# Patient Record
Sex: FEMALE | Race: WHITE | Employment: UNEMPLOYED
[De-identification: names, ages, dates, MRNs, and addresses within clinical notes are randomized per-mention and may not be internally consistent; named-entity substitution may affect disease eponyms.]

---

## 2017-01-12 ENCOUNTER — OFFICE VISIT (OUTPATIENT)
Dept: BARIATRICS/WEIGHT MGMT | Facility: CLINIC | Age: 57
End: 2017-01-12

## 2017-01-12 VITALS
HEART RATE: 74 BPM | SYSTOLIC BLOOD PRESSURE: 122 MMHG | RESPIRATION RATE: 20 BRPM | HEIGHT: 66 IN | DIASTOLIC BLOOD PRESSURE: 74 MMHG | BODY MASS INDEX: 29.89 KG/M2 | WEIGHT: 186 LBS

## 2017-01-12 DIAGNOSIS — Z98.84 STATUS POST BARIATRIC SURGERY: ICD-10-CM

## 2017-01-12 DIAGNOSIS — G47.33 OBSTRUCTIVE SLEEP APNEA: Primary | ICD-10-CM

## 2017-01-12 PROCEDURE — 99212 OFFICE O/P EST SF 10 MIN: CPT | Performed by: SURGERY

## 2017-02-20 ENCOUNTER — OFFICE VISIT (OUTPATIENT)
Dept: PULMONOLOGY | Facility: CLINIC | Age: 57
End: 2017-02-20

## 2017-02-20 VITALS
WEIGHT: 188.6 LBS | HEART RATE: 66 BPM | DIASTOLIC BLOOD PRESSURE: 67 MMHG | RESPIRATION RATE: 18 BRPM | OXYGEN SATURATION: 99 % | BODY MASS INDEX: 29.6 KG/M2 | SYSTOLIC BLOOD PRESSURE: 102 MMHG | HEIGHT: 67 IN

## 2017-02-20 DIAGNOSIS — Z86.718 HISTORY OF DVT (DEEP VEIN THROMBOSIS): ICD-10-CM

## 2017-02-20 DIAGNOSIS — I10 ESSENTIAL HYPERTENSION: ICD-10-CM

## 2017-02-20 DIAGNOSIS — Z99.89 OSA ON CPAP: ICD-10-CM

## 2017-02-20 DIAGNOSIS — J45.20 MILD INTERMITTENT ASTHMA WITHOUT COMPLICATION: Primary | ICD-10-CM

## 2017-02-20 DIAGNOSIS — G47.33 OSA ON CPAP: ICD-10-CM

## 2017-02-20 DIAGNOSIS — Z98.84 STATUS POST GASTRIC BYPASS FOR OBESITY: ICD-10-CM

## 2017-02-20 DIAGNOSIS — R63.4 WEIGHT LOSS: ICD-10-CM

## 2017-02-20 PROCEDURE — 99213 OFFICE O/P EST LOW 20 MIN: CPT | Performed by: INTERNAL MEDICINE

## 2017-02-20 RX ORDER — SENNOSIDES 8.6 MG
650 CAPSULE ORAL EVERY 8 HOURS PRN
Status: ON HOLD | COMMUNITY
End: 2018-11-22

## 2017-02-21 ENCOUNTER — OFFICE VISIT (OUTPATIENT)
Dept: FAMILY MEDICINE CLINIC | Facility: CLINIC | Age: 57
End: 2017-02-21

## 2017-02-21 VITALS
WEIGHT: 188.8 LBS | OXYGEN SATURATION: 98 % | HEART RATE: 64 BPM | HEIGHT: 66 IN | SYSTOLIC BLOOD PRESSURE: 112 MMHG | TEMPERATURE: 97.8 F | DIASTOLIC BLOOD PRESSURE: 76 MMHG | BODY MASS INDEX: 30.34 KG/M2

## 2017-02-21 DIAGNOSIS — H72.91 PERFORATION OF EAR DRUM, RIGHT: Primary | ICD-10-CM

## 2017-02-21 PROCEDURE — 99213 OFFICE O/P EST LOW 20 MIN: CPT | Performed by: PHYSICIAN ASSISTANT

## 2017-02-21 RX ORDER — TRIAMCINOLONE ACETONIDE 40 MG/ML
40 INJECTION, SUSPENSION INTRA-ARTICULAR; INTRAMUSCULAR ONCE
Status: COMPLETED | OUTPATIENT
Start: 2017-02-21 | End: 2017-02-21

## 2017-02-21 RX ORDER — LEVOFLOXACIN 500 MG/1
500 TABLET, FILM COATED ORAL DAILY
Qty: 5 TABLET | Refills: 0 | Status: SHIPPED | OUTPATIENT
Start: 2017-02-21 | End: 2017-02-26

## 2017-02-21 RX ORDER — CEFTRIAXONE 1 G/1
1 INJECTION, POWDER, FOR SOLUTION INTRAMUSCULAR; INTRAVENOUS ONCE
Status: COMPLETED | OUTPATIENT
Start: 2017-02-21 | End: 2017-02-21

## 2017-02-21 RX ADMIN — TRIAMCINOLONE ACETONIDE 40 MG: 40 INJECTION, SUSPENSION INTRA-ARTICULAR; INTRAMUSCULAR at 09:36

## 2017-02-21 RX ADMIN — CEFTRIAXONE 1 G: 1 INJECTION, POWDER, FOR SOLUTION INTRAMUSCULAR; INTRAVENOUS at 09:35

## 2017-02-21 ASSESSMENT — ENCOUNTER SYMPTOMS
COUGH: 0
EYE ITCHING: 0
NAUSEA: 0
CHEST TIGHTNESS: 0
SINUS PRESSURE: 0
EYE DISCHARGE: 0
RHINORRHEA: 0
DIARRHEA: 0
VOMITING: 0
ABDOMINAL PAIN: 0
SORE THROAT: 0
SHORTNESS OF BREATH: 0

## 2017-03-09 ENCOUNTER — OFFICE VISIT (OUTPATIENT)
Dept: FAMILY MEDICINE CLINIC | Facility: CLINIC | Age: 57
End: 2017-03-09

## 2017-03-09 VITALS
SYSTOLIC BLOOD PRESSURE: 101 MMHG | WEIGHT: 185 LBS | OXYGEN SATURATION: 99 % | HEART RATE: 71 BPM | HEIGHT: 66 IN | TEMPERATURE: 97.8 F | BODY MASS INDEX: 29.73 KG/M2 | DIASTOLIC BLOOD PRESSURE: 60 MMHG

## 2017-03-09 DIAGNOSIS — L72.9 INFECTED CYST OF SKIN: Primary | ICD-10-CM

## 2017-03-09 DIAGNOSIS — L08.9 INFECTED CYST OF SKIN: Primary | ICD-10-CM

## 2017-03-09 PROCEDURE — 99213 OFFICE O/P EST LOW 20 MIN: CPT | Performed by: NURSE PRACTITIONER

## 2017-03-09 RX ORDER — CYCLOSPORINE 0.5 MG/ML
1 EMULSION OPHTHALMIC 2 TIMES DAILY
COMMUNITY
Start: 2017-03-01 | End: 2020-09-24

## 2017-03-09 RX ORDER — CEPHALEXIN 500 MG/1
500 CAPSULE ORAL 3 TIMES DAILY
Qty: 30 CAPSULE | Refills: 0 | Status: SHIPPED | OUTPATIENT
Start: 2017-03-09 | End: 2017-03-19

## 2017-03-20 ENCOUNTER — OFFICE VISIT (OUTPATIENT)
Dept: FAMILY MEDICINE CLINIC | Age: 57
End: 2017-03-20
Payer: MEDICARE

## 2017-03-20 VITALS
HEART RATE: 98 BPM | DIASTOLIC BLOOD PRESSURE: 76 MMHG | HEIGHT: 66 IN | OXYGEN SATURATION: 98 % | BODY MASS INDEX: 29.88 KG/M2 | TEMPERATURE: 97.7 F | WEIGHT: 185.9 LBS | SYSTOLIC BLOOD PRESSURE: 118 MMHG

## 2017-03-20 DIAGNOSIS — L02.91 ABSCESS: Primary | ICD-10-CM

## 2017-03-20 PROCEDURE — 99213 OFFICE O/P EST LOW 20 MIN: CPT | Performed by: PHYSICIAN ASSISTANT

## 2017-03-20 RX ORDER — DOXYCYCLINE HYCLATE 100 MG
100 TABLET ORAL 2 TIMES DAILY
Qty: 20 TABLET | Refills: 0 | Status: SHIPPED | OUTPATIENT
Start: 2017-03-20 | End: 2017-03-30

## 2017-03-20 ASSESSMENT — ENCOUNTER SYMPTOMS
EYE ITCHING: 0
COUGH: 0
VOMITING: 0
NAUSEA: 0
EYE DISCHARGE: 0
RHINORRHEA: 0
DIARRHEA: 0
SINUS PRESSURE: 0
CHEST TIGHTNESS: 0
ABDOMINAL PAIN: 0
SHORTNESS OF BREATH: 0
SORE THROAT: 0

## 2017-03-31 ENCOUNTER — ANESTHESIA EVENT (OUTPATIENT)
Dept: OPERATING ROOM | Age: 57
End: 2017-03-31
Payer: MEDICARE

## 2017-04-03 ENCOUNTER — HOSPITAL ENCOUNTER (OUTPATIENT)
Age: 57
Setting detail: OUTPATIENT SURGERY
Discharge: HOME | End: 2017-04-03
Attending: SURGERY | Admitting: SURGERY
Payer: MEDICARE

## 2017-04-03 ENCOUNTER — ANESTHESIA (OUTPATIENT)
Dept: OPERATING ROOM | Age: 57
End: 2017-04-03
Payer: MEDICARE

## 2017-04-03 VITALS — SYSTOLIC BLOOD PRESSURE: 105 MMHG | DIASTOLIC BLOOD PRESSURE: 55 MMHG | OXYGEN SATURATION: 100 %

## 2017-04-03 VITALS
HEART RATE: 67 BPM | BODY MASS INDEX: 29.73 KG/M2 | WEIGHT: 185 LBS | OXYGEN SATURATION: 99 % | TEMPERATURE: 97.2 F | HEIGHT: 66 IN | SYSTOLIC BLOOD PRESSURE: 110 MMHG | RESPIRATION RATE: 16 BRPM | DIASTOLIC BLOOD PRESSURE: 68 MMHG

## 2017-04-03 LAB
ANION GAP: 12 MMOL/L (ref 8–16)
GLUCOSE BLD-MCNC: 84 MG/DL (ref 74–106)
HCT VFR BLD CALC: 40.4 % (ref 36–46)
HEMOGLOBIN: 13.6 G/DL (ref 12–16)
MCH RBC QN AUTO: 29.8 PG (ref 26–34)
MCHC RBC AUTO-ENTMCNC: 33.6 G/DL (ref 31–37)
MCV RBC AUTO: 88.9 FL (ref 80–100)
PDW BLD-RTO: 14.9 % (ref 12.5–15.4)
PLATELET # BLD: 287 K/UL (ref 140–450)
PMV BLD AUTO: 6.9 FL (ref 6–12)
POC BUN: 17 MG/DL (ref 6–20)
POC CHLORIDE: 102 MMOL/L (ref 98–110)
POC CREATININE: 0.7 MG/DL (ref 0.6–1.4)
POC HEMATOCRIT: 43 % (ref 36–46)
POC HEMOGLOBIN: 14.6 GM/DL (ref 12–16)
POC IONIZED CALCIUM: 1.19 MMOL/L (ref 1.13–1.33)
POC POTASSIUM: 5.3 MMOL/L (ref 3.5–5.1)
POC SODIUM: 140 MMOL/L (ref 136–145)
POC TCO2: 32 MMOL/L (ref 20–31)
RBC # BLD: 4.54 M/UL (ref 4–5.2)
WBC # BLD: 9.4 K/UL (ref 3.5–11)

## 2017-04-03 PROCEDURE — 2580000003 HC RX 258: Performed by: ANESTHESIOLOGY

## 2017-04-03 PROCEDURE — 3600000002 HC SURGERY LEVEL 2 BASE: Performed by: SURGERY

## 2017-04-03 PROCEDURE — 80047 BASIC METABLC PNL IONIZED CA: CPT

## 2017-04-03 PROCEDURE — 3700000000 HC ANESTHESIA ATTENDED CARE: Performed by: SURGERY

## 2017-04-03 PROCEDURE — 7100000010 HC PHASE II RECOVERY - FIRST 15 MIN: Performed by: SURGERY

## 2017-04-03 PROCEDURE — 3700000001 HC ADD 15 MINUTES (ANESTHESIA): Performed by: SURGERY

## 2017-04-03 PROCEDURE — 3600000012 HC SURGERY LEVEL 2 ADDTL 15MIN: Performed by: SURGERY

## 2017-04-03 PROCEDURE — 2580000003 HC RX 258: Performed by: SURGERY

## 2017-04-03 PROCEDURE — 85027 COMPLETE CBC AUTOMATED: CPT

## 2017-04-03 PROCEDURE — 2500000003 HC RX 250 WO HCPCS: Performed by: SURGERY

## 2017-04-03 PROCEDURE — 2500000003 HC RX 250 WO HCPCS: Performed by: ANESTHESIOLOGY

## 2017-04-03 PROCEDURE — 93005 ELECTROCARDIOGRAM TRACING: CPT

## 2017-04-03 PROCEDURE — 6360000002 HC RX W HCPCS: Performed by: NURSE ANESTHETIST, CERTIFIED REGISTERED

## 2017-04-03 PROCEDURE — 88304 TISSUE EXAM BY PATHOLOGIST: CPT

## 2017-04-03 PROCEDURE — 85014 HEMATOCRIT: CPT

## 2017-04-03 PROCEDURE — 2580000003 HC RX 258: Performed by: NURSE ANESTHETIST, CERTIFIED REGISTERED

## 2017-04-03 PROCEDURE — 7100000011 HC PHASE II RECOVERY - ADDTL 15 MIN: Performed by: SURGERY

## 2017-04-03 PROCEDURE — 2500000003 HC RX 250 WO HCPCS: Performed by: NURSE ANESTHETIST, CERTIFIED REGISTERED

## 2017-04-03 RX ORDER — FENTANYL CITRATE 50 UG/ML
INJECTION, SOLUTION INTRAMUSCULAR; INTRAVENOUS PRN
Status: DISCONTINUED | OUTPATIENT
Start: 2017-04-03 | End: 2017-04-03 | Stop reason: SDUPTHER

## 2017-04-03 RX ORDER — FENTANYL CITRATE 50 UG/ML
25 INJECTION, SOLUTION INTRAMUSCULAR; INTRAVENOUS EVERY 5 MIN PRN
Status: DISCONTINUED | OUTPATIENT
Start: 2017-04-03 | End: 2017-04-03 | Stop reason: HOSPADM

## 2017-04-03 RX ORDER — CLINDAMYCIN PHOSPHATE 150 MG/ML
INJECTION, SOLUTION INTRAVENOUS PRN
Status: DISCONTINUED | OUTPATIENT
Start: 2017-04-03 | End: 2017-04-03 | Stop reason: SDUPTHER

## 2017-04-03 RX ORDER — SODIUM CHLORIDE, SODIUM LACTATE, POTASSIUM CHLORIDE, CALCIUM CHLORIDE 600; 310; 30; 20 MG/100ML; MG/100ML; MG/100ML; MG/100ML
INJECTION, SOLUTION INTRAVENOUS CONTINUOUS
Status: DISCONTINUED | OUTPATIENT
Start: 2017-04-03 | End: 2017-04-03 | Stop reason: SDUPTHER

## 2017-04-03 RX ORDER — HYDROCODONE BITARTRATE AND ACETAMINOPHEN 5; 325 MG/1; MG/1
1 TABLET ORAL EVERY 6 HOURS PRN
Qty: 10 TABLET | Refills: 0 | Status: SHIPPED | OUTPATIENT
Start: 2017-04-03 | End: 2017-04-10

## 2017-04-03 RX ORDER — DIPHENHYDRAMINE HYDROCHLORIDE 50 MG/ML
12.5 INJECTION INTRAMUSCULAR; INTRAVENOUS
Status: DISCONTINUED | OUTPATIENT
Start: 2017-04-03 | End: 2017-04-03 | Stop reason: HOSPADM

## 2017-04-03 RX ORDER — ONDANSETRON 2 MG/ML
4 INJECTION INTRAMUSCULAR; INTRAVENOUS
Status: DISCONTINUED | OUTPATIENT
Start: 2017-04-03 | End: 2017-04-03 | Stop reason: HOSPADM

## 2017-04-03 RX ORDER — SODIUM CHLORIDE, SODIUM LACTATE, POTASSIUM CHLORIDE, CALCIUM CHLORIDE 600; 310; 30; 20 MG/100ML; MG/100ML; MG/100ML; MG/100ML
INJECTION, SOLUTION INTRAVENOUS CONTINUOUS PRN
Status: DISCONTINUED | OUTPATIENT
Start: 2017-04-03 | End: 2017-04-03 | Stop reason: SDUPTHER

## 2017-04-03 RX ORDER — MIDAZOLAM HYDROCHLORIDE 1 MG/ML
INJECTION INTRAMUSCULAR; INTRAVENOUS PRN
Status: DISCONTINUED | OUTPATIENT
Start: 2017-04-03 | End: 2017-04-03 | Stop reason: SDUPTHER

## 2017-04-03 RX ORDER — BUPIVACAINE HYDROCHLORIDE AND EPINEPHRINE 5; 5 MG/ML; UG/ML
INJECTION, SOLUTION EPIDURAL; INTRACAUDAL; PERINEURAL PRN
Status: DISCONTINUED | OUTPATIENT
Start: 2017-04-03 | End: 2017-04-03 | Stop reason: HOSPADM

## 2017-04-03 RX ORDER — MAGNESIUM HYDROXIDE 1200 MG/15ML
LIQUID ORAL CONTINUOUS PRN
Status: DISCONTINUED | OUTPATIENT
Start: 2017-04-03 | End: 2017-04-03 | Stop reason: HOSPADM

## 2017-04-03 RX ORDER — LIDOCAINE HYDROCHLORIDE 10 MG/ML
1 INJECTION, SOLUTION EPIDURAL; INFILTRATION; INTRACAUDAL; PERINEURAL
Status: COMPLETED | OUTPATIENT
Start: 2017-04-03 | End: 2017-04-03

## 2017-04-03 RX ORDER — FENTANYL CITRATE 50 UG/ML
50 INJECTION, SOLUTION INTRAMUSCULAR; INTRAVENOUS EVERY 5 MIN PRN
Status: DISCONTINUED | OUTPATIENT
Start: 2017-04-03 | End: 2017-04-03 | Stop reason: HOSPADM

## 2017-04-03 RX ORDER — PROPOFOL 10 MG/ML
INJECTION, EMULSION INTRAVENOUS CONTINUOUS PRN
Status: DISCONTINUED | OUTPATIENT
Start: 2017-04-03 | End: 2017-04-03 | Stop reason: SDUPTHER

## 2017-04-03 RX ORDER — HYDROCODONE BITARTRATE AND ACETAMINOPHEN 5; 325 MG/1; MG/1
1 TABLET ORAL EVERY 6 HOURS PRN
Qty: 10 TABLET | Refills: 0 | Status: SHIPPED | OUTPATIENT
Start: 2017-04-03 | End: 2017-04-03

## 2017-04-03 RX ADMIN — MIDAZOLAM HYDROCHLORIDE 2 MG: 1 INJECTION, SOLUTION INTRAMUSCULAR; INTRAVENOUS at 15:29

## 2017-04-03 RX ADMIN — FENTANYL CITRATE 50 MCG: 50 INJECTION INTRAMUSCULAR; INTRAVENOUS at 15:30

## 2017-04-03 RX ADMIN — CLINDAMYCIN PHOSPHATE 900 MG: 150 INJECTION, SOLUTION INTRAMUSCULAR; INTRAVENOUS at 15:41

## 2017-04-03 RX ADMIN — SODIUM CHLORIDE, POTASSIUM CHLORIDE, SODIUM LACTATE AND CALCIUM CHLORIDE: 600; 310; 30; 20 INJECTION, SOLUTION INTRAVENOUS at 15:29

## 2017-04-03 RX ADMIN — FENTANYL CITRATE 25 MCG: 50 INJECTION INTRAMUSCULAR; INTRAVENOUS at 16:13

## 2017-04-03 RX ADMIN — FENTANYL CITRATE 25 MCG: 50 INJECTION INTRAMUSCULAR; INTRAVENOUS at 16:04

## 2017-04-03 RX ADMIN — SODIUM CHLORIDE, POTASSIUM CHLORIDE, SODIUM LACTATE AND CALCIUM CHLORIDE: 600; 310; 30; 20 INJECTION, SOLUTION INTRAVENOUS at 13:53

## 2017-04-03 RX ADMIN — LIDOCAINE HYDROCHLORIDE 0.4 ML: 10 INJECTION, SOLUTION INFILTRATION; PERINEURAL at 13:41

## 2017-04-03 RX ADMIN — PROPOFOL 100 MCG/KG/MIN: 10 INJECTION, EMULSION INTRAVENOUS at 15:32

## 2017-04-03 ASSESSMENT — PAIN - FUNCTIONAL ASSESSMENT: PAIN_FUNCTIONAL_ASSESSMENT: 0-10

## 2017-04-03 ASSESSMENT — PAIN SCALES - GENERAL
PAINLEVEL_OUTOF10: 2
PAINLEVEL_OUTOF10: 0
PAINLEVEL_OUTOF10: 2

## 2017-04-05 LAB — SURGICAL PATHOLOGY REPORT: NORMAL

## 2017-04-06 LAB
EKG ATRIAL RATE: 66 BPM
EKG P AXIS: 12 DEGREES
EKG P-R INTERVAL: 138 MS
EKG Q-T INTERVAL: 388 MS
EKG QRS DURATION: 72 MS
EKG QTC CALCULATION (BAZETT): 406 MS
EKG R AXIS: 33 DEGREES
EKG T AXIS: 38 DEGREES
EKG VENTRICULAR RATE: 66 BPM

## 2017-05-12 RX ORDER — LORAZEPAM 0.5 MG/1
0.5 TABLET ORAL EVERY 8 HOURS PRN
Qty: 15 TABLET | Refills: 0 | Status: SHIPPED | OUTPATIENT
Start: 2017-05-12 | End: 2017-06-11

## 2017-06-06 RX ORDER — MOMETASONE FUROATE AND FORMOTEROL FUMARATE DIHYDRATE 200; 5 UG/1; UG/1
AEROSOL RESPIRATORY (INHALATION)
Qty: 1 INHALER | Refills: 2 | Status: SHIPPED | OUTPATIENT
Start: 2017-06-06 | End: 2017-08-21 | Stop reason: SDUPTHER

## 2017-07-12 ENCOUNTER — OFFICE VISIT (OUTPATIENT)
Dept: BARIATRICS/WEIGHT MGMT | Age: 57
End: 2017-07-12
Payer: MEDICARE

## 2017-07-12 VITALS
WEIGHT: 186 LBS | RESPIRATION RATE: 20 BRPM | SYSTOLIC BLOOD PRESSURE: 104 MMHG | DIASTOLIC BLOOD PRESSURE: 64 MMHG | HEART RATE: 68 BPM | BODY MASS INDEX: 29.89 KG/M2 | HEIGHT: 66 IN

## 2017-07-12 DIAGNOSIS — M25.562 CHRONIC PAIN OF LEFT KNEE: ICD-10-CM

## 2017-07-12 DIAGNOSIS — E66.9 OBESITY (BMI 30-39.9): ICD-10-CM

## 2017-07-12 DIAGNOSIS — Z98.84 STATUS POST GASTRIC BYPASS FOR OBESITY: ICD-10-CM

## 2017-07-12 DIAGNOSIS — Z99.89 OSA ON CPAP: ICD-10-CM

## 2017-07-12 DIAGNOSIS — G89.29 CHRONIC PAIN OF LEFT KNEE: ICD-10-CM

## 2017-07-12 DIAGNOSIS — M85.80 OSTEOPENIA: ICD-10-CM

## 2017-07-12 DIAGNOSIS — J45.909 UNCOMPLICATED ASTHMA, UNSPECIFIED ASTHMA SEVERITY: Primary | ICD-10-CM

## 2017-07-12 DIAGNOSIS — G47.33 OSA ON CPAP: ICD-10-CM

## 2017-07-12 DIAGNOSIS — M15.9 PRIMARY OSTEOARTHRITIS INVOLVING MULTIPLE JOINTS: ICD-10-CM

## 2017-07-12 PROCEDURE — 99213 OFFICE O/P EST LOW 20 MIN: CPT | Performed by: NURSE PRACTITIONER

## 2017-08-21 ENCOUNTER — OFFICE VISIT (OUTPATIENT)
Dept: PULMONOLOGY | Age: 57
End: 2017-08-21
Payer: MEDICARE

## 2017-08-21 VITALS
HEART RATE: 73 BPM | DIASTOLIC BLOOD PRESSURE: 72 MMHG | TEMPERATURE: 98.1 F | HEIGHT: 67 IN | RESPIRATION RATE: 15 BRPM | SYSTOLIC BLOOD PRESSURE: 114 MMHG | WEIGHT: 182 LBS | OXYGEN SATURATION: 98 % | BODY MASS INDEX: 28.56 KG/M2

## 2017-08-21 DIAGNOSIS — I10 ESSENTIAL HYPERTENSION: ICD-10-CM

## 2017-08-21 DIAGNOSIS — Z98.84 STATUS POST GASTRIC BYPASS FOR OBESITY: ICD-10-CM

## 2017-08-21 DIAGNOSIS — R63.4 WEIGHT LOSS: ICD-10-CM

## 2017-08-21 DIAGNOSIS — G47.33 OSA ON CPAP: ICD-10-CM

## 2017-08-21 DIAGNOSIS — Z86.718 HISTORY OF DVT (DEEP VEIN THROMBOSIS): ICD-10-CM

## 2017-08-21 DIAGNOSIS — Z99.89 OSA ON CPAP: ICD-10-CM

## 2017-08-21 DIAGNOSIS — J45.20 MILD INTERMITTENT ASTHMA WITHOUT COMPLICATION: Primary | ICD-10-CM

## 2017-08-21 PROCEDURE — 99214 OFFICE O/P EST MOD 30 MIN: CPT | Performed by: INTERNAL MEDICINE

## 2017-08-21 RX ORDER — ALBUTEROL SULFATE 2.5 MG/3ML
2.5 SOLUTION RESPIRATORY (INHALATION) EVERY 6 HOURS PRN
Qty: 120 EACH | Refills: 11 | Status: SHIPPED | OUTPATIENT
Start: 2017-08-21 | End: 2020-10-12 | Stop reason: SDUPTHER

## 2017-08-21 RX ORDER — ALBUTEROL SULFATE 90 UG/1
2 AEROSOL, METERED RESPIRATORY (INHALATION) EVERY 6 HOURS PRN
Qty: 1 INHALER | Refills: 11 | Status: ON HOLD | OUTPATIENT
Start: 2017-08-21 | End: 2018-12-06 | Stop reason: HOSPADM

## 2017-09-05 ENCOUNTER — OFFICE VISIT (OUTPATIENT)
Dept: FAMILY MEDICINE CLINIC | Age: 57
End: 2017-09-05
Payer: MEDICARE

## 2017-09-05 VITALS
WEIGHT: 189.6 LBS | HEART RATE: 75 BPM | BODY MASS INDEX: 30.47 KG/M2 | HEIGHT: 66 IN | SYSTOLIC BLOOD PRESSURE: 118 MMHG | TEMPERATURE: 98.2 F | DIASTOLIC BLOOD PRESSURE: 76 MMHG | OXYGEN SATURATION: 98 %

## 2017-09-05 DIAGNOSIS — R51.9 INTRACTABLE HEADACHE, UNSPECIFIED CHRONICITY PATTERN, UNSPECIFIED HEADACHE TYPE: Primary | ICD-10-CM

## 2017-09-05 DIAGNOSIS — J45.909 UNCOMPLICATED ASTHMA, UNSPECIFIED ASTHMA SEVERITY: ICD-10-CM

## 2017-09-05 DIAGNOSIS — M15.9 PRIMARY OSTEOARTHRITIS INVOLVING MULTIPLE JOINTS: ICD-10-CM

## 2017-09-05 DIAGNOSIS — E66.9 OBESITY (BMI 30-39.9): ICD-10-CM

## 2017-09-05 DIAGNOSIS — R63.4 WEIGHT LOSS: ICD-10-CM

## 2017-09-05 DIAGNOSIS — E55.9 VITAMIN D DEFICIENCY: ICD-10-CM

## 2017-09-05 DIAGNOSIS — Z23 NEED FOR INFLUENZA VACCINATION: ICD-10-CM

## 2017-09-05 DIAGNOSIS — I10 ESSENTIAL HYPERTENSION: ICD-10-CM

## 2017-09-05 PROCEDURE — 90471 IMMUNIZATION ADMIN: CPT | Performed by: PHYSICIAN ASSISTANT

## 2017-09-05 PROCEDURE — 90686 IIV4 VACC NO PRSV 0.5 ML IM: CPT | Performed by: PHYSICIAN ASSISTANT

## 2017-09-05 PROCEDURE — 99214 OFFICE O/P EST MOD 30 MIN: CPT | Performed by: PHYSICIAN ASSISTANT

## 2017-09-05 RX ORDER — KETOROLAC TROMETHAMINE 30 MG/ML
60 INJECTION, SOLUTION INTRAMUSCULAR; INTRAVENOUS ONCE
Status: COMPLETED | OUTPATIENT
Start: 2017-09-05 | End: 2017-09-05

## 2017-09-05 RX ORDER — TRIAMCINOLONE ACETONIDE 40 MG/ML
40 INJECTION, SUSPENSION INTRA-ARTICULAR; INTRAMUSCULAR ONCE
Status: COMPLETED | OUTPATIENT
Start: 2017-09-05 | End: 2017-09-05

## 2017-09-05 RX ADMIN — KETOROLAC TROMETHAMINE 60 MG: 30 INJECTION, SOLUTION INTRAMUSCULAR; INTRAVENOUS at 14:41

## 2017-09-05 RX ADMIN — TRIAMCINOLONE ACETONIDE 40 MG: 40 INJECTION, SUSPENSION INTRA-ARTICULAR; INTRAMUSCULAR at 14:38

## 2017-09-05 ASSESSMENT — PATIENT HEALTH QUESTIONNAIRE - PHQ9
1. LITTLE INTEREST OR PLEASURE IN DOING THINGS: 0
SUM OF ALL RESPONSES TO PHQ QUESTIONS 1-9: 0
SUM OF ALL RESPONSES TO PHQ9 QUESTIONS 1 & 2: 0
2. FEELING DOWN, DEPRESSED OR HOPELESS: 0

## 2017-09-06 ENCOUNTER — HOSPITAL ENCOUNTER (OUTPATIENT)
Age: 57
Discharge: HOME | End: 2017-09-06
Payer: MEDICARE

## 2017-09-06 DIAGNOSIS — E66.9 OBESITY (BMI 30-39.9): ICD-10-CM

## 2017-09-06 DIAGNOSIS — E55.9 VITAMIN D DEFICIENCY: ICD-10-CM

## 2017-09-06 DIAGNOSIS — I10 ESSENTIAL HYPERTENSION: ICD-10-CM

## 2017-09-06 DIAGNOSIS — M15.9 PRIMARY OSTEOARTHRITIS INVOLVING MULTIPLE JOINTS: ICD-10-CM

## 2017-09-06 DIAGNOSIS — R63.4 WEIGHT LOSS: ICD-10-CM

## 2017-09-06 DIAGNOSIS — R51.9 INTRACTABLE HEADACHE, UNSPECIFIED CHRONICITY PATTERN, UNSPECIFIED HEADACHE TYPE: ICD-10-CM

## 2017-09-06 DIAGNOSIS — J45.909 UNCOMPLICATED ASTHMA, UNSPECIFIED ASTHMA SEVERITY: ICD-10-CM

## 2017-09-06 LAB
ABSOLUTE EOS #: 0.2 K/UL (ref 0–0.4)
ABSOLUTE LYMPH #: 1.9 K/UL (ref 1–4.8)
ABSOLUTE MONO #: 0.5 K/UL (ref 0.1–1.2)
ALBUMIN SERPL-MCNC: 4 G/DL (ref 3.5–5.2)
ALBUMIN/GLOBULIN RATIO: 1.4 (ref 1–2.5)
ALP BLD-CCNC: 80 U/L (ref 35–104)
ALT SERPL-CCNC: 13 U/L (ref 5–33)
ANION GAP SERPL CALCULATED.3IONS-SCNC: 10 MMOL/L (ref 9–17)
AST SERPL-CCNC: 13 U/L
BASOPHILS # BLD: 0 %
BASOPHILS ABSOLUTE: 0 K/UL (ref 0–0.2)
BILIRUB SERPL-MCNC: 0.39 MG/DL (ref 0.3–1.2)
BUN BLDV-MCNC: 18 MG/DL (ref 6–20)
BUN/CREAT BLD: NORMAL (ref 9–20)
CALCIUM SERPL-MCNC: 8.9 MG/DL (ref 8.6–10.4)
CHLORIDE BLD-SCNC: 106 MMOL/L (ref 98–107)
CHOLESTEROL/HDL RATIO: 4.3
CHOLESTEROL: 166 MG/DL
CO2: 26 MMOL/L (ref 20–31)
CREAT SERPL-MCNC: 0.73 MG/DL (ref 0.5–0.9)
DIFFERENTIAL TYPE: NORMAL
EOSINOPHILS RELATIVE PERCENT: 2 %
ESTIMATED AVERAGE GLUCOSE: 105 MG/DL
FOLATE: >20 NG/ML
GFR AFRICAN AMERICAN: >60 ML/MIN
GFR NON-AFRICAN AMERICAN: >60 ML/MIN
GFR SERPL CREATININE-BSD FRML MDRD: NORMAL ML/MIN/{1.73_M2}
GFR SERPL CREATININE-BSD FRML MDRD: NORMAL ML/MIN/{1.73_M2}
GLUCOSE BLD-MCNC: 94 MG/DL (ref 70–99)
HBA1C MFR BLD: 5.3 % (ref 4–6)
HCT VFR BLD CALC: 40.6 % (ref 36–46)
HDLC SERPL-MCNC: 39 MG/DL
HEMOGLOBIN: 13.6 G/DL (ref 12–16)
IRON SATURATION: 26 % (ref 20–55)
IRON: 61 UG/DL (ref 37–145)
LDL CHOLESTEROL: 95 MG/DL (ref 0–130)
LYMPHOCYTES # BLD: 24 %
MCH RBC QN AUTO: 30.9 PG (ref 26–34)
MCHC RBC AUTO-ENTMCNC: 33.5 G/DL (ref 31–37)
MCV RBC AUTO: 92.2 FL (ref 80–100)
MONOCYTES # BLD: 6 %
PDW BLD-RTO: 13.3 % (ref 12.5–15.4)
PLATELET # BLD: 299 K/UL (ref 140–450)
PLATELET ESTIMATE: NORMAL
PMV BLD AUTO: 6.4 FL (ref 6–12)
POTASSIUM SERPL-SCNC: 4.9 MMOL/L (ref 3.7–5.3)
RBC # BLD: 4.4 M/UL (ref 4–5.2)
RBC # BLD: NORMAL 10*6/UL
SEG NEUTROPHILS: 68 %
SEGMENTED NEUTROPHILS ABSOLUTE COUNT: 5.2 K/UL (ref 1.8–7.7)
SODIUM BLD-SCNC: 142 MMOL/L (ref 135–144)
TOTAL IRON BINDING CAPACITY: 232 UG/DL (ref 250–450)
TOTAL PROTEIN: 6.9 G/DL (ref 6.4–8.3)
TRIGL SERPL-MCNC: 159 MG/DL
TSH SERPL DL<=0.05 MIU/L-ACNC: 1.15 MIU/L (ref 0.3–5)
UNSATURATED IRON BINDING CAPACITY: 171 UG/DL (ref 112–347)
VITAMIN B-12: 647 PG/ML (ref 211–946)
VITAMIN D 25-HYDROXY: 46.5 NG/ML (ref 30–100)
VLDLC SERPL CALC-MCNC: ABNORMAL MG/DL (ref 1–30)
WBC # BLD: 7.8 K/UL (ref 3.5–11)
WBC # BLD: NORMAL 10*3/UL

## 2017-09-06 PROCEDURE — 80053 COMPREHEN METABOLIC PANEL: CPT

## 2017-09-06 PROCEDURE — 83036 HEMOGLOBIN GLYCOSYLATED A1C: CPT

## 2017-09-06 PROCEDURE — 83540 ASSAY OF IRON: CPT

## 2017-09-06 PROCEDURE — 85025 COMPLETE CBC W/AUTO DIFF WBC: CPT

## 2017-09-06 PROCEDURE — 84425 ASSAY OF VITAMIN B-1: CPT

## 2017-09-06 PROCEDURE — 83550 IRON BINDING TEST: CPT

## 2017-09-06 PROCEDURE — 82607 VITAMIN B-12: CPT

## 2017-09-06 PROCEDURE — 84443 ASSAY THYROID STIM HORMONE: CPT

## 2017-09-06 PROCEDURE — 82306 VITAMIN D 25 HYDROXY: CPT

## 2017-09-06 PROCEDURE — 82746 ASSAY OF FOLIC ACID SERUM: CPT

## 2017-09-06 PROCEDURE — 80061 LIPID PANEL: CPT

## 2017-09-06 PROCEDURE — 36415 COLL VENOUS BLD VENIPUNCTURE: CPT

## 2017-09-06 RX ORDER — AMITRIPTYLINE HYDROCHLORIDE 25 MG/1
25 TABLET, FILM COATED ORAL NIGHTLY
Qty: 30 TABLET | Refills: 0 | Status: SHIPPED | OUTPATIENT
Start: 2017-09-06 | End: 2017-09-22

## 2017-09-06 RX ORDER — AMITRIPTYLINE HYDROCHLORIDE 10 MG/1
10 TABLET, FILM COATED ORAL NIGHTLY PRN
Qty: 30 TABLET | Refills: 3 | Status: SHIPPED | OUTPATIENT
Start: 2017-09-06 | End: 2017-09-06

## 2017-09-06 ASSESSMENT — ENCOUNTER SYMPTOMS
COUGH: 0
DIARRHEA: 0
BLURRED VISION: 0
NAUSEA: 0
DIFFICULTY BREATHING: 0
EYE PAIN: 0
EYE DISCHARGE: 0
ABDOMINAL PAIN: 0
PHOTOPHOBIA: 0
BACK PAIN: 0
EYE WATERING: 0
SINUS PRESSURE: 0
SHORTNESS OF BREATH: 0
EYE REDNESS: 0
ORTHOPNEA: 0
RHINORRHEA: 0
CHEST TIGHTNESS: 0
VOMITING: 0
SORE THROAT: 0
EYE ITCHING: 0

## 2017-09-09 ENCOUNTER — HOSPITAL ENCOUNTER (OUTPATIENT)
Dept: MRI IMAGING | Age: 57
Discharge: HOME | End: 2017-09-09
Payer: MEDICARE

## 2017-09-09 DIAGNOSIS — R51.9 INTRACTABLE HEADACHE, UNSPECIFIED CHRONICITY PATTERN, UNSPECIFIED HEADACHE TYPE: ICD-10-CM

## 2017-09-09 LAB — VITAMIN B1 WHOLE BLOOD: 97 NMOL/L (ref 70–180)

## 2017-09-09 PROCEDURE — 70551 MRI BRAIN STEM W/O DYE: CPT

## 2017-09-22 ENCOUNTER — OFFICE VISIT (OUTPATIENT)
Dept: FAMILY MEDICINE CLINIC | Age: 57
End: 2017-09-22
Payer: MEDICARE

## 2017-09-22 VITALS
HEIGHT: 66 IN | OXYGEN SATURATION: 98 % | DIASTOLIC BLOOD PRESSURE: 76 MMHG | TEMPERATURE: 98.1 F | WEIGHT: 186 LBS | SYSTOLIC BLOOD PRESSURE: 120 MMHG | BODY MASS INDEX: 29.89 KG/M2 | HEART RATE: 87 BPM

## 2017-09-22 DIAGNOSIS — R51.9 HEADACHE, UNSPECIFIED HEADACHE TYPE: Primary | ICD-10-CM

## 2017-09-22 PROCEDURE — 99213 OFFICE O/P EST LOW 20 MIN: CPT | Performed by: PHYSICIAN ASSISTANT

## 2017-09-22 RX ORDER — DOXYCYCLINE HYCLATE 100 MG
100 TABLET ORAL 2 TIMES DAILY
Qty: 20 TABLET | Refills: 0 | Status: SHIPPED | OUTPATIENT
Start: 2017-09-22 | End: 2017-10-02

## 2017-09-22 RX ORDER — AMITRIPTYLINE HYDROCHLORIDE 50 MG/1
50 TABLET, FILM COATED ORAL NIGHTLY
Qty: 30 TABLET | Refills: 3 | Status: SHIPPED | OUTPATIENT
Start: 2017-09-22 | End: 2017-10-10

## 2017-09-22 ASSESSMENT — ENCOUNTER SYMPTOMS
ABDOMINAL PAIN: 0
SHORTNESS OF BREATH: 0
NAUSEA: 0
DIFFICULTY BREATHING: 0
BACK PAIN: 0
EYE DISCHARGE: 0
PHOTOPHOBIA: 0
DIARRHEA: 0
COUGH: 0
SINUS PRESSURE: 0
EYE ITCHING: 0
EYE PAIN: 0
SORE THROAT: 0
BLURRED VISION: 0
ORTHOPNEA: 0
EYE REDNESS: 0
VOMITING: 0
RHINORRHEA: 0
EYE WATERING: 0
CHEST TIGHTNESS: 0
FACIAL SWEATING: 0

## 2017-09-26 ENCOUNTER — PATIENT MESSAGE (OUTPATIENT)
Dept: FAMILY MEDICINE CLINIC | Age: 57
End: 2017-09-26

## 2017-09-26 ENCOUNTER — HOSPITAL ENCOUNTER (EMERGENCY)
Age: 57
Discharge: HOME | End: 2017-09-26
Attending: EMERGENCY MEDICINE
Payer: MEDICARE

## 2017-09-26 VITALS
BODY MASS INDEX: 29.73 KG/M2 | DIASTOLIC BLOOD PRESSURE: 88 MMHG | OXYGEN SATURATION: 99 % | TEMPERATURE: 98.4 F | RESPIRATION RATE: 18 BRPM | WEIGHT: 185 LBS | HEART RATE: 78 BPM | SYSTOLIC BLOOD PRESSURE: 126 MMHG | HEIGHT: 66 IN

## 2017-09-26 DIAGNOSIS — R51.9 NONINTRACTABLE HEADACHE, UNSPECIFIED CHRONICITY PATTERN, UNSPECIFIED HEADACHE TYPE: Primary | ICD-10-CM

## 2017-09-26 PROCEDURE — 6360000002 HC RX W HCPCS: Performed by: NURSE PRACTITIONER

## 2017-09-26 PROCEDURE — S0028 INJECTION, FAMOTIDINE, 20 MG: HCPCS | Performed by: NURSE PRACTITIONER

## 2017-09-26 PROCEDURE — 99283 EMERGENCY DEPT VISIT LOW MDM: CPT

## 2017-09-26 PROCEDURE — 96374 THER/PROPH/DIAG INJ IV PUSH: CPT

## 2017-09-26 PROCEDURE — 96375 TX/PRO/DX INJ NEW DRUG ADDON: CPT

## 2017-09-26 PROCEDURE — 2500000003 HC RX 250 WO HCPCS: Performed by: NURSE PRACTITIONER

## 2017-09-26 PROCEDURE — 2580000003 HC RX 258: Performed by: NURSE PRACTITIONER

## 2017-09-26 RX ORDER — DEXAMETHASONE SODIUM PHOSPHATE 10 MG/ML
10 INJECTION INTRAMUSCULAR; INTRAVENOUS ONCE
Status: COMPLETED | OUTPATIENT
Start: 2017-09-26 | End: 2017-09-26

## 2017-09-26 RX ORDER — 0.9 % SODIUM CHLORIDE 0.9 %
1000 INTRAVENOUS SOLUTION INTRAVENOUS ONCE
Status: COMPLETED | OUTPATIENT
Start: 2017-09-26 | End: 2017-09-26

## 2017-09-26 RX ORDER — KETOROLAC TROMETHAMINE 15 MG/ML
15 INJECTION, SOLUTION INTRAMUSCULAR; INTRAVENOUS ONCE
Status: COMPLETED | OUTPATIENT
Start: 2017-09-26 | End: 2017-09-26

## 2017-09-26 RX ORDER — DIPHENHYDRAMINE HYDROCHLORIDE 50 MG/ML
25 INJECTION INTRAMUSCULAR; INTRAVENOUS ONCE
Status: COMPLETED | OUTPATIENT
Start: 2017-09-26 | End: 2017-09-26

## 2017-09-26 RX ADMIN — FAMOTIDINE 20 MG: 10 INJECTION INTRAVENOUS at 18:49

## 2017-09-26 RX ADMIN — DIPHENHYDRAMINE HYDROCHLORIDE 25 MG: 50 INJECTION, SOLUTION INTRAMUSCULAR; INTRAVENOUS at 18:02

## 2017-09-26 RX ADMIN — DEXAMETHASONE SODIUM PHOSPHATE 10 MG: 10 INJECTION INTRAMUSCULAR; INTRAVENOUS at 18:49

## 2017-09-26 RX ADMIN — SODIUM CHLORIDE 1000 ML: 9 INJECTION, SOLUTION INTRAVENOUS at 18:02

## 2017-09-26 RX ADMIN — PROCHLORPERAZINE EDISYLATE 10 MG: 5 INJECTION INTRAMUSCULAR; INTRAVENOUS at 18:02

## 2017-09-26 RX ADMIN — KETOROLAC TROMETHAMINE 15 MG: 15 INJECTION, SOLUTION INTRAMUSCULAR; INTRAVENOUS at 18:02

## 2017-09-26 ASSESSMENT — PAIN SCALES - GENERAL
PAINLEVEL_OUTOF10: 7
PAINLEVEL_OUTOF10: 8
PAINLEVEL_OUTOF10: 8

## 2017-09-26 ASSESSMENT — PAIN DESCRIPTION - LOCATION: LOCATION: HEAD

## 2017-09-26 ASSESSMENT — PAIN DESCRIPTION - DESCRIPTORS: DESCRIPTORS: DULL

## 2017-09-26 ASSESSMENT — PAIN DESCRIPTION - PAIN TYPE: TYPE: CHRONIC PAIN

## 2017-09-26 ASSESSMENT — PAIN DESCRIPTION - PROGRESSION: CLINICAL_PROGRESSION: GRADUALLY IMPROVING

## 2017-10-10 RX ORDER — AMITRIPTYLINE HYDROCHLORIDE 75 MG/1
150 TABLET, FILM COATED ORAL NIGHTLY
Qty: 60 TABLET | Refills: 3 | Status: SHIPPED | OUTPATIENT
Start: 2017-10-10 | End: 2018-02-05 | Stop reason: SDUPTHER

## 2017-10-11 ENCOUNTER — APPOINTMENT (OUTPATIENT)
Dept: CT IMAGING | Age: 57
End: 2017-10-11
Payer: MEDICARE

## 2017-10-11 ENCOUNTER — HOSPITAL ENCOUNTER (OUTPATIENT)
Age: 57
Setting detail: OBSERVATION
Discharge: HOME | End: 2017-10-12
Attending: EMERGENCY MEDICINE | Admitting: INTERNAL MEDICINE
Payer: MEDICARE

## 2017-10-11 DIAGNOSIS — R11.2 NAUSEA AND VOMITING, INTRACTABILITY OF VOMITING NOT SPECIFIED, UNSPECIFIED VOMITING TYPE: Primary | ICD-10-CM

## 2017-10-11 DIAGNOSIS — R10.13 ABDOMINAL PAIN, EPIGASTRIC: ICD-10-CM

## 2017-10-11 PROBLEM — K52.9 ACUTE GASTROENTERITIS: Status: ACTIVE | Noted: 2017-10-11

## 2017-10-11 LAB
-: ABNORMAL
ABSOLUTE EOS #: 0.1 K/UL (ref 0–0.4)
ABSOLUTE LYMPH #: 2.7 K/UL (ref 1–4.8)
ABSOLUTE MONO #: 1.1 K/UL (ref 0.1–1.2)
ALBUMIN SERPL-MCNC: 4.5 G/DL (ref 3.5–5.2)
ALBUMIN/GLOBULIN RATIO: 1.1 (ref 1–2.5)
ALP BLD-CCNC: 105 U/L (ref 35–104)
ALT SERPL-CCNC: 7 U/L (ref 5–33)
AMORPHOUS: ABNORMAL
AMYLASE: 61 U/L (ref 28–100)
ANION GAP SERPL CALCULATED.3IONS-SCNC: 14 MMOL/L (ref 9–17)
AST SERPL-CCNC: 15 U/L
BACTERIA: ABNORMAL
BASOPHILS # BLD: 0 %
BASOPHILS ABSOLUTE: 0.1 K/UL (ref 0–0.2)
BILIRUB SERPL-MCNC: 0.38 MG/DL (ref 0.3–1.2)
BILIRUBIN DIRECT: 0.11 MG/DL
BILIRUBIN URINE: NEGATIVE
BILIRUBIN, INDIRECT: 0.27 MG/DL (ref 0–1)
BUN BLDV-MCNC: 10 MG/DL (ref 6–20)
BUN/CREAT BLD: ABNORMAL (ref 9–20)
CALCIUM SERPL-MCNC: 10 MG/DL (ref 8.6–10.4)
CASTS UA: ABNORMAL /LPF
CHLORIDE BLD-SCNC: 99 MMOL/L (ref 98–107)
CO2: 27 MMOL/L (ref 20–31)
COLOR: YELLOW
COMMENT UA: ABNORMAL
CREAT SERPL-MCNC: 0.73 MG/DL (ref 0.5–0.9)
CRYSTALS, UA: ABNORMAL /HPF
DIFFERENTIAL TYPE: ABNORMAL
EKG ATRIAL RATE: 98 BPM
EKG P AXIS: 58 DEGREES
EKG P-R INTERVAL: 186 MS
EKG Q-T INTERVAL: 346 MS
EKG QRS DURATION: 84 MS
EKG QTC CALCULATION (BAZETT): 441 MS
EKG R AXIS: 55 DEGREES
EKG T AXIS: 47 DEGREES
EKG VENTRICULAR RATE: 98 BPM
EOSINOPHILS RELATIVE PERCENT: 1 %
EPITHELIAL CELLS UA: ABNORMAL /HPF (ref 0–5)
GFR AFRICAN AMERICAN: >60 ML/MIN
GFR NON-AFRICAN AMERICAN: >60 ML/MIN
GFR SERPL CREATININE-BSD FRML MDRD: ABNORMAL ML/MIN/{1.73_M2}
GFR SERPL CREATININE-BSD FRML MDRD: ABNORMAL ML/MIN/{1.73_M2}
GLOBULIN: ABNORMAL G/DL (ref 1.5–3.8)
GLUCOSE BLD-MCNC: 107 MG/DL (ref 70–99)
GLUCOSE URINE: NEGATIVE
HCT VFR BLD CALC: 42.4 % (ref 36–46)
HEMOGLOBIN: 14 G/DL (ref 12–16)
KETONES, URINE: ABNORMAL
LACTIC ACID: 1.5 MMOL/L (ref 0.5–2.2)
LEUKOCYTE ESTERASE, URINE: NEGATIVE
LIPASE: 25 U/L (ref 13–60)
LYMPHOCYTES # BLD: 16 %
MCH RBC QN AUTO: 30.3 PG (ref 26–34)
MCHC RBC AUTO-ENTMCNC: 33 G/DL (ref 31–37)
MCV RBC AUTO: 91.6 FL (ref 80–100)
MONOCYTES # BLD: 7 %
MUCUS: ABNORMAL
NITRITE, URINE: NEGATIVE
OTHER OBSERVATIONS UA: ABNORMAL
PDW BLD-RTO: 12.9 % (ref 12.5–15.4)
PH UA: 7 (ref 5–8)
PLATELET # BLD: 395 K/UL (ref 140–450)
PLATELET ESTIMATE: ABNORMAL
PMV BLD AUTO: 6.1 FL (ref 6–12)
POTASSIUM SERPL-SCNC: 4.4 MMOL/L (ref 3.7–5.3)
PROTEIN UA: NEGATIVE
RBC # BLD: 4.63 M/UL (ref 4–5.2)
RBC # BLD: ABNORMAL 10*6/UL
RBC UA: ABNORMAL /HPF (ref 0–2)
RENAL EPITHELIAL, UA: ABNORMAL /HPF
SEG NEUTROPHILS: 76 %
SEGMENTED NEUTROPHILS ABSOLUTE COUNT: 12.7 K/UL (ref 1.8–7.7)
SODIUM BLD-SCNC: 140 MMOL/L (ref 135–144)
SPECIFIC GRAVITY UA: 1.02 (ref 1–1.03)
TOTAL PROTEIN: 8.5 G/DL (ref 6.4–8.3)
TRICHOMONAS: ABNORMAL
TROPONIN INTERP: NORMAL
TROPONIN T: <0.03 NG/ML
TURBIDITY: ABNORMAL
URINE HGB: ABNORMAL
UROBILINOGEN, URINE: NORMAL
WBC # BLD: 16.6 K/UL (ref 3.5–11)
WBC # BLD: ABNORMAL 10*3/UL
WBC UA: ABNORMAL /HPF (ref 0–5)
YEAST: ABNORMAL

## 2017-10-11 PROCEDURE — G0378 HOSPITAL OBSERVATION PER HR: HCPCS

## 2017-10-11 PROCEDURE — 6370000000 HC RX 637 (ALT 250 FOR IP): Performed by: INTERNAL MEDICINE

## 2017-10-11 PROCEDURE — 94640 AIRWAY INHALATION TREATMENT: CPT

## 2017-10-11 PROCEDURE — 80048 BASIC METABOLIC PNL TOTAL CA: CPT

## 2017-10-11 PROCEDURE — 6360000002 HC RX W HCPCS: Performed by: INTERNAL MEDICINE

## 2017-10-11 PROCEDURE — 83690 ASSAY OF LIPASE: CPT

## 2017-10-11 PROCEDURE — 85025 COMPLETE CBC W/AUTO DIFF WBC: CPT

## 2017-10-11 PROCEDURE — 96375 TX/PRO/DX INJ NEW DRUG ADDON: CPT

## 2017-10-11 PROCEDURE — 96376 TX/PRO/DX INJ SAME DRUG ADON: CPT

## 2017-10-11 PROCEDURE — 81001 URINALYSIS AUTO W/SCOPE: CPT

## 2017-10-11 PROCEDURE — 2580000003 HC RX 258: Performed by: EMERGENCY MEDICINE

## 2017-10-11 PROCEDURE — 6360000002 HC RX W HCPCS: Performed by: EMERGENCY MEDICINE

## 2017-10-11 PROCEDURE — 80076 HEPATIC FUNCTION PANEL: CPT

## 2017-10-11 PROCEDURE — 36415 COLL VENOUS BLD VENIPUNCTURE: CPT

## 2017-10-11 PROCEDURE — 2580000003 HC RX 258: Performed by: INTERNAL MEDICINE

## 2017-10-11 PROCEDURE — 6360000004 HC RX CONTRAST MEDICATION: Performed by: EMERGENCY MEDICINE

## 2017-10-11 PROCEDURE — 99285 EMERGENCY DEPT VISIT HI MDM: CPT

## 2017-10-11 PROCEDURE — 96374 THER/PROPH/DIAG INJ IV PUSH: CPT

## 2017-10-11 PROCEDURE — 84484 ASSAY OF TROPONIN QUANT: CPT

## 2017-10-11 PROCEDURE — 83605 ASSAY OF LACTIC ACID: CPT

## 2017-10-11 PROCEDURE — 93005 ELECTROCARDIOGRAM TRACING: CPT

## 2017-10-11 PROCEDURE — 96372 THER/PROPH/DIAG INJ SC/IM: CPT

## 2017-10-11 PROCEDURE — 94664 DEMO&/EVAL PT USE INHALER: CPT

## 2017-10-11 PROCEDURE — 99219 PR INITIAL OBSERVATION CARE/DAY 50 MINUTES: CPT | Performed by: INTERNAL MEDICINE

## 2017-10-11 PROCEDURE — 82150 ASSAY OF AMYLASE: CPT

## 2017-10-11 PROCEDURE — 74177 CT ABD & PELVIS W/CONTRAST: CPT

## 2017-10-11 RX ORDER — SODIUM CHLORIDE 0.9 % (FLUSH) 0.9 %
10 SYRINGE (ML) INJECTION PRN
Status: DISCONTINUED | OUTPATIENT
Start: 2017-10-11 | End: 2017-10-11 | Stop reason: SDUPTHER

## 2017-10-11 RX ORDER — SODIUM CHLORIDE 0.9 % (FLUSH) 0.9 %
10 SYRINGE (ML) INJECTION EVERY 12 HOURS SCHEDULED
Status: DISCONTINUED | OUTPATIENT
Start: 2017-10-11 | End: 2017-10-12 | Stop reason: HOSPADM

## 2017-10-11 RX ORDER — MORPHINE SULFATE 4 MG/ML
4 INJECTION, SOLUTION INTRAMUSCULAR; INTRAVENOUS
Status: DISCONTINUED | OUTPATIENT
Start: 2017-10-11 | End: 2017-10-11

## 2017-10-11 RX ORDER — BISACODYL 10 MG
10 SUPPOSITORY, RECTAL RECTAL DAILY PRN
Status: DISCONTINUED | OUTPATIENT
Start: 2017-10-11 | End: 2017-10-12 | Stop reason: HOSPADM

## 2017-10-11 RX ORDER — SODIUM CHLORIDE 9 MG/ML
INJECTION, SOLUTION INTRAVENOUS CONTINUOUS
Status: DISCONTINUED | OUTPATIENT
Start: 2017-10-11 | End: 2017-10-12 | Stop reason: HOSPADM

## 2017-10-11 RX ORDER — MORPHINE SULFATE 2 MG/ML
2 INJECTION, SOLUTION INTRAMUSCULAR; INTRAVENOUS ONCE
Status: COMPLETED | OUTPATIENT
Start: 2017-10-11 | End: 2017-10-11

## 2017-10-11 RX ORDER — AMITRIPTYLINE HYDROCHLORIDE 50 MG/1
150 TABLET, FILM COATED ORAL NIGHTLY
Status: DISCONTINUED | OUTPATIENT
Start: 2017-10-11 | End: 2017-10-12 | Stop reason: HOSPADM

## 2017-10-11 RX ORDER — OMEGA-3 FATTY ACIDS/FISH OIL 300-1000MG
1000 CAPSULE ORAL DAILY
Status: DISCONTINUED | OUTPATIENT
Start: 2017-10-11 | End: 2017-10-11

## 2017-10-11 RX ORDER — ALBUTEROL SULFATE 90 UG/1
2 AEROSOL, METERED RESPIRATORY (INHALATION) EVERY 6 HOURS PRN
Status: DISCONTINUED | OUTPATIENT
Start: 2017-10-11 | End: 2017-10-11

## 2017-10-11 RX ORDER — MORPHINE SULFATE 2 MG/ML
2 INJECTION, SOLUTION INTRAMUSCULAR; INTRAVENOUS
Status: DISCONTINUED | OUTPATIENT
Start: 2017-10-11 | End: 2017-10-11

## 2017-10-11 RX ORDER — 0.9 % SODIUM CHLORIDE 0.9 %
80 INTRAVENOUS SOLUTION INTRAVENOUS ONCE
Status: COMPLETED | OUTPATIENT
Start: 2017-10-11 | End: 2017-10-11

## 2017-10-11 RX ORDER — ALBUTEROL SULFATE 2.5 MG/3ML
2.5 SOLUTION RESPIRATORY (INHALATION)
Status: DISCONTINUED | OUTPATIENT
Start: 2017-10-11 | End: 2017-10-12 | Stop reason: HOSPADM

## 2017-10-11 RX ORDER — MULTIVITAMIN WITH FOLIC ACID 400 MCG
1 TABLET ORAL DAILY
Status: DISCONTINUED | OUTPATIENT
Start: 2017-10-11 | End: 2017-10-12 | Stop reason: HOSPADM

## 2017-10-11 RX ORDER — ONDANSETRON 2 MG/ML
4 INJECTION INTRAMUSCULAR; INTRAVENOUS EVERY 6 HOURS PRN
Status: DISCONTINUED | OUTPATIENT
Start: 2017-10-11 | End: 2017-10-12 | Stop reason: HOSPADM

## 2017-10-11 RX ORDER — NICOTINE 21 MG/24HR
1 PATCH, TRANSDERMAL 24 HOURS TRANSDERMAL DAILY PRN
Status: DISCONTINUED | OUTPATIENT
Start: 2017-10-11 | End: 2017-10-12 | Stop reason: HOSPADM

## 2017-10-11 RX ORDER — SODIUM CHLORIDE 0.9 % (FLUSH) 0.9 %
10 SYRINGE (ML) INJECTION PRN
Status: DISCONTINUED | OUTPATIENT
Start: 2017-10-11 | End: 2017-10-12 | Stop reason: HOSPADM

## 2017-10-11 RX ORDER — CALCIUM CARBONATE/VITAMIN D3 250-3.125
2 TABLET ORAL DAILY
Status: DISCONTINUED | OUTPATIENT
Start: 2017-10-11 | End: 2017-10-12 | Stop reason: HOSPADM

## 2017-10-11 RX ORDER — POTASSIUM CHLORIDE 7.45 MG/ML
10 INJECTION INTRAVENOUS PRN
Status: DISCONTINUED | OUTPATIENT
Start: 2017-10-11 | End: 2017-10-12 | Stop reason: HOSPADM

## 2017-10-11 RX ORDER — POTASSIUM CHLORIDE 20MEQ/15ML
40 LIQUID (ML) ORAL PRN
Status: DISCONTINUED | OUTPATIENT
Start: 2017-10-11 | End: 2017-10-12 | Stop reason: HOSPADM

## 2017-10-11 RX ORDER — HYDROCODONE BITARTRATE AND ACETAMINOPHEN 10; 325 MG/1; MG/1
1 TABLET ORAL EVERY 6 HOURS PRN
Status: ON HOLD | COMMUNITY
End: 2017-10-12 | Stop reason: HOSPADM

## 2017-10-11 RX ORDER — ONDANSETRON 2 MG/ML
4 INJECTION INTRAMUSCULAR; INTRAVENOUS ONCE
Status: COMPLETED | OUTPATIENT
Start: 2017-10-11 | End: 2017-10-11

## 2017-10-11 RX ORDER — HYDROCODONE BITARTRATE AND ACETAMINOPHEN 5; 325 MG/1; MG/1
1 TABLET ORAL EVERY 4 HOURS PRN
Status: DISCONTINUED | OUTPATIENT
Start: 2017-10-11 | End: 2017-10-11

## 2017-10-11 RX ORDER — ACETAMINOPHEN 325 MG/1
650 TABLET ORAL EVERY 4 HOURS PRN
Status: DISCONTINUED | OUTPATIENT
Start: 2017-10-11 | End: 2017-10-12 | Stop reason: HOSPADM

## 2017-10-11 RX ORDER — SODIUM CHLORIDE 0.9 % (FLUSH) 0.9 %
10 SYRINGE (ML) INJECTION EVERY 12 HOURS SCHEDULED
Status: DISCONTINUED | OUTPATIENT
Start: 2017-10-11 | End: 2017-10-11 | Stop reason: SDUPTHER

## 2017-10-11 RX ORDER — ALBUTEROL SULFATE 2.5 MG/3ML
2.5 SOLUTION RESPIRATORY (INHALATION) EVERY 6 HOURS PRN
Status: DISCONTINUED | OUTPATIENT
Start: 2017-10-11 | End: 2017-10-11

## 2017-10-11 RX ORDER — POTASSIUM CHLORIDE 20 MEQ/1
40 TABLET, EXTENDED RELEASE ORAL PRN
Status: DISCONTINUED | OUTPATIENT
Start: 2017-10-11 | End: 2017-10-12 | Stop reason: HOSPADM

## 2017-10-11 RX ADMIN — ENOXAPARIN SODIUM 40 MG: 40 INJECTION SUBCUTANEOUS at 21:06

## 2017-10-11 RX ADMIN — IOPAMIDOL 75 ML: 755 INJECTION, SOLUTION INTRAVENOUS at 12:44

## 2017-10-11 RX ADMIN — SODIUM CHLORIDE: 9 INJECTION, SOLUTION INTRAVENOUS at 12:02

## 2017-10-11 RX ADMIN — MORPHINE SULFATE 2 MG: 2 INJECTION, SOLUTION INTRAMUSCULAR; INTRAVENOUS at 15:13

## 2017-10-11 RX ADMIN — ONDANSETRON 4 MG: 2 INJECTION, SOLUTION INTRAMUSCULAR; INTRAVENOUS at 12:02

## 2017-10-11 RX ADMIN — SODIUM CHLORIDE: 9 INJECTION, SOLUTION INTRAVENOUS at 16:45

## 2017-10-11 RX ADMIN — ONDANSETRON 4 MG: 2 INJECTION, SOLUTION INTRAMUSCULAR; INTRAVENOUS at 19:28

## 2017-10-11 RX ADMIN — HYDROCODONE BITARTRATE AND ACETAMINOPHEN 1 TABLET: 5; 325 TABLET ORAL at 17:18

## 2017-10-11 RX ADMIN — SODIUM CHLORIDE 80 ML: 9 INJECTION, SOLUTION INTRAVENOUS at 12:43

## 2017-10-11 RX ADMIN — MOMETASONE FUROATE AND FORMOTEROL FUMARATE DIHYDRATE 2 PUFF: 200; 5 AEROSOL RESPIRATORY (INHALATION) at 21:12

## 2017-10-11 RX ADMIN — Medication 10 ML: at 12:44

## 2017-10-11 RX ADMIN — SODIUM CHLORIDE: 9 INJECTION, SOLUTION INTRAVENOUS at 21:06

## 2017-10-11 RX ADMIN — MORPHINE SULFATE 4 MG: 4 INJECTION INTRAVENOUS at 21:14

## 2017-10-11 ASSESSMENT — PAIN DESCRIPTION - LOCATION
LOCATION: ABDOMEN

## 2017-10-11 ASSESSMENT — PAIN DESCRIPTION - ONSET
ONSET: ON-GOING
ONSET: ON-GOING

## 2017-10-11 ASSESSMENT — PAIN SCALES - GENERAL
PAINLEVEL_OUTOF10: 10
PAINLEVEL_OUTOF10: 8
PAINLEVEL_OUTOF10: 8
PAINLEVEL_OUTOF10: 9
PAINLEVEL_OUTOF10: 7
PAINLEVEL_OUTOF10: 9

## 2017-10-11 ASSESSMENT — PAIN DESCRIPTION - FREQUENCY
FREQUENCY: CONTINUOUS

## 2017-10-11 ASSESSMENT — ENCOUNTER SYMPTOMS
VOMITING: 1
ABDOMINAL PAIN: 1
NAUSEA: 1

## 2017-10-11 ASSESSMENT — PAIN DESCRIPTION - PROGRESSION
CLINICAL_PROGRESSION: NOT CHANGED
CLINICAL_PROGRESSION: NOT CHANGED
CLINICAL_PROGRESSION: GRADUALLY IMPROVING
CLINICAL_PROGRESSION: NOT CHANGED
CLINICAL_PROGRESSION: NOT CHANGED

## 2017-10-11 ASSESSMENT — PAIN DESCRIPTION - DESCRIPTORS
DESCRIPTORS: ACHING
DESCRIPTORS: SHARP
DESCRIPTORS: SHARP
DESCRIPTORS: ACHING;PRESSURE

## 2017-10-11 ASSESSMENT — PAIN DESCRIPTION - PAIN TYPE
TYPE: ACUTE PAIN

## 2017-10-11 ASSESSMENT — PAIN DESCRIPTION - ORIENTATION
ORIENTATION: UPPER;RIGHT;LEFT;MID
ORIENTATION: UPPER;MID
ORIENTATION: MID;UPPER

## 2017-10-11 NOTE — PLAN OF CARE
Problem: Respiratory  Intervention: Respiratory assessment  Rosetta Nancearron Mckenna, RCPPatient Assessment complete. Abdominal pain [R10.9]  Abdominal pain [R10.9]  Abdominal pain [R10.9] . Vitals:    10/11/17 1546   BP: (!) 148/85   Pulse: 74   Resp: 19   Temp: 98.4 °F (36.9 °C)   SpO2: 97%   . Patients home meds are   Prior to Admission medications    Medication Sig Start Date End Date Taking? Authorizing Provider   CLINDAMYCIN HCL PO Take by mouth   Yes Historical Provider, MD   HYDROcodone-acetaminophen (NORCO)  MG per tablet Take 1 tablet by mouth every 6 hours as needed for Pain .    Yes Historical Provider, MD   amitriptyline (ELAVIL) 75 MG tablet Take 2 tablets by mouth nightly 10/10/17  Yes Aislinn Davis PA-C   sodium chloride (VINEET 128) 2 % ophthalmic solution Place 1 drop into both eyes 2 times daily   Yes Historical Provider, MD   mometasone-formoterol (DULERA) 200-5 MCG/ACT inhaler Inhale 2 puffs into the lungs 2 times daily 8/21/17  Yes Sasha Michaud MD   albuterol sulfate HFA (PROVENTIL HFA) 108 (90 Base) MCG/ACT inhaler Inhale 2 puffs into the lungs every 6 hours as needed for Wheezing 8/21/17  Yes Sasha Michaud MD   albuterol (PROVENTIL) (2.5 MG/3ML) 0.083% nebulizer solution Take 3 mLs by nebulization every 6 hours as needed for Wheezing 8/21/17  Yes Sasha Michaud MD   RESTASIS 0.05 % ophthalmic emulsion  3/1/17  Yes Historical Provider, MD   acetaminophen (TYLENOL ARTHRITIS PAIN) 650 MG extended release tablet Take 650 mg by mouth every 8 hours as needed for Pain   Yes Historical Provider, MD   Omega-3 Fatty Acids (OMEGA 3 PO) Take by mouth daily   Yes Historical Provider, MD   Calcium Carb-Cholecalciferol (CALCIUM 500 + D3) 500-600 MG-UNIT TABS Take 1 tablet by mouth daily   Yes Historical Provider, MD   Multiple Vitamin (MVI, CELEBRATE, CHEWABLE TABLET) Take 1 tablet by mouth 2 times daily Using Flinstone for not d/t nausea with Celebrate   Yes Historical Provider, MD   Handicap Placard MISC by Does not apply route. Dx: arthritis, expires 5 yrs from date 3/11/15  Yes Blanca Byers PA-C   .   Recent Surgical History: None = 0     Assessment     IS volume 2750ml  IBW na    RR 16  Breath Sounds: clear throughout      · Bronchodilator assessment at level  1  · Hyperinflation assessment at level   · Secretion Management assessment at level    ·   · [x]    Bronchodilator Assessment  BRONCHODILATOR ASSESSMENT SCORE  Score 0 1 2 3 4 5   Breath Sounds   []  Patient Baseline [x]  No Wheeze good aeration []  Faint, scattered wheezing, good aeration []  Expiratory Wheezing and or moderately diminished []  Insp/Exp wheeze and/or very diminished []  Insp/Exp and/ or marked distress   Respiratory Rate   []  Patient Baseline [x]  Less than 20 []  Less than 20 []  20-25 []  Greater than 25 []  Greater than 25   Peak flow % of Pred or PB [x]  NA   []  Greater than 90%  []  81-90% []  71-80% []  Less than or equal to 70%  or unable to perform []  Unable due to Respiratory Distress   Dyspnea re [x]  Patient Baseline [x]  No SOB [x]  No SOB []  SOB on exertion []  SOB min activity []  At rest/acute   e FEV% Predicted       [x]  NA []  Above 69%  []  Unable []  Above 60-69%  []  Unable []  Above 50-59%  []  Unable []  Above 35-49%  []  Unable []  Less than 35%  []  Unable                 []  Hyperinflation Assessment  Score 1 2 3   CXR and Breath Sounds   []  Clear []  No atelectasis  Basilar aeration []  Atelectasis or absent basilar breath sounds   Incentive Spirometry Volume  (Per IBW)   []  Greater than or equal to 15ml/Kg []  less than 15ml/Kg []  less than 15ml/Kg   Surgery within last 2 weeks []  None or general   []  Abdominal or thoracic surgery  []  Abdominal or thoracic   Chronic Pulmonary Historyre []  No []  Yes []  Yes     []  Secretion Management Assessment  Score 1 2 3   Bilateral Breath Sounds   []  Occasional Rhonchi []  Scattered Rhonchi []  Course Rhonchi and/or poor aeration   Sputum [phone]   70 [phone]   75 261 274 289 305 319 334 348 364 75 344 372 400 [phone]   80 253 266 282 296 312 [phone]

## 2017-10-11 NOTE — ED NOTES
Resting on cart watching TV without noted distress. Relates to relief of nausea. Advised of wait for scan results. Conveys understanding. Denies needs at present.      Faby Garcia RN  10/11/17 5192

## 2017-10-11 NOTE — ED PROVIDER NOTES
1987); Cholecystectomy (); Upper gastrointestinal endoscopy (Aug. 2015); Shel[place]-en-Y Gastric Bypass (12/29/15); Cystoscopy (16); shoulder surgery (Left, 2016); shoulder surgery; other surgical history (10/14/2016); and EXCISION / BIOPSY SKIN LESION OF HEAD / NECK (N/A, 4/3/2017). CURRENT MEDICATIONS       Previous Medications    ACETAMINOPHEN (TYLENOL ARTHRITIS PAIN) 650 MG EXTENDED RELEASE TABLET    Take 650 mg by mouth every 8 hours as needed for Pain    ALBUTEROL (PROVENTIL) (2.5 MG/3ML) 0.083% NEBULIZER SOLUTION    Take 3 mLs by nebulization every 6 hours as needed for Wheezing    ALBUTEROL SULFATE HFA (PROVENTIL HFA) 108 (90 BASE) MCG/ACT INHALER    Inhale 2 puffs into the lungs every 6 hours as needed for Wheezing    AMITRIPTYLINE (ELAVIL) 75 MG TABLET    Take 2 tablets by mouth nightly    CALCIUM CARB-CHOLECALCIFEROL (CALCIUM 500 + D3) 500-600 MG-UNIT TABS    Take 1 tablet by mouth daily    CLINDAMYCIN HCL PO    Take by mouth    HANDICAP PLACARD MISC    by Does not apply route. Dx: arthritis, expires 5 yrs from date    HYDROCODONE-ACETAMINOPHEN (NORCO)  MG PER TABLET    Take 1 tablet by mouth every 6 hours as needed for Pain . MOMETASONE-FORMOTEROL (DULERA) 200-5 MCG/ACT INHALER    Inhale 2 puffs into the lungs 2 times daily    MULTIPLE VITAMIN (MVI, CELEBRATE, CHEWABLE TABLET)    Take 1 tablet by mouth 2 times daily Using Flinstone for not d/t nausea with Celebrate    OMEGA-3 FATTY ACIDS (OMEGA 3 PO)    Take by mouth daily    RESTASIS 0.05 % OPHTHALMIC EMULSION        SODIUM CHLORIDE (VINEET 128) 2 % OPHTHALMIC SOLUTION    Place 1 drop into both eyes 2 times daily       ALLERGIES     is allergic to nsaids; pcn [penicillins]; bactrim [sulfamethoxazole-trimethoprim]; and codeine. FAMILY HISTORY     indicated that her mother is . She indicated that her father is . She indicated that her sister is alive. She indicated that her brother is alive.  She indicated that the none  Conduction: normal  ST Segments: no acute change  T Waves: no acute change  Q Waves: none    Clinical Impression: normal sinus rhythm with no acute changes/normal EKG. No acute infarction/ischemia noted. RADIOLOGY:   Non-plain film images such as CT, Ultrasound and MRI are read by the radiologist. Plain radiographic images are visualized and the radiologist interpretations are reviewed as follows:         Interpretation per the Radiologist below, if available at the time of this note:    CT ABDOMEN PELVIS W IV CONTRAST (Preliminary result)   Result time 10/11/17 13:29:02   Preliminary result by Triston Og MD (10/11/17 13:29:02)                Impression:    Subtle nonspecific mild inflammatory changes within the mesentery adjacent to  the small bowel anastomoses.  No adjacent bowel thickening.  No bowel  obstruction. Moderate amount of stool within the colon. Narrative:    EXAMINATION:  CT OF THE ABDOMEN AND PELVIS WITH CONTRAST 10/11/2017 12:44 pm    TECHNIQUE:  CT of the abdomen and pelvis was performed with the administration of  intravenous contrast. Multiplanar reformatted images are provided for review. Dose modulation, iterative reconstruction, and/or weight based adjustment of  the mA/kV was utilized to reduce the radiation dose to as low as reasonably  achievable. COMPARISON:  None    HISTORY:  ORDERING SYSTEM PROVIDED HISTORY: epigastric abdominal pain, s/p gastric  bypass  TECHNOLOGIST PROVIDED HISTORY:  IV Only Contrast  Ordering Physician Provided Reason for Exam: mid abdomen pain, constipation,  nausea  Acuity: Acute  Type of Exam: Initial  Relevant Medical/Surgical History: Sx:  x2, gastric bypass,  cholecystectomy    FINDINGS:  Lower Chest: No cardiomegaly.  No pericardial effusion.  No pleural effusions. Organs: No liver lesion.  Mild intrahepatic ductal dilatation likely relates  to prior cholecystectomy.  No pancreatic lesion.  No splenomegaly.  No  adrenal lesion.  No hydronephrosis.  No renal calculus.  A 9 mm left renal  hypodensity is incompletely characterized but likely represents a cyst  requiring no additional follow-up. GI/Bowel: Evaluation of the gastrointestinal tract is suboptimal without oral  contrast.  Prior gastric bypass.  No bowel obstruction.  No acute adjacent  inflammatory process.  Large amount of stool is seen throughout the colon. Pelvis: No free fluid is seen within the pelvis.  No bladder calculus. Peritoneum/Retroperitoneum: Mild nonspecific stranding is seen within the  mesentery within the upper abdomen slightly left of midline.  This is seen  adjacent to the anastomosis. Bones/Soft Tissues: No acute soft tissue abnormality.  Mild lumbar spine  degenerative changes.                Preliminary result by Alireza Jimenez MD (10/11/17 13:17:52)                Impression:    Subtle nonspecific mild inflammatory changes within the mesentery adjacent to  the small bowel anastomoses.  No adjacent bowel thickening.  No bowel  obstruction. Moderate amount of stool within the colon.                   LABS:  Results for orders placed or performed during the hospital encounter of 10/11/17   Amylase   Result Value Ref Range    Amylase 61 28 - 100 U/L   Lipase   Result Value Ref Range    Lipase 25 13 - 60 U/L   Hepatic Function Panel   Result Value Ref Range    Alb 4.5 3.5 - 5.2 g/dL    Alkaline Phosphatase 105 (H) 35 - 104 U/L    ALT 7 5 - 33 U/L    AST 15 <32 U/L    Total Bilirubin 0.38 0.3 - 1.2 mg/dL    Bilirubin, Direct 0.11 <0.31 mg/dL    Bilirubin, Indirect 0.27 0.00 - 1.00 mg/dL    Total Protein 8.5 (H) 6.4 - 8.3 g/dL    Globulin NOT REPORTED 1.5 - 3.8 g/dL    Albumin/Globulin Ratio 1.1 1.0 - 2.5   Basic Metabolic Panel   Result Value Ref Range    Glucose 107 (H) 70 - 99 mg/dL    BUN 10 6 - 20 mg/dL    CREATININE 0.73 0.50 - 0.90 mg/dL    Bun/Cre Ratio NOT REPORTED 9 - 20    Calcium 10.0 8.6 - 10.4 mg/dL    Sodium 140 135 - 144 mmol/L    Potassium 4.4 3.7 - 5.3 mmol/L    Chloride 99 98 - 107 mmol/L    CO2 27 20 - 31 mmol/L    Anion Gap 14 9 - 17 mmol/L    GFR Non-African American >60 >60 mL/min    GFR African American >60 >60 mL/min    GFR Comment          GFR Staging NOT REPORTED    CBC Auto Differential   Result Value Ref Range    WBC 16.6 (H) 3.5 - 11.0 k/uL    RBC 4.63 4.0 - 5.2 m/uL    Hemoglobin 14.0 12.0 - 16.0 g/dL    Hematocrit 42.4 36 - 46 %    MCV 91.6 80 - 100 fL    MCH 30.3 26 - 34 pg    MCHC 33.0 31 - 37 g/dL    RDW 12.9 12.5 - 15.4 %    Platelets 673 306 - 404 k/uL    MPV 6.1 6.0 - 12.0 fL    Differential Type NOT REPORTED     Seg Neutrophils 76 %    Lymphocytes 16 %    Monocytes 7 %    Eosinophils % 1 %    Basophils 0 %    Segs Absolute 12.70 (H) 1.8 - 7.7 k/uL    Absolute Lymph # 2.70 1.0 - 4.8 k/uL    Absolute Mono # 1.10 0.1 - 1.2 k/uL    Absolute Eos # 0.10 0.0 - 0.4 k/uL    Basophils # 0.10 0.0 - 0.2 k/uL    WBC Morphology NOT REPORTED     RBC Morphology NOT REPORTED     Platelet Estimate NOT REPORTED    Lactic Acid   Result Value Ref Range    Lactic Acid 1.5 0.5 - 2.2 mmol/L   Troponin   Result Value Ref Range    Troponin T <0.03 <0.03 ng/mL    Troponin Interp         UA W/REFLEX CULTURE   Result Value Ref Range    Color, UA YELLOW YEL    Turbidity UA SLIGHTLY CLOUDY (A) CLEAR    Glucose, Ur NEGATIVE NEG    Bilirubin Urine NEGATIVE NEG    Ketones, Urine TRACE (A) NEG    Specific Gravity, UA 1.025 1.005 - 1.030    Urine Hgb TRACE (A) NEG    pH, UA 7.0 5.0 - 8.0    Protein, UA NEGATIVE NEG    Urobilinogen, Urine Normal NORM    Nitrite, Urine NEGATIVE NEG    Leukocyte Esterase, Urine NEGATIVE NEG    Urinalysis Comments NOT REPORTED    Microscopic Urinalysis   Result Value Ref Range    -          WBC, UA 2 TO 5 0 - 5 /HPF    RBC, UA 2 TO 5 0 - 2 /HPF    Casts UA NOT REPORTED /LPF    Crystals UA NOT REPORTED NONE /HPF    Epithelial Cells UA 2 TO 5 0 - 5 /HPF    Renal Epithelial, Urine NOT REPORTED 0 /HPF voice recognition program.  Efforts were made to edit the dictations but occasionally words are mis-transcribed.)    Swain MD, F.A.C.E.P.   Attending Emergency Medicine Physician       Wendie Cadena MD  10/11/17 0160

## 2017-10-11 NOTE — ED NOTES
Dr Rosalba Lira at bedside discussing plans for admission.      300 [facility], RN  10/11/17 2863

## 2017-10-11 NOTE — ED NOTES
To ED c/o abd pain, vomiting since yesterday. Pt sts she had oral surg last week et has been taking norco, clindamycin. Felt as if she was constipated so started colace, laxative suppository yesterday with small amt soft stool. Sts she vomits everything she eats. Denies any chest pain, shortness of breath. Denies any blood in emesis or stool. Denies fever, dysuria. Is alert, oriented. Skin warm, dry, pink. Resp nonlabored, reg. Lungs clear. HT strong, reg. Abd soft, tender to mid upper area. Hyperactive BS ausc.       300 [facility], RN  10/11/17 [address] [facility], RN  10/11/17 2787

## 2017-10-11 NOTE — H&P
[facility] [address]    HISTORY AND PHYSICAL EXAMINATION            Date:   10/11/2017  Patient name:  Clare Albarado  Date of admission:  10/11/2017 11:27 AM  MRN:   [id number]  Account:  [de-identified]  YOB: 1960  PCP:    Rufus Davis PA-C  Room:   [facility]4246-94  Code Status:    Full Code    Chief Complaint:     Chief Complaint   Patient presents with    Emesis       History Obtained From:     patient, electronic medical record, ED Physician    History of Present Illness: The patient is a 65 y.o. Non-/non  female who presents with Emesis   and she is admitted to the hospital for the management of  Gastroenteritis, Mesenteric Inflammation. She has the following significant co-morbidities: HTN, HLD, KEVIN on CPAP, H/O DVT/PE on 6 mos of AC in the past, Morbid Obesity s/p gastric bypass 12/29/15. She presented to [facility] ED with nausea, vomiting and abdominal pain. She said it started from when she started taking Norco and Clindamycin last week. Patient had elective teeth extractions last week on Wednesday, October 4th. She was discharged with Norco and Clindamycin. She noted that she started experiencing constipation, nausea, vomiting, and burning epigastric abdominal pain by Friday and Saturday. She had 4 episodes of vomiting yesterday and felt weak and dizzy and epigastric pain got worse, so she decided to go to [facility] ED. There, she had basic work up done which revealed Leukocytosis of 16.6K, and CT Abd Pelvis revealed the following:     \"Subtle nonspecific mild inflammatory changes within the mesentery adjacent to the small bowel anastomoses. No adjacent bowel thickening. No bowel obstruction. Moderate amount of stool within the colon. \"    Due to her complicated bariatric history, unrelenting abdominal pain, leukocytosis, and presence of inflammation on CT Abd/Pelvis, she was transferred to [place] [address] for further evaluation/care. Past Medical History:     Past Medical History:   Diagnosis Date    Arthritis     Asthma     On Inhaler    Bursitis     left shoulder    GERD (gastroesophageal reflux disease)     H/O bariatric surgery     15 MONTHS AGO/LOST 160#    Head ache     Hiatal hernia     No surgery yet    History of bladder infections     History of gastritis     History of hemorrhoids     Hyperlipidemia     Hypertension     Not anymore after Bariatric Surgery    Hypertension     presently off medication    Mild intermittent asthma     Obesity     KEVIN on CPAP     at night    Osteoarthritis     Osteopenia     Pulmonary embolism ([address]     from foot  trauma, was treated for six months with anticoagulation. She has no inferior vena cava filter.  S/P gastric bypass 12-29-15    Dr. Magdalena Mace, hosp wt = 280lb, initial wt = 328lb    Vitamin D deficiency     Wears glasses         Past Surgical History:     Past Surgical History:   Procedure Laterality Date     SECTION  1986,  1987    CHOLECYSTECTOMY      COLONOSCOPY      found hital hernia    CYSTOSCOPY  16    EXCISION / BIOPSY SKIN LESION OF HEAD / NECK N/A 4/3/2017     EXCISION POSTERIOR NECK CYST performed by Becky Guerra IV, DO at [facility]  10/14/2016    excision back lipoma with drain placement    SHELBY-EN-Y GASTRIC BYPASS  12/29/15    liver bx, EGD    SHOULDER SURGERY Left 2016    Giovanna procedure    SHOULDER SURGERY      bone spurs 2016    TONSILLECTOMY  1979    UPPER GASTROINTESTINAL ENDOSCOPY  Aug. 2015        Medications Prior to Admission:     Prior to Admission medications    Medication Sig Start Date End Date Taking?  Authorizing Provider   CLINDAMYCIN HCL PO Take by mouth   Yes Historical Provider, MD   HYDROcodone-acetaminophen (NORCO)  MG per tablet Take 1 tablet by mouth every 6 hours as needed for Pain . Yes Historical Provider, MD   amitriptyline (ELAVIL) 75 MG tablet Take 2 tablets by mouth nightly 10/10/17  Yes Crystal Beckford PA-C   sodium chloride (VINEET 128) 2 % ophthalmic solution Place 1 drop into both eyes 2 times daily   Yes Historical Provider, MD   mometasone-formoterol (DULERA) 200-5 MCG/ACT inhaler Inhale 2 puffs into the lungs 2 times daily 8/21/17  Yes Barrie Agee MD   albuterol sulfate HFA (PROVENTIL HFA) 108 (90 Base) MCG/ACT inhaler Inhale 2 puffs into the lungs every 6 hours as needed for Wheezing 8/21/17  Yes Barrie Agee MD   albuterol (PROVENTIL) (2.5 MG/3ML) 0.083% nebulizer solution Take 3 mLs by nebulization every 6 hours as needed for Wheezing 8/21/17  Yes Barrie Agee MD   RESTASIS 0.05 % ophthalmic emulsion  3/1/17  Yes Historical Provider, MD   acetaminophen (TYLENOL ARTHRITIS PAIN) 650 MG extended release tablet Take 650 mg by mouth every 8 hours as needed for Pain   Yes Historical Provider, MD   Omega-3 Fatty Acids (OMEGA 3 PO) Take by mouth daily   Yes Historical Provider, MD   Calcium Carb-Cholecalciferol (CALCIUM 500 + D3) 500-600 MG-UNIT TABS Take 1 tablet by mouth daily   Yes Historical Provider, MD   Multiple Vitamin (MVI, CELEBRATE, CHEWABLE TABLET) Take 1 tablet by mouth 2 times daily Using Flinstone for not d/t nausea with Celebrate   Yes Historical Provider, MD   Handicap Placard MISC by Does not apply route. Dx: arthritis, expires 5 yrs from date 3/11/15  Yes Crystal Beckford PA-C        Allergies:     Nsaids; Pcn [penicillins]; Bactrim [sulfamethoxazole-trimethoprim]; and Codeine    Social History:     Tobacco:    reports that she quit smoking about 30 years ago. Her smoking use included Cigarettes. She started smoking about 38 years ago. She has a 5.00 pack-year smoking history. She has never used smokeless tobacco.  Alcohol:      reports that she does not drink alcohol. Drug Use:  reports that she does not use drugs.     Family History:     Family discharge, hearing intact  Nose:  no drainage noted  Mouth: mucous membranes moist  Neck: supple, no carotid bruits, thyroid not palpable  Lungs: Bilateral equal air entry, clear to ausculation, no wheezing, rales or rhonchi, normal effort  Cardiovascular: normal rate, regular rhythm, no murmur, gallop, rub. Abdomen: Soft, nondistended, normal bowel sounds, no hepatomegaly or splenomegaly. Moderate tenderness to palpation in epigastric region.    Neurologic: There are no new focal motor or sensory deficits, normal muscle tone and bulk, no abnormal sensation, normal speech, cranial nerves II through XII grossly intact  Skin: No gross lesions, rashes, bruising or bleeding on exposed skin area  Extremities:  peripheral pulses palpable, no pedal edema or calf pain with palpation  Psych: normal affect     Investigations:      Laboratory Testing:  Recent Results (from the past 24 hour(s))   UA W/REFLEX CULTURE    Collection Time: 10/11/17 11:50 AM   Result Value Ref Range    Color, UA YELLOW YEL    Turbidity UA SLIGHTLY CLOUDY (A) CLEAR    Glucose, Ur NEGATIVE NEG    Bilirubin Urine NEGATIVE NEG    Ketones, Urine TRACE (A) NEG    Specific Gravity, UA 1.025 1.005 - 1.030    Urine Hgb TRACE (A) NEG    pH, UA 7.0 5.0 - 8.0    Protein, UA NEGATIVE NEG    Urobilinogen, Urine Normal NORM    Nitrite, Urine NEGATIVE NEG    Leukocyte Esterase, Urine NEGATIVE NEG    Urinalysis Comments NOT REPORTED    Microscopic Urinalysis    Collection Time: 10/11/17 11:50 AM   Result Value Ref Range    -          WBC, UA 2 TO 5 0 - 5 /HPF    RBC, UA 2 TO 5 0 - 2 /HPF    Casts UA NOT REPORTED /LPF    Crystals UA NOT REPORTED NONE /HPF    Epithelial Cells UA 2 TO 5 0 - 5 /HPF    Renal Epithelial, Urine NOT REPORTED 0 /HPF    Bacteria, UA FEW (A) NONE    Mucus, UA 1+ (A) NONE    Trichomonas, UA NOT REPORTED NONE    Amorphous, UA NOT REPORTED NONE    Other Observations UA (A) NREQ     Utilizing a urinalysis as the only screening method to exclude a

## 2017-10-12 VITALS
BODY MASS INDEX: 28.95 KG/M2 | OXYGEN SATURATION: 97 % | TEMPERATURE: 98.7 F | RESPIRATION RATE: 17 BRPM | HEART RATE: 90 BPM | WEIGHT: 191 LBS | SYSTOLIC BLOOD PRESSURE: 128 MMHG | DIASTOLIC BLOOD PRESSURE: 75 MMHG | HEIGHT: 68 IN

## 2017-10-12 LAB
ABSOLUTE EOS #: 0.1 K/UL (ref 0–0.4)
ABSOLUTE LYMPH #: 2.2 K/UL (ref 1–4.8)
ABSOLUTE MONO #: 0.7 K/UL (ref 0.1–1.2)
ALBUMIN SERPL-MCNC: 3.3 G/DL (ref 3.5–5.2)
ALBUMIN/GLOBULIN RATIO: 1.1 (ref 1–2.5)
ALP BLD-CCNC: 79 U/L (ref 35–104)
ALT SERPL-CCNC: 9 U/L (ref 5–33)
ANION GAP SERPL CALCULATED.3IONS-SCNC: 12 MMOL/L (ref 9–17)
AST SERPL-CCNC: 10 U/L
BASOPHILS # BLD: 1 %
BASOPHILS ABSOLUTE: 0.1 K/UL (ref 0–0.2)
BILIRUB SERPL-MCNC: 0.38 MG/DL (ref 0.3–1.2)
BUN BLDV-MCNC: 9 MG/DL (ref 6–20)
BUN/CREAT BLD: ABNORMAL (ref 9–20)
CALCIUM SERPL-MCNC: 8.5 MG/DL (ref 8.6–10.4)
CHLORIDE BLD-SCNC: 102 MMOL/L (ref 98–107)
CO2: 25 MMOL/L (ref 20–31)
CREAT SERPL-MCNC: 0.65 MG/DL (ref 0.5–0.9)
DIFFERENTIAL TYPE: ABNORMAL
EOSINOPHILS RELATIVE PERCENT: 2 %
GFR AFRICAN AMERICAN: >60 ML/MIN
GFR NON-AFRICAN AMERICAN: >60 ML/MIN
GFR SERPL CREATININE-BSD FRML MDRD: ABNORMAL ML/MIN/{1.73_M2}
GFR SERPL CREATININE-BSD FRML MDRD: ABNORMAL ML/MIN/{1.73_M2}
GLUCOSE BLD-MCNC: 88 MG/DL (ref 70–99)
HCT VFR BLD CALC: 33.8 % (ref 36–46)
HEMOGLOBIN: 11.5 G/DL (ref 12–16)
INR BLD: 1
LYMPHOCYTES # BLD: 22 %
MAGNESIUM: 1.9 MG/DL (ref 1.6–2.6)
MCH RBC QN AUTO: 31.2 PG (ref 26–34)
MCHC RBC AUTO-ENTMCNC: 34.1 G/DL (ref 31–37)
MCV RBC AUTO: 91.4 FL (ref 80–100)
MONOCYTES # BLD: 7 %
PDW BLD-RTO: 13.3 % (ref 12.5–15.4)
PLATELET # BLD: 359 K/UL (ref 140–450)
PLATELET ESTIMATE: ABNORMAL
PMV BLD AUTO: 5.8 FL (ref 6–12)
POTASSIUM SERPL-SCNC: 4.7 MMOL/L (ref 3.7–5.3)
PROTHROMBIN TIME: 11.1 SEC (ref 9.4–12.6)
RBC # BLD: 3.7 M/UL (ref 4–5.2)
RBC # BLD: ABNORMAL 10*6/UL
SEG NEUTROPHILS: 68 %
SEGMENTED NEUTROPHILS ABSOLUTE COUNT: 6.7 K/UL (ref 1.8–7.7)
SODIUM BLD-SCNC: 139 MMOL/L (ref 135–144)
TOTAL PROTEIN: 6.2 G/DL (ref 6.4–8.3)
WBC # BLD: 9.9 K/UL (ref 3.5–11)
WBC # BLD: ABNORMAL 10*3/UL

## 2017-10-12 PROCEDURE — 36415 COLL VENOUS BLD VENIPUNCTURE: CPT

## 2017-10-12 PROCEDURE — 2580000003 HC RX 258: Performed by: INTERNAL MEDICINE

## 2017-10-12 PROCEDURE — 85025 COMPLETE CBC W/AUTO DIFF WBC: CPT

## 2017-10-12 PROCEDURE — 80053 COMPREHEN METABOLIC PANEL: CPT

## 2017-10-12 PROCEDURE — 99225 PR SBSQ OBSERVATION CARE/DAY 25 MINUTES: CPT | Performed by: INTERNAL MEDICINE

## 2017-10-12 PROCEDURE — 83735 ASSAY OF MAGNESIUM: CPT

## 2017-10-12 PROCEDURE — G0378 HOSPITAL OBSERVATION PER HR: HCPCS

## 2017-10-12 PROCEDURE — 85610 PROTHROMBIN TIME: CPT

## 2017-10-12 PROCEDURE — 99243 OFF/OP CNSLTJ NEW/EST LOW 30: CPT | Performed by: SURGERY

## 2017-10-12 PROCEDURE — 94640 AIRWAY INHALATION TREATMENT: CPT

## 2017-10-12 RX ORDER — ONDANSETRON 4 MG/1
4 TABLET, FILM COATED ORAL EVERY 12 HOURS PRN
Qty: 15 TABLET | Refills: 0 | Status: SHIPPED | OUTPATIENT
Start: 2017-10-12 | End: 2017-10-23 | Stop reason: ALTCHOICE

## 2017-10-12 RX ORDER — BISACODYL 10 MG
10 SUPPOSITORY, RECTAL RECTAL DAILY PRN
Qty: 15 SUPPOSITORY | Refills: 0 | Status: SHIPPED | OUTPATIENT
Start: 2017-10-12 | End: 2017-11-11

## 2017-10-12 RX ORDER — ONDANSETRON 4 MG/1
4 TABLET, FILM COATED ORAL EVERY 8 HOURS PRN
Qty: 30 TABLET | Refills: 2 | Status: SHIPPED | OUTPATIENT
Start: 2017-10-12 | End: 2018-01-11 | Stop reason: ALTCHOICE

## 2017-10-12 RX ADMIN — MOMETASONE FUROATE AND FORMOTEROL FUMARATE DIHYDRATE 2 PUFF: 200; 5 AEROSOL RESPIRATORY (INHALATION) at 07:49

## 2017-10-12 RX ADMIN — SODIUM CHLORIDE: 9 INJECTION, SOLUTION INTRAVENOUS at 05:05

## 2017-10-12 RX ADMIN — Medication 240 ML: at 09:20

## 2017-10-12 NOTE — PROGRESS NOTES
[facility] [address]    Progress Note    10/12/2017    7:40 AM    Name:   Jane Sadler  MRN:     [id number]     Acct:      [de-identified]   Room:   [address] [place] [address] Day:  0  Admit Date:  10/11/2017 11:27 AM    PCP:   Andrae Samano PA-C  Code Status:  Full Code    Subjective:     C/C:   Chief Complaint   Patient presents with    Emesis     Interval History Status: improved. Sitting up in bed, in pleasant mood. Daughter by bedside. Says she slept well. Abdominal pain improved. Had a molasses enema, as per Bariatric team. Evacuated bowels. No vomiting, but still had some nausea with pill ingestion in the evening. Will advance her diet and likely dc in afternoon. Brief History:     The patient is a 65 y.o. Non-/non  female who presents with Emesis   and she is admitted to the hospital for the management of  Gastroenteritis, Mesenteric Inflammation.     She has the following significant co-morbidities: HTN, HLD, KEVIN on CPAP, H/O DVT/PE on 6 mos of AC in the past, Morbid Obesity s/p gastric bypass 12/29/15.     She presented to [facility] ED with nausea, vomiting and abdominal pain. She said it started from when she started taking Norco and Clindamycin last week. Patient had elective teeth extractions last week on Wednesday, October 4th. She was discharged with Norco and Clindamycin. She noted that she started experiencing constipation, nausea, vomiting, and burning epigastric abdominal pain by Friday and Saturday. She had 4 episodes of vomiting yesterday and felt weak and dizzy and epigastric pain got worse, so she decided to go to [facility] ED.  There, she had basic work up done which revealed Leukocytosis of 16.6K, and CT Abd Pelvis revealed the following:      \"Subtle nonspecific mild inflammatory changes within the mesentery adjacent to the small bowel anastomoses.  No adjacent bowel thickening.  No bowel return of bowel function would recommend increased/ adequate oral hydration, Continue IVFs while NPO. Appreciate their involvement in the care of the patient.      Constipation, Likely 2/2 Narcotic Use. Avoid narcotics. Per Bariatrics: \"Milk of Mag ordered. Also had molasses enema.      HTN. Patient said she had been normotensive since dropping weight after gastric bypass. Monitor.      DVT PPx.  Lovenox    Erik Gardner MD  10/12/2017  7:40 AM

## 2017-10-12 NOTE — CONSULTS
General Surgery:  Consult Note        PATIENT NAME: Lizzie Bates   YOB: 1960    ADMISSION DATE: 10/11/2017 11:27 AM     Admitting Provider: Dr. Leida Doty Physician: Dr. [facility]: 10/12/2017    Chief Complaint:  N/V, constipation  Consult Regarding:  N/V, constipation and s/p RnY in 12/2015 with CT revealing subtle mesenteric inflammation     HISTORY OF PRESENT ILLNESS:  The patient is a 65 y.o. female  who was admitted on 10/11 with n/v for past 3 days and mid abdominal pain that is described as crampy/ dull in nature. Pt states she has had chills for past 3 days but denies any fevers, sob, chest pain, or diarrhea. Does admit to recent onset of constipation and attributes that to the recent use of oral Norco for pain control for teeth extraction on 10/04/17. Pt was started on oral clindamycin post dental procedure. Pt states she has become more intolerant of PO foods/liquids over past 3 days and was concenered with decrease bowel movements and ongoing pain. Pt admits to giving her self a suppositry yesterday am while at home with a small BM. No blood in stool or pain with defecation. Pt admits to flatus yesterday, but none this am as of yet. CT reveals subtle nonspecific mild inlfammatory changes within the mesentery adjacent to the small bowel anastomoses, no adjacent bowel thickening, no bowel obstruction and moderate amount of stool burden within the colon. Pt does have WBC 16.6. Pt has been afebrile since admission. Denies any emesis since yesterday before coming in.      Past Medical History:        Diagnosis Date    Arthritis     Asthma 1980    On Inhaler    Bursitis     left shoulder    GERD (gastroesophageal reflux disease)     H/O bariatric surgery     15 MONTHS AGO/LOST 160#    Head ache     Hiatal hernia 2015    No surgery yet    History of bladder infections     History of gastritis     History of hemorrhoids     Hyperlipidemia     emulsion,   acetaminophen (TYLENOL ARTHRITIS PAIN) 650 MG extended release tablet, Take 650 mg by mouth every 8 hours as needed for Pain  Omega-3 Fatty Acids (OMEGA 3 PO), Take by mouth daily  Calcium Carb-Cholecalciferol (CALCIUM 500 + D3) 500-600 MG-UNIT TABS, Take 1 tablet by mouth daily  Multiple Vitamin (MVI, CELEBRATE, CHEWABLE TABLET), Take 1 tablet by mouth 2 times daily Using Flinstone for not d/t nausea with Celebrate  Handicap Placard MISC, by Does not apply route. Dx: arthritis, expires 5 yrs from date    Allergies:  Nsaids; Pcn [penicillins]; Bactrim [sulfamethoxazole-trimethoprim]; and Codeine    Social History:   Social History     Social History    Marital status:      Spouse name: Uday Husain Number of children: 1    Years of education: N/A     Occupational History    UNEMPLOYED       Social History Main Topics    Smoking status: Former Smoker     Packs/day: 0.50     Years: 10.00     Types: Cigarettes     Start date: 12/16/1978     Quit date: 1/1/1987    Smokeless tobacco: Never Used    Alcohol use No    Drug use: No    Sexual activity: Yes     Partners: Male     Other Topics Concern    Not on file     Social History Narrative    No narrative on file       Family History:       Problem Relation Age of Onset    Asthma Mother     Cancer Father      leukemia    Other Father      Myelodysplastic syndrome, which converted to leukemia    Allergies Other      Family history       REVIEW OF SYSTEMS:    CONSTITUTIONAL: Admits to chills past 3 days  Denies recent weight loss, fatigue, fevers  HEENT: Denies rhinorrhea, dysphagia, odynphagia. CARDIOVASCULAR: Denies history of MI, recent chest pain. RESPIRATORY: Denies recent history of shortness of breath or history of PE.   GASTROINTESTINAL: admits to nausea and emesis past 3 days, mild anorexia, constipation and upper/mid abdominal pain that is dull/crampy in nature   GENITOURINARY: Denies increased frequency or

## 2017-10-12 NOTE — PLAN OF CARE
Problem: Pain:  Goal: Pain level will decrease  Pain level will decrease   Outcome: Ongoing    Goal: Control of acute pain  Control of acute pain   Outcome: Ongoing    Goal: Control of chronic pain  Control of chronic pain   Outcome: Ongoing

## 2017-10-12 NOTE — CARE COORDINATION
Case Management Initial Discharge Plan  Alon Rainey,         Readmission Risk              Readmission Risk:        17.25       Age 72 or Greater:  0    Admitted from SNF or Requires Paid or Family Care:  0    Currently has CHF,COPD,ARF,CRI,or is on dialysis:  0    Takes more than 5 Prescription Medications:  4    Takes Digoxin,Insulin,Anticoagulants,Narcotics or ASA/Plavix:  [address] in Past 12 Months:  10    On Disability:  0    Patient Considers own Health:  1.25            Met with:patient to discuss discharge plans.    Information verified: address, contacts, phone number, , insurance Yes  PCP: Vinod Aj PA-C  Date of last visit: earlier this month    Insurance Provider: [employer]    Discharge Planning  Current Residence:  Private Residence  Living Arrangements:  Spouse/Significant Other   Home has 1 story  Support Systems:  Family Members, Children, Spouse/Significant Other  Current Services PTA:  None   Patient able to perform ADL's:Independent  DME used to aid ambulation prior to admission: none/during admission none    Potential Assistance Needed:  No needs identified     Pharmacy: [facility]   Potential Assistance Purchasing Medications:  No  Does patient want to participate in local refill/ meds to beds program?  No    Patient agreeable to home care: no skilled needs  Freedom of choice provided:  n/a      Type of Home Care Services:  None  Patient expects to be discharged to:  Home    Prior SNF/Rehab Placement and Facility: none  Agreeable to SNF/Rehab: n/a  Ola of choice provided: n/a   Evaluation: no    Expected Discharge date:  10/12/17  Follow Up Appointment: Best Day/ Time: Monday AM    Transportation provider: family  Transportation arrangements needed for discharge: No    Discharge Plan: home with family support        Electronically signed by Montserrat West RN on 10/12/17 at 1:16 PM

## 2017-10-12 NOTE — PROGRESS NOTES
BRONCHOSPASM/BRONCHOCONSTRICTION     [x]         IMPROVE AERATION/BREATH SOUNDS  [x]   ADMINISTER BRONCHODILATOR THERAPY AS APPROPRIATE  [x]   ASSESS BREATH SOUNDS  []   IMPLEMENT AEROSOL/MDI PROTOCOL  [x]   PATIENT EDUCATION AS NEEDED

## 2017-10-12 NOTE — PROGRESS NOTES
[address]  Occupational Therapy Not Seen Note     Patient not available for Occupational Therapy due to:     [] Testing:     [] Hemodialysis     [] Blood Transfusion in Progress     []Refusal by Patient:      [] Surgery/Procedure:     [] Strict Bedrest     [] Sedation     [] Spine Precautions      [] Pt being transferred to palliative care at this time. Spoke with pt/family and OT services to be defered.     [x] Pt independent with functional mobility and functional tasks.  Pt with no OT acute care needs at this time, will defer OT eval. Spoke with pt and RN.     [] Other    Next Scheduled Treatment: n/a     Signature: Jeffrey Lopes OTR/L

## 2017-10-13 ENCOUNTER — TELEPHONE (OUTPATIENT)
Dept: BARIATRICS/WEIGHT MGMT | Age: 57
End: 2017-10-13

## 2017-10-13 NOTE — DISCHARGE SUMMARY
the small bowel anastomoses.  No adjacent bowel thickening.  No bowel obstruction.   Moderate amount of stool within the colon. \"     Due to her complicated bariatric history, unrelenting abdominal pain, leukocytosis, and presence of inflammation on CT Abd/Pelvis, she was transferred to [facility] for further evaluation/care. While here, she was given IVF, made NPO and evaluated by Bariatric curgery, who recommended fluid resuscitation and bowel regimen. Patient had a BM, had improvement of her abdominal pain, and remained HD and clinically stable throughout her stay. Her Clindamycin and Norco were held. She was then prepared for discharge. Significant therapeutic interventions:    Abdominal Pain, Nausea, Vomiting. Leukocytosis. Likely 2/2 Acute viral gastroenteritis. Complicated by Possible Antibiotic-induced esophagitis. Patient has h/o gastric bypass in 2013. Getting basic stool studies. No need for antibiotics at this time. Bariatric Surgery on board. Per their recommendations: \"Diet: NPO, after return of bowel function would recommend increased/ adequate oral hydration, Continue IVFs while NPO. Appreciate their involvement in the care of the patient.      Constipation, Likely 2/2 Narcotic Use. Avoid narcotics. Per Bariatrics: \"Milk of Mag ordered. Also had molasses enema.      HTN. Patient said she had been normotensive since dropping weight after gastric bypass.  Monitor.        Significant Diagnostic Studies:   Labs / Micro:  CBC:   Lab Results   Component Value Date    WBC 9.9 10/12/2017    RBC 3.70 10/12/2017    HGB 11.5 10/12/2017    HCT 33.8 10/12/2017    MCV 91.4 10/12/2017    MCH 31.2 10/12/2017    MCHC 34.1 10/12/2017    RDW 13.3 10/12/2017     10/12/2017     BMP:    Lab Results   Component Value Date    GLUCOSE 88 10/12/2017     10/12/2017    K 4.7 10/12/2017     10/12/2017    CO2 25 10/12/2017    ANIONGAP 12 10/12/2017    BUN 9 10/12/2017    CREATININE 0.65 10/12/2017 HOSPITALIZATION/Incidental Findings: None    Diet: cardiac diet and low fat, low cholesterol diet    Activity: As tolerated    Instructions to Patient: F/U with PCP, Bariatrics, and Dental surgeon as planned    Discharge Medications:      Medication List      START taking these medications    bisacodyl 10 MG suppository  Commonly known as:  DULCOLAX  Place 1 suppository rectally daily as needed for Constipation     magnesium hydroxide 400 MG/5ML suspension  Commonly known as:  MILK OF MAGNESIA  Take 30 mLs by mouth daily as needed for Constipation     * ondansetron 4 MG tablet  Commonly known as:  ZOFRAN  Take 1 tablet by mouth every 8 hours as needed for Nausea or Vomiting     * ondansetron 4 MG tablet  Commonly known as:  ZOFRAN  Take 1 tablet by mouth every 12 hours as needed for Nausea or Vomiting        * This list has 2 medication(s) that are the same as other medications prescribed for you. Read the directions carefully, and ask your doctor or other care provider to review them with you. CONTINUE taking these medications    * albuterol sulfate  (90 Base) MCG/ACT inhaler  Commonly known as:  PROVENTIL HFA  Inhale 2 puffs into the lungs every 6 hours as needed for Wheezing     * albuterol (2.5 MG/3ML) 0.083% nebulizer solution  Commonly known as:  PROVENTIL  Take 3 mLs by nebulization every 6 hours as needed for Wheezing     amitriptyline 75 MG tablet  Commonly known as:  ELAVIL  Take 2 tablets by mouth nightly     CALCIUM 500 + D3 500-600 MG-UNIT Tabs  Generic drug:  Calcium Carb-Cholecalciferol     Handicap Placard Misc  by Does not apply route.  Dx: arthritis, expires 5 yrs from date     mometasone-formoterol 200-5 MCG/ACT inhaler  Commonly known as:  DULERA  Inhale 2 puffs into the lungs 2 times daily     VINEET 128 2 % ophthalmic solution  Generic drug:  sodium chloride     MVI (CELEBRATE) CHEWABLE TABLET     OMEGA 3 PO     RESTASIS 0.05 % ophthalmic emulsion  Generic drug:  cycloSPORINE

## 2017-10-13 NOTE — TELEPHONE ENCOUNTER
Per Dr Merdis Ormond I let patient know she can take Milk of Magnesium once this morning and once this evening but starting tomorrow 10/14/17 only take in the evening till she starts have bowel movements.

## 2017-10-23 ENCOUNTER — OFFICE VISIT (OUTPATIENT)
Dept: FAMILY MEDICINE CLINIC | Age: 57
End: 2017-10-23
Payer: MEDICARE

## 2017-10-23 VITALS
DIASTOLIC BLOOD PRESSURE: 63 MMHG | OXYGEN SATURATION: 98 % | HEIGHT: 66 IN | HEART RATE: 105 BPM | SYSTOLIC BLOOD PRESSURE: 107 MMHG | WEIGHT: 184 LBS | BODY MASS INDEX: 29.57 KG/M2 | TEMPERATURE: 98.5 F

## 2017-10-23 DIAGNOSIS — R51.9 NONINTRACTABLE HEADACHE, UNSPECIFIED CHRONICITY PATTERN, UNSPECIFIED HEADACHE TYPE: ICD-10-CM

## 2017-10-23 DIAGNOSIS — E55.9 VITAMIN D DEFICIENCY: ICD-10-CM

## 2017-10-23 DIAGNOSIS — J45.909 UNCOMPLICATED ASTHMA, UNSPECIFIED ASTHMA SEVERITY, UNSPECIFIED WHETHER PERSISTENT: ICD-10-CM

## 2017-10-23 DIAGNOSIS — I10 ESSENTIAL HYPERTENSION: Primary | ICD-10-CM

## 2017-10-23 DIAGNOSIS — N91.2 AMENIA: ICD-10-CM

## 2017-10-23 DIAGNOSIS — Z11.59 ENCOUNTER FOR HEPATITIS C SCREENING TEST FOR LOW RISK PATIENT: ICD-10-CM

## 2017-10-23 DIAGNOSIS — Z98.84 H/O BARIATRIC SURGERY: ICD-10-CM

## 2017-10-23 DIAGNOSIS — Z11.4 SCREENING FOR HIV (HUMAN IMMUNODEFICIENCY VIRUS): ICD-10-CM

## 2017-10-23 PROCEDURE — 3014F SCREEN MAMMO DOC REV: CPT | Performed by: PHYSICIAN ASSISTANT

## 2017-10-23 PROCEDURE — 99214 OFFICE O/P EST MOD 30 MIN: CPT | Performed by: PHYSICIAN ASSISTANT

## 2017-10-23 PROCEDURE — G8484 FLU IMMUNIZE NO ADMIN: HCPCS | Performed by: PHYSICIAN ASSISTANT

## 2017-10-23 PROCEDURE — G8427 DOCREV CUR MEDS BY ELIG CLIN: HCPCS | Performed by: PHYSICIAN ASSISTANT

## 2017-10-23 PROCEDURE — 3017F COLORECTAL CA SCREEN DOC REV: CPT | Performed by: PHYSICIAN ASSISTANT

## 2017-10-23 PROCEDURE — 1036F TOBACCO NON-USER: CPT | Performed by: PHYSICIAN ASSISTANT

## 2017-10-23 PROCEDURE — G8417 CALC BMI ABV UP PARAM F/U: HCPCS | Performed by: PHYSICIAN ASSISTANT

## 2017-10-23 ASSESSMENT — ENCOUNTER SYMPTOMS
PHOTOPHOBIA: 0
HEMATOCHEZIA: 0
NAUSEA: 0
ABDOMINAL PAIN: 0
FLATUS: 0
EYE REDNESS: 0
SORE THROAT: 0
FACIAL SWEATING: 0
EYE ITCHING: 0
CHEST TIGHTNESS: 0
EYE WATERING: 0
SHORTNESS OF BREATH: 0
EYE PAIN: 0
BLURRED VISION: 0
EYE DISCHARGE: 0
ORTHOPNEA: 0
SINUS PRESSURE: 0
VOMITING: 0
BACK PAIN: 0
COUGH: 0
DIARRHEA: 0
DIFFICULTY BREATHING: 0
RHINORRHEA: 0
CONSTIPATION: 1

## 2017-10-23 NOTE — PROGRESS NOTES
[facility]  [address] [facility] [address]  Dept: [phone]  Dept Fax: [phone]    Pradip Naidu is a 65 y.o. female who presents today for her medical conditions/complaints as noted below. Pradip Naidu is c/o of   Chief Complaint   Patient presents with    Follow-Up from Hospital    Constipation    Discuss Labs         HPI:     Headache    This is a new problem. The current episode started more than 1 month ago. The problem occurs daily. The pain quality is not similar to prior headaches. The quality of the pain is described as aching and throbbing. Associated symptoms include phonophobia. Pertinent negatives include no abdominal pain, abnormal behavior, anorexia, back pain, blurred vision, coughing, dizziness, drainage, ear pain, eye pain, eye redness, eye watering, facial sweating, fever, hearing loss, insomnia, loss of balance, nausea, neck pain, numbness, photophobia, rhinorrhea, sinus pressure, sore throat, vomiting or weakness. She has tried acetaminophen for the symptoms. The treatment provided no relief. Hypertension   This is a chronic problem. Associated symptoms include headaches. Pertinent negatives include no anxiety, blurred vision, chest pain, malaise/fatigue, neck pain, orthopnea, palpitations, peripheral edema, PND or shortness of breath. There is no history of angina or kidney disease. Asthma   There is no chest tightness, cough, difficulty breathing or shortness of breath. This is a chronic problem. Associated symptoms include headaches. Pertinent negatives include no appetite change, chest pain, dyspnea on exertion, ear pain, fever, malaise/fatigue, myalgias, nasal congestion, PND, postnasal drip, rhinorrhea or sore throat. Her past medical history is significant for asthma.    Constipation   Pertinent negatives include no abdominal pain, anorexia, back pain, diarrhea, fever, flatus, hematochezia, nausea or vomiting. Hemoglobin A1C (%)   Date Value   2017 5.3   2016 5.3   2016 5.0             ( goal A1C is < 7)   No results found for: LABMICR  LDL Cholesterol (mg/dL)   Date Value   2017 95   2017 118   2016 111       (goal LDL is <100)   AST (U/L)   Date Value   10/12/2017 10     ALT (U/L)   Date Value   10/12/2017 9     BUN (mg/dL)   Date Value   10/12/2017 9     BP Readings from Last 3 Encounters:   10/23/17 107/63   10/12/17 128/75   17 126/88          (goal 120/80)    Past Medical History:   Diagnosis Date    Arthritis     Asthma     On Inhaler    Bursitis     left shoulder    GERD (gastroesophageal reflux disease)     H/O bariatric surgery     15 MONTHS AGO/LOST 160#    Head ache     Hiatal hernia     No surgery yet    History of bladder infections     History of gastritis     History of hemorrhoids     Hyperlipidemia     Hypertension     Not anymore after Bariatric Surgery    Hypertension     presently off medication    Mild intermittent asthma     Obesity     KEVIN on CPAP     at night    Osteoarthritis     Osteopenia     Pulmonary embolism ([address]     from foot  trauma, was treated for six months with anticoagulation. She has no inferior vena cava filter.     S/P gastric bypass 12-29-15    Dr. Naomy Nicolas, hosp wt = 280lb, initial wt = 328lb    Vitamin D deficiency     Wears glasses       Past Surgical History:   Procedure Laterality Date     SECTION  1986,  1987    CHOLECYSTECTOMY      COLONOSCOPY      found hital hernia    CYSTOSCOPY  16    EXCISION / BIOPSY SKIN LESION OF HEAD / NECK N/A 4/3/2017     EXCISION POSTERIOR NECK CYST performed by Elmer Guerra IV, DO at [address]  10/14/2016    excision back lipoma with drain placement    SHELBY-EN-Y GASTRIC BYPASS  12/29/15    liver bx, EGD    SHOULDER SURGERY Left 2016    Giovanna procedure   Jessica Root SHOULDER SURGERY      bone spurs 8/19/2016    TONSILLECTOMY  1979    UPPER GASTROINTESTINAL ENDOSCOPY  Aug. 2015       Family History   Problem Relation Age of Onset    Asthma Mother     Cancer Father      leukemia    Other Father      Myelodysplastic syndrome, which converted to leukemia    Allergies Other      Family history       Social History   Substance Use Topics    Smoking status: Former Smoker     Packs/day: 0.50     Years: 10.00     Types: Cigarettes     Start date: 12/16/1978     Quit date: 1/1/1987    Smokeless tobacco: Never Used    Alcohol use No      Current Outpatient Prescriptions   Medication Sig Dispense Refill    ondansetron (ZOFRAN) 4 MG tablet Take 1 tablet by mouth every 8 hours as needed for Nausea or Vomiting 30 tablet 2    magnesium hydroxide (MILK OF MAGNESIA) 400 MG/5ML suspension Take 30 mLs by mouth daily as needed for Constipation      bisacodyl (DULCOLAX) 10 MG suppository Place 1 suppository rectally daily as needed for Constipation 15 suppository 0    amitriptyline (ELAVIL) 75 MG tablet Take 2 tablets by mouth nightly 60 tablet 3    sodium chloride (VINEET 128) 2 % ophthalmic solution Place 1 drop into both eyes 2 times daily      mometasone-formoterol (DULERA) 200-5 MCG/ACT inhaler Inhale 2 puffs into the lungs 2 times daily 1 Inhaler 11    albuterol sulfate HFA (PROVENTIL HFA) 108 (90 Base) MCG/ACT inhaler Inhale 2 puffs into the lungs every 6 hours as needed for Wheezing 1 Inhaler 11    albuterol (PROVENTIL) (2.5 MG/3ML) 0.083% nebulizer solution Take 3 mLs by nebulization every 6 hours as needed for Wheezing 120 each 11    RESTASIS 0.05 % ophthalmic emulsion       acetaminophen (TYLENOL ARTHRITIS PAIN) 650 MG extended release tablet Take 650 mg by mouth every 8 hours as needed for Pain      Omega-3 Fatty Acids (OMEGA 3 PO) Take by mouth daily      Calcium Carb-Cholecalciferol (CALCIUM 500 + D3) 500-600 MG-UNIT TABS Take 1 tablet by mouth daily      Multiple Vitamin (MVI, CELEBRATE, CHEWABLE TABLET) Take 1 tablet by mouth 2 times daily Using Flinstone for not d/t nausea with Celebrate      Handicap Placard MISC by Does not apply route. Dx: arthritis, expires 5 yrs from date 1 each 0     No current facility-administered medications for this visit. Facility-Administered Medications Ordered in Other Visits   Medication Dose Route Frequency Provider Last Rate Last Dose    lactated ringers infusion 1,000 mL  1,000 mL Intravenous Continuous Mikhail Delgado MD   Stopped at 08/19/16 1249    HYDROmorphone (DILAUDID) injection 0.25 mg  0.25 mg Intravenous Q5 Min PRN Sameer Steve MD         Allergies   Allergen Reactions    Nsaids Other (See Comments)     Bariatric Surgeon told me not to take NSAIDS for good    Pcn [Penicillins] Other (See Comments)     Sores in mouth    Bactrim [Sulfamethoxazole-Trimethoprim] Itching and Rash    Codeine Itching and Rash       Health Maintenance   Topic Date Due    Hepatitis C screen  1960    HIV screen  01/10/1976    Cervical cancer screen  06/06/2017    Breast cancer screen  03/16/2018    Lipid screen  09/06/2022    DTaP/Tdap/Td vaccine (2 - Td) 09/14/2022    Colon cancer screen colonoscopy  04/03/2025    Flu vaccine  Completed    Pneumococcal med risk  Completed       Subjective:      Review of Systems   Constitutional: Negative for appetite change, chills, diaphoresis, fatigue, fever and malaise/fatigue. HENT: Negative for congestion, ear discharge, ear pain, hearing loss, postnasal drip, rhinorrhea, sinus pressure and sore throat. Eyes: Negative for blurred vision, photophobia, pain, discharge, redness and itching. Respiratory: Negative for cough, chest tightness and shortness of breath. Cardiovascular: Negative for chest pain, dyspnea on exertion, palpitations, orthopnea, leg swelling and PND. Gastrointestinal: Positive for constipation.  Negative for abdominal pain, anorexia, diarrhea, flatus, hematochezia, nausea and vomiting. Genitourinary: Negative for dysuria and frequency. Musculoskeletal: Negative for back pain, myalgias, neck pain and neck stiffness. Skin: Negative for rash. Neurological: Positive for headaches. Negative for dizziness, weakness, light-headedness, numbness and loss of balance. Psychiatric/Behavioral: The patient does not have insomnia. All other systems reviewed and are negative. Objective:     Physical Exam   Constitutional: She is oriented to person, place, and time. She appears well-developed and well-nourished. No distress. /76  Pulse 75  Temp 98.2 °F (36.8 °C) (Oral)   Ht 5' 6\" (1.676 m)  Wt 189 lb 9.6 oz (86 kg)  LMP  (LMP Unknown)  SpO2 98%  BMI 30.6 kg/m2     HENT:   Head: Normocephalic and atraumatic. Right Ear: External ear normal.   Left Ear: External ear normal.   Nose: Nose normal.   Mouth/Throat: Oropharynx is clear and moist.   Eyes: Conjunctivae and EOM are normal. Pupils are equal, round, and reactive to light. Right eye exhibits no discharge. Left eye exhibits no discharge. No scleral icterus. Neck: Normal range of motion. Neck supple. No tracheal deviation present. No thyromegaly present. Cardiovascular: Normal rate, regular rhythm and normal heart sounds. Exam reveals no gallop and no friction rub. No murmur heard. Pulmonary/Chest: Effort normal and breath sounds normal. No stridor. No respiratory distress. She has no wheezes. She has no rales. She exhibits no tenderness. Abdominal: Soft. Bowel sounds are normal. She exhibits no distension. There is no tenderness. There is no rebound and no guarding. Musculoskeletal: She exhibits no edema. Neurological: She is alert and oriented to person, place, and time. No cranial nerve deficit. Coordination and gait normal.   Skin: Skin is warm and dry. No rash noted. She is not diaphoretic. Psychiatric: She has a normal mood and affect. Her affect is not inappropriate. Nursing note and vitals reviewed. /63   Pulse 105   Temp 98.5 °F (36.9 °C) (Oral)   Ht 5' 6\" (1.676 m)   Wt 184 lb (83.5 kg)   LMP  (LMP Unknown)   SpO2 98%   BMI 29.70 kg/m²     Assessment:      1. Essential hypertension  CBC Auto Differential    Comprehensive Metabolic Panel    Lipid Panel    TSH with Reflex    Vitamin D 25 Hydroxy    Vitamin B12    Folate    Iron and TIBC    Vitamin B1   2. Vitamin D deficiency  CBC Auto Differential    Comprehensive Metabolic Panel    Lipid Panel    TSH with Reflex    Vitamin D 25 Hydroxy    Vitamin B12    Folate    Iron and TIBC    Vitamin B1   3. Uncomplicated asthma, unspecified asthma severity, unspecified whether persistent  CBC Auto Differential    Comprehensive Metabolic Panel    Lipid Panel    TSH with Reflex    Vitamin D 25 Hydroxy    Vitamin B12    Folate    Iron and TIBC    Vitamin B1   4. Nonintractable headache, unspecified chronicity pattern, unspecified headache type  CBC Auto Differential    Comprehensive Metabolic Panel    Lipid Panel    TSH with Reflex    Vitamin D 25 Hydroxy    Vitamin B12    Folate    Iron and TIBC    Vitamin B1   5. H/O bariatric surgery  CBC Auto Differential    Comprehensive Metabolic Panel    Lipid Panel    TSH with Reflex    Vitamin D 25 Hydroxy    Vitamin B12    Folate    Iron and TIBC    Vitamin B1   6. [place]  CBC Auto Differential    Comprehensive Metabolic Panel    Lipid Panel    TSH with Reflex    Vitamin D 25 Hydroxy    Vitamin B12    Folate    Iron and TIBC    Vitamin B1   7. Screening for HIV (human immunodeficiency virus)  HIV Screen   8. Encounter for hepatitis C screening test for low risk patient  Hepatitis C Antibody             Plan:      No Follow-up on file.     Orders Placed This Encounter   Procedures    CBC Auto Differential     Standing Status:   Future     Standing Expiration Date:   10/23/2018    Comprehensive Metabolic Panel     Standing Status:   Future     Standing Expiration Date: Have you changed or stopped any medications since your last visit including any over-the-counter medicines, vitamins, or herbal medicines? yes -      Are you taking all your prescribed medications? No          If NO, why? -  N/A    Have you seen any other physician or provider since your last visit no    Have you had any other diagnostic tests since your last visit? no    Tobacco use:  Patient  reports that she quit smoking about 30 years ago. Her smoking use included Cigarettes. She started smoking about 38 years ago. She has a 5.00 pack-year smoking history. She has never used smokeless tobacco.   If a smoker, cessation materials provided? NA   1-800-QUIT-NOW (4-686.488.3141)     Medical history Review  Past Medical, Family, and Social History reviewed and does contribute to the patient presenting condition    Health Maintenance   Topic Date Due    Hepatitis C screen  1960    HIV screen  01/10/1976    Cervical cancer screen  06/06/2017    Breast cancer screen  03/16/2018    Lipid screen  09/06/2022    DTaP/Tdap/Td vaccine (2 - Td) 09/14/2022    Colon cancer screen colonoscopy  04/03/2025    Flu vaccine  Completed    Pneumococcal med risk  Completed                   HPI:     Hyperglycemia   This is a chronic problem. The current episode started more than 1 year ago. The problem has been gradually improving. Associated symptoms include numbness. Pertinent negatives include no abdominal pain, anorexia, arthralgias, change in bowel habit, chest pain, chills, congestion, coughing, diaphoresis, fatigue, fever, headaches, joint swelling, myalgias, nausea, neck pain, rash, sore throat, swollen glands, vertigo, vomiting or weakness. Hyperlipidemia   This is a chronic problem. Exacerbating diseases include obesity. She has no history of chronic renal disease, diabetes, hypothyroidism or liver disease. Pertinent negatives include no chest pain, myalgias or shortness of breath.        Hemoglobin A1C (%) TABLET) Take 1 tablet by mouth 2 times daily Using Flinstone for not d/t nausea with Celebrate      Handicap Placard MISC by Does not apply route. Dx: arthritis, expires 5 yrs from date 1 each 0     No current facility-administered medications for this visit. Facility-Administered Medications Ordered in Other Visits   Medication Dose Route Frequency Provider Last Rate Last Dose    lactated ringers infusion 1,000 mL  1,000 mL Intravenous Continuous Mikhail Sykes MD   Stopped at 08/19/16 1249    HYDROmorphone (DILAUDID) injection 0.25 mg  0.25 mg Intravenous Q5 Min PRN Warden Carla MD         Allergies   Allergen Reactions    Nsaids Other (See Comments)     Bariatric Surgeon told me not to take NSAIDS for good    Pcn [Penicillins] Other (See Comments)     Sores in mouth    Bactrim [Sulfamethoxazole-Trimethoprim] Itching and Rash    Codeine Itching and Rash       Health Maintenance   Topic Date Due    Hepatitis C screen  1960    HIV screen  01/10/1976    Cervical cancer screen  06/06/2017    Breast cancer screen  03/16/2018    Lipid screen  09/06/2022    DTaP/Tdap/Td vaccine (2 - Td) 09/14/2022    Colon cancer screen colonoscopy  04/03/2025    Flu vaccine  Completed    Pneumococcal med risk  Completed       Subjective:      Review of Systems   Constitutional: Negative for chills, diaphoresis, fatigue and fever. HENT: Negative for congestion, ear discharge, ear pain, postnasal drip, rhinorrhea, sinus pressure and sore throat. Eyes: Negative for discharge and itching. Respiratory: Negative for cough, chest tightness and shortness of breath. Cardiovascular: Negative for chest pain, palpitations and leg swelling. Gastrointestinal: Negative for abdominal pain, anorexia, change in bowel habit, diarrhea, nausea and vomiting. Genitourinary: Negative for dysuria and frequency. Musculoskeletal: Negative for arthralgias, joint swelling, myalgias, neck pain and neck stiffness.    Skin: Metabolic Panel    Lipid Panel    TSH with Reflex    Vitamin D 25 Hydroxy    Vitamin B12    Folate    Iron and TIBC    Vitamin B1   2. Vitamin D deficiency  CBC Auto Differential    Comprehensive Metabolic Panel    Lipid Panel    TSH with Reflex    Vitamin D 25 Hydroxy    Vitamin B12    Folate    Iron and TIBC    Vitamin B1   3. Uncomplicated asthma, unspecified asthma severity, unspecified whether persistent  CBC Auto Differential    Comprehensive Metabolic Panel    Lipid Panel    TSH with Reflex    Vitamin D 25 Hydroxy    Vitamin B12    Folate    Iron and TIBC    Vitamin B1   4. Nonintractable headache, unspecified chronicity pattern, unspecified headache type  CBC Auto Differential    Comprehensive Metabolic Panel    Lipid Panel    TSH with Reflex    Vitamin D 25 Hydroxy    Vitamin B12    Folate    Iron and TIBC    Vitamin B1   5. H/O bariatric surgery  CBC Auto Differential    Comprehensive Metabolic Panel    Lipid Panel    TSH with Reflex    Vitamin D 25 Hydroxy    Vitamin B12    Folate    Iron and TIBC    Vitamin B1   6. [place]  CBC Auto Differential    Comprehensive Metabolic Panel    Lipid Panel    TSH with Reflex    Vitamin D 25 Hydroxy    Vitamin B12    Folate    Iron and TIBC    Vitamin B1   7. Screening for HIV (human immunodeficiency virus)  HIV Screen   8. Encounter for hepatitis C screening test for low risk patient  Hepatitis C Antibody             Plan:      No Follow-up on file.     Orders Placed This Encounter   Procedures    CBC Auto Differential     Standing Status:   Future     Standing Expiration Date:   10/23/2018    Comprehensive Metabolic Panel     Standing Status:   Future     Standing Expiration Date:   10/23/2018    Lipid Panel     Standing Status:   Future     Standing Expiration Date:   10/23/2018     Order Specific Question:   Is Patient Fasting?/# of Hours     Answer:   yes    TSH with Reflex     Standing Status:   Future     Standing Expiration Date:   10/23/2018    Vitamin D 25 Hydroxy     Standing Status:   Future     Standing Expiration Date:   10/23/2018    Vitamin B12     Standing Status:   Future     Standing Expiration Date:   1/21/2018    Folate     Standing Status:   Future     Standing Expiration Date:   10/23/2018    Iron and TIBC     Standing Status:   Future     Standing Expiration Date:   4/21/2018     Order Specific Question:   Is Patient Fasting? Answer:   No     Order Specific Question:   No of Hours? Answer:   none    Vitamin B1     Standing Status:   Future     Standing Expiration Date:   10/23/2018    HIV Screen     Standing Status:   Future     Standing Expiration Date:   10/23/2018    Hepatitis C Antibody     Standing Status:   Future     Standing Expiration Date:   10/23/2018     No orders of the defined types were placed in this encounter. Labs from 11/16/16 reviewed.      Hyperglycemia - 3/7/15 . A1C 6.0. Diet and exercise encouraged. 6/15/15 5.7.  11/16/16 5. 3.      Anemia - Iron deficient. Will replace.       HLD - Diet and exercise.      HTN - Pt has used home BP cuff. Resolved with wt loss.      GERD - Resolved with wt loss.      Asthma - Daily wheezing and SOB. No recent meds or eval. H/o tobacco abuse. Stopped 25 yrs ago. No CP. Rx dulera. Improvement of sx. 3/7/15 Mild chronic interstitial pulmonary changes without hyperinflation. PFTs reviewed. No rescue inhaler. Seeing pulm - Dr. Pako Buckner.      Obesity - S/p gastric bypass. Interested in panniculectomy. Referral generated      Vit D def - 3/7/15 13.6. 6/15/15 Improved 37. Start 5000 IU daily.      L shoulder pain - Surgery 8/2016. Dr. Casper Matias. Feeling well.     PE - H/o s/p trauma to LE. On coumadin for 6 mo then on asa for 1 yr. No leg edema, CP or SOB.     Bilat knee pain - Chronic. Ambulates with cane. Pain R>L. Pt started PT. On mobic. Saw ortho. Plan for f/u 3 mo. Pt fearful of injections. Xrays reviewed.      KEVIN - On CPAP. Hard time sleeping with mask.  Will start trazadone.      Elevated WBCs - Pt was sick and was on steroids. Wnl      L hip pain - Chronic. No injury. Xray reviewed.      UTI - Saw Dr. Jayjay Martin. Neg cysto per pt.      Cephalgia - Daily x 2 mo. No h/o same or family hx HA. No relief with OTC meds. Mild sensitivity to sound. No vision changes, N/V, N/T/W. No h/o same. Cannot do steroid oral d/t gastric bypass. Injection and imaging today. If not resolving consider daily med. RAHUL - 3/16/16 Neg. Cont self exams and repeat yearly.      Dexa - 3/16/15 osteopenia. Rx fosamax. Repeat 2 yrs. Med d/c'ed after surgery.      Colonoscopy - 2015. Repeat 2025.      GYN - Seeing GYN 6/2016 - [place]     Eye exam - 3/2015 optiview      Shingles vaccination orders given.      Prevnar 13 - 9/2015     Pneumovax 23 - 11/22/16      Flu 2017    Tdap - 9/14/12      Patient given educational materials - see patient instructions. Discussed use, benefit, and side effects of prescribed medications. All patient questions answered. Pt voiced understanding. Reviewed health maintenance. Instructed to continue current medications, diet and exercise. Patient agreed with treatment plan. Follow up as directed.      Electronically signed by Calixto Pandya PA-C on 10/23/2017 at 8:12 AM

## 2017-10-30 ENCOUNTER — TELEPHONE (OUTPATIENT)
Dept: BARIATRICS/WEIGHT MGMT | Age: 57
End: 2017-10-30

## 2017-10-30 DIAGNOSIS — E65 PANNUS, ABDOMINAL: Primary | ICD-10-CM

## 2018-01-04 ENCOUNTER — HOSPITAL ENCOUNTER (OUTPATIENT)
Age: 58
Discharge: HOME | End: 2018-01-04
Payer: MEDICARE

## 2018-01-04 DIAGNOSIS — G89.29 CHRONIC PAIN OF LEFT KNEE: ICD-10-CM

## 2018-01-04 DIAGNOSIS — J45.909 UNCOMPLICATED ASTHMA: ICD-10-CM

## 2018-01-04 DIAGNOSIS — M85.80 OSTEOPENIA: ICD-10-CM

## 2018-01-04 DIAGNOSIS — Z99.89 OSA ON CPAP: ICD-10-CM

## 2018-01-04 DIAGNOSIS — Z98.84 STATUS POST GASTRIC BYPASS FOR OBESITY: ICD-10-CM

## 2018-01-04 DIAGNOSIS — G47.33 OSA ON CPAP: ICD-10-CM

## 2018-01-04 DIAGNOSIS — M25.562 CHRONIC PAIN OF LEFT KNEE: ICD-10-CM

## 2018-01-04 DIAGNOSIS — M15.9 PRIMARY OSTEOARTHRITIS INVOLVING MULTIPLE JOINTS: ICD-10-CM

## 2018-01-04 DIAGNOSIS — E66.9 OBESITY (BMI 30-39.9): ICD-10-CM

## 2018-01-04 LAB
ABSOLUTE EOS #: 0.18 K/UL (ref 0–0.44)
ABSOLUTE IMMATURE GRANULOCYTE: 0.08 K/UL (ref 0–0.3)
ABSOLUTE LYMPH #: 2.53 K/UL (ref 1.1–3.7)
ABSOLUTE MONO #: 0.51 K/UL (ref 0.1–1.2)
ALBUMIN SERPL-MCNC: 3.7 G/DL (ref 3.5–5.2)
ALBUMIN/GLOBULIN RATIO: 1.1 (ref 1–2.5)
ALP BLD-CCNC: 92 U/L (ref 35–104)
ALT SERPL-CCNC: 19 U/L (ref 5–33)
ANION GAP SERPL CALCULATED.3IONS-SCNC: 14 MMOL/L (ref 9–17)
AST SERPL-CCNC: 17 U/L
BASOPHILS # BLD: 1 % (ref 0–2)
BASOPHILS ABSOLUTE: 0.04 K/UL (ref 0–0.2)
BILIRUB SERPL-MCNC: 0.33 MG/DL (ref 0.3–1.2)
BUN BLDV-MCNC: 15 MG/DL (ref 6–20)
BUN/CREAT BLD: NORMAL (ref 9–20)
CALCIUM SERPL-MCNC: 8.9 MG/DL (ref 8.6–10.4)
CHLORIDE BLD-SCNC: 103 MMOL/L (ref 98–107)
CHOLESTEROL, FASTING: 206 MG/DL
CHOLESTEROL/HDL RATIO: 3.9
CO2: 27 MMOL/L (ref 20–31)
CREAT SERPL-MCNC: 0.73 MG/DL (ref 0.5–0.9)
DIFFERENTIAL TYPE: ABNORMAL
EOSINOPHILS RELATIVE PERCENT: 3 % (ref 1–4)
ESTIMATED AVERAGE GLUCOSE: 100 MG/DL
FERRITIN: 171 UG/L (ref 13–150)
GFR AFRICAN AMERICAN: >60 ML/MIN
GFR NON-AFRICAN AMERICAN: >60 ML/MIN
GFR SERPL CREATININE-BSD FRML MDRD: NORMAL ML/MIN/{1.73_M2}
GFR SERPL CREATININE-BSD FRML MDRD: NORMAL ML/MIN/{1.73_M2}
GLUCOSE BLD-MCNC: 81 MG/DL (ref 70–99)
HBA1C MFR BLD: 5.1 % (ref 4–6)
HCT VFR BLD CALC: 42.4 % (ref 36.3–47.1)
HDLC SERPL-MCNC: 53 MG/DL
HEMOGLOBIN: 13.1 G/DL (ref 11.9–15.1)
IMMATURE GRANULOCYTES: 1 %
IRON SATURATION: 26 % (ref 20–55)
IRON: 63 UG/DL (ref 37–145)
LDL CHOLESTEROL: 125 MG/DL (ref 0–130)
LYMPHOCYTES # BLD: 36 % (ref 24–43)
MAGNESIUM: 2.2 MG/DL (ref 1.6–2.6)
MCH RBC QN AUTO: 29.7 PG (ref 25.2–33.5)
MCHC RBC AUTO-ENTMCNC: 30.9 G/DL (ref 28.4–34.8)
MCV RBC AUTO: 96.1 FL (ref 82.6–102.9)
MONOCYTES # BLD: 7 % (ref 3–12)
PDW BLD-RTO: 12.6 % (ref 11.8–14.4)
PLATELET # BLD: 320 K/UL (ref 138–453)
PLATELET ESTIMATE: ABNORMAL
PMV BLD AUTO: 8.1 FL (ref 8.1–13.5)
POTASSIUM SERPL-SCNC: 4.7 MMOL/L (ref 3.7–5.3)
RBC # BLD: 4.41 M/UL (ref 3.95–5.11)
RBC # BLD: ABNORMAL 10*6/UL
SEG NEUTROPHILS: 52 % (ref 36–65)
SEGMENTED NEUTROPHILS ABSOLUTE COUNT: 3.68 K/UL (ref 1.5–8.1)
SODIUM BLD-SCNC: 144 MMOL/L (ref 135–144)
THYROXINE, FREE: 0.87 NG/DL (ref 0.93–1.7)
TOTAL IRON BINDING CAPACITY: 240 UG/DL (ref 250–450)
TOTAL PROTEIN: 7 G/DL (ref 6.4–8.3)
TRIGLYCERIDE, FASTING: 142 MG/DL
TSH SERPL DL<=0.05 MIU/L-ACNC: 1.34 MIU/L (ref 0.3–5)
UNSATURATED IRON BINDING CAPACITY: 177 UG/DL (ref 112–347)
VITAMIN D 25-HYDROXY: 49.3 NG/ML (ref 30–100)
VLDLC SERPL CALC-MCNC: ABNORMAL MG/DL (ref 1–30)
WBC # BLD: 7 K/UL (ref 3.5–11.3)
WBC # BLD: ABNORMAL 10*3/UL

## 2018-01-04 PROCEDURE — 84443 ASSAY THYROID STIM HORMONE: CPT

## 2018-01-04 PROCEDURE — 82728 ASSAY OF FERRITIN: CPT

## 2018-01-04 PROCEDURE — 82607 VITAMIN B-12: CPT

## 2018-01-04 PROCEDURE — 80061 LIPID PANEL: CPT

## 2018-01-04 PROCEDURE — 83550 IRON BINDING TEST: CPT

## 2018-01-04 PROCEDURE — 82746 ASSAY OF FOLIC ACID SERUM: CPT

## 2018-01-04 PROCEDURE — 83036 HEMOGLOBIN GLYCOSYLATED A1C: CPT

## 2018-01-04 PROCEDURE — 84439 ASSAY OF FREE THYROXINE: CPT

## 2018-01-04 PROCEDURE — 84590 ASSAY OF VITAMIN A: CPT

## 2018-01-04 PROCEDURE — 85025 COMPLETE CBC W/AUTO DIFF WBC: CPT

## 2018-01-04 PROCEDURE — 82306 VITAMIN D 25 HYDROXY: CPT

## 2018-01-04 PROCEDURE — 83970 ASSAY OF PARATHORMONE: CPT

## 2018-01-04 PROCEDURE — 83735 ASSAY OF MAGNESIUM: CPT

## 2018-01-04 PROCEDURE — 83540 ASSAY OF IRON: CPT

## 2018-01-04 PROCEDURE — 84425 ASSAY OF VITAMIN B-1: CPT

## 2018-01-04 PROCEDURE — 36415 COLL VENOUS BLD VENIPUNCTURE: CPT

## 2018-01-04 PROCEDURE — 80053 COMPREHEN METABOLIC PANEL: CPT

## 2018-01-04 PROCEDURE — 84630 ASSAY OF ZINC: CPT

## 2018-01-05 LAB
FOLATE: >20 NG/ML
PTH INTACT: 34.59 PG/ML (ref 15–65)
VITAMIN B-12: 735 PG/ML (ref 232–1245)
ZINC: 67 UG/DL (ref 60–120)

## 2018-01-06 LAB
RETINYL PALMITATE: <0.02 MG/L (ref 0–0.1)
VITAMIN A LEVEL: 0.64 MG/L (ref 0.3–1.2)
VITAMIN A, INTERP: NORMAL

## 2018-01-08 LAB — VITAMIN B1 WHOLE BLOOD: 119 NMOL/L (ref 70–180)

## 2018-01-11 ENCOUNTER — OFFICE VISIT (OUTPATIENT)
Dept: BARIATRICS/WEIGHT MGMT | Age: 58
End: 2018-01-11
Payer: MEDICARE

## 2018-01-11 VITALS
BODY MASS INDEX: 31.02 KG/M2 | DIASTOLIC BLOOD PRESSURE: 60 MMHG | SYSTOLIC BLOOD PRESSURE: 102 MMHG | WEIGHT: 193 LBS | HEART RATE: 68 BPM | HEIGHT: 66 IN

## 2018-01-11 DIAGNOSIS — Z98.84 STATUS POST BARIATRIC SURGERY: ICD-10-CM

## 2018-01-11 DIAGNOSIS — K76.0 STEATOSIS, LIVER: Primary | ICD-10-CM

## 2018-01-11 PROCEDURE — 3014F SCREEN MAMMO DOC REV: CPT | Performed by: SURGERY

## 2018-01-11 PROCEDURE — 1036F TOBACCO NON-USER: CPT | Performed by: SURGERY

## 2018-01-11 PROCEDURE — 3017F COLORECTAL CA SCREEN DOC REV: CPT | Performed by: SURGERY

## 2018-01-11 PROCEDURE — 99213 OFFICE O/P EST LOW 20 MIN: CPT | Performed by: SURGERY

## 2018-01-11 PROCEDURE — G8428 CUR MEDS NOT DOCUMENT: HCPCS | Performed by: SURGERY

## 2018-01-11 PROCEDURE — G8484 FLU IMMUNIZE NO ADMIN: HCPCS | Performed by: SURGERY

## 2018-01-11 PROCEDURE — G8417 CALC BMI ABV UP PARAM F/U: HCPCS | Performed by: SURGERY

## 2018-01-11 NOTE — Clinical Note
Ranjeet Childress,  She has done very well since surgery. She will see us back in 1 year.   Thanks,  Arpit Rivas

## 2018-02-05 RX ORDER — AMITRIPTYLINE HYDROCHLORIDE 75 MG/1
150 TABLET, FILM COATED ORAL NIGHTLY
Qty: 60 TABLET | Refills: 3 | Status: SHIPPED | OUTPATIENT
Start: 2018-02-05 | End: 2018-03-20

## 2018-03-20 ENCOUNTER — HOSPITAL ENCOUNTER (OUTPATIENT)
Dept: ULTRASOUND IMAGING | Facility: CLINIC | Age: 58
Discharge: HOME | End: 2018-03-22
Payer: MEDICARE

## 2018-03-20 ENCOUNTER — OFFICE VISIT (OUTPATIENT)
Dept: FAMILY MEDICINE CLINIC | Age: 58
End: 2018-03-20
Payer: MEDICARE

## 2018-03-20 VITALS
SYSTOLIC BLOOD PRESSURE: 122 MMHG | DIASTOLIC BLOOD PRESSURE: 82 MMHG | BODY MASS INDEX: 32.02 KG/M2 | HEART RATE: 94 BPM | WEIGHT: 204 LBS | TEMPERATURE: 98 F | OXYGEN SATURATION: 99 % | HEIGHT: 67 IN

## 2018-03-20 DIAGNOSIS — Z23 NEED FOR SHINGLES VACCINE: ICD-10-CM

## 2018-03-20 DIAGNOSIS — M79.89 LEG SWELLING: Primary | ICD-10-CM

## 2018-03-20 DIAGNOSIS — M79.89 LEG SWELLING: ICD-10-CM

## 2018-03-20 DIAGNOSIS — Z86.718 HISTORY OF DVT (DEEP VEIN THROMBOSIS): ICD-10-CM

## 2018-03-20 PROCEDURE — G8482 FLU IMMUNIZE ORDER/ADMIN: HCPCS | Performed by: PHYSICIAN ASSISTANT

## 2018-03-20 PROCEDURE — G8417 CALC BMI ABV UP PARAM F/U: HCPCS | Performed by: PHYSICIAN ASSISTANT

## 2018-03-20 PROCEDURE — G8427 DOCREV CUR MEDS BY ELIG CLIN: HCPCS | Performed by: PHYSICIAN ASSISTANT

## 2018-03-20 PROCEDURE — 3014F SCREEN MAMMO DOC REV: CPT | Performed by: PHYSICIAN ASSISTANT

## 2018-03-20 PROCEDURE — 99213 OFFICE O/P EST LOW 20 MIN: CPT | Performed by: PHYSICIAN ASSISTANT

## 2018-03-20 PROCEDURE — 1036F TOBACCO NON-USER: CPT | Performed by: PHYSICIAN ASSISTANT

## 2018-03-20 PROCEDURE — 3017F COLORECTAL CA SCREEN DOC REV: CPT | Performed by: PHYSICIAN ASSISTANT

## 2018-03-20 PROCEDURE — 93971 EXTREMITY STUDY: CPT

## 2018-03-20 RX ORDER — AMITRIPTYLINE HYDROCHLORIDE 100 MG/1
100 TABLET, FILM COATED ORAL NIGHTLY
Qty: 30 TABLET | Refills: 0 | Status: SHIPPED | OUTPATIENT
Start: 2018-03-20 | End: 2018-04-13 | Stop reason: SDUPTHER

## 2018-03-20 RX ORDER — DOXYCYCLINE HYCLATE 100 MG
100 TABLET ORAL 2 TIMES DAILY
Qty: 20 TABLET | Refills: 0 | Status: SHIPPED | OUTPATIENT
Start: 2018-03-20 | End: 2018-03-30

## 2018-03-20 RX ORDER — TOPIRAMATE 25 MG/1
25 TABLET ORAL NIGHTLY
Qty: 60 TABLET | Refills: 1 | Status: SHIPPED | OUTPATIENT
Start: 2018-03-20 | End: 2018-04-24

## 2018-03-20 ASSESSMENT — ENCOUNTER SYMPTOMS
SCALP TENDERNESS: 0
SINUS PRESSURE: 0
EYE ITCHING: 0
FLATUS: 0
EYE WATERING: 0
DIFFICULTY BREATHING: 0
HEMATOCHEZIA: 0
CONSTIPATION: 1
ABDOMINAL PAIN: 0
SHORTNESS OF BREATH: 0
EYE REDNESS: 0
NAUSEA: 0
EYE PAIN: 0
FACIAL SWEATING: 0
SORE THROAT: 0
VOMITING: 0
PHOTOPHOBIA: 0
BACK PAIN: 0
CHEST TIGHTNESS: 0
BLURRED VISION: 0
RHINORRHEA: 0
COUGH: 0
ORTHOPNEA: 0
EYE DISCHARGE: 0
DIARRHEA: 0

## 2018-03-20 NOTE — PROGRESS NOTES
[id number]  [address] [place] [id number]  Dept: [phone]  Dept Fax: [phone]    Agustina Sheldon is a 63 y.o. female who presents today for her medical conditions/complaints as noted below. Agustina Sheldon is c/o of   Chief Complaint   Patient presents with    Leg Swelling    Migraine      a few days         HPI:     Constipation   Pertinent negatives include no abdominal pain, anorexia, back pain, diarrhea, fever, flatus, hematochezia, nausea or vomiting. Headache    This is a new problem. The current episode started more than 1 month ago. The problem occurs daily. The pain quality is not similar to prior headaches. The quality of the pain is described as aching and throbbing. Associated symptoms include phonophobia. Pertinent negatives include no abdominal pain, abnormal behavior, anorexia, back pain, blurred vision, coughing, dizziness, drainage, ear pain, eye pain, eye redness, eye watering, facial sweating, fever, hearing loss, insomnia, loss of balance, nausea, neck pain, numbness, photophobia, rhinorrhea, scalp tenderness, seizures, sinus pressure, sore throat, vomiting or weakness. She has tried acetaminophen for the symptoms. The treatment provided no relief. Hypertension   This is a chronic problem. Associated symptoms include headaches. Pertinent negatives include no anxiety, blurred vision, chest pain, malaise/fatigue, neck pain, orthopnea, palpitations, peripheral edema, PND or shortness of breath. There is no history of angina or kidney disease. Asthma   There is no chest tightness, cough, difficulty breathing or shortness of breath. This is a chronic problem. Associated symptoms include headaches. Pertinent negatives include no appetite change, chest pain, dyspnea on exertion, ear pain, fever, malaise/fatigue, myalgias, nasal congestion, PND, postnasal drip, rhinorrhea or sore throat.  Her past medical history is bypass 12-29-15    Dr. Aicha Gaytan, hosp wt = 280lb, initial wt = 328lb    Vitamin D deficiency 1    Wears glasses       Past Surgical History:   Procedure Laterality Date     SECTION  1986,  1987    CHOLECYSTECTOMY  1987    COLONOSCOPY      found hital hernia    CYSTOSCOPY  16    EXCISION / BIOPSY SKIN LESION OF HEAD / NECK N/A 4/3/2017     EXCISION POSTERIOR NECK CYST performed by Claudine Guerra IV, DO at [facility]  10/14/2016    excision back lipoma with drain placement    SHELBY-EN-Y GASTRIC BYPASS  12/29/15    liver bx, EGD    SHOULDER SURGERY Left 2016    Giovanna procedure    SHOULDER SURGERY      bone spurs 2016    TONSILLECTOMY  1979    UPPER GASTROINTESTINAL ENDOSCOPY  Aug. 2015       Family History   Problem Relation Age of Onset    Asthma Mother     Cancer Father      leukemia    Other Father      Myelodysplastic syndrome, which converted to leukemia    Allergies Other      Family history       Social History   Substance Use Topics    Smoking status: Former Smoker     Packs/day: 0.50     Years: 10.00     Types: Cigarettes     Start date: 1978     Quit date: 1987    Smokeless tobacco: Never Used    Alcohol use No      Current Outpatient Prescriptions   Medication Sig Dispense Refill    amitriptyline (ELAVIL) 100 MG tablet Take 1 tablet by mouth nightly 30 tablet 0    topiramate (TOPAMAX) 25 MG tablet Take 1 tablet by mouth nightly 60 tablet 1    zoster recombinant adjuvanted vaccine (SHINGRIX) 50 MCG SUSR injection Inject 0.5 mLs into the muscle once for 1 dose 0.5 mL 0    FIBER COMPLETE PO Take by mouth      sodium chloride (VINEET 128) 2 % ophthalmic solution Place 1 drop into both eyes 2 times daily      mometasone-formoterol (DULERA) 200-5 MCG/ACT inhaler Inhale 2 puffs into the lungs 2 times daily 1 Inhaler 11    albuterol sulfate HFA (PROVENTIL HFA) 108 (90 Base) MCG/ACT inhaler Inhale 2 Pneumococcal med risk  Completed       Subjective:      Review of Systems   Constitutional: Negative for appetite change, chills, diaphoresis, fatigue, fever and malaise/fatigue. HENT: Negative for congestion, ear discharge, ear pain, hearing loss, postnasal drip, rhinorrhea, sinus pressure and sore throat. Eyes: Negative for blurred vision, photophobia, pain, discharge, redness and itching. Respiratory: Negative for cough, chest tightness and shortness of breath. Cardiovascular: Negative for chest pain, dyspnea on exertion, palpitations, orthopnea, leg swelling and PND. Gastrointestinal: Positive for constipation. Negative for abdominal pain, anorexia, diarrhea, flatus, hematochezia, nausea and vomiting. Genitourinary: Negative for dysuria and frequency. Musculoskeletal: Negative for back pain, myalgias, neck pain and neck stiffness. Skin: Negative for rash. Neurological: Positive for headaches. Negative for dizziness, seizures, weakness, light-headedness, numbness and loss of balance. Psychiatric/Behavioral: The patient does not have insomnia. All other systems reviewed and are negative. Objective:     Physical Exam   Constitutional: She is oriented to person, place, and time. She appears well-developed and well-nourished. No distress. /76  Pulse 75  Temp 98.2 °F (36.8 °C) (Oral)   Ht 5' 6\" (1.676 m)  Wt 189 lb 9.6 oz (86 kg)  LMP  (LMP Unknown)  SpO2 98%  BMI 30.6 kg/m2     HENT:   Head: Normocephalic and atraumatic. Right Ear: External ear normal.   Left Ear: External ear normal.   Nose: Nose normal.   Mouth/Throat: Oropharynx is clear and moist.   Eyes: Conjunctivae and EOM are normal. Pupils are equal, round, and reactive to light. Right eye exhibits no discharge. Left eye exhibits no discharge. No scleral icterus. Neck: Normal range of motion. Neck supple. No tracheal deviation present. No thyromegaly present.    Cardiovascular: Normal rate, regular rhythm and normal heart sounds. Exam reveals no gallop and no friction rub. No murmur heard. Pulmonary/Chest: Effort normal and breath sounds normal. No stridor. No respiratory distress. She has no wheezes. She has no rales. She exhibits no tenderness. Abdominal: Soft. Bowel sounds are normal. She exhibits no distension. There is no tenderness. There is no rebound and no guarding. Musculoskeletal: She exhibits edema. Neurological: She is alert and oriented to person, place, and time. No cranial nerve deficit. Coordination and gait normal.   Skin: Skin is warm and dry. No rash noted. She is not diaphoretic. Psychiatric: She has a normal mood and affect. Her affect is not inappropriate. Nursing note and vitals reviewed. /82   Pulse 94   Temp 98 °F (36.7 °C) (Oral)   Ht 5' 6.5\" (1.689 m)   Wt 204 lb (92.5 kg)   LMP  (LMP Unknown)   SpO2 99%   BMI 32.43 kg/m²     Assessment:      1. Leg swelling  US DUP LOWER EXTREMITY LEFT YAYO   2. Need for shingles vaccine     3. History of DVT (deep vein thrombosis)  US DUP LOWER EXTREMITY LEFT YAYO             Plan:      No Follow-up on file. Orders Placed This Encounter   Procedures    US DUP LOWER EXTREMITY LEFT YAYO     Orders Placed This Encounter   Medications    amitriptyline (ELAVIL) 100 MG tablet     Sig: Take 1 tablet by mouth nightly     Dispense:  30 tablet     Refill:  0    topiramate (TOPAMAX) 25 MG tablet     Sig: Take 1 tablet by mouth nightly     Dispense:  60 tablet     Refill:  1    zoster recombinant adjuvanted vaccine (SHINGRIX) 50 MCG SUSR injection     Sig: Inject 0.5 mLs into the muscle once for 1 dose     Dispense:  0.5 mL     Refill:  0       Patient given educational materials - see patient instructions. Discussed use, benefit, and side effects of prescribed medications. All patient questions answered. Pt voiced understanding. Reviewed health maintenance. Instructed to continue current medications, diet and exercise.   Patient Soft. Bowel sounds are normal. She exhibits no distension. There is no tenderness. There is no rebound and no guarding. Musculoskeletal: She exhibits no edema. Neurological: She is alert and oriented to person, place, and time. Gait normal.   Skin: Skin is warm and dry. No rash noted. She is not diaphoretic. Psychiatric: She has a normal mood and affect. Her affect is not inappropriate. Nursing note and vitals reviewed. /82   Pulse 94   Temp 98 °F (36.7 °C) (Oral)   Ht 5' 6.5\" (1.689 m)   Wt 204 lb (92.5 kg)   LMP  (LMP Unknown)   SpO2 99%   BMI 32.43 kg/m²     Assessment:      1. Leg swelling  US DUP LOWER EXTREMITY LEFT YAYO   2. Need for shingles vaccine     3. History of DVT (deep vein thrombosis)  US DUP LOWER EXTREMITY LEFT YAYO             Plan:      No Follow-up on file. Orders Placed This Encounter   Procedures    US DUP LOWER EXTREMITY LEFT YAYO     Orders Placed This Encounter   Medications    amitriptyline (ELAVIL) 100 MG tablet     Sig: Take 1 tablet by mouth nightly     Dispense:  30 tablet     Refill:  0    topiramate (TOPAMAX) 25 MG tablet     Sig: Take 1 tablet by mouth nightly     Dispense:  60 tablet     Refill:  1    zoster recombinant adjuvanted vaccine (SHINGRIX) 50 MCG SUSR injection     Sig: Inject 0.5 mLs into the muscle once for 1 dose     Dispense:  0.5 mL     Refill:  0      Labs from 11/16/16 reviewed.      Hyperglycemia - 3/7/15 . A1C 6.0. Diet and exercise encouraged. 6/15/15 5.7.  11/16/16 5. 3.      Anemia - Iron deficient. Will replace.       HLD - Diet and exercise.      HTN - Pt has used home BP cuff. Resolved with wt loss.      GERD - Resolved with wt loss.      Asthma - Daily wheezing and SOB. No recent meds or eval. H/o tobacco abuse. Stopped 25 yrs ago. No CP. Rx dulera. Improvement of sx. 3/7/15 Mild chronic interstitial pulmonary changes without hyperinflation. PFTs reviewed. No rescue inhaler.  Seeing pulm - Dr. Lillian Winston.      Obesity - S/p

## 2018-04-10 DIAGNOSIS — R60.9 EDEMA, UNSPECIFIED TYPE: Primary | ICD-10-CM

## 2018-04-17 ENCOUNTER — HOSPITAL ENCOUNTER (OUTPATIENT)
Age: 58
Discharge: HOME | End: 2018-04-17
Payer: MEDICARE

## 2018-04-17 DIAGNOSIS — Z11.59 ENCOUNTER FOR HEPATITIS C SCREENING TEST FOR LOW RISK PATIENT: ICD-10-CM

## 2018-04-17 DIAGNOSIS — I10 ESSENTIAL HYPERTENSION: ICD-10-CM

## 2018-04-17 DIAGNOSIS — Z98.84 H/O BARIATRIC SURGERY: ICD-10-CM

## 2018-04-17 DIAGNOSIS — E55.9 VITAMIN D DEFICIENCY: ICD-10-CM

## 2018-04-17 DIAGNOSIS — N91.2 AMENIA: ICD-10-CM

## 2018-04-17 DIAGNOSIS — R51.9 NONINTRACTABLE HEADACHE, UNSPECIFIED CHRONICITY PATTERN, UNSPECIFIED HEADACHE TYPE: ICD-10-CM

## 2018-04-17 DIAGNOSIS — J45.909 UNCOMPLICATED ASTHMA, UNSPECIFIED ASTHMA SEVERITY, UNSPECIFIED WHETHER PERSISTENT: ICD-10-CM

## 2018-04-17 DIAGNOSIS — Z11.4 SCREENING FOR HIV (HUMAN IMMUNODEFICIENCY VIRUS): ICD-10-CM

## 2018-04-17 LAB
ABSOLUTE EOS #: 0.23 K/UL (ref 0–0.44)
ABSOLUTE IMMATURE GRANULOCYTE: 0.12 K/UL (ref 0–0.3)
ABSOLUTE LYMPH #: 2.85 K/UL (ref 1.1–3.7)
ABSOLUTE MONO #: 0.6 K/UL (ref 0.1–1.2)
ALBUMIN SERPL-MCNC: 4.1 G/DL (ref 3.5–5.2)
ALBUMIN/GLOBULIN RATIO: 1.3 (ref 1–2.5)
ALP BLD-CCNC: 90 U/L (ref 35–104)
ALT SERPL-CCNC: 20 U/L (ref 5–33)
ANION GAP SERPL CALCULATED.3IONS-SCNC: 13 MMOL/L (ref 9–17)
AST SERPL-CCNC: 15 U/L
BASOPHILS # BLD: 1 % (ref 0–2)
BASOPHILS ABSOLUTE: 0.05 K/UL (ref 0–0.2)
BILIRUB SERPL-MCNC: 0.32 MG/DL (ref 0.3–1.2)
BUN BLDV-MCNC: 15 MG/DL (ref 6–20)
BUN/CREAT BLD: NORMAL (ref 9–20)
CALCIUM SERPL-MCNC: 8.8 MG/DL (ref 8.6–10.4)
CHLORIDE BLD-SCNC: 100 MMOL/L (ref 98–107)
CHOLESTEROL/HDL RATIO: 4.3
CHOLESTEROL: 216 MG/DL
CO2: 26 MMOL/L (ref 20–31)
CREAT SERPL-MCNC: 0.69 MG/DL (ref 0.5–0.9)
DIFFERENTIAL TYPE: ABNORMAL
EOSINOPHILS RELATIVE PERCENT: 3 % (ref 1–4)
FOLATE: >20 NG/ML
GFR AFRICAN AMERICAN: >60 ML/MIN
GFR NON-AFRICAN AMERICAN: >60 ML/MIN
GFR SERPL CREATININE-BSD FRML MDRD: NORMAL ML/MIN/{1.73_M2}
GFR SERPL CREATININE-BSD FRML MDRD: NORMAL ML/MIN/{1.73_M2}
GLUCOSE BLD-MCNC: 83 MG/DL (ref 70–99)
HCT VFR BLD CALC: 40.8 % (ref 36.3–47.1)
HDLC SERPL-MCNC: 50 MG/DL
HEMOGLOBIN: 12.4 G/DL (ref 11.9–15.1)
HEPATITIS C ANTIBODY: NONREACTIVE
HIV AG/AB: NONREACTIVE
IMMATURE GRANULOCYTES: 2 %
IRON SATURATION: 28 % (ref 20–55)
IRON: 77 UG/DL (ref 37–145)
LDL CHOLESTEROL: 129 MG/DL (ref 0–130)
LYMPHOCYTES # BLD: 35 % (ref 24–43)
MCH RBC QN AUTO: 29.3 PG (ref 25.2–33.5)
MCHC RBC AUTO-ENTMCNC: 30.4 G/DL (ref 28.4–34.8)
MCV RBC AUTO: 96.5 FL (ref 82.6–102.9)
MONOCYTES # BLD: 7 % (ref 3–12)
NRBC AUTOMATED: 0 PER 100 WBC
PDW BLD-RTO: 13 % (ref 11.8–14.4)
PLATELET # BLD: 324 K/UL (ref 138–453)
PLATELET ESTIMATE: ABNORMAL
PMV BLD AUTO: 8.5 FL (ref 8.1–13.5)
POTASSIUM SERPL-SCNC: 4.3 MMOL/L (ref 3.7–5.3)
RBC # BLD: 4.23 M/UL (ref 3.95–5.11)
RBC # BLD: ABNORMAL 10*6/UL
SEG NEUTROPHILS: 52 % (ref 36–65)
SEGMENTED NEUTROPHILS ABSOLUTE COUNT: 4.3 K/UL (ref 1.5–8.1)
SODIUM BLD-SCNC: 139 MMOL/L (ref 135–144)
TOTAL IRON BINDING CAPACITY: 278 UG/DL (ref 250–450)
TOTAL PROTEIN: 7.2 G/DL (ref 6.4–8.3)
TRIGL SERPL-MCNC: 185 MG/DL
TSH SERPL DL<=0.05 MIU/L-ACNC: 1.62 MIU/L (ref 0.3–5)
UNSATURATED IRON BINDING CAPACITY: 201 UG/DL (ref 112–347)
VITAMIN B-12: 704 PG/ML (ref 232–1245)
VITAMIN D 25-HYDROXY: 40.1 NG/ML (ref 30–100)
VLDLC SERPL CALC-MCNC: ABNORMAL MG/DL (ref 1–30)
WBC # BLD: 8.2 K/UL (ref 3.5–11.3)
WBC # BLD: ABNORMAL 10*3/UL

## 2018-04-17 PROCEDURE — 83550 IRON BINDING TEST: CPT

## 2018-04-17 PROCEDURE — 84443 ASSAY THYROID STIM HORMONE: CPT

## 2018-04-17 PROCEDURE — 87389 HIV-1 AG W/HIV-1&-2 AB AG IA: CPT

## 2018-04-17 PROCEDURE — 82306 VITAMIN D 25 HYDROXY: CPT

## 2018-04-17 PROCEDURE — 83540 ASSAY OF IRON: CPT

## 2018-04-17 PROCEDURE — 36415 COLL VENOUS BLD VENIPUNCTURE: CPT

## 2018-04-17 PROCEDURE — 84425 ASSAY OF VITAMIN B-1: CPT

## 2018-04-17 PROCEDURE — 82607 VITAMIN B-12: CPT

## 2018-04-17 PROCEDURE — 80061 LIPID PANEL: CPT

## 2018-04-17 PROCEDURE — 82746 ASSAY OF FOLIC ACID SERUM: CPT

## 2018-04-17 PROCEDURE — 86803 HEPATITIS C AB TEST: CPT

## 2018-04-17 PROCEDURE — 85025 COMPLETE CBC W/AUTO DIFF WBC: CPT

## 2018-04-17 PROCEDURE — 80053 COMPREHEN METABOLIC PANEL: CPT

## 2018-04-22 LAB — VITAMIN B1 WHOLE BLOOD: 102 NMOL/L (ref 70–180)

## 2018-04-24 ENCOUNTER — OFFICE VISIT (OUTPATIENT)
Dept: FAMILY MEDICINE CLINIC | Age: 58
End: 2018-04-24
Payer: MEDICARE

## 2018-04-24 VITALS
HEIGHT: 67 IN | WEIGHT: 213 LBS | DIASTOLIC BLOOD PRESSURE: 64 MMHG | TEMPERATURE: 98.1 F | HEART RATE: 88 BPM | BODY MASS INDEX: 33.43 KG/M2 | SYSTOLIC BLOOD PRESSURE: 110 MMHG | OXYGEN SATURATION: 98 %

## 2018-04-24 DIAGNOSIS — J45.909 UNCOMPLICATED ASTHMA, UNSPECIFIED ASTHMA SEVERITY, UNSPECIFIED WHETHER PERSISTENT: ICD-10-CM

## 2018-04-24 DIAGNOSIS — I10 ESSENTIAL HYPERTENSION: Primary | ICD-10-CM

## 2018-04-24 DIAGNOSIS — Z12.31 ENCOUNTER FOR SCREENING MAMMOGRAM FOR BREAST CANCER: ICD-10-CM

## 2018-04-24 DIAGNOSIS — R51.9 NONINTRACTABLE HEADACHE, UNSPECIFIED CHRONICITY PATTERN, UNSPECIFIED HEADACHE TYPE: ICD-10-CM

## 2018-04-24 DIAGNOSIS — E55.9 VITAMIN D DEFICIENCY: ICD-10-CM

## 2018-04-24 PROCEDURE — G8427 DOCREV CUR MEDS BY ELIG CLIN: HCPCS | Performed by: PHYSICIAN ASSISTANT

## 2018-04-24 PROCEDURE — 99214 OFFICE O/P EST MOD 30 MIN: CPT | Performed by: PHYSICIAN ASSISTANT

## 2018-04-24 PROCEDURE — 1036F TOBACCO NON-USER: CPT | Performed by: PHYSICIAN ASSISTANT

## 2018-04-24 PROCEDURE — 3017F COLORECTAL CA SCREEN DOC REV: CPT | Performed by: PHYSICIAN ASSISTANT

## 2018-04-24 PROCEDURE — G8417 CALC BMI ABV UP PARAM F/U: HCPCS | Performed by: PHYSICIAN ASSISTANT

## 2018-04-24 RX ORDER — TOPIRAMATE 50 MG/1
50 TABLET, FILM COATED ORAL DAILY
Qty: 30 TABLET | Refills: 2 | Status: SHIPPED | OUTPATIENT
Start: 2018-04-24 | End: 2018-07-16 | Stop reason: SDUPTHER

## 2018-04-24 ASSESSMENT — ENCOUNTER SYMPTOMS
BACK PAIN: 0
GLOBUS SENSATION: 0
SHORTNESS OF BREATH: 0
DIARRHEA: 0
ORTHOPNEA: 0
COUGH: 0
EYE ITCHING: 0
EYE DISCHARGE: 0
HEMATOCHEZIA: 0
NAUSEA: 0
VOMITING: 0
CHEST TIGHTNESS: 0
EYE REDNESS: 0
HEARTBURN: 1
DIFFICULTY BREATHING: 0
FLATUS: 0
SINUS PRESSURE: 0
EYE PAIN: 0
SORE THROAT: 0
FACIAL SWEATING: 0
BLURRED VISION: 0
CONSTIPATION: 1
ABDOMINAL PAIN: 0
PHOTOPHOBIA: 0
RHINORRHEA: 0
EYE WATERING: 0

## 2018-04-30 ENCOUNTER — HOSPITAL ENCOUNTER (OUTPATIENT)
Dept: WOMENS IMAGING | Age: 58
Discharge: HOME | End: 2018-05-02
Payer: MEDICARE

## 2018-04-30 DIAGNOSIS — Z12.31 ENCOUNTER FOR SCREENING MAMMOGRAM FOR BREAST CANCER: ICD-10-CM

## 2018-04-30 PROCEDURE — 77063 BREAST TOMOSYNTHESIS BI: CPT

## 2018-05-04 ENCOUNTER — OFFICE VISIT (OUTPATIENT)
Dept: PULMONOLOGY | Age: 58
End: 2018-05-04
Payer: MEDICARE

## 2018-05-04 VITALS
HEART RATE: 89 BPM | WEIGHT: 214 LBS | RESPIRATION RATE: 12 BRPM | BODY MASS INDEX: 34.39 KG/M2 | DIASTOLIC BLOOD PRESSURE: 74 MMHG | TEMPERATURE: 97.2 F | OXYGEN SATURATION: 99 % | HEIGHT: 66 IN | SYSTOLIC BLOOD PRESSURE: 110 MMHG

## 2018-05-04 DIAGNOSIS — Z86.718 HISTORY OF DVT (DEEP VEIN THROMBOSIS): ICD-10-CM

## 2018-05-04 DIAGNOSIS — I10 ESSENTIAL HYPERTENSION: ICD-10-CM

## 2018-05-04 DIAGNOSIS — Z99.89 OSA ON CPAP: ICD-10-CM

## 2018-05-04 DIAGNOSIS — G47.33 OSA ON CPAP: ICD-10-CM

## 2018-05-04 DIAGNOSIS — Z98.84 STATUS POST GASTRIC BYPASS FOR OBESITY: ICD-10-CM

## 2018-05-04 DIAGNOSIS — J45.40 MODERATE PERSISTENT ASTHMA WITHOUT COMPLICATION: Primary | ICD-10-CM

## 2018-05-04 PROCEDURE — 99214 OFFICE O/P EST MOD 30 MIN: CPT | Performed by: INTERNAL MEDICINE

## 2018-05-04 PROCEDURE — G8417 CALC BMI ABV UP PARAM F/U: HCPCS | Performed by: INTERNAL MEDICINE

## 2018-05-04 PROCEDURE — G8427 DOCREV CUR MEDS BY ELIG CLIN: HCPCS | Performed by: INTERNAL MEDICINE

## 2018-05-04 PROCEDURE — 1036F TOBACCO NON-USER: CPT | Performed by: INTERNAL MEDICINE

## 2018-05-04 PROCEDURE — 3017F COLORECTAL CA SCREEN DOC REV: CPT | Performed by: INTERNAL MEDICINE

## 2018-05-04 RX ORDER — MONTELUKAST SODIUM 10 MG/1
10 TABLET ORAL DAILY
Qty: 30 TABLET | Refills: 4 | Status: SHIPPED | OUTPATIENT
Start: 2018-05-04 | End: 2018-09-25 | Stop reason: SDUPTHER

## 2018-05-17 ENCOUNTER — OFFICE VISIT (OUTPATIENT)
Dept: OBGYN CLINIC | Age: 58
End: 2018-05-17
Payer: MEDICARE

## 2018-05-17 ENCOUNTER — HOSPITAL ENCOUNTER (OUTPATIENT)
Age: 58
Setting detail: SPECIMEN
Discharge: HOME | End: 2018-05-17
Payer: MEDICARE

## 2018-05-17 VITALS
RESPIRATION RATE: 16 BRPM | WEIGHT: 213.25 LBS | DIASTOLIC BLOOD PRESSURE: 62 MMHG | HEIGHT: 66 IN | SYSTOLIC BLOOD PRESSURE: 112 MMHG | BODY MASS INDEX: 34.27 KG/M2

## 2018-05-17 DIAGNOSIS — Z13.820 SCREENING FOR OSTEOPOROSIS: ICD-10-CM

## 2018-05-17 DIAGNOSIS — Z78.0 MENOPAUSE: ICD-10-CM

## 2018-05-17 DIAGNOSIS — Z01.419 PAP SMEAR, AS PART OF ROUTINE GYNECOLOGICAL EXAMINATION: Primary | ICD-10-CM

## 2018-05-17 PROCEDURE — 99396 PREV VISIT EST AGE 40-64: CPT | Performed by: NURSE PRACTITIONER

## 2018-05-17 RX ORDER — VENLAFAXINE HYDROCHLORIDE 37.5 MG/1
37.5 CAPSULE, EXTENDED RELEASE ORAL DAILY
Qty: 30 CAPSULE | Refills: 3 | Status: SHIPPED | OUTPATIENT
Start: 2018-05-17 | End: 2018-05-22 | Stop reason: SDUPTHER

## 2018-05-17 ASSESSMENT — ENCOUNTER SYMPTOMS
BACK PAIN: 0
CONSTIPATION: 0
COLOR CHANGE: 0
COUGH: 0
PHOTOPHOBIA: 0
VOICE CHANGE: 0
ABDOMINAL DISTENTION: 0
WHEEZING: 0
ABDOMINAL PAIN: 0
DIARRHEA: 0
SHORTNESS OF BREATH: 0
SORE THROAT: 0
NAUSEA: 0
TROUBLE SWALLOWING: 0
STRIDOR: 0

## 2018-05-18 LAB
HPV SAMPLE: NORMAL
HPV SOURCE: NORMAL
HPV, GENOTYPE 16: NOT DETECTED
HPV, GENOTYPE 18: NOT DETECTED
HPV, HIGH RISK OTHER: NOT DETECTED
HPV, INTERPRETATION: NORMAL

## 2018-05-22 ENCOUNTER — TELEPHONE (OUTPATIENT)
Dept: OBGYN CLINIC | Age: 58
End: 2018-05-22

## 2018-05-22 ENCOUNTER — HOSPITAL ENCOUNTER (OUTPATIENT)
Dept: WOMENS IMAGING | Age: 58
Discharge: HOME | End: 2018-05-24
Payer: MEDICARE

## 2018-05-22 DIAGNOSIS — Z78.0 MENOPAUSE: ICD-10-CM

## 2018-05-22 DIAGNOSIS — Z13.820 SCREENING FOR OSTEOPOROSIS: ICD-10-CM

## 2018-05-22 PROCEDURE — 77080 DXA BONE DENSITY AXIAL: CPT

## 2018-05-22 RX ORDER — VENLAFAXINE HYDROCHLORIDE 37.5 MG/1
37.5 CAPSULE, EXTENDED RELEASE ORAL DAILY
Qty: 30 CAPSULE | Refills: 3 | Status: ON HOLD | OUTPATIENT
Start: 2018-05-22 | End: 2018-11-22

## 2018-05-22 RX ORDER — IBANDRONATE SODIUM 150 MG/1
TABLET, FILM COATED ORAL
Qty: 30 TABLET | Refills: 3 | Status: SHIPPED | OUTPATIENT
Start: 2018-05-22 | End: 2018-07-24 | Stop reason: ALTCHOICE

## 2018-05-24 ENCOUNTER — PATIENT MESSAGE (OUTPATIENT)
Dept: OBGYN CLINIC | Age: 58
End: 2018-05-24

## 2018-05-29 ENCOUNTER — PATIENT MESSAGE (OUTPATIENT)
Dept: OBGYN CLINIC | Age: 58
End: 2018-05-29

## 2018-05-29 RX ORDER — ALENDRONATE SODIUM 70 MG/1
70 TABLET ORAL
Qty: 4 TABLET | Refills: 11 | Status: SHIPPED | OUTPATIENT
Start: 2018-05-29 | End: 2018-07-24

## 2018-06-12 LAB — CYTOLOGY REPORT: NORMAL

## 2018-07-16 NOTE — TELEPHONE ENCOUNTER
Next Visit Date:  Future Appointments  Date Time Provider Rhona Lopezi   7/24/2018 7:00 AM TEMI [facility]   8/10/2018 1:00 PM SCHEDULE, [facility] RESPIRATORY SPEC Resp Spec TOLP   8/10/2018 1:30 PM Erick Ocampo  [address] Maintenance   Topic Date Due    Flu vaccine (1) 09/01/2018    Shingles Vaccine (2 of 2 - 2 Dose Series) 09/21/2018    Potassium monitoring  04/17/2019    Creatinine monitoring  04/17/2019    Cervical cancer screen  05/17/2019    Breast cancer screen  04/30/2020    DTaP/Tdap/Td vaccine (2 - Td) 09/14/2022    Lipid screen  04/17/2023    Colon cancer screen colonoscopy  04/03/2025    Pneumococcal med risk  Completed    Hepatitis C screen  Completed    HIV screen  Completed       Hemoglobin A1C (%)   Date Value   01/04/2018 5.1   09/06/2017 5.3   11/16/2016 5.3             ( goal A1C is < 7)   No results found for: LABMICR  LDL Cholesterol (mg/dL)   Date Value   04/17/2018 129   01/04/2018 125       (goal LDL is <100)   AST (U/L)   Date Value   04/17/2018 15     ALT (U/L)   Date Value   04/17/2018 20     BUN (mg/dL)   Date Value   04/17/2018 15     BP Readings from Last 3 Encounters:   05/17/18 112/62   05/04/18 110/74   04/24/18 110/64          (goal 120/80)    All Future Testing planned in [facility]  Lab Frequency Next Occurrence   Comprehensive Metabolic Panel Once 00/52/1345   TSH without Reflex Once 02/10/2018   Hemoglobin A1C Once 02/10/2018   Vitamin B12 & Folate Once 02/10/2018   Vitamin B1 Once 02/10/2018   Magnesium Once 02/10/2018   Iron and TIBC Once 02/10/2018   Vitamin A Once 02/10/2018   FULL PFT STUDY WITH PRE AND POST Once 08/04/2018   XR CHEST STANDARD (2 VW) Once 08/04/2018   Phase I - warming device     Phase I - cardiac monitor     Phase I & II - metered glucose                 Patient Active Problem List:     Knee pain     Asthma     Vitamin D deficiency     Osteoarthritis     History of pulmonary embolism

## 2018-07-17 RX ORDER — TOPIRAMATE 50 MG/1
TABLET, FILM COATED ORAL
Qty: 30 TABLET | Refills: 2 | Status: SHIPPED | OUTPATIENT
Start: 2018-07-17 | End: 2018-10-15 | Stop reason: SDUPTHER

## 2018-07-24 ENCOUNTER — HOSPITAL ENCOUNTER (OUTPATIENT)
Age: 58
Discharge: HOME | End: 2018-07-26
Payer: MEDICARE

## 2018-07-24 ENCOUNTER — HOSPITAL ENCOUNTER (OUTPATIENT)
Dept: GENERAL RADIOLOGY | Age: 58
Discharge: HOME | End: 2018-07-26
Payer: MEDICARE

## 2018-07-24 ENCOUNTER — OFFICE VISIT (OUTPATIENT)
Dept: FAMILY MEDICINE CLINIC | Age: 58
End: 2018-07-24
Payer: MEDICARE

## 2018-07-24 VITALS
TEMPERATURE: 98.2 F | DIASTOLIC BLOOD PRESSURE: 64 MMHG | SYSTOLIC BLOOD PRESSURE: 116 MMHG | HEART RATE: 72 BPM | OXYGEN SATURATION: 99 % | BODY MASS INDEX: 34.06 KG/M2 | WEIGHT: 217 LBS | HEIGHT: 67 IN

## 2018-07-24 DIAGNOSIS — E66.9 OBESITY (BMI 30-39.9): ICD-10-CM

## 2018-07-24 DIAGNOSIS — E55.9 VITAMIN D DEFICIENCY: ICD-10-CM

## 2018-07-24 DIAGNOSIS — M25.511 CHRONIC RIGHT SHOULDER PAIN: Primary | ICD-10-CM

## 2018-07-24 DIAGNOSIS — G89.29 CHRONIC RIGHT SHOULDER PAIN: Primary | ICD-10-CM

## 2018-07-24 DIAGNOSIS — E66.01 MORBID OBESITY (HCC): ICD-10-CM

## 2018-07-24 DIAGNOSIS — G89.29 CHRONIC RIGHT SHOULDER PAIN: ICD-10-CM

## 2018-07-24 DIAGNOSIS — M15.9 PRIMARY OSTEOARTHRITIS INVOLVING MULTIPLE JOINTS: ICD-10-CM

## 2018-07-24 DIAGNOSIS — I10 ESSENTIAL HYPERTENSION: ICD-10-CM

## 2018-07-24 DIAGNOSIS — M25.511 CHRONIC RIGHT SHOULDER PAIN: ICD-10-CM

## 2018-07-24 DIAGNOSIS — Z98.84 STATUS POST GASTRIC BYPASS FOR OBESITY: ICD-10-CM

## 2018-07-24 PROCEDURE — 3017F COLORECTAL CA SCREEN DOC REV: CPT | Performed by: PHYSICIAN ASSISTANT

## 2018-07-24 PROCEDURE — 99214 OFFICE O/P EST MOD 30 MIN: CPT | Performed by: PHYSICIAN ASSISTANT

## 2018-07-24 PROCEDURE — G8417 CALC BMI ABV UP PARAM F/U: HCPCS | Performed by: PHYSICIAN ASSISTANT

## 2018-07-24 PROCEDURE — 73030 X-RAY EXAM OF SHOULDER: CPT

## 2018-07-24 PROCEDURE — G8427 DOCREV CUR MEDS BY ELIG CLIN: HCPCS | Performed by: PHYSICIAN ASSISTANT

## 2018-07-24 PROCEDURE — 1036F TOBACCO NON-USER: CPT | Performed by: PHYSICIAN ASSISTANT

## 2018-07-24 RX ORDER — IBANDRONATE SODIUM 150 MG/1
150 TABLET, FILM COATED ORAL
Qty: 30 TABLET | Refills: 5 | Status: SHIPPED | OUTPATIENT
Start: 2018-07-24 | End: 2018-09-06 | Stop reason: SDUPTHER

## 2018-07-24 ASSESSMENT — ENCOUNTER SYMPTOMS
NAUSEA: 0
STRIDOR: 0
GLOBUS SENSATION: 0
FACIAL SWEATING: 0
DIARRHEA: 0
CHEST TIGHTNESS: 0
VOMITING: 0
SINUS PRESSURE: 0
COUGH: 0
SHORTNESS OF BREATH: 0
BLURRED VISION: 0
SORE THROAT: 0
RHINORRHEA: 0
ABDOMINAL PAIN: 0
EYE ITCHING: 0
EYE DISCHARGE: 0
HEARTBURN: 1

## 2018-07-24 NOTE — PROGRESS NOTES
[id number]  [address] [place] [id number]  Dept: [phone]  Dept Fax: [phone]    Reggie Olivares is a 63 y.o. female who presents today for her medical conditions/complaints as noted below. Reggie Olivares is c/o of   Chief Complaint   Patient presents with    3 Month Follow-Up    Shoulder Pain     right shoulder pain         HPI:     Hyperlipidemia   This is a chronic problem. Exacerbating diseases include obesity. She has no history of chronic renal disease, liver disease or nephrotic syndrome. Pertinent negatives include no chest pain, focal sensory loss, focal weakness, leg pain, myalgias or shortness of breath. Hypertension   This is a chronic problem. Associated symptoms include headaches. Pertinent negatives include no anxiety, blurred vision, chest pain, neck pain, palpitations, peripheral edema, PND or shortness of breath. There is no history of chronic renal disease. Headache    This is a chronic problem. The pain is at a severity of 4/10. The pain is moderate. Pertinent negatives include no abdominal pain, abnormal behavior, blurred vision, coughing, dizziness, ear pain, facial sweating, fever, hearing loss, nausea, neck pain, numbness, rhinorrhea, sinus pressure, sore throat, vomiting or weakness. Her past medical history is significant for obesity. Gastroesophageal Reflux   She complains of heartburn. She reports no abdominal pain, no chest pain, no coughing, no early satiety, no globus sensation, no nausea, no sore throat, no stridor or no tooth decay. This is a chronic problem. Associated symptoms include anemia. Pertinent negatives include no fatigue. Shoulder Pain    This is a chronic problem. The pain is at a severity of 5/10. Pertinent negatives include no fever or numbness.        Hemoglobin A1C (%)   Date Value   01/04/2018 5.1   09/06/2017 5.3   11/16/2016 5.3             ( goal A1C is < 7)   No results found for: LABMICR  LDL Cholesterol (mg/dL)   Date Value   2018 129   2018 125   2017 95       (goal LDL is <100)   AST (U/L)   Date Value   2018 15     ALT (U/L)   Date Value   2018 20     BUN (mg/dL)   Date Value   2018 15     BP Readings from Last 3 Encounters:   18 116/64   18 112/62   18 110/74          (goal 120/80)    Past Medical History:   Diagnosis Date    Arthritis     Asthma     On Inhaler    Bursitis     left shoulder    GERD (gastroesophageal reflux disease)     H/O bariatric surgery     15 MONTHS AGO/LOST 160#    Head ache     Hiatal hernia     No surgery yet    History of bladder infections     History of gastritis     History of hemorrhoids     Hyperlipidemia     Hypertension     Not anymore after Bariatric Surgery    Hypertension     presently off medication    Mild intermittent asthma     Obesity     KEVIN on CPAP     at night    Osteoarthritis     Osteopenia     Pulmonary embolism ([address]     from foot  trauma, was treated for six months with anticoagulation. She has no inferior vena cava filter.     S/P gastric bypass 12-29-15    Dr. Newton Ing, hosp wt = 280lb, initial wt = 328lb    Vitamin D deficiency     Wears glasses       Past Surgical History:   Procedure Laterality Date     SECTION  1986,  1987    CHOLECYSTECTOMY      COLONOSCOPY      found hital hernia    CYSTOSCOPY  16    EXCISION / BIOPSY SKIN LESION OF HEAD / NECK N/A 4/3/2017     EXCISION POSTERIOR NECK CYST performed by Lamine Guerra IV, DO at [address] [facility] [address]  10/14/2016    excision back lipoma with drain placement    SHELBY-EN-Y GASTRIC BYPASS  12/29/15    liver bx, EGD    SHOULDER SURGERY Left 2016    Giovanna procedure    SHOULDER SURGERY      bone spurs 2016    TONSILLECTOMY  1979    UPPER GASTROINTESTINAL ENDOSCOPY  Aug. 2015       Family History   Problem Relation Age of Onset    Asthma Mother     Cancer Father         leukemia    Other Father         Myelodysplastic syndrome, which converted to leukemia    Allergies Other         Family history       Social History   Substance Use Topics    Smoking status: Former Smoker     Packs/day: 0.50     Years: 10.00     Types: Cigarettes     Start date: 12/16/1978     Quit date: 1/1/1987    Smokeless tobacco: Never Used    Alcohol use No      Current Outpatient Prescriptions   Medication Sig Dispense Refill    ibandronate (BONIVA) 150 MG tablet Take 1 tablet by mouth every 30 days Take one (1) tablet once per month in the morning with a full glass of water, on an empty stomach, and do not take anything else by mouth or lie down for the next 30 minutes. 30 tablet 5    topiramate (TOPAMAX) 50 MG tablet TAKE 1 TABLET BY MOUTH ONCE DAILY 30 tablet 2    venlafaxine (EFFEXOR XR) 37.5 MG extended release capsule Take 1 capsule by mouth daily 30 capsule 3    montelukast (SINGULAIR) 10 MG tablet Take 1 tablet by mouth daily 30 tablet 4    tiotropium (SPIRIVA HANDIHALER) 18 MCG inhalation capsule Inhale 1 capsule into the lungs daily 30 capsule 4    Elastic Bandages & Supports (MEDICAL COMPRESSION STOCKINGS) MISC 2 each by Does not apply route daily Right and left knee high compression stockings. 20-30 mmHg. To be worn continuously throughout the day.  2 each 11    FIBER COMPLETE PO Take by mouth      sodium chloride (VINEET 128) 2 % ophthalmic solution Place 1 drop into both eyes 2 times daily      mometasone-formoterol (DULERA) 200-5 MCG/ACT inhaler Inhale 2 puffs into the lungs 2 times daily 1 Inhaler 11    albuterol sulfate HFA (PROVENTIL HFA) 108 (90 Base) MCG/ACT inhaler Inhale 2 puffs into the lungs every 6 hours as needed for Wheezing 1 Inhaler 11    albuterol (PROVENTIL) (2.5 MG/3ML) 0.083% nebulizer solution Take 3 mLs by nebulization every 6 hours as needed for Wheezing 120 each 11    RESTASIS 0.05 % ophthalmic emulsion       acetaminophen (TYLENOL ARTHRITIS PAIN) 650 MG extended release tablet Take 650 mg by mouth every 8 hours as needed for Pain      Omega-3 Fatty Acids (OMEGA 3 PO) Take by mouth daily      Calcium Carb-Cholecalciferol (CALCIUM 500 + D3) 500-600 MG-UNIT TABS Take 1 tablet by mouth daily      Multiple Vitamin (MVI, CELEBRATE, CHEWABLE TABLET) Take 1 tablet by mouth 2 times daily Using Flinstone for not d/t nausea with Celebrate      Handicap Placard MISC by Does not apply route. Dx: arthritis, expires 5 yrs from date 1 each 0     No current facility-administered medications for this visit. Facility-Administered Medications Ordered in Other Visits   Medication Dose Route Frequency Provider Last Rate Last Dose    lactated ringers infusion 1,000 mL  1,000 mL Intravenous Continuous Mikhail Young MD   Stopped at 08/19/16 1249    HYDROmorphone (DILAUDID) injection 0.25 mg  0.25 mg Intravenous Q5 Min PRN Yessica Mendenhall MD         Allergies   Allergen Reactions    Nsaids Other (See Comments)     Bariatric Surgeon told me not to take NSAIDS for good    Pcn [Penicillins] Other (See Comments)     Sores in mouth    Bactrim [Sulfamethoxazole-Trimethoprim] Itching and Rash    Codeine Itching and Rash       Health Maintenance   Topic Date Due    Flu vaccine (1) 09/01/2018    Shingles Vaccine (2 of 2 - 2 Dose Series) 09/21/2018    Potassium monitoring  04/17/2019    Creatinine monitoring  04/17/2019    Cervical cancer screen  05/17/2019    Breast cancer screen  04/30/2020    DTaP/Tdap/Td vaccine (2 - Td) 09/14/2022    Lipid screen  04/17/2023    Colon cancer screen colonoscopy  04/03/2025    Pneumococcal med risk  Completed    Hepatitis C screen  Completed    HIV screen  Completed       Subjective:      Review of Systems   Constitutional: Negative for chills, diaphoresis, fatigue and fever.    HENT: Negative for congestion, ear discharge, ear pain, hearing loss, postnasal drip, rhinorrhea, sinus pressure and sore throat. Eyes: Negative for blurred vision, discharge and itching. Respiratory: Negative for cough, chest tightness and shortness of breath. Cardiovascular: Negative for chest pain, palpitations, leg swelling and PND. Gastrointestinal: Positive for heartburn. Negative for abdominal pain, diarrhea, nausea and vomiting. Genitourinary: Negative for dysuria and frequency. Musculoskeletal: Negative for myalgias, neck pain and neck stiffness. Skin: Negative for rash. Neurological: Positive for headaches. Negative for dizziness, focal weakness, weakness, light-headedness and numbness. All other systems reviewed and are negative. Objective:     Physical Exam   Constitutional: She is oriented to person, place, and time. She appears well-developed and well-nourished. No distress. /64   Pulse 88   Temp 98.1 °F (36.7 °C) (Oral)   Ht 5' 6.5\" (1.689 m)   Wt 213 lb (96.6 kg)   LMP  (LMP Unknown)   SpO2 98%   BMI 33.86 kg/m²      HENT:   Head: Normocephalic and atraumatic. Right Ear: External ear normal.   Left Ear: External ear normal.   Nose: Nose normal.   Mouth/Throat: Oropharynx is clear and moist.   Eyes: Conjunctivae and EOM are normal. Pupils are equal, round, and reactive to light. Right eye exhibits no discharge. Left eye exhibits no discharge. No scleral icterus. Neck: Normal range of motion. Neck supple. No tracheal deviation present. No thyromegaly present. Cardiovascular: Normal rate, regular rhythm and normal heart sounds. Exam reveals no gallop and no friction rub. No murmur heard. Pulmonary/Chest: Effort normal and breath sounds normal. No stridor. No respiratory distress. She has no wheezes. She has no rales. She exhibits no tenderness. Abdominal: Soft. Bowel sounds are normal. She exhibits no distension. There is no tenderness. There is no rebound and no guarding. Musculoskeletal: She exhibits no edema.    Neurological: She is alert and oriented to person, place, and time. No cranial nerve deficit. Coordination and gait normal.   Skin: Skin is warm and dry. No rash noted. She is not diaphoretic. Psychiatric: She has a normal mood and affect. Her affect is not inappropriate. Nursing note and vitals reviewed. /64   Pulse 72   Temp 98.2 °F (36.8 °C) (Oral)   Ht 5' 7\" (1.702 m)   Wt 217 lb (98.4 kg)   LMP  (LMP Unknown)   SpO2 99%   BMI 33.99 kg/m²     Assessment:       Diagnosis Orders   1. Chronic right shoulder pain  External Referral To Physical Therapy    XR SHOULDER RIGHT (MIN 2 VIEWS)   2. Vitamin D deficiency     3. Primary osteoarthritis involving multiple joints     4. Obesity (BMI 30-39.9)     5. Essential hypertension     6. Morbid obesity ([address]     7. Status post gastric bypass for obesity               Plan:      No Follow-up on file. Orders Placed This Encounter   Procedures    XR SHOULDER RIGHT (MIN 2 VIEWS)     Standing Status:   Future     Standing Expiration Date:   7/24/2019    External Referral To Physical Therapy     Referral Priority:   Routine     Referral Type:   Eval and Treat     Referral Reason:   Specialty Services Required     Requested Specialty:   Physical Therapy     Number of Visits Requested:   1     Orders Placed This Encounter   Medications    ibandronate (BONIVA) 150 MG tablet     Sig: Take 1 tablet by mouth every 30 days Take one (1) tablet once per month in the morning with a full glass of water, on an empty stomach, and do not take anything else by mouth or lie down for the next 30 minutes. Dispense:  30 tablet     Refill:  5      Labs 4/17/18    Hyperglycemia - Diet and exercise. Will follow.        Anemia - Iron deficient. Will replace.       HLD - Diet and exercise. Will follow.      HTN - Pt has used home BP cuff. Resolved with wt loss.      GERD - Resolved with wt loss.      Asthma - Daily wheezing and SOB. No recent meds or eval. H/o tobacco abuse.  Stopped 25

## 2018-08-06 ENCOUNTER — HOSPITAL ENCOUNTER (OUTPATIENT)
Age: 58
Discharge: HOME | End: 2018-08-08
Payer: MEDICARE

## 2018-08-06 ENCOUNTER — HOSPITAL ENCOUNTER (OUTPATIENT)
Dept: GENERAL RADIOLOGY | Age: 58
Discharge: HOME | End: 2018-08-08
Payer: MEDICARE

## 2018-08-06 DIAGNOSIS — J45.40 MODERATE PERSISTENT ASTHMA WITHOUT COMPLICATION: ICD-10-CM

## 2018-08-06 PROCEDURE — 71046 X-RAY EXAM CHEST 2 VIEWS: CPT

## 2018-08-07 ENCOUNTER — OFFICE VISIT (OUTPATIENT)
Dept: ORTHOPEDIC SURGERY | Age: 58
End: 2018-08-07
Payer: MEDICARE

## 2018-08-07 VITALS — HEIGHT: 67 IN | WEIGHT: 215 LBS | BODY MASS INDEX: 33.74 KG/M2

## 2018-08-07 DIAGNOSIS — M19.011 ARTHRITIS OF RIGHT ACROMIOCLAVICULAR JOINT: Primary | ICD-10-CM

## 2018-08-07 DIAGNOSIS — G89.29 CHRONIC RIGHT SHOULDER PAIN: ICD-10-CM

## 2018-08-07 DIAGNOSIS — M25.511 CHRONIC RIGHT SHOULDER PAIN: ICD-10-CM

## 2018-08-07 PROCEDURE — G8417 CALC BMI ABV UP PARAM F/U: HCPCS | Performed by: ORTHOPAEDIC SURGERY

## 2018-08-07 PROCEDURE — 1036F TOBACCO NON-USER: CPT | Performed by: ORTHOPAEDIC SURGERY

## 2018-08-07 PROCEDURE — 3017F COLORECTAL CA SCREEN DOC REV: CPT | Performed by: ORTHOPAEDIC SURGERY

## 2018-08-07 PROCEDURE — 20610 DRAIN/INJ JOINT/BURSA W/O US: CPT | Performed by: ORTHOPAEDIC SURGERY

## 2018-08-07 PROCEDURE — G8427 DOCREV CUR MEDS BY ELIG CLIN: HCPCS | Performed by: ORTHOPAEDIC SURGERY

## 2018-08-07 PROCEDURE — 99213 OFFICE O/P EST LOW 20 MIN: CPT | Performed by: ORTHOPAEDIC SURGERY

## 2018-08-07 RX ORDER — HYDROCODONE BITARTRATE AND ACETAMINOPHEN 5; 325 MG/1; MG/1
1 TABLET ORAL EVERY 6 HOURS PRN
Qty: 28 TABLET | Refills: 0 | Status: SHIPPED | OUTPATIENT
Start: 2018-08-07 | End: 2018-08-14

## 2018-08-07 RX ORDER — METHYLPREDNISOLONE ACETATE 40 MG/ML
40 INJECTION, SUSPENSION INTRA-ARTICULAR; INTRALESIONAL; INTRAMUSCULAR; SOFT TISSUE ONCE
Status: COMPLETED | OUTPATIENT
Start: 2018-08-07 | End: 2018-08-07

## 2018-08-07 RX ADMIN — METHYLPREDNISOLONE ACETATE 40 MG: 40 INJECTION, SUSPENSION INTRA-ARTICULAR; INTRALESIONAL; INTRAMUSCULAR; SOFT TISSUE at 07:32

## 2018-08-10 ENCOUNTER — OFFICE VISIT (OUTPATIENT)
Dept: PULMONOLOGY | Age: 58
End: 2018-08-10
Payer: MEDICARE

## 2018-08-10 VITALS
SYSTOLIC BLOOD PRESSURE: 129 MMHG | HEIGHT: 66 IN | RESPIRATION RATE: 12 BRPM | HEART RATE: 72 BPM | TEMPERATURE: 97.6 F | BODY MASS INDEX: 34.07 KG/M2 | OXYGEN SATURATION: 99 % | DIASTOLIC BLOOD PRESSURE: 82 MMHG | WEIGHT: 212 LBS

## 2018-08-10 VITALS — OXYGEN SATURATION: 99 % | HEART RATE: 74 BPM | WEIGHT: 212 LBS | BODY MASS INDEX: 33.27 KG/M2 | HEIGHT: 67 IN

## 2018-08-10 DIAGNOSIS — Z98.84 STATUS POST GASTRIC BYPASS FOR OBESITY: ICD-10-CM

## 2018-08-10 DIAGNOSIS — J45.20 MILD INTERMITTENT ASTHMA WITHOUT COMPLICATION: Primary | ICD-10-CM

## 2018-08-10 DIAGNOSIS — G47.33 OSA ON CPAP: ICD-10-CM

## 2018-08-10 DIAGNOSIS — Z99.89 OSA ON CPAP: Primary | ICD-10-CM

## 2018-08-10 DIAGNOSIS — J45.909 UNCOMPLICATED ASTHMA, UNSPECIFIED ASTHMA SEVERITY, UNSPECIFIED WHETHER PERSISTENT: ICD-10-CM

## 2018-08-10 DIAGNOSIS — G47.33 OSA ON CPAP: Primary | ICD-10-CM

## 2018-08-10 DIAGNOSIS — Z99.89 OSA ON CPAP: ICD-10-CM

## 2018-08-10 DIAGNOSIS — Z86.718 HISTORY OF DVT (DEEP VEIN THROMBOSIS): ICD-10-CM

## 2018-08-10 DIAGNOSIS — I10 ESSENTIAL HYPERTENSION: ICD-10-CM

## 2018-08-10 PROCEDURE — 99214 OFFICE O/P EST MOD 30 MIN: CPT | Performed by: INTERNAL MEDICINE

## 2018-08-10 PROCEDURE — 94729 DIFFUSING CAPACITY: CPT | Performed by: INTERNAL MEDICINE

## 2018-08-10 PROCEDURE — 3017F COLORECTAL CA SCREEN DOC REV: CPT | Performed by: INTERNAL MEDICINE

## 2018-08-10 PROCEDURE — G8427 DOCREV CUR MEDS BY ELIG CLIN: HCPCS | Performed by: INTERNAL MEDICINE

## 2018-08-10 PROCEDURE — 1036F TOBACCO NON-USER: CPT | Performed by: INTERNAL MEDICINE

## 2018-08-10 PROCEDURE — 94375 RESPIRATORY FLOW VOLUME LOOP: CPT | Performed by: INTERNAL MEDICINE

## 2018-08-10 PROCEDURE — G8417 CALC BMI ABV UP PARAM F/U: HCPCS | Performed by: INTERNAL MEDICINE

## 2018-08-10 PROCEDURE — 94726 PLETHYSMOGRAPHY LUNG VOLUMES: CPT | Performed by: INTERNAL MEDICINE

## 2018-08-11 NOTE — PROGRESS NOTES
Champ Model  8/10/2018      Deeptinnda Model  63 y.o. female presented for follow up . Obstructive sleep apnea and asthma. Since last visit is doing better , only used albuterol mdi/nebulized once . Addition of Spiriva and continued Mercedes Elodia has helped . She is ess anxious about her husbands health . Denies cough wheeze hemoptysis or sputum production . no nocturnal symptoms . Is using cpap and is complaint . Flip Orozco She is currently doing well using her positive airway pressure device. She is sleeping better at night with her machine and says she has less daytime sleepiness. There have been no driving issues and concentration is better when awake. The machine pressure settings are comfortable, the mask is reasonably comfortable and she is using the humidity. There have been no ER visits, hospital visits, any new issues or medication changes since the last visit here in sleep clinic. Last visit   Since her last visit, patient feels her asthma is gotten worse. Starting January. Her  health has gotten worse and he has been told not to shovel snow or cut grass and patient is under a lot of stress because of his health and that is also triggering her asthma attacks and since January. Patient has started putting on more weight, even though she had a bariatric surgery since she started putting on more weight. She started feeling more short of breath with exertion and doing activities which her  was doing like cutting grass shoveling snow taking garbage out. She is using her rescue inhaler every other day, but no nocturnal symptoms. Denies any cough or hemoptysis. He is using Flonase for allergies, which are in spring. She is compliant with her Dulera. From [address] [facility] sleep standpoint, she has started Tuesday use CPAP again at night and a compliance report for 90 days shows only 38% compliance with usage of 7 hours at night.   We discussed in detail regarding improving compliance of CPAP machine. She doesn't notice any difference in her symptoms. She's never had excessive daytime sleepiness. The only reason she is started to use the machine again is because she is gaining weight          8/21/17  She is doing well from her asthma standpoint. Uses albuterol once a month, does not use nebulized albuterol until She has cold. Denies any cough, any sputum. No wheezing. No dyspnea. Her asthma symptoms are triggered during fall and spring. She is using CPAP compliance report showed 93% compliance. She does not want to use CPAP because she is uncomfortable with the mask. It wakes her up the pressure. [Patient had a good bariatric surgery and has lost significant weight. She was sent for re-titration and she had a split-night study, which showed that she still has obstructive sleep apnea AHI 12 /hr needed a CPAP of 12 cm of water pressure  He is using the CPAP machine. Initially it was an auto titrating now it has been set at 12 cm  She is on [facility]  Her allergy testing was negative. IgE level was low]      Review of Systems -  General ROS: negative for - chills, fatigue, fever or weight loss  ENT ROS: negative for - headaches, oral lesions or sore throat  Cardiovascular ROS: no chest pain , orthopnea or pnd   Gastrointestinal ROS: no abdominal pain, change in bowel habits, or black or bloody stools  Skin - no rash   Neuro - no blurry vision , no loc .  No focal weakness   msk - no jt tenderness or swelling    Vascular - no claudication , rest completed and negative   Lymphatic - complete and negative   Hematology - oncology - complete and negative   Allergy immunology - complete and negative    no burning or hematuria      Immunization History   Administered Date(s) Administered    Influenza, Quadv, 3 yrs and older, IM, Preservative Free 08/23/2016, 09/05/2017    Pneumococcal 13-valent Conjugate ([id number]) 09/18/2015    Pneumococcal Polysaccharide ([id number]) 2016    Tdap (Boostrix, Adacel) 2012    Zoster Subunit (Shingrix) 2018          PAST MEDICAL HISTORY:         Diagnosis Date    Acromioclavicular joint arthritis 2016    Acute gastroenteritis 10/11/2017    Arthritis     Asthma 1980    On Inhaler    Bursitis     left shoulder    Chronic right shoulder pain 2018    Essential hypertension 2017    GERD (gastroesophageal reflux disease)     H/O bariatric surgery     15 MONTHS AGO/LOST 160#    Head ache     Hiatal hernia     No surgery yet    History of bladder infections     History of DVT (deep vein thrombosis) 2015    History of gastritis     History of hemorrhoids     History of pulmonary embolism 2015    Hyperlipidemia     Hypertension     Not anymore after Bariatric Surgery    Hypertension     presently off medication    Knee pain 3/6/2015    Mild intermittent asthma     Morbid obesity ([address] 2015    Nausea and vomiting     Obesity     Obesity (BMI 30-39. 9) 2016    KEVIN on CPAP     at night    Osteoarthritis     Osteopenia     Pulmonary embolism ([address]     from foot  trauma, was treated for six months with anticoagulation. She has no inferior vena cava filter.     S/P gastric bypass 12-29-15    Dr. Cem Srivastava, [facility], hosp wt = 280lb, initial wt = 328lb    Status post gastric bypass for obesity 2015    Vitamin D deficiency     Wears glasses     Weight loss of 160 lbs since surgery  2017       Family History:   Family History   Problem Relation Age of Onset    Asthma Mother     Cancer Father         leukemia    Other Father         Myelodysplastic syndrome, which converted to leukemia    Allergies Other         Family history       SURGICAL HISTORY:   Past Surgical History:   Procedure Laterality Date     SECTION  1986,  1987    [address] [place] [address]    found hital hernia    CYSTOSCOPY  16    EXCISION tablet once per month in the morning with a full glass of water, on an empty stomach, and do not take anything else by mouth or lie down for the next 30 minutes. 7/24/18  Yes Jose Juan Pineda PA-C   topiramate (TOPAMAX) 50 MG tablet TAKE 1 TABLET BY MOUTH ONCE DAILY 7/17/18  Yes Prabhakar Daigle MD   montelukast (SINGULAIR) 10 MG tablet Take 1 tablet by mouth daily 5/4/18  Yes Leon Figueroa MD   FIBER COMPLETE PO Take by mouth   Yes Historical Provider, MD   sodium chloride (VINEET 128) 2 % ophthalmic solution Place 1 drop into both eyes 2 times daily   Yes Historical Provider, MD   albuterol sulfate HFA (PROVENTIL HFA) 108 (90 Base) MCG/ACT inhaler Inhale 2 puffs into the lungs every 6 hours as needed for Wheezing 8/21/17  Yes Leon Figueroa MD   albuterol (PROVENTIL) (2.5 MG/3ML) 0.083% nebulizer solution Take 3 mLs by nebulization every 6 hours as needed for Wheezing 8/21/17  Yes Leon Figueroa MD   RESTASIS 0.05 % ophthalmic emulsion  3/1/17  Yes Historical Provider, MD   acetaminophen (TYLENOL ARTHRITIS PAIN) 650 MG extended release tablet Take 650 mg by mouth every 8 hours as needed for Pain   Yes Historical Provider, MD   Omega-3 Fatty Acids (OMEGA 3 PO) Take by mouth daily   Yes Historical Provider, MD   Calcium Carb-Cholecalciferol (CALCIUM 500 + D3) 500-600 MG-UNIT TABS Take 1 tablet by mouth daily   Yes Historical Provider, MD   Multiple Vitamin (MVI, CELEBRATE, CHEWABLE TABLET) Take 1 tablet by mouth 2 times daily Using Flinstone for not d/t nausea with Celebrate   Yes Historical Provider, MD   venlafaxine (EFFEXOR XR) 37.5 MG extended release capsule Take 1 capsule by mouth daily 5/22/18   EMILY Russ - CNP   Physical exam  Head and neck atraumatic, normocephalic    Lymph nodes-no cervical, supraclavicular lymphadenopathy    Neck-no JVP elevation    Lungs - Ventilating all lobes without any rales, rhonchi, wheezes or dullness. No bronchial breath sounds.   Chest expansion equal

## 2018-08-28 ENCOUNTER — OFFICE VISIT (OUTPATIENT)
Dept: ORTHOPEDIC SURGERY | Age: 58
End: 2018-08-28
Payer: MEDICARE

## 2018-08-28 VITALS — HEIGHT: 67 IN | BODY MASS INDEX: 33.74 KG/M2 | WEIGHT: 215 LBS

## 2018-08-28 DIAGNOSIS — M75.111 INCOMPLETE TEAR OF RIGHT ROTATOR CUFF: ICD-10-CM

## 2018-08-28 DIAGNOSIS — M19.011 ARTHRITIS OF RIGHT ACROMIOCLAVICULAR JOINT: Primary | ICD-10-CM

## 2018-08-28 PROCEDURE — 3017F COLORECTAL CA SCREEN DOC REV: CPT | Performed by: ORTHOPAEDIC SURGERY

## 2018-08-28 PROCEDURE — 99213 OFFICE O/P EST LOW 20 MIN: CPT | Performed by: ORTHOPAEDIC SURGERY

## 2018-08-28 PROCEDURE — 1036F TOBACCO NON-USER: CPT | Performed by: ORTHOPAEDIC SURGERY

## 2018-08-28 PROCEDURE — G8417 CALC BMI ABV UP PARAM F/U: HCPCS | Performed by: ORTHOPAEDIC SURGERY

## 2018-08-28 PROCEDURE — G8427 DOCREV CUR MEDS BY ELIG CLIN: HCPCS | Performed by: ORTHOPAEDIC SURGERY

## 2018-09-05 ENCOUNTER — HOSPITAL ENCOUNTER (OUTPATIENT)
Dept: MRI IMAGING | Age: 58
Discharge: HOME | End: 2018-09-07
Payer: MEDICARE

## 2018-09-05 DIAGNOSIS — M75.111 INCOMPLETE TEAR OF RIGHT ROTATOR CUFF: ICD-10-CM

## 2018-09-05 PROCEDURE — 73221 MRI JOINT UPR EXTREM W/O DYE: CPT

## 2018-09-06 RX ORDER — IBANDRONATE SODIUM 150 MG/1
150 TABLET, FILM COATED ORAL
Qty: 3 TABLET | Refills: 4 | Status: SHIPPED | OUTPATIENT
Start: 2018-09-06 | End: 2018-09-28

## 2018-09-06 NOTE — TELEPHONE ENCOUNTER
bypass for obesity     Acromioclavicular joint arthritis     Obesity (BMI 30-39.9)     KEVIN on CPAP 12 cm h20     Essential hypertension     Weight loss of 160 lbs since surgery      Acute gastroenteritis     Nausea and vomiting     Chronic right shoulder pain

## 2018-09-13 ENCOUNTER — OFFICE VISIT (OUTPATIENT)
Dept: ORTHOPEDIC SURGERY | Age: 58
End: 2018-09-13
Payer: MEDICARE

## 2018-09-13 VITALS — HEIGHT: 66 IN | WEIGHT: 215 LBS | BODY MASS INDEX: 34.55 KG/M2

## 2018-09-13 DIAGNOSIS — M19.011 ARTHRITIS OF RIGHT ACROMIOCLAVICULAR JOINT: Primary | ICD-10-CM

## 2018-09-13 DIAGNOSIS — M25.511 CHRONIC RIGHT SHOULDER PAIN: ICD-10-CM

## 2018-09-13 DIAGNOSIS — G89.29 CHRONIC RIGHT SHOULDER PAIN: ICD-10-CM

## 2018-09-13 PROCEDURE — 20605 DRAIN/INJ JOINT/BURSA W/O US: CPT | Performed by: ORTHOPAEDIC SURGERY

## 2018-09-13 PROCEDURE — G8417 CALC BMI ABV UP PARAM F/U: HCPCS | Performed by: ORTHOPAEDIC SURGERY

## 2018-09-13 PROCEDURE — 99213 OFFICE O/P EST LOW 20 MIN: CPT | Performed by: ORTHOPAEDIC SURGERY

## 2018-09-13 PROCEDURE — G8427 DOCREV CUR MEDS BY ELIG CLIN: HCPCS | Performed by: ORTHOPAEDIC SURGERY

## 2018-09-13 PROCEDURE — 1036F TOBACCO NON-USER: CPT | Performed by: ORTHOPAEDIC SURGERY

## 2018-09-13 PROCEDURE — 3017F COLORECTAL CA SCREEN DOC REV: CPT | Performed by: ORTHOPAEDIC SURGERY

## 2018-09-13 RX ORDER — METHYLPREDNISOLONE ACETATE 40 MG/ML
40 INJECTION, SUSPENSION INTRA-ARTICULAR; INTRALESIONAL; INTRAMUSCULAR; SOFT TISSUE ONCE
Status: COMPLETED | OUTPATIENT
Start: 2018-09-13 | End: 2018-09-13

## 2018-09-13 RX ADMIN — METHYLPREDNISOLONE ACETATE 40 MG: 40 INJECTION, SUSPENSION INTRA-ARTICULAR; INTRALESIONAL; INTRAMUSCULAR; SOFT TISSUE at 12:51

## 2018-09-20 ENCOUNTER — OFFICE VISIT (OUTPATIENT)
Dept: ORTHOPEDIC SURGERY | Age: 58
End: 2018-09-20
Payer: MEDICARE

## 2018-09-20 VITALS — WEIGHT: 215 LBS | BODY MASS INDEX: 34.55 KG/M2 | HEIGHT: 66 IN

## 2018-09-20 DIAGNOSIS — M19.011 ARTHRITIS OF RIGHT ACROMIOCLAVICULAR JOINT: Primary | ICD-10-CM

## 2018-09-20 PROCEDURE — 99212 OFFICE O/P EST SF 10 MIN: CPT | Performed by: ORTHOPAEDIC SURGERY

## 2018-09-20 PROCEDURE — 3017F COLORECTAL CA SCREEN DOC REV: CPT | Performed by: ORTHOPAEDIC SURGERY

## 2018-09-20 PROCEDURE — 1036F TOBACCO NON-USER: CPT | Performed by: ORTHOPAEDIC SURGERY

## 2018-09-20 PROCEDURE — G8417 CALC BMI ABV UP PARAM F/U: HCPCS | Performed by: ORTHOPAEDIC SURGERY

## 2018-09-20 PROCEDURE — G8427 DOCREV CUR MEDS BY ELIG CLIN: HCPCS | Performed by: ORTHOPAEDIC SURGERY

## 2018-09-27 DIAGNOSIS — M19.011 ARTHRITIS OF RIGHT ACROMIOCLAVICULAR JOINT: ICD-10-CM

## 2018-09-27 RX ORDER — HYDROCODONE BITARTRATE AND ACETAMINOPHEN 5; 325 MG/1; MG/1
1 TABLET ORAL EVERY 6 HOURS PRN
Qty: 28 TABLET | Refills: 0 | Status: CANCELLED | OUTPATIENT
Start: 2018-09-27 | End: 2018-10-04

## 2018-09-28 ENCOUNTER — HOSPITAL ENCOUNTER (OUTPATIENT)
Dept: PREADMISSION TESTING | Age: 58
Discharge: HOME | End: 2018-10-02
Payer: MEDICARE

## 2018-09-28 VITALS
DIASTOLIC BLOOD PRESSURE: 66 MMHG | RESPIRATION RATE: 18 BRPM | OXYGEN SATURATION: 98 % | HEART RATE: 79 BPM | SYSTOLIC BLOOD PRESSURE: 122 MMHG | BODY MASS INDEX: 34.39 KG/M2 | HEIGHT: 67 IN | WEIGHT: 219.14 LBS

## 2018-09-28 DIAGNOSIS — M25.519 CHRONIC SHOULDER PAIN, UNSPECIFIED LATERALITY: Primary | ICD-10-CM

## 2018-09-28 DIAGNOSIS — G89.29 CHRONIC SHOULDER PAIN, UNSPECIFIED LATERALITY: Primary | ICD-10-CM

## 2018-09-28 DIAGNOSIS — G89.18 POST-OP PAIN: ICD-10-CM

## 2018-09-28 LAB
ABSOLUTE EOS #: 0.3 K/UL (ref 0–0.4)
ABSOLUTE IMMATURE GRANULOCYTE: ABNORMAL K/UL (ref 0–0.3)
ABSOLUTE LYMPH #: 2.4 K/UL (ref 1–4.8)
ABSOLUTE MONO #: 0.8 K/UL (ref 0.2–0.8)
ANION GAP SERPL CALCULATED.3IONS-SCNC: 8 MMOL/L (ref 9–17)
BASOPHILS # BLD: 0 % (ref 0–2)
BASOPHILS ABSOLUTE: 0 K/UL (ref 0–0.2)
BUN BLDV-MCNC: 15 MG/DL (ref 6–20)
CHLORIDE BLD-SCNC: 107 MMOL/L (ref 98–107)
CO2: 27 MMOL/L (ref 20–31)
CREAT SERPL-MCNC: 0.71 MG/DL (ref 0.5–0.9)
DIFFERENTIAL TYPE: ABNORMAL
EKG ATRIAL RATE: 68 BPM
EKG P AXIS: 48 DEGREES
EKG P-R INTERVAL: 150 MS
EKG Q-T INTERVAL: 392 MS
EKG QRS DURATION: 76 MS
EKG QTC CALCULATION (BAZETT): 416 MS
EKG R AXIS: 33 DEGREES
EKG T AXIS: 36 DEGREES
EKG VENTRICULAR RATE: 68 BPM
EOSINOPHILS RELATIVE PERCENT: 3 % (ref 1–4)
GFR AFRICAN AMERICAN: >60 ML/MIN
GFR NON-AFRICAN AMERICAN: >60 ML/MIN
GFR SERPL CREATININE-BSD FRML MDRD: NORMAL ML/MIN/{1.73_M2}
GFR SERPL CREATININE-BSD FRML MDRD: NORMAL ML/MIN/{1.73_M2}
HCT VFR BLD CALC: 38.2 % (ref 36–46)
HEMOGLOBIN: 13.1 G/DL (ref 12–16)
IMMATURE GRANULOCYTES: ABNORMAL %
LYMPHOCYTES # BLD: 26 % (ref 24–44)
MCH RBC QN AUTO: 29.8 PG (ref 26–34)
MCHC RBC AUTO-ENTMCNC: 34.4 G/DL (ref 31–37)
MCV RBC AUTO: 86.8 FL (ref 80–100)
MONOCYTES # BLD: 9 % (ref 1–7)
NRBC AUTOMATED: ABNORMAL PER 100 WBC
PDW BLD-RTO: 13.9 % (ref 11.5–14.5)
PLATELET # BLD: 377 K/UL (ref 130–400)
PLATELET ESTIMATE: ABNORMAL
PMV BLD AUTO: 5.7 FL (ref 6–12)
POTASSIUM SERPL-SCNC: 4.2 MMOL/L (ref 3.7–5.3)
RBC # BLD: 4.4 M/UL (ref 4–5.2)
RBC # BLD: ABNORMAL 10*6/UL
SEG NEUTROPHILS: 62 % (ref 36–66)
SEGMENTED NEUTROPHILS ABSOLUTE COUNT: 5.9 K/UL (ref 1.8–7.7)
SODIUM BLD-SCNC: 142 MMOL/L (ref 135–144)
WBC # BLD: 9.4 K/UL (ref 3.5–11)
WBC # BLD: ABNORMAL 10*3/UL

## 2018-09-28 PROCEDURE — 36415 COLL VENOUS BLD VENIPUNCTURE: CPT

## 2018-09-28 PROCEDURE — 93005 ELECTROCARDIOGRAM TRACING: CPT

## 2018-09-28 PROCEDURE — 82565 ASSAY OF CREATININE: CPT

## 2018-09-28 PROCEDURE — 84520 ASSAY OF UREA NITROGEN: CPT

## 2018-09-28 PROCEDURE — 85025 COMPLETE CBC W/AUTO DIFF WBC: CPT

## 2018-09-28 PROCEDURE — 80051 ELECTROLYTE PANEL: CPT

## 2018-09-28 RX ORDER — MONTELUKAST SODIUM 10 MG/1
10 TABLET ORAL NIGHTLY
COMMUNITY
End: 2019-04-01 | Stop reason: ALTCHOICE

## 2018-09-28 RX ORDER — HYDROCODONE BITARTRATE AND ACETAMINOPHEN 5; 325 MG/1; MG/1
1 TABLET ORAL EVERY 6 HOURS PRN
Status: ON HOLD | COMMUNITY
End: 2018-11-22

## 2018-09-28 RX ORDER — HYDROCODONE BITARTRATE AND ACETAMINOPHEN 5; 325 MG/1; MG/1
1 TABLET ORAL EVERY 6 HOURS PRN
Qty: 28 TABLET | Refills: 0 | Status: SHIPPED | OUTPATIENT
Start: 2018-09-28 | End: 2018-10-05

## 2018-09-28 ASSESSMENT — PAIN DESCRIPTION - PROGRESSION: CLINICAL_PROGRESSION: GRADUALLY WORSENING

## 2018-09-28 ASSESSMENT — PAIN DESCRIPTION - ONSET: ONSET: ON-GOING

## 2018-09-28 ASSESSMENT — PAIN DESCRIPTION - ORIENTATION: ORIENTATION: RIGHT

## 2018-09-28 ASSESSMENT — PAIN DESCRIPTION - PAIN TYPE: TYPE: CHRONIC PAIN

## 2018-09-28 ASSESSMENT — PAIN DESCRIPTION - DIRECTION: RADIATING_TOWARDS: DOWN RIGHT ARM

## 2018-09-28 ASSESSMENT — PAIN DESCRIPTION - FREQUENCY: FREQUENCY: CONTINUOUS

## 2018-09-28 ASSESSMENT — PAIN DESCRIPTION - DESCRIPTORS: DESCRIPTORS: SHOOTING

## 2018-09-28 ASSESSMENT — PAIN SCALES - GENERAL: PAINLEVEL_OUTOF10: 8

## 2018-09-28 ASSESSMENT — PAIN DESCRIPTION - LOCATION: LOCATION: SHOULDER

## 2018-09-28 NOTE — H&P
thrombosis) 2015    History of gastritis     History of hemorrhoids     History of pulmonary embolism 2015    Hyperlipidemia     Hypertension     Not anymore after Bariatric Surgery    Hypertension     presently off medication    Knee pain 3/6/2015    Mild intermittent asthma     Morbid obesity ([address] 2015    Nausea and vomiting     Obesity     Obesity (BMI 30-39. 9) 2016    KEVIN on CPAP     at night    Osteoarthritis     Osteopenia     Pulmonary embolism ([address]     from foot  trauma, was treated for six months with anticoagulation. She has no inferior vena cava filter.  S/P gastric bypass 12-29-15    Dr. Joslyn Betancur, [facility][1], hosp wt = 280lb, initial wt = 328lb    Status post gastric bypass for obesity 2015    Vitamin D deficiency     Wears glasses     Weight loss of 160 lbs since surgery  2017        Past Surgical History:     Past Surgical History:   Procedure Laterality Date     SECTION  1986,  1987    CHOLECYSTECTOMY      COLONOSCOPY      found hital hernia    CYSTOSCOPY  16    EXCISION / BIOPSY SKIN LESION OF HEAD / NECK N/A 4/3/2017     EXCISION POSTERIOR NECK CYST performed by Josie 63 IV, DO at [address]  10/14/2016    excision back lipoma with drain placement    SHELBY-EN-Y GASTRIC BYPASS  12/29/15    liver bx, EGD    SHOULDER SURGERY Left 2016    Giovanna procedure    SHOULDER SURGERY      bone spurs 2016    TONSILLECTOMY  1979    UPPER GASTROINTESTINAL ENDOSCOPY  Aug. 2015        Medications Prior to Admission:     Prior to Admission medications    Medication Sig Start Date End Date Taking? Authorizing Provider   HYDROcodone-acetaminophen (NORCO) 5-325 MG per tablet Take 1 tablet by mouth every 6 hours as needed for Pain. .   Yes Historical Provider, MD   montelukast (SINGULAIR) 10 MG tablet Take 10 mg by mouth nightly   Yes Historical Provider, MD contusion or marrow occupying lesion. OUTLET SPACES: The suprascapular notch and quadrilateral space are without obstructing or space occupying lesions. 1. Superior labral tear from 10-2 o'clock position using anterior 3 o'clock convention. 2. Minimal partial-thickness tear of the anterior supraspinatus tendon. 3. Mild subscapularis tendinosis. [id number]. See paper chart. Diagnosis:      1. Right AC joint osteoarthritis    Plans:     1.  Right shoulder magdy procedure      EMILY Nicholas - CNP  2018  8:40 AM

## 2018-09-28 NOTE — PROGRESS NOTES
surgery and the morning of surgery with a special soap called chlorhexidine gluconate (CHG*). *Not to be used by people allergic to Chlorhexidine Gluconate (CHG). Following these instructions will help you be sure that your skin is clean before surgery. Instructions on cleaning your skin before surgery: The night before your surgery:      You will need to shower with warm water (not hot) and the CHG soap.  Use a clean wash cloth and a clean towel. Have clean clothes available to put on after the shower.    First wash your hair with regular shampoo. Rinse your hair and body thoroughly to remove the shampoo. [facility] Wash your face with your regular soap or water only. Thoroughly rinse your body with warm water from the neck down.  Turn water off to prevent rinsing the soap off too soon.  With a clean wet washcloth and half of the CHG soap in the bottle, lather your entire body from the neck down. Do not use CHG soap near your eyes or ears to avoid injury to those areas.  Wash thoroughly, paying special attention to the area where your surgery will be performed.  Wash your body gently for five (5) minutes. Avoid scrubbing your skin too hard.  Turn the water back on and rinse your body thoroughly.  Pat yourself dry with a clean, soft towel. Do not apply lotion, cream or powder.  Dress with clean freshly washed clothes. The morning of surgery:     Repeat shower following steps above - using remaining half of CHG soap in bottle. Patient Instructions:    [facility] If you are having any type of anesthesia you are to have nothing to eat or drink after midnight the night before your surgery. This includes gum, mints, water or smoking or chewing tobacco.  The only exception to this is a small sip of water to take with any morning dose of heart, blood pressure, or seizure medications. No alcoholic beverages for 24 hours prior to surgery.      Brush your teeth but do not

## 2018-10-08 ENCOUNTER — ANESTHESIA EVENT (OUTPATIENT)
Dept: OPERATING ROOM | Age: 58
End: 2018-10-08
Payer: MEDICARE

## 2018-10-08 ENCOUNTER — ANESTHESIA (OUTPATIENT)
Dept: OPERATING ROOM | Age: 58
End: 2018-10-08
Payer: MEDICARE

## 2018-10-08 ENCOUNTER — HOSPITAL ENCOUNTER (OUTPATIENT)
Age: 58
Setting detail: OUTPATIENT SURGERY
Discharge: HOME | End: 2018-10-08
Attending: ORTHOPAEDIC SURGERY | Admitting: ORTHOPAEDIC SURGERY
Payer: MEDICARE

## 2018-10-08 VITALS
HEIGHT: 67 IN | OXYGEN SATURATION: 96 % | BODY MASS INDEX: 34.39 KG/M2 | RESPIRATION RATE: 15 BRPM | TEMPERATURE: 97.3 F | WEIGHT: 219.14 LBS | HEART RATE: 61 BPM | SYSTOLIC BLOOD PRESSURE: 115 MMHG | DIASTOLIC BLOOD PRESSURE: 62 MMHG

## 2018-10-08 VITALS — DIASTOLIC BLOOD PRESSURE: 65 MMHG | TEMPERATURE: 90.9 F | OXYGEN SATURATION: 100 % | SYSTOLIC BLOOD PRESSURE: 106 MMHG

## 2018-10-08 DIAGNOSIS — G89.18 POST-OPERATIVE PAIN: Primary | ICD-10-CM

## 2018-10-08 PROCEDURE — 3600000012 HC SURGERY LEVEL 2 ADDTL 15MIN: Performed by: ORTHOPAEDIC SURGERY

## 2018-10-08 PROCEDURE — 7100000000 HC PACU RECOVERY - FIRST 15 MIN: Performed by: ORTHOPAEDIC SURGERY

## 2018-10-08 PROCEDURE — 2580000003 HC RX 258: Performed by: ANESTHESIOLOGY

## 2018-10-08 PROCEDURE — 3700000000 HC ANESTHESIA ATTENDED CARE: Performed by: ORTHOPAEDIC SURGERY

## 2018-10-08 PROCEDURE — 2500000003 HC RX 250 WO HCPCS: Performed by: ANESTHESIOLOGY

## 2018-10-08 PROCEDURE — 6360000002 HC RX W HCPCS: Performed by: ANESTHESIOLOGY

## 2018-10-08 PROCEDURE — 6360000002 HC RX W HCPCS: Performed by: NURSE ANESTHETIST, CERTIFIED REGISTERED

## 2018-10-08 PROCEDURE — 2500000003 HC RX 250 WO HCPCS: Performed by: NURSE ANESTHETIST, CERTIFIED REGISTERED

## 2018-10-08 PROCEDURE — 7100000001 HC PACU RECOVERY - ADDTL 15 MIN: Performed by: ORTHOPAEDIC SURGERY

## 2018-10-08 PROCEDURE — 6360000002 HC RX W HCPCS: Performed by: STUDENT IN AN ORGANIZED HEALTH CARE EDUCATION/TRAINING PROGRAM

## 2018-10-08 PROCEDURE — 7100000010 HC PHASE II RECOVERY - FIRST 15 MIN: Performed by: ORTHOPAEDIC SURGERY

## 2018-10-08 PROCEDURE — 64415 NJX AA&/STRD BRCH PLXS IMG: CPT | Performed by: ANESTHESIOLOGY

## 2018-10-08 PROCEDURE — C9290 INJ, BUPIVACAINE LIPOSOME: HCPCS | Performed by: ANESTHESIOLOGY

## 2018-10-08 PROCEDURE — 2580000003 HC RX 258: Performed by: NURSE ANESTHETIST, CERTIFIED REGISTERED

## 2018-10-08 PROCEDURE — 3600000002 HC SURGERY LEVEL 2 BASE: Performed by: ORTHOPAEDIC SURGERY

## 2018-10-08 PROCEDURE — 3700000001 HC ADD 15 MINUTES (ANESTHESIA): Performed by: ORTHOPAEDIC SURGERY

## 2018-10-08 PROCEDURE — 7100000011 HC PHASE II RECOVERY - ADDTL 15 MIN: Performed by: ORTHOPAEDIC SURGERY

## 2018-10-08 PROCEDURE — 2709999900 HC NON-CHARGEABLE SUPPLY: Performed by: ORTHOPAEDIC SURGERY

## 2018-10-08 RX ORDER — SODIUM CHLORIDE, SODIUM LACTATE, POTASSIUM CHLORIDE, CALCIUM CHLORIDE 600; 310; 30; 20 MG/100ML; MG/100ML; MG/100ML; MG/100ML
INJECTION, SOLUTION INTRAVENOUS CONTINUOUS PRN
Status: DISCONTINUED | OUTPATIENT
Start: 2018-10-08 | End: 2018-10-08 | Stop reason: SDUPTHER

## 2018-10-08 RX ORDER — DEXAMETHASONE SODIUM PHOSPHATE 10 MG/ML
INJECTION INTRAMUSCULAR; INTRAVENOUS PRN
Status: DISCONTINUED | OUTPATIENT
Start: 2018-10-08 | End: 2018-10-08 | Stop reason: SDUPTHER

## 2018-10-08 RX ORDER — ONDANSETRON 2 MG/ML
INJECTION INTRAMUSCULAR; INTRAVENOUS PRN
Status: DISCONTINUED | OUTPATIENT
Start: 2018-10-08 | End: 2018-10-08 | Stop reason: SDUPTHER

## 2018-10-08 RX ORDER — ONDANSETRON 2 MG/ML
4 INJECTION INTRAMUSCULAR; INTRAVENOUS
Status: COMPLETED | OUTPATIENT
Start: 2018-10-08 | End: 2018-10-08

## 2018-10-08 RX ORDER — FENTANYL CITRATE 50 UG/ML
50 INJECTION, SOLUTION INTRAMUSCULAR; INTRAVENOUS EVERY 5 MIN PRN
Status: DISCONTINUED | OUTPATIENT
Start: 2018-10-08 | End: 2018-10-08 | Stop reason: HOSPADM

## 2018-10-08 RX ORDER — HYDROMORPHONE HCL 110MG/55ML
0.25 PATIENT CONTROLLED ANALGESIA SYRINGE INTRAVENOUS EVERY 5 MIN PRN
Status: DISCONTINUED | OUTPATIENT
Start: 2018-10-08 | End: 2018-10-08 | Stop reason: HOSPADM

## 2018-10-08 RX ORDER — DOCUSATE SODIUM 100 MG/1
100 CAPSULE, LIQUID FILLED ORAL DAILY PRN
Qty: 30 CAPSULE | Refills: 0 | Status: SHIPPED | OUTPATIENT
Start: 2018-10-08 | End: 2019-08-02 | Stop reason: ALTCHOICE

## 2018-10-08 RX ORDER — MIDAZOLAM HYDROCHLORIDE 1 MG/ML
2 INJECTION INTRAMUSCULAR; INTRAVENOUS ONCE
Status: COMPLETED | OUTPATIENT
Start: 2018-10-08 | End: 2018-10-08

## 2018-10-08 RX ORDER — FENTANYL CITRATE 50 UG/ML
25 INJECTION, SOLUTION INTRAMUSCULAR; INTRAVENOUS EVERY 5 MIN PRN
Status: DISCONTINUED | OUTPATIENT
Start: 2018-10-08 | End: 2018-10-08 | Stop reason: HOSPADM

## 2018-10-08 RX ORDER — FENTANYL CITRATE 50 UG/ML
INJECTION, SOLUTION INTRAMUSCULAR; INTRAVENOUS PRN
Status: DISCONTINUED | OUTPATIENT
Start: 2018-10-08 | End: 2018-10-08 | Stop reason: SDUPTHER

## 2018-10-08 RX ORDER — PROPOFOL 10 MG/ML
INJECTION, EMULSION INTRAVENOUS PRN
Status: DISCONTINUED | OUTPATIENT
Start: 2018-10-08 | End: 2018-10-08 | Stop reason: SDUPTHER

## 2018-10-08 RX ORDER — HYDROCODONE BITARTRATE AND ACETAMINOPHEN 5; 325 MG/1; MG/1
1 TABLET ORAL EVERY 6 HOURS PRN
Qty: 28 TABLET | Refills: 0 | Status: SHIPPED | OUTPATIENT
Start: 2018-10-08 | End: 2018-10-15

## 2018-10-08 RX ORDER — LIDOCAINE HYDROCHLORIDE 10 MG/ML
INJECTION, SOLUTION INFILTRATION; PERINEURAL PRN
Status: DISCONTINUED | OUTPATIENT
Start: 2018-10-08 | End: 2018-10-08 | Stop reason: SDUPTHER

## 2018-10-08 RX ORDER — ONDANSETRON 4 MG/1
4 TABLET, FILM COATED ORAL DAILY PRN
Qty: 30 TABLET | Refills: 0 | Status: ON HOLD | OUTPATIENT
Start: 2018-10-08 | End: 2018-12-06 | Stop reason: HOSPADM

## 2018-10-08 RX ORDER — HYDROMORPHONE HCL 110MG/55ML
0.5 PATIENT CONTROLLED ANALGESIA SYRINGE INTRAVENOUS EVERY 5 MIN PRN
Status: DISCONTINUED | OUTPATIENT
Start: 2018-10-08 | End: 2018-10-08 | Stop reason: HOSPADM

## 2018-10-08 RX ORDER — BUPIVACAINE HYDROCHLORIDE 5 MG/ML
INJECTION, SOLUTION EPIDURAL; INTRACAUDAL PRN
Status: DISCONTINUED | OUTPATIENT
Start: 2018-10-08 | End: 2018-10-08 | Stop reason: SDUPTHER

## 2018-10-08 RX ORDER — ROCURONIUM BROMIDE 10 MG/ML
INJECTION, SOLUTION INTRAVENOUS PRN
Status: DISCONTINUED | OUTPATIENT
Start: 2018-10-08 | End: 2018-10-08 | Stop reason: SDUPTHER

## 2018-10-08 RX ORDER — LIDOCAINE HYDROCHLORIDE 10 MG/ML
0.5 INJECTION, SOLUTION EPIDURAL; INFILTRATION; INTRACAUDAL; PERINEURAL ONCE
Status: DISCONTINUED | OUTPATIENT
Start: 2018-10-08 | End: 2018-10-08 | Stop reason: HOSPADM

## 2018-10-08 RX ORDER — SODIUM CHLORIDE, SODIUM LACTATE, POTASSIUM CHLORIDE, CALCIUM CHLORIDE 600; 310; 30; 20 MG/100ML; MG/100ML; MG/100ML; MG/100ML
INJECTION, SOLUTION INTRAVENOUS CONTINUOUS
Status: DISCONTINUED | OUTPATIENT
Start: 2018-10-08 | End: 2018-10-08 | Stop reason: HOSPADM

## 2018-10-08 RX ADMIN — FENTANYL CITRATE 25 MCG: 50 INJECTION, SOLUTION INTRAMUSCULAR; INTRAVENOUS at 12:05

## 2018-10-08 RX ADMIN — FENTANYL CITRATE 25 MCG: 50 INJECTION, SOLUTION INTRAMUSCULAR; INTRAVENOUS at 11:53

## 2018-10-08 RX ADMIN — DEXAMETHASONE SODIUM PHOSPHATE 10 MG: 10 INJECTION INTRAMUSCULAR; INTRAVENOUS at 10:14

## 2018-10-08 RX ADMIN — LIDOCAINE HYDROCHLORIDE 3 ML: 10 INJECTION, SOLUTION INFILTRATION; PERINEURAL at 09:50

## 2018-10-08 RX ADMIN — ONDANSETRON 4 MG: 2 INJECTION, SOLUTION INTRAMUSCULAR; INTRAVENOUS at 10:14

## 2018-10-08 RX ADMIN — MIDAZOLAM HYDROCHLORIDE 2 MG: 2 INJECTION, SOLUTION INTRAMUSCULAR; INTRAVENOUS at 09:47

## 2018-10-08 RX ADMIN — SUGAMMADEX 200 MG: 100 INJECTION, SOLUTION INTRAVENOUS at 10:50

## 2018-10-08 RX ADMIN — HYDROMORPHONE HYDROCHLORIDE 0.5 MG: 2 INJECTION INTRAMUSCULAR; INTRAVENOUS; SUBCUTANEOUS at 11:35

## 2018-10-08 RX ADMIN — PROPOFOL 150 MG: 10 INJECTION, EMULSION INTRAVENOUS at 10:03

## 2018-10-08 RX ADMIN — ROCURONIUM BROMIDE 50 MG: 10 INJECTION, SOLUTION INTRAVENOUS at 10:03

## 2018-10-08 RX ADMIN — BUPIVACAINE 10 ML: 13.3 INJECTION, SUSPENSION, LIPOSOMAL INFILTRATION at 09:50

## 2018-10-08 RX ADMIN — FENTANYL CITRATE 50 MCG: 50 INJECTION, SOLUTION INTRAMUSCULAR; INTRAVENOUS at 11:28

## 2018-10-08 RX ADMIN — HYDROMORPHONE HYDROCHLORIDE 0.5 MG: 2 INJECTION INTRAMUSCULAR; INTRAVENOUS; SUBCUTANEOUS at 11:43

## 2018-10-08 RX ADMIN — FENTANYL CITRATE 150 MCG: 50 INJECTION, SOLUTION INTRAMUSCULAR; INTRAVENOUS at 10:03

## 2018-10-08 RX ADMIN — FENTANYL CITRATE 50 MCG: 50 INJECTION, SOLUTION INTRAMUSCULAR; INTRAVENOUS at 10:24

## 2018-10-08 RX ADMIN — FENTANYL CITRATE 50 MCG: 50 INJECTION, SOLUTION INTRAMUSCULAR; INTRAVENOUS at 11:07

## 2018-10-08 RX ADMIN — SODIUM CHLORIDE, POTASSIUM CHLORIDE, SODIUM LACTATE AND CALCIUM CHLORIDE: 600; 310; 30; 20 INJECTION, SOLUTION INTRAVENOUS at 09:14

## 2018-10-08 RX ADMIN — ONDANSETRON 4 MG: 2 INJECTION INTRAMUSCULAR; INTRAVENOUS at 13:08

## 2018-10-08 RX ADMIN — BUPIVACAINE HYDROCHLORIDE 10 ML: 5 INJECTION, SOLUTION EPIDURAL; INTRACAUDAL at 09:50

## 2018-10-08 RX ADMIN — CEFAZOLIN SODIUM 2 G: 2 SOLUTION INTRAVENOUS at 10:14

## 2018-10-08 RX ADMIN — SODIUM CHLORIDE, POTASSIUM CHLORIDE, SODIUM LACTATE AND CALCIUM CHLORIDE: 600; 310; 30; 20 INJECTION, SOLUTION INTRAVENOUS at 10:01

## 2018-10-08 ASSESSMENT — PULMONARY FUNCTION TESTS
PIF_VALUE: 15
PIF_VALUE: 18
PIF_VALUE: 14
PIF_VALUE: 15
PIF_VALUE: 14
PIF_VALUE: 15
PIF_VALUE: 14
PIF_VALUE: 15
PIF_VALUE: 1
PIF_VALUE: 15
PIF_VALUE: 15
PIF_VALUE: 21
PIF_VALUE: 15
PIF_VALUE: 3
PIF_VALUE: 17
PIF_VALUE: 15
PIF_VALUE: 21
PIF_VALUE: 14
PIF_VALUE: 14
PIF_VALUE: 15
PIF_VALUE: 14
PIF_VALUE: 15
PIF_VALUE: 3
PIF_VALUE: 7
PIF_VALUE: 3
PIF_VALUE: 5
PIF_VALUE: 14
PIF_VALUE: 15
PIF_VALUE: 15
PIF_VALUE: 1
PIF_VALUE: 14
PIF_VALUE: 15
PIF_VALUE: 1
PIF_VALUE: 15
PIF_VALUE: 14
PIF_VALUE: 15
PIF_VALUE: 14
PIF_VALUE: 14
PIF_VALUE: 16
PIF_VALUE: 15
PIF_VALUE: 15
PIF_VALUE: 20
PIF_VALUE: 14
PIF_VALUE: 15
PIF_VALUE: 14
PIF_VALUE: 14
PIF_VALUE: 15
PIF_VALUE: 15
PIF_VALUE: 14
PIF_VALUE: 1
PIF_VALUE: 15
PIF_VALUE: 18
PIF_VALUE: 14
PIF_VALUE: 6
PIF_VALUE: 15
PIF_VALUE: 15
PIF_VALUE: 0
PIF_VALUE: 14
PIF_VALUE: 15
PIF_VALUE: 15

## 2018-10-08 ASSESSMENT — PAIN DESCRIPTION - DIRECTION: RADIATING_TOWARDS: DOWN RIGHT ARM

## 2018-10-08 ASSESSMENT — PAIN DESCRIPTION - ONSET: ONSET: ON-GOING

## 2018-10-08 ASSESSMENT — PAIN DESCRIPTION - FREQUENCY: FREQUENCY: CONTINUOUS

## 2018-10-08 ASSESSMENT — PAIN SCALES - GENERAL
PAINLEVEL_OUTOF10: 5
PAINLEVEL_OUTOF10: 9
PAINLEVEL_OUTOF10: 4
PAINLEVEL_OUTOF10: 7
PAINLEVEL_OUTOF10: 9
PAINLEVEL_OUTOF10: 4
PAINLEVEL_OUTOF10: 4
PAINLEVEL_OUTOF10: 8

## 2018-10-08 ASSESSMENT — PAIN DESCRIPTION - PAIN TYPE
TYPE: ACUTE PAIN;SURGICAL PAIN
TYPE: CHRONIC PAIN

## 2018-10-08 ASSESSMENT — PAIN DESCRIPTION - ORIENTATION
ORIENTATION: RIGHT
ORIENTATION: RIGHT

## 2018-10-08 ASSESSMENT — PAIN DESCRIPTION - LOCATION
LOCATION: SHOULDER
LOCATION: SHOULDER

## 2018-10-08 ASSESSMENT — PAIN DESCRIPTION - DESCRIPTORS: DESCRIPTORS: SHOOTING

## 2018-10-08 ASSESSMENT — PAIN DESCRIPTION - PROGRESSION: CLINICAL_PROGRESSION: GRADUALLY WORSENING

## 2018-10-08 NOTE — ANESTHESIA POSTPROCEDURE EVALUATION
Department of Anesthesiology  Postprocedure Note    Patient: Karon Duque  MRN: [id number]  YOB: 1960  Date of evaluation: 10/8/2018  Time:  1:17 PM     Procedure Summary     Date:  10/08/18 [facility]    Anesthesia Start:  1000 Anesthesia Stop:  8524    Procedure:  RIGHT SHOULDER  LOUIE PROCEDURE (Right Shoulder) Diagnosis:  (DX RIGHT AC JOINT OA)    Surgeon:  Syd Jin MD Responsible Provider:  Odilia Wood MD    Anesthesia Type:  general, regional ASA Status:  3          Anesthesia Type: general, regional      Last vitals: Reviewed and per EMR flowsheets.        Anesthesia Post Evaluation    Patient location during evaluation: PACU  Level of consciousness: awake and alert  Airway patency: patent  Nausea & Vomiting: no nausea and no vomiting  Complications: no  Cardiovascular status: blood pressure returned to baseline  Respiratory status: acceptable  Hydration status: euvolemic

## 2018-10-08 NOTE — ANESTHESIA PRE PROCEDURE
Department of Anesthesiology  Preprocedure Note       Name:  Karon Duque   Age:  63 y.o.  :  1960                                          MRN:  [id number]         Date:  10/8/2018      Surgeon: Aspen Friedman):  Syd Jin MD    Procedure: Procedure(s):  RIGHT SHOULDER  LOUIE PROCEDURE    Medications prior to admission:   Prior to Admission medications    Medication Sig Start Date End Date Taking? Authorizing Provider   HYDROcodone-acetaminophen (NORCO) 5-325 MG per tablet Take 1 tablet by mouth every 6 hours as needed for Pain. .   Yes Historical Provider, MD   montelukast (SINGULAIR) 10 MG tablet Take 10 mg by mouth nightly   Yes Historical Provider, MD   mometasone-formoterol (DULERA) 200-5 MCG/ACT inhaler Inhale 2 puffs into the lungs 2 times daily 8/10/18  Yes Sridhar Resendiz MD   topiramate (TOPAMAX) 50 MG tablet TAKE 1 TABLET BY MOUTH ONCE DAILY 18  Yes Navneet Benson MD   FIBER COMPLETE PO Take by mouth   Yes Historical Provider, MD   sodium chloride (VINEET 128) 2 % ophthalmic solution Place 1 drop into both eyes 2 times daily   Yes Historical Provider, MD   albuterol sulfate HFA (PROVENTIL HFA) 108 (90 Base) MCG/ACT inhaler Inhale 2 puffs into the lungs every 6 hours as needed for Wheezing 17  Yes Sridhar Resendiz MD   albuterol (PROVENTIL) (2.5 MG/3ML) 0.083% nebulizer solution Take 3 mLs by nebulization every 6 hours as needed for Wheezing 17  Yes Sridhar Resendiz MD   RESTASIS 0.05 % ophthalmic emulsion  3/1/17  Yes Historical Provider, MD   acetaminophen (TYLENOL ARTHRITIS PAIN) 650 MG extended release tablet Take 650 mg by mouth every 8 hours as needed for Pain   Yes Historical Provider, MD   Calcium Carb-Cholecalciferol (CALCIUM 500 + D3) 500-600 MG-UNIT TABS Take 1 tablet by mouth daily   Yes Historical Provider, MD   Multiple Vitamin (MVI, CELEBRATE, CHEWABLE TABLET) Take 1 tablet by mouth 2 times daily Using Flinstone for not d/t nausea with Celebrate   Yes Historical SURGERY Left 08/19/2016    Giovanna procedure    SHOULDER SURGERY      bone spurs 8/19/2016    TONSILLECTOMY  1979    UPPER GASTROINTESTINAL ENDOSCOPY  Aug. 2015       Social History:    Social History   Substance Use Topics    Smoking status: Former Smoker     Packs/day: 0.50     Years: 10.00     Types: Cigarettes     Start date: 12/16/1978     Quit date: 1/1/1987    Smokeless tobacco: Never Used    Alcohol use No                                Counseling given: Not Answered      Vital Signs (Current):   Vitals:    10/08/18 0850   BP: 127/72   Pulse: 83   Resp: 22   Temp: 98.2 °F (36.8 °C)   TempSrc: Oral   SpO2: 98%   Weight: 219 lb 2.2 oz (99.4 kg)   Height: 5' 6.5\" (1.689 m)                                              BP Readings from Last 3 Encounters:   10/08/18 127/72   09/28/18 122/66   08/10/18 129/82       NPO Status: Time of last liquid consumption: 2000                        Time of last solid consumption: 2000                        Date of last liquid consumption: 10/07/18                        Date of last solid food consumption: 10/07/18    BMI:   Wt Readings from Last 3 Encounters:   10/08/18 219 lb 2.2 oz (99.4 kg)   09/28/18 219 lb 2.2 oz (99.4 kg)   09/20/18 215 lb (97.5 kg)     Body mass index is 34.84 kg/m².     CBC:   Lab Results   Component Value Date    WBC 9.4 09/28/2018    RBC 4.40 09/28/2018    HGB 13.1 09/28/2018    HCT 38.2 09/28/2018    MCV 86.8 09/28/2018    RDW 13.9 09/28/2018     09/28/2018       CMP:   Lab Results   Component Value Date     09/28/2018    K 4.2 09/28/2018     09/28/2018    CO2 27 09/28/2018    BUN 15 09/28/2018    CREATININE 0.71 09/28/2018    GFRAA >60 09/28/2018    LABGLOM >60 09/28/2018    GLUCOSE 83 04/17/2018    PROT 7.2 04/17/2018    CALCIUM 8.8 04/17/2018    BILITOT 0.32 04/17/2018    ALKPHOS 90 04/17/2018    AST 15 04/17/2018    ALT 20 04/17/2018       POC Tests: No results for input(s): POCGLU, POCNA, POCK, POCCL, POCBUN, POCHEMO,

## 2018-10-08 NOTE — FLOWSHEET NOTE
Rounds on patient,. Informed that fentanyl 50mcg just given and only med given.   Orders for pt to have dilaudid for next pain medication

## 2018-10-08 NOTE — H&P
MD Gisele   RESTASIS 0.05 % ophthalmic emulsion  3/1/17   Historical Provider, MD   acetaminophen (TYLENOL ARTHRITIS PAIN) 650 MG extended release tablet Take 650 mg by mouth every 8 hours as needed for Pain    Historical Provider, MD   Omega-3 Fatty Acids (OMEGA 3 PO) Take by mouth daily    Historical Provider, MD   Calcium Carb-Cholecalciferol (CALCIUM 500 + D3) 500-600 MG-UNIT TABS Take 1 tablet by mouth daily    Historical Provider, MD   Multiple Vitamin (MVI, CELEBRATE, CHEWABLE TABLET) Take 1 tablet by mouth 2 times daily Using Flinstone for not d/t nausea with Celebrate    Historical Provider, MD       This is a 63 y.o. female who is pleasant, cooperative, alert and oriented x3, in no acute distress. Heart: Heart sounds are normal.  HR regular rate and rhythm without murmur, gallop or rub. Lungs: Normal respiratory effort with good air exchange and clear to auscultation without wheezes or rales bilaterally   Abdomen: soft, nontender, nondistended with bowel sounds .        Labs:  Recent Labs      09/28/18   0826   HGB  13.1   HCT  38.2   WBC  9.4   MCV  86.8   PLT  377   NA  142   K  4.2   CL  107   CO2  27   BUN  15   CREATININE  0.71       ARTHUR Del Castillo-BC  Electronically signed 10/8/2018 at 8:51 AM     EMILY Ibarra - CNP Nurse Practitioner Signed General Surgery  H&P Date of Service: 9/28/2018  8:08 AM   Related encounter: Pre-Admission Testing Visit from 9/28/2018 in [facility] PRE-ADMIT TESTING       Expand All Collapse All    []Manual[]Template  []Copied  History and Physical Service   Melinda      HISTORY AND PHYSICAL EXAMINATION            Date of Evaluation:     9/28/2018  Patient name:              Bijal Bates  MRN:                           [id number]  YOB: 1960  PCP:                            Irene Hills PA-C     History Obtained From:      Patient,      History of Present Illness:      This is [place] and anxiety.     Physical Exam:   /66   Pulse 79   Resp 18   Ht 5' 6.5\" (1.689 m)   Wt 219 lb 2.2 oz (99.4 kg)   LMP  (LMP Unknown)   SpO2 98%   BMI 34.84 kg/m²   No LMP recorded (lmp unknown). Patient is postmenopausal.  No obstetric history on file. No results for input(s): POCGLU in the last 72 hours.      General Appearance:  Alert, well appearing, and in no acute distress. Obese. Mental status: Oriented to person, place, and time. Head: Normocephalic and atraumatic. Eye: Wearing glasses. No icterus, redness, pupils equal and reactive, extraocular eye movements intact, and conjunctiva clear. Ear: Hearing grossly intact. Nose: No drainage noted. Mouth: Missing dentition. No dentures noted. Airway is patent. Mucous membranes moist.  Neck: Supple and no carotid bruits noted. Lungs: Diminished bilaterally. Bilateral equal air entry, clear to auscultation, no wheezing, rales or rhonchi, and normal effort. Cardiovascular: Normal rate, regular rhythm, no murmur, gallop, or rub. Abdomen: Rounded abdomen. Soft, non-tender, non-distended, and active bowel sounds. Neurologic: Normal speech and cranial nerves II through XII grossly intact. Strength 5/5 bilaterally. Skin: No gross lesions, rashes, bruising, or bleeding on exposed skin area. Extremities: Right lower leg and right ankle 2+ non-pitting edema. Left ankle 1+ non-pitting edema. Posterior tibial pulses 2+ bilaterally. No calf tenderness with palpation.   Psych: Normal affect.      Investigations:       Laboratory Testing:  Recent Results   No results found for this or any previous visit (from the past 24 hour(s)).        No results for input(s): HGB, HCT, WBC, MCV, PLATELET, NA, K, CL, CO2, BUN, CREATININE, GLUCOSE, INR, PROTIME, APTT, AST, ALT, LABALBU, HCG in the last 720 hours.     Imaging/Diagnostics:     Vascular lower extremities DVT study procedure: 12/30/2015  Findings:        Right                                 Left    The common femoral, superficial       The common femoral, superficial    femoral, popliteal, tibials, and               femoral, popliteal, tibials and    saphenous veins are compressible     saphenous veins were compressible    with normal doppler responses.           with normal doppler responses.             Chest X-ray: 08/06/2018  Impression   No acute findings.             MRI Shoulder Right Wo Contrast  Result Date: 9/5/2018  EXAMINATION: MRI OF THE RIGHT SHOULDER WITHOUT CONTRAST   9/5/2018 9:45 am TECHNIQUE: Multiplanar multisequence MRI of the right shoulder was performed without the administration of intravenous contrast. COMPARISON: Radiographs dated 07/24/2018 HISTORY: ORDERING SYSTEM PROVIDED HISTORY: Incomplete tear of right rotator cuff TECHNOLOGIST PROVIDED HISTORY: Rotator cuff tear Ordering Physician Provided Reason for Exam: Pt states no known injury. C/o right shoulder pain. Acuity: Acute Type of Exam: Initial FINDINGS: ROTATOR CUFF: The cuff musculature is well preserved. No edema or atrophy. Minimal partial thickness articular surface tear of the anterior supraspinatus tendon. The infraspinatus tendon is intact. Teres minor is normal.  Subscapularis shows mild tendinosis without tear. BICEPS TENDON: The longhead biceps tendon is intact. No evidence of tendinosis. The biceps pulley and anchor are intact. LABRUM: A superior labral tear is present extending from 10:00 to 2:00 position using anterior 3 o'clock convention. GLENOHUMERAL JOINT:  The articular cartilage overlying the glenoid fossa is normal.  There is no glenohumeral joint effusion. There is no loose body or debris present within the glenohumeral joint. AC JOINT AND ACROMIOCLAVICULAR ARCH: There are acromioclavicular joint degenerative changes. BONE MARROW:  Bone marrow is normal in signal without evidence of fracture, marrow contusion or marrow occupying lesion.  OUTLET SPACES: The suprascapular notch and

## 2018-10-15 RX ORDER — TOPIRAMATE 50 MG/1
TABLET, FILM COATED ORAL
Qty: 90 TABLET | Refills: 1 | Status: SHIPPED | OUTPATIENT
Start: 2018-10-15 | End: 2019-04-19 | Stop reason: SDUPTHER

## 2018-10-16 ENCOUNTER — OFFICE VISIT (OUTPATIENT)
Dept: ORTHOPEDIC SURGERY | Age: 58
End: 2018-10-16

## 2018-10-16 VITALS — WEIGHT: 219.14 LBS | BODY MASS INDEX: 35.22 KG/M2 | HEIGHT: 66 IN

## 2018-10-16 DIAGNOSIS — M19.011 ARTHRITIS OF RIGHT ACROMIOCLAVICULAR JOINT: Primary | ICD-10-CM

## 2018-10-16 PROCEDURE — 99024 POSTOP FOLLOW-UP VISIT: CPT | Performed by: ORTHOPAEDIC SURGERY

## 2018-10-16 RX ORDER — OXYCODONE HYDROCHLORIDE AND ACETAMINOPHEN 5; 325 MG/1; MG/1
TABLET ORAL
Status: ON HOLD | COMMUNITY
End: 2018-12-06 | Stop reason: HOSPADM

## 2018-10-16 RX ORDER — ALBUTEROL SULFATE 90 UG/1
AEROSOL, METERED RESPIRATORY (INHALATION)
Status: ON HOLD | COMMUNITY
End: 2018-12-05 | Stop reason: SDUPTHER

## 2018-10-23 ENCOUNTER — OFFICE VISIT (OUTPATIENT)
Dept: ORTHOPEDIC SURGERY | Age: 58
End: 2018-10-23

## 2018-10-23 VITALS — WEIGHT: 219.14 LBS | HEIGHT: 66 IN | BODY MASS INDEX: 35.22 KG/M2

## 2018-10-23 DIAGNOSIS — M19.011 ARTHRITIS OF RIGHT ACROMIOCLAVICULAR JOINT: Primary | ICD-10-CM

## 2018-10-23 PROCEDURE — 99024 POSTOP FOLLOW-UP VISIT: CPT | Performed by: ORTHOPAEDIC SURGERY

## 2018-10-24 ENCOUNTER — HOSPITAL ENCOUNTER (OUTPATIENT)
Dept: PHYSICAL THERAPY | Facility: CLINIC | Age: 58
Setting detail: THERAPIES SERIES
Discharge: HOME | End: 2018-10-24
Payer: MEDICARE

## 2018-10-24 PROCEDURE — 97110 THERAPEUTIC EXERCISES: CPT

## 2018-10-24 PROCEDURE — 97016 VASOPNEUMATIC DEVICE THERAPY: CPT

## 2018-10-24 PROCEDURE — 97161 PT EVAL LOW COMPLEX 20 MIN: CPT

## 2018-10-24 NOTE — CONSULTS
[] [facility]  [address]  [phone]  F: [phone] [] [address]  [facility]   [address]  P: [phone]  F: [phone] [x] [address]  Therapy  [address]  P: [phone]  F: [phone] [] [address]  [facility]   [address]   [place]: [phone]  F: [phone]     Physical Therapy Upper Extremity Evaluation    Date:  10/24/2018  Patient: Willie Mooney  : 1960  MRN: [id number]  Physician: Ellen Gonzáles MD  Insurance: [employer]  Medical Diagnosis: Arthritis of R acromioclavicular joint M19.011  Rehab Codes: L37.570, M25.51  Onset Date: 10/8/2018   Next 's appt: 2018    Subjective:   CC: Chronic pain in R shoulder which has been going on for years, however recently patient underwent a R distal clavicle resection, Giovanna Procedure. HPI:10/8/2018 Giovanna Procedure    PMHx: [] Unremarkable [] Diabetes [] HTN  [] Pacemaker   [] MI/Heart Problems [] Cancer [x] Arthritis [] Other:              [x] Refer to full medical chart  In EPIC   Tests: [x] X-Ray: Degenerative osteoarthritis in distal clavicle/AC joint [] MRI:  [] Other:    Medications: [x] Refer to full medical record [] None [] Other:  Allergies:      [x] Refer to full medical record [] None [] Other:    Function:  Hand Dominance  [x] Right  [] Left  Working:  [x] Normal Duty  [] Light Duty  [] Off D/T Condition  [] Retired    [] Not Employed    []  Disability  [] Other:             Return to work:   Job/ADL Description:    Pain:  [x] Yes  [] No Location: R shoulder/clavicle Pain Rating: (0-10 scale) 4-8/10 depending on activity/time of day  Pain altered Tx:  [] Yes  [x] No  Action:  Symptoms:  [] Improving [] Worsening [x] Same  Better:    Worse:               Sleep: []

## 2018-10-26 ENCOUNTER — HOSPITAL ENCOUNTER (OUTPATIENT)
Dept: PHYSICAL THERAPY | Facility: CLINIC | Age: 58
Setting detail: THERAPIES SERIES
Discharge: HOME | End: 2018-10-26
Payer: MEDICARE

## 2018-10-26 PROCEDURE — 97016 VASOPNEUMATIC DEVICE THERAPY: CPT

## 2018-10-26 PROCEDURE — 97110 THERAPEUTIC EXERCISES: CPT

## 2018-10-26 PROCEDURE — 97140 MANUAL THERAPY 1/> REGIONS: CPT

## 2018-10-29 ENCOUNTER — HOSPITAL ENCOUNTER (OUTPATIENT)
Dept: PHYSICAL THERAPY | Facility: CLINIC | Age: 58
Setting detail: THERAPIES SERIES
Discharge: HOME | End: 2018-10-29
Payer: MEDICARE

## 2018-10-29 PROCEDURE — 97140 MANUAL THERAPY 1/> REGIONS: CPT

## 2018-10-29 PROCEDURE — 97016 VASOPNEUMATIC DEVICE THERAPY: CPT

## 2018-10-29 PROCEDURE — 97110 THERAPEUTIC EXERCISES: CPT

## 2018-10-31 ENCOUNTER — HOSPITAL ENCOUNTER (OUTPATIENT)
Dept: PHYSICAL THERAPY | Facility: CLINIC | Age: 58
Setting detail: THERAPIES SERIES
Discharge: HOME | End: 2018-10-31
Payer: MEDICARE

## 2018-10-31 PROCEDURE — 97016 VASOPNEUMATIC DEVICE THERAPY: CPT

## 2018-10-31 PROCEDURE — 97140 MANUAL THERAPY 1/> REGIONS: CPT

## 2018-10-31 PROCEDURE — 97110 THERAPEUTIC EXERCISES: CPT

## 2018-11-02 ENCOUNTER — HOSPITAL ENCOUNTER (OUTPATIENT)
Dept: PHYSICAL THERAPY | Facility: CLINIC | Age: 58
Setting detail: THERAPIES SERIES
Discharge: HOME | End: 2018-11-02
Payer: MEDICARE

## 2018-11-02 PROCEDURE — 97140 MANUAL THERAPY 1/> REGIONS: CPT

## 2018-11-02 PROCEDURE — 97110 THERAPEUTIC EXERCISES: CPT

## 2018-11-02 PROCEDURE — 97016 VASOPNEUMATIC DEVICE THERAPY: CPT

## 2018-11-05 ENCOUNTER — HOSPITAL ENCOUNTER (OUTPATIENT)
Dept: PHYSICAL THERAPY | Facility: CLINIC | Age: 58
Setting detail: THERAPIES SERIES
Discharge: HOME | End: 2018-11-05
Payer: MEDICARE

## 2018-11-05 PROCEDURE — 97110 THERAPEUTIC EXERCISES: CPT

## 2018-11-05 PROCEDURE — 97140 MANUAL THERAPY 1/> REGIONS: CPT

## 2018-11-05 PROCEDURE — 97016 VASOPNEUMATIC DEVICE THERAPY: CPT

## 2018-11-07 ENCOUNTER — HOSPITAL ENCOUNTER (OUTPATIENT)
Dept: PHYSICAL THERAPY | Facility: CLINIC | Age: 58
Setting detail: THERAPIES SERIES
Discharge: HOME | End: 2018-11-07
Payer: MEDICARE

## 2018-11-07 NOTE — FLOWSHEET NOTE
Programs  5. Demonstrate Knowledge of fall prevention  LTG: (to be met in 16 treatments)  1. Patient will demonstrate <10% impaired on QuickDASH for improved function and ADL tolerance  2. Patient will demonstrate improved shoulder AROM to >165 degrees flexion/abduction/scaption and >75 degrees IR/ER in scapular plane within 8 weeks for improved ability to perform daily activities. 3. Patient will demonstrate improved R shoulder/scapular strength to 5/5 for improve scapulohumeral rhythm and decreased pain with activity.                    Patient goals: Decrease pain and improve mobility and strength    Pt. Education:  [x] Yes  [] No  [] Reviewed Prior HEP/Ed  Method of Education: [x] Verbal  [] Demo  [] Written  Comprehension of Education:  [x] Verbalizes understanding. [] Demonstrates understanding. [] Needs review. [] Demonstrates/verbalizes HEP/Ed previously given. Plan: [x] Continue per plan of care.    [] Other:      Time In: 1:00pm        Time Out: 1:47pm     Electronically signed by:  KIMBERLY Strong // This documentation was reviewed by Licensed Physical Therapy Assistant Cindy Castro PTA

## 2018-11-09 ENCOUNTER — HOSPITAL ENCOUNTER (OUTPATIENT)
Dept: PHYSICAL THERAPY | Facility: CLINIC | Age: 58
Setting detail: THERAPIES SERIES
Discharge: HOME | End: 2018-11-09
Payer: MEDICARE

## 2018-11-09 PROCEDURE — 97110 THERAPEUTIC EXERCISES: CPT

## 2018-11-09 PROCEDURE — 97140 MANUAL THERAPY 1/> REGIONS: CPT

## 2018-11-09 NOTE — FLOWSHEET NOTE
flexion/abduction/scaption and >75 degrees IR/ER in scapular plane within 8 weeks for improved ability to perform daily activities. 3. Patient will demonstrate improved R shoulder/scapular strength to 5/5 for improve scapulohumeral rhythm and decreased pain with activity.                    Patient goals: Decrease pain and improve mobility and strength    Pt. Education:  [x] Yes  [] No  [] Reviewed Prior HEP/Ed  Method of Education: [x] Verbal  [] Demo  [] Written  Comprehension of Education:  [x] Verbalizes understanding. [] Demonstrates understanding. [] Needs review. [] Demonstrates/verbalizes HEP/Ed previously given. Plan: [x] Continue per plan of care.    [] Other:      Time In: 1:00pm        Time Out: 1:47pm     Electronically signed by:  Arpit Milligan PTA

## 2018-11-12 ENCOUNTER — OFFICE VISIT (OUTPATIENT)
Dept: PULMONOLOGY | Age: 58
End: 2018-11-12
Payer: MEDICARE

## 2018-11-12 ENCOUNTER — HOSPITAL ENCOUNTER (OUTPATIENT)
Dept: PHYSICAL THERAPY | Facility: CLINIC | Age: 58
Setting detail: THERAPIES SERIES
Discharge: HOME | End: 2018-11-12
Payer: MEDICARE

## 2018-11-12 VITALS
RESPIRATION RATE: 16 BRPM | DIASTOLIC BLOOD PRESSURE: 76 MMHG | HEART RATE: 88 BPM | WEIGHT: 213 LBS | HEIGHT: 66 IN | OXYGEN SATURATION: 98 % | BODY MASS INDEX: 34.23 KG/M2 | TEMPERATURE: 97.6 F | SYSTOLIC BLOOD PRESSURE: 120 MMHG

## 2018-11-12 DIAGNOSIS — Z98.84 STATUS POST GASTRIC BYPASS FOR OBESITY: ICD-10-CM

## 2018-11-12 DIAGNOSIS — Z99.89 OSA ON CPAP: ICD-10-CM

## 2018-11-12 DIAGNOSIS — G47.33 OSA ON CPAP: ICD-10-CM

## 2018-11-12 DIAGNOSIS — I10 ESSENTIAL HYPERTENSION: ICD-10-CM

## 2018-11-12 DIAGNOSIS — J45.40 MODERATE PERSISTENT ASTHMA WITHOUT COMPLICATION: Primary | ICD-10-CM

## 2018-11-12 PROCEDURE — G8427 DOCREV CUR MEDS BY ELIG CLIN: HCPCS | Performed by: INTERNAL MEDICINE

## 2018-11-12 PROCEDURE — G8417 CALC BMI ABV UP PARAM F/U: HCPCS | Performed by: INTERNAL MEDICINE

## 2018-11-12 PROCEDURE — 3017F COLORECTAL CA SCREEN DOC REV: CPT | Performed by: INTERNAL MEDICINE

## 2018-11-12 PROCEDURE — 97110 THERAPEUTIC EXERCISES: CPT

## 2018-11-12 PROCEDURE — 99214 OFFICE O/P EST MOD 30 MIN: CPT | Performed by: INTERNAL MEDICINE

## 2018-11-12 PROCEDURE — 1036F TOBACCO NON-USER: CPT | Performed by: INTERNAL MEDICINE

## 2018-11-12 PROCEDURE — G8484 FLU IMMUNIZE NO ADMIN: HCPCS | Performed by: INTERNAL MEDICINE

## 2018-11-13 ENCOUNTER — OFFICE VISIT (OUTPATIENT)
Dept: ORTHOPEDIC SURGERY | Age: 58
End: 2018-11-13

## 2018-11-13 VITALS — HEIGHT: 67 IN | WEIGHT: 213 LBS | BODY MASS INDEX: 33.43 KG/M2

## 2018-11-13 DIAGNOSIS — M19.011 ARTHRITIS OF RIGHT ACROMIOCLAVICULAR JOINT: Primary | ICD-10-CM

## 2018-11-13 PROCEDURE — 99024 POSTOP FOLLOW-UP VISIT: CPT | Performed by: ORTHOPAEDIC SURGERY

## 2018-11-13 RX ORDER — OXYCODONE HYDROCHLORIDE AND ACETAMINOPHEN 5; 325 MG/1; MG/1
1-2 TABLET ORAL EVERY 6 HOURS PRN
Qty: 56 TABLET | Refills: 0 | Status: SHIPPED | OUTPATIENT
Start: 2018-11-13 | End: 2018-11-29 | Stop reason: SDUPTHER

## 2018-11-13 NOTE — PROGRESS NOTES
wakes her up the pressure. [Patient had a good bariatric surgery and has lost significant weight. She was sent for re-titration and she had a split-night study, which showed that she still has obstructive sleep apnea AHI 12 /hr needed a CPAP of 12 cm of water pressure  He is using the CPAP machine. Initially it was an auto titrating now it has been set at 12 cm  She is on [facility]  Her allergy testing was negative. IgE level was low]      Review of Systems -  General ROS: negative for - chills, fatigue, fever or weight loss  ENT ROS: negative for - headaches, oral lesions or sore throat  Cardiovascular ROS: no chest pain , orthopnea or pnd   Gastrointestinal ROS: no abdominal pain, change in bowel habits, or black or bloody stools  Skin - no rash   Neuro - no blurry vision , no loc .  No focal weakness   msk - no jt tenderness or swelling    Vascular - no claudication , rest completed and negative   Lymphatic - complete and negative   Hematology - oncology - complete and negative   Allergy immunology - complete and negative    no burning or hematuria      Immunization History   Administered Date(s) Administered    Influenza, Quadv, 3 yrs and older, IM, PF (Fluzone 3 yrs and older or Afluria 5 yrs and older) 08/23/2016, 09/05/2017    Pneumococcal 13-valent Conjugate ([id number]) 09/18/2015    Pneumococcal Polysaccharide ([id number]) 11/22/2016    Tdap (Boostrix, Adacel) 09/14/2012    Zoster Subunit (Shingrix) 03/21/2018          PAST MEDICAL HISTORY:         Diagnosis Date    Acromioclavicular joint arthritis 4/18/2016    Acute gastroenteritis 10/11/2017    Arthritis     Asthma 1980    On Inhaler    Bursitis     left shoulder    Chronic right shoulder pain 7/24/2018    Dry eyes     Essential hypertension 8/21/2017    GERD (gastroesophageal reflux disease)     H/O bariatric surgery     15 MONTHS AGO/LOST 160#    Head ache     Hearing loss     Hiatal hernia 2015    No surgery yet    History of 100 MG capsule Take 1 capsule by mouth daily as needed for Constipation 10/8/18  Yes Sheryl Nelson DO   HYDROcodone-acetaminophen (NORCO) 5-325 MG per tablet Take 1 tablet by mouth every 6 hours as needed for Pain. .   Yes Historical Provider, MD   montelukast (SINGULAIR) 10 MG tablet Take 10 mg by mouth nightly   Yes Historical Provider, MD   mometasone-formoterol (DULERA) 200-5 MCG/ACT inhaler Inhale 2 puffs into the lungs 2 times daily 8/10/18  Yes Barrington Vaz MD   venlafaxine (EFFEXOR XR) 37.5 MG extended release capsule Take 1 capsule by mouth daily 5/22/18  Yes Maxi Fruit, APRN - CNP   FIBER COMPLETE PO Take by mouth   Yes Historical Provider, MD   sodium chloride (VINEET 128) 2 % ophthalmic solution Place 1 drop into both eyes 2 times daily   Yes Historical Provider, MD   albuterol sulfate HFA (PROVENTIL HFA) 108 (90 Base) MCG/ACT inhaler Inhale 2 puffs into the lungs every 6 hours as needed for Wheezing 8/21/17  Yes Barrington Vaz MD   albuterol (PROVENTIL) (2.5 MG/3ML) 0.083% nebulizer solution Take 3 mLs by nebulization every 6 hours as needed for Wheezing 8/21/17  Yes Barrington Vaz MD   RESTASIS 0.05 % ophthalmic emulsion  3/1/17  Yes Historical Provider, MD   acetaminophen (TYLENOL ARTHRITIS PAIN) 650 MG extended release tablet Take 650 mg by mouth every 8 hours as needed for Pain   Yes Historical Provider, MD   Omega-3 Fatty Acids (OMEGA 3 PO) Take by mouth daily   Yes Historical Provider, MD   Calcium Carb-Cholecalciferol (CALCIUM 500 + D3) 500-600 MG-UNIT TABS Take 1 tablet by mouth daily   Yes Historical Provider, MD   Multiple Vitamin (MVI, CELEBRATE, CHEWABLE TABLET) Take 1 tablet by mouth 2 times daily Using Flinstone for not d/t nausea with Celebrate   Yes Historical Provider, MD   Physical exam  Head and neck atraumatic, normocephalic    Lymph nodes-no cervical, supraclavicular lymphadenopathy    Neck-no JVP elevation    Lungs - Ventilating all lobes without any rales, rhonchi, wheezes or

## 2018-11-13 NOTE — PROGRESS NOTES
This patient had undergone Giovanna procedure on October 8 she still having problems with mobilization of the shoulder. She says that last week. She was the marking helped by the physical therapist was a little too aggressive since then the pain got worse and mobility decreased. Examination: The incision remains well healed. Her true glenohumeral abduction is only about 40° at the best.    Diagnosis: Post operative for this if capsulitis right shoulder. Treatment: She will continue with the physical therapist.  I have given her a prescription for Percocet to pain during the therapy particularly. I'll see her in 2 weeks and if she is not making much improvement then we will consider manipulation under anesthesia. I discussed this possibility with her.

## 2018-11-14 ENCOUNTER — HOSPITAL ENCOUNTER (OUTPATIENT)
Dept: PHYSICAL THERAPY | Facility: CLINIC | Age: 58
Setting detail: THERAPIES SERIES
Discharge: HOME | End: 2018-11-14
Payer: MEDICARE

## 2018-11-14 PROCEDURE — 97110 THERAPEUTIC EXERCISES: CPT

## 2018-11-14 PROCEDURE — 97140 MANUAL THERAPY 1/> REGIONS: CPT

## 2018-11-16 ENCOUNTER — HOSPITAL ENCOUNTER (OUTPATIENT)
Dept: PHYSICAL THERAPY | Facility: CLINIC | Age: 58
Setting detail: THERAPIES SERIES
Discharge: HOME | End: 2018-11-16
Payer: MEDICARE

## 2018-11-16 PROCEDURE — 97110 THERAPEUTIC EXERCISES: CPT

## 2018-11-16 PROCEDURE — 97140 MANUAL THERAPY 1/> REGIONS: CPT

## 2018-11-19 ENCOUNTER — HOSPITAL ENCOUNTER (OUTPATIENT)
Dept: PHYSICAL THERAPY | Facility: CLINIC | Age: 58
Setting detail: THERAPIES SERIES
Discharge: HOME | End: 2018-11-19
Payer: MEDICARE

## 2018-11-19 PROCEDURE — 97110 THERAPEUTIC EXERCISES: CPT

## 2018-11-19 PROCEDURE — 97140 MANUAL THERAPY 1/> REGIONS: CPT

## 2018-11-20 ENCOUNTER — HOSPITAL ENCOUNTER (OUTPATIENT)
Dept: PHYSICAL THERAPY | Facility: CLINIC | Age: 58
Setting detail: THERAPIES SERIES
Discharge: HOME | End: 2018-11-20
Payer: MEDICARE

## 2018-11-20 PROCEDURE — G0283 ELEC STIM OTHER THAN WOUND: HCPCS

## 2018-11-20 PROCEDURE — 97110 THERAPEUTIC EXERCISES: CPT

## 2018-11-20 PROCEDURE — 97140 MANUAL THERAPY 1/> REGIONS: CPT

## 2018-11-20 NOTE — FLOWSHEET NOTE
2x20x      Sidelying ABD 15x  To 90 degrees    Sidelying ER 15x      Seated Scap Retraction 20x3''      Seated Scap Depression 20x3\"      T-band       Rows 2x20 Blue[place]     External Rotation x20 Blueberry     Shoulder extension 2x20 Blue[place]     Other:  Manual: R shoulder PROM all planes, inferior and posterior glides to R shoulder. Movement with mobilization with inferior glide into abduction. Inferior long axis glides with abduction- got to around 95 degrees true abduction prior to onset of pain      *THER EX PERFORMED FIRST  *ESTIM SECOND  *MANUAL performed after estim with moist HP      Specific Instructions for next treatment: Resume strengthening exercises as well as progressive manual techniques to improve ROM specifically abduction. Treatment Charges: Mins Units   [x]  Modalities:Estim 15 1   [x]  Ther Exercise 16 1   [x]  Manual Therapy 25 2   []  Ther Activities     []  Aquatics     []  Vasocompression     []  Other     Total Treatment time 55 4       Assessment: [x] Progressing toward goals. [] No change. [x] Other: Strength-based exercises held this visit d/t extreme pain in distal clavicle/AC joint region on R. Treatment focused on AROM, AAROM, and PROM R shoulder elevation with both flexion and abduction. Inferior mobilizations performed both grade IV and with movement (elevation into abduction) to improve superior joint space room for glenohumeral elevation. STG: (to be met in 8 treatments)  1. ? Pain: Patient will report decreases in pain to <4/10 with activity within 4 weeks for improved function and quality of life. 2. ? ROM: Patient will exhibit improvements in shoulder AROM to >125 flexion/abduction and >45 IR/ER in scapular plane within 4 weeks for improving function and ability to bathe/dress. 3. ? Strength: Patient will demonstrate improvements in R shoulder/scapular strength to 4/5 for improved scapulohumeral rhythm  4.  Independent with Home Exercise

## 2018-11-21 ENCOUNTER — APPOINTMENT (OUTPATIENT)
Dept: GENERAL RADIOLOGY | Age: 58
DRG: 313 | End: 2018-11-21
Payer: MEDICARE

## 2018-11-21 ENCOUNTER — HOSPITAL ENCOUNTER (OUTPATIENT)
Dept: PHYSICAL THERAPY | Facility: CLINIC | Age: 58
Setting detail: THERAPIES SERIES
Discharge: HOME | End: 2018-11-21
Payer: MEDICARE

## 2018-11-21 ENCOUNTER — APPOINTMENT (OUTPATIENT)
Dept: CT IMAGING | Age: 58
DRG: 313 | End: 2018-11-21
Payer: MEDICARE

## 2018-11-21 ENCOUNTER — HOSPITAL ENCOUNTER (INPATIENT)
Age: 58
LOS: 1 days | Discharge: HOME | DRG: 313 | End: 2018-11-22
Attending: EMERGENCY MEDICINE | Admitting: INTERNAL MEDICINE
Payer: MEDICARE

## 2018-11-21 DIAGNOSIS — S82.102A CLOSED FRACTURE OF PROXIMAL END OF LEFT TIBIA, UNSPECIFIED FRACTURE MORPHOLOGY, INITIAL ENCOUNTER: Primary | ICD-10-CM

## 2018-11-21 PROBLEM — Z87.81 S/P TIBIAL FRACTURE: Status: ACTIVE | Noted: 2018-11-21

## 2018-11-21 LAB
ABSOLUTE EOS #: 0.1 K/UL (ref 0–0.4)
ABSOLUTE IMMATURE GRANULOCYTE: ABNORMAL K/UL (ref 0–0.3)
ABSOLUTE LYMPH #: 2.2 K/UL (ref 1–4.8)
ABSOLUTE MONO #: 0.7 K/UL (ref 0.1–1.2)
ANION GAP SERPL CALCULATED.3IONS-SCNC: 13 MMOL/L (ref 9–17)
BASOPHILS # BLD: 1 % (ref 0–2)
BASOPHILS ABSOLUTE: 0.1 K/UL (ref 0–0.2)
BUN BLDV-MCNC: 12 MG/DL (ref 6–20)
BUN/CREAT BLD: ABNORMAL (ref 9–20)
CALCIUM SERPL-MCNC: 9.6 MG/DL (ref 8.6–10.4)
CHLORIDE BLD-SCNC: 105 MMOL/L (ref 98–107)
CO2: 22 MMOL/L (ref 20–31)
CREAT SERPL-MCNC: 0.58 MG/DL (ref 0.5–0.9)
DIFFERENTIAL TYPE: ABNORMAL
EKG ATRIAL RATE: 74 BPM
EKG P AXIS: 42 DEGREES
EKG P-R INTERVAL: 150 MS
EKG Q-T INTERVAL: 384 MS
EKG QRS DURATION: 76 MS
EKG QTC CALCULATION (BAZETT): 426 MS
EKG R AXIS: 35 DEGREES
EKG T AXIS: 19 DEGREES
EKG VENTRICULAR RATE: 74 BPM
EOSINOPHILS RELATIVE PERCENT: 1 % (ref 1–4)
GFR AFRICAN AMERICAN: >60 ML/MIN
GFR NON-AFRICAN AMERICAN: >60 ML/MIN
GFR SERPL CREATININE-BSD FRML MDRD: ABNORMAL ML/MIN/{1.73_M2}
GFR SERPL CREATININE-BSD FRML MDRD: ABNORMAL ML/MIN/{1.73_M2}
GLUCOSE BLD-MCNC: 100 MG/DL (ref 70–99)
HCT VFR BLD CALC: 41.2 % (ref 36–46)
HEMOGLOBIN: 13.4 G/DL (ref 12–16)
IMMATURE GRANULOCYTES: ABNORMAL %
LYMPHOCYTES # BLD: 18 % (ref 24–44)
MCH RBC QN AUTO: 29.9 PG (ref 26–34)
MCHC RBC AUTO-ENTMCNC: 32.6 G/DL (ref 31–37)
MCV RBC AUTO: 91.7 FL (ref 80–100)
MONOCYTES # BLD: 5 % (ref 2–11)
NRBC AUTOMATED: ABNORMAL PER 100 WBC
PDW BLD-RTO: 13.6 % (ref 12.5–15.4)
PLATELET # BLD: 375 K/UL (ref 140–450)
PLATELET ESTIMATE: ABNORMAL
PMV BLD AUTO: 6.5 FL (ref 6–12)
POTASSIUM SERPL-SCNC: 4.1 MMOL/L (ref 3.7–5.3)
RBC # BLD: 4.49 M/UL (ref 4–5.2)
RBC # BLD: ABNORMAL 10*6/UL
SEG NEUTROPHILS: 75 % (ref 36–66)
SEGMENTED NEUTROPHILS ABSOLUTE COUNT: 9.3 K/UL (ref 1.8–7.7)
SODIUM BLD-SCNC: 140 MMOL/L (ref 135–144)
WBC # BLD: 12.4 K/UL (ref 3.5–11)
WBC # BLD: ABNORMAL 10*3/UL

## 2018-11-21 PROCEDURE — 71046 X-RAY EXAM CHEST 2 VIEWS: CPT

## 2018-11-21 PROCEDURE — 73700 CT LOWER EXTREMITY W/O DYE: CPT

## 2018-11-21 PROCEDURE — 73562 X-RAY EXAM OF KNEE 3: CPT

## 2018-11-21 PROCEDURE — 99285 EMERGENCY DEPT VISIT HI MDM: CPT

## 2018-11-21 PROCEDURE — 6370000000 HC RX 637 (ALT 250 FOR IP): Performed by: EMERGENCY MEDICINE

## 2018-11-21 PROCEDURE — 85025 COMPLETE CBC W/AUTO DIFF WBC: CPT

## 2018-11-21 PROCEDURE — 36415 COLL VENOUS BLD VENIPUNCTURE: CPT

## 2018-11-21 PROCEDURE — 6360000002 HC RX W HCPCS: Performed by: EMERGENCY MEDICINE

## 2018-11-21 PROCEDURE — 96375 TX/PRO/DX INJ NEW DRUG ADDON: CPT

## 2018-11-21 PROCEDURE — 6360000002 HC RX W HCPCS: Performed by: NURSE PRACTITIONER

## 2018-11-21 PROCEDURE — 2580000003 HC RX 258: Performed by: EMERGENCY MEDICINE

## 2018-11-21 PROCEDURE — 96374 THER/PROPH/DIAG INJ IV PUSH: CPT

## 2018-11-21 PROCEDURE — 1200000000 HC SEMI PRIVATE

## 2018-11-21 PROCEDURE — 93005 ELECTROCARDIOGRAM TRACING: CPT

## 2018-11-21 PROCEDURE — 80048 BASIC METABOLIC PNL TOTAL CA: CPT

## 2018-11-21 PROCEDURE — 96376 TX/PRO/DX INJ SAME DRUG ADON: CPT

## 2018-11-21 PROCEDURE — 0S9D3ZZ DRAINAGE OF LEFT KNEE JOINT, PERCUTANEOUS APPROACH: ICD-10-PCS | Performed by: INTERNAL MEDICINE

## 2018-11-21 RX ORDER — DIPHENHYDRAMINE HCL 25 MG
50 TABLET ORAL ONCE
Status: COMPLETED | OUTPATIENT
Start: 2018-11-21 | End: 2018-11-21

## 2018-11-21 RX ORDER — MAGNESIUM SULFATE 1 G/100ML
1 INJECTION INTRAVENOUS PRN
Status: DISCONTINUED | OUTPATIENT
Start: 2018-11-21 | End: 2018-11-22 | Stop reason: HOSPADM

## 2018-11-21 RX ORDER — SODIUM CHLORIDE 9 MG/ML
INJECTION, SOLUTION INTRAVENOUS CONTINUOUS
Status: DISCONTINUED | OUTPATIENT
Start: 2018-11-21 | End: 2018-11-22 | Stop reason: HOSPADM

## 2018-11-21 RX ORDER — LIDOCAINE HYDROCHLORIDE 10 MG/ML
10 INJECTION, SOLUTION EPIDURAL; INFILTRATION; INTRACAUDAL; PERINEURAL ONCE
Status: DISCONTINUED | OUTPATIENT
Start: 2018-11-21 | End: 2018-11-22 | Stop reason: HOSPADM

## 2018-11-21 RX ORDER — SODIUM CHLORIDE 0.9 % (FLUSH) 0.9 %
10 SYRINGE (ML) INJECTION EVERY 12 HOURS SCHEDULED
Status: DISCONTINUED | OUTPATIENT
Start: 2018-11-21 | End: 2018-11-22 | Stop reason: HOSPADM

## 2018-11-21 RX ORDER — ACETAMINOPHEN 325 MG/1
650 TABLET ORAL EVERY 4 HOURS PRN
Status: DISCONTINUED | OUTPATIENT
Start: 2018-11-21 | End: 2018-11-22 | Stop reason: HOSPADM

## 2018-11-21 RX ORDER — OXYCODONE HYDROCHLORIDE AND ACETAMINOPHEN 5; 325 MG/1; MG/1
1 TABLET ORAL EVERY 4 HOURS PRN
Status: DISCONTINUED | OUTPATIENT
Start: 2018-11-21 | End: 2018-11-22 | Stop reason: HOSPADM

## 2018-11-21 RX ORDER — ONDANSETRON 2 MG/ML
4 INJECTION INTRAMUSCULAR; INTRAVENOUS ONCE
Status: COMPLETED | OUTPATIENT
Start: 2018-11-21 | End: 2018-11-21

## 2018-11-21 RX ORDER — BISACODYL 10 MG
10 SUPPOSITORY, RECTAL RECTAL DAILY PRN
Status: DISCONTINUED | OUTPATIENT
Start: 2018-11-21 | End: 2018-11-22 | Stop reason: HOSPADM

## 2018-11-21 RX ORDER — POTASSIUM CHLORIDE 20MEQ/15ML
40 LIQUID (ML) ORAL PRN
Status: DISCONTINUED | OUTPATIENT
Start: 2018-11-21 | End: 2018-11-22 | Stop reason: HOSPADM

## 2018-11-21 RX ORDER — POTASSIUM CHLORIDE 7.45 MG/ML
10 INJECTION INTRAVENOUS PRN
Status: DISCONTINUED | OUTPATIENT
Start: 2018-11-21 | End: 2018-11-22 | Stop reason: HOSPADM

## 2018-11-21 RX ORDER — POTASSIUM CHLORIDE 20 MEQ/1
40 TABLET, EXTENDED RELEASE ORAL PRN
Status: DISCONTINUED | OUTPATIENT
Start: 2018-11-21 | End: 2018-11-22 | Stop reason: HOSPADM

## 2018-11-21 RX ORDER — OXYCODONE HYDROCHLORIDE AND ACETAMINOPHEN 5; 325 MG/1; MG/1
2 TABLET ORAL EVERY 4 HOURS PRN
Status: DISCONTINUED | OUTPATIENT
Start: 2018-11-21 | End: 2018-11-22 | Stop reason: HOSPADM

## 2018-11-21 RX ORDER — SODIUM CHLORIDE 0.9 % (FLUSH) 0.9 %
10 SYRINGE (ML) INJECTION PRN
Status: DISCONTINUED | OUTPATIENT
Start: 2018-11-21 | End: 2018-11-22 | Stop reason: HOSPADM

## 2018-11-21 RX ORDER — OXYCODONE HYDROCHLORIDE AND ACETAMINOPHEN 5; 325 MG/1; MG/1
1 TABLET ORAL ONCE
Status: COMPLETED | OUTPATIENT
Start: 2018-11-21 | End: 2018-11-21

## 2018-11-21 RX ORDER — ONDANSETRON 2 MG/ML
4 INJECTION INTRAMUSCULAR; INTRAVENOUS EVERY 6 HOURS PRN
Status: DISCONTINUED | OUTPATIENT
Start: 2018-11-21 | End: 2018-11-22 | Stop reason: HOSPADM

## 2018-11-21 RX ORDER — LIDOCAINE HYDROCHLORIDE 10 MG/ML
INJECTION, SOLUTION INFILTRATION; PERINEURAL
Status: COMPLETED
Start: 2018-11-21 | End: 2018-11-22

## 2018-11-21 RX ADMIN — HYDROMORPHONE HYDROCHLORIDE 1 MG: 1 INJECTION, SOLUTION INTRAMUSCULAR; INTRAVENOUS; SUBCUTANEOUS at 16:20

## 2018-11-21 RX ADMIN — ONDANSETRON 4 MG: 2 INJECTION INTRAMUSCULAR; INTRAVENOUS at 16:20

## 2018-11-21 RX ADMIN — DIPHENHYDRAMINE HCL 50 MG: 25 TABLET ORAL at 16:40

## 2018-11-21 RX ADMIN — HYDROMORPHONE HYDROCHLORIDE 0.5 MG: 1 INJECTION, SOLUTION INTRAMUSCULAR; INTRAVENOUS; SUBCUTANEOUS at 21:09

## 2018-11-21 RX ADMIN — OXYCODONE HYDROCHLORIDE AND ACETAMINOPHEN 1 TABLET: 5; 325 TABLET ORAL at 13:51

## 2018-11-21 RX ADMIN — HYDROMORPHONE HYDROCHLORIDE 1 MG: 1 INJECTION, SOLUTION INTRAMUSCULAR; INTRAVENOUS; SUBCUTANEOUS at 18:32

## 2018-11-21 RX ADMIN — SODIUM CHLORIDE: 9 INJECTION, SOLUTION INTRAVENOUS at 16:41

## 2018-11-21 ASSESSMENT — PAIN DESCRIPTION - ORIENTATION
ORIENTATION: LEFT

## 2018-11-21 ASSESSMENT — PAIN DESCRIPTION - DESCRIPTORS
DESCRIPTORS: ACHING

## 2018-11-21 ASSESSMENT — PAIN DESCRIPTION - PROGRESSION
CLINICAL_PROGRESSION: RAPIDLY IMPROVING
CLINICAL_PROGRESSION: NOT CHANGED

## 2018-11-21 ASSESSMENT — PAIN DESCRIPTION - LOCATION
LOCATION: KNEE

## 2018-11-21 ASSESSMENT — PAIN SCALES - GENERAL
PAINLEVEL_OUTOF10: 7
PAINLEVEL_OUTOF10: 8
PAINLEVEL_OUTOF10: 4
PAINLEVEL_OUTOF10: 8
PAINLEVEL_OUTOF10: 8
PAINLEVEL_OUTOF10: 7

## 2018-11-21 ASSESSMENT — PAIN DESCRIPTION - PAIN TYPE
TYPE: ACUTE PAIN

## 2018-11-21 NOTE — FLOWSHEET NOTE
[] [facility] [address]  [address]    [phone]  F: [phone] [] [address]  [address] [facility]   [address]  P: [phone]  F: [phone] [x] [place]  [address] [facility] [address] [facility]  P: [phone]  F: [phone] [] [address]  [facility]   [address]   [place]: [phone]  F: [phone] [] [facility]  [address] [facility] [address]  [place]: [phone]   F: [phone]      Physical Therapy Cancel/No Show note    Date: 2018  Patient: Ranjith López  : 1960  MRN: [id number]    Cancels/No Shows to date:     For today's appointment patient:  [x]  Cancelled  []  Rescheduled appointment  []  No-show     Reason given by patient:  []  Patient ill  []  Conflicting appointment  []  No transportation    []  Conflict with work  []  No reason given  []  Weather related  [x]  Other:      Comments: Patient called stating she fell this morning and is on her way to the ER to be checked out.       [x]  Next appointment was confirmed    Electronically signed by: Nilson House PTA

## 2018-11-22 ENCOUNTER — APPOINTMENT (OUTPATIENT)
Dept: GENERAL RADIOLOGY | Age: 58
DRG: 313 | End: 2018-11-22
Payer: MEDICARE

## 2018-11-22 ENCOUNTER — ANESTHESIA EVENT (OUTPATIENT)
Dept: OPERATING ROOM | Age: 58
DRG: 313 | End: 2018-11-22
Payer: MEDICARE

## 2018-11-22 ENCOUNTER — ANESTHESIA (OUTPATIENT)
Dept: OPERATING ROOM | Age: 58
DRG: 313 | End: 2018-11-22
Payer: MEDICARE

## 2018-11-22 VITALS
WEIGHT: 220.6 LBS | BODY MASS INDEX: 34.62 KG/M2 | DIASTOLIC BLOOD PRESSURE: 61 MMHG | HEART RATE: 76 BPM | RESPIRATION RATE: 16 BRPM | OXYGEN SATURATION: 94 % | SYSTOLIC BLOOD PRESSURE: 126 MMHG | TEMPERATURE: 97.9 F | HEIGHT: 67 IN

## 2018-11-22 VITALS
DIASTOLIC BLOOD PRESSURE: 59 MMHG | SYSTOLIC BLOOD PRESSURE: 106 MMHG | RESPIRATION RATE: 12 BRPM | OXYGEN SATURATION: 98 %

## 2018-11-22 PROBLEM — S82.102A CLOSED FRACTURE OF PROXIMAL END OF LEFT TIBIA: Status: ACTIVE | Noted: 2018-11-22

## 2018-11-22 PROBLEM — S82.202A CLOSED FRACTURE OF SHAFT OF LEFT TIBIA: Status: ACTIVE | Noted: 2018-11-22

## 2018-11-22 LAB
ANION GAP SERPL CALCULATED.3IONS-SCNC: 11 MMOL/L (ref 9–17)
BUN BLDV-MCNC: 11 MG/DL (ref 6–20)
BUN/CREAT BLD: ABNORMAL (ref 9–20)
CALCIUM SERPL-MCNC: 8.7 MG/DL (ref 8.6–10.4)
CHLORIDE BLD-SCNC: 106 MMOL/L (ref 98–107)
CO2: 21 MMOL/L (ref 20–31)
CREAT SERPL-MCNC: 0.66 MG/DL (ref 0.5–0.9)
GFR AFRICAN AMERICAN: >60 ML/MIN
GFR NON-AFRICAN AMERICAN: >60 ML/MIN
GFR SERPL CREATININE-BSD FRML MDRD: ABNORMAL ML/MIN/{1.73_M2}
GFR SERPL CREATININE-BSD FRML MDRD: ABNORMAL ML/MIN/{1.73_M2}
GLUCOSE BLD-MCNC: 101 MG/DL (ref 70–99)
HCT VFR BLD CALC: 38.1 % (ref 36.3–47.1)
HEMOGLOBIN: 11.4 G/DL (ref 11.9–15.1)
INR BLD: 1
MCH RBC QN AUTO: 29.1 PG (ref 25.2–33.5)
MCHC RBC AUTO-ENTMCNC: 29.9 G/DL (ref 28.4–34.8)
MCV RBC AUTO: 97.2 FL (ref 82.6–102.9)
NRBC AUTOMATED: 0 PER 100 WBC
PDW BLD-RTO: 13.2 % (ref 11.8–14.4)
PLATELET # BLD: 270 K/UL (ref 138–453)
PMV BLD AUTO: 8.4 FL (ref 8.1–13.5)
POTASSIUM SERPL-SCNC: 4.2 MMOL/L (ref 3.7–5.3)
PROTHROMBIN TIME: 10.7 SEC (ref 9–12)
RBC # BLD: 3.92 M/UL (ref 3.95–5.11)
SODIUM BLD-SCNC: 138 MMOL/L (ref 135–144)
VITAMIN D 25-HYDROXY: 34.2 NG/ML (ref 30–100)
WBC # BLD: 9.1 K/UL (ref 3.5–11.3)

## 2018-11-22 PROCEDURE — 2500000003 HC RX 250 WO HCPCS: Performed by: NURSE ANESTHETIST, CERTIFIED REGISTERED

## 2018-11-22 PROCEDURE — 6360000002 HC RX W HCPCS: Performed by: NURSE PRACTITIONER

## 2018-11-22 PROCEDURE — 82306 VITAMIN D 25 HYDROXY: CPT

## 2018-11-22 PROCEDURE — 6370000000 HC RX 637 (ALT 250 FOR IP): Performed by: INTERNAL MEDICINE

## 2018-11-22 PROCEDURE — 94640 AIRWAY INHALATION TREATMENT: CPT

## 2018-11-22 PROCEDURE — G8987 SELF CARE CURRENT STATUS: HCPCS | Performed by: OCCUPATIONAL THERAPIST

## 2018-11-22 PROCEDURE — 6370000000 HC RX 637 (ALT 250 FOR IP): Performed by: NURSE PRACTITIONER

## 2018-11-22 PROCEDURE — 6360000002 HC RX W HCPCS: Performed by: INTERNAL MEDICINE

## 2018-11-22 PROCEDURE — 2500000003 HC RX 250 WO HCPCS

## 2018-11-22 PROCEDURE — 85610 PROTHROMBIN TIME: CPT

## 2018-11-22 PROCEDURE — 97535 SELF CARE MNGMENT TRAINING: CPT | Performed by: OCCUPATIONAL THERAPIST

## 2018-11-22 PROCEDURE — 97162 PT EVAL MOD COMPLEX 30 MIN: CPT

## 2018-11-22 PROCEDURE — 2709999900 HC NON-CHARGEABLE SUPPLY: Performed by: ORTHOPAEDIC SURGERY

## 2018-11-22 PROCEDURE — 73590 X-RAY EXAM OF LOWER LEG: CPT

## 2018-11-22 PROCEDURE — 7100000000 HC PACU RECOVERY - FIRST 15 MIN: Performed by: ORTHOPAEDIC SURGERY

## 2018-11-22 PROCEDURE — 3700000000 HC ANESTHESIA ATTENDED CARE: Performed by: ORTHOPAEDIC SURGERY

## 2018-11-22 PROCEDURE — 3600000014 HC SURGERY LEVEL 4 ADDTL 15MIN: Performed by: ORTHOPAEDIC SURGERY

## 2018-11-22 PROCEDURE — 2720000010 HC SURG SUPPLY STERILE: Performed by: ORTHOPAEDIC SURGERY

## 2018-11-22 PROCEDURE — 73560 X-RAY EXAM OF KNEE 1 OR 2: CPT

## 2018-11-22 PROCEDURE — 85027 COMPLETE CBC AUTOMATED: CPT

## 2018-11-22 PROCEDURE — 3700000001 HC ADD 15 MINUTES (ANESTHESIA): Performed by: ORTHOPAEDIC SURGERY

## 2018-11-22 PROCEDURE — 2580000003 HC RX 258: Performed by: ORTHOPAEDIC SURGERY

## 2018-11-22 PROCEDURE — 2580000003 HC RX 258: Performed by: EMERGENCY MEDICINE

## 2018-11-22 PROCEDURE — G8978 MOBILITY CURRENT STATUS: HCPCS

## 2018-11-22 PROCEDURE — 2580000003 HC RX 258: Performed by: NURSE ANESTHETIST, CERTIFIED REGISTERED

## 2018-11-22 PROCEDURE — 7100000001 HC PACU RECOVERY - ADDTL 15 MIN: Performed by: ORTHOPAEDIC SURGERY

## 2018-11-22 PROCEDURE — 97530 THERAPEUTIC ACTIVITIES: CPT

## 2018-11-22 PROCEDURE — G8988 SELF CARE GOAL STATUS: HCPCS | Performed by: OCCUPATIONAL THERAPIST

## 2018-11-22 PROCEDURE — G8979 MOBILITY GOAL STATUS: HCPCS

## 2018-11-22 PROCEDURE — 2580000003 HC RX 258: Performed by: INTERNAL MEDICINE

## 2018-11-22 PROCEDURE — 3600000004 HC SURGERY LEVEL 4 BASE: Performed by: ORTHOPAEDIC SURGERY

## 2018-11-22 PROCEDURE — 6360000002 HC RX W HCPCS: Performed by: NURSE ANESTHETIST, CERTIFIED REGISTERED

## 2018-11-22 PROCEDURE — 36415 COLL VENOUS BLD VENIPUNCTURE: CPT

## 2018-11-22 PROCEDURE — 99222 1ST HOSP IP/OBS MODERATE 55: CPT | Performed by: INTERNAL MEDICINE

## 2018-11-22 PROCEDURE — 80048 BASIC METABOLIC PNL TOTAL CA: CPT

## 2018-11-22 PROCEDURE — 73552 X-RAY EXAM OF FEMUR 2/>: CPT

## 2018-11-22 PROCEDURE — 0QSH35Z REPOSITION LEFT TIBIA WITH EXTERNAL FIXATION DEVICE, PERCUTANEOUS APPROACH: ICD-10-PCS | Performed by: ORTHOPAEDIC SURGERY

## 2018-11-22 PROCEDURE — 97166 OT EVAL MOD COMPLEX 45 MIN: CPT | Performed by: OCCUPATIONAL THERAPIST

## 2018-11-22 PROCEDURE — C1713 ANCHOR/SCREW BN/BN,TIS/BN: HCPCS | Performed by: ORTHOPAEDIC SURGERY

## 2018-11-22 DEVICE — SCREW FIX L200MM DIA5MM THRD L80MM S STL SELF DRL SCHNZ: Type: IMPLANTABLE DEVICE | Site: LEG | Status: FUNCTIONAL

## 2018-11-22 DEVICE — SCREW EXT FIX L175MM DIA5MM THRD L60MM S STL SELF DRL SCHNZ: Type: IMPLANTABLE DEVICE | Site: LEG | Status: FUNCTIONAL

## 2018-11-22 RX ORDER — SODIUM CHLORIDE 9 MG/ML
INJECTION, SOLUTION INTRAVENOUS CONTINUOUS PRN
Status: DISCONTINUED | OUTPATIENT
Start: 2018-11-22 | End: 2018-11-22 | Stop reason: SDUPTHER

## 2018-11-22 RX ORDER — CYCLOBENZAPRINE HCL 10 MG
10 TABLET ORAL 3 TIMES DAILY PRN
Status: DISCONTINUED | OUTPATIENT
Start: 2018-11-22 | End: 2018-11-22 | Stop reason: HOSPADM

## 2018-11-22 RX ORDER — PROPOFOL 10 MG/ML
INJECTION, EMULSION INTRAVENOUS PRN
Status: DISCONTINUED | OUTPATIENT
Start: 2018-11-22 | End: 2018-11-22 | Stop reason: SDUPTHER

## 2018-11-22 RX ORDER — PROPOFOL 10 MG/ML
INJECTION, EMULSION INTRAVENOUS CONTINUOUS PRN
Status: DISCONTINUED | OUTPATIENT
Start: 2018-11-22 | End: 2018-11-22 | Stop reason: SDUPTHER

## 2018-11-22 RX ORDER — MIDAZOLAM HYDROCHLORIDE 1 MG/ML
INJECTION INTRAMUSCULAR; INTRAVENOUS PRN
Status: DISCONTINUED | OUTPATIENT
Start: 2018-11-22 | End: 2018-11-22 | Stop reason: SDUPTHER

## 2018-11-22 RX ORDER — CYCLOBENZAPRINE HCL 10 MG
10 TABLET ORAL 3 TIMES DAILY PRN
Qty: 30 TABLET | Refills: 0 | Status: SHIPPED | OUTPATIENT
Start: 2018-11-22 | End: 2018-12-02

## 2018-11-22 RX ORDER — FENTANYL CITRATE 50 UG/ML
INJECTION, SOLUTION INTRAMUSCULAR; INTRAVENOUS PRN
Status: DISCONTINUED | OUTPATIENT
Start: 2018-11-22 | End: 2018-11-22 | Stop reason: SDUPTHER

## 2018-11-22 RX ORDER — TOPIRAMATE 50 MG/1
50 TABLET, FILM COATED ORAL DAILY
Status: DISCONTINUED | OUTPATIENT
Start: 2018-11-22 | End: 2018-11-22 | Stop reason: HOSPADM

## 2018-11-22 RX ORDER — ALBUTEROL SULFATE 2.5 MG/3ML
2.5 SOLUTION RESPIRATORY (INHALATION) EVERY 6 HOURS PRN
Status: DISCONTINUED | OUTPATIENT
Start: 2018-11-22 | End: 2018-11-22 | Stop reason: HOSPADM

## 2018-11-22 RX ORDER — MONTELUKAST SODIUM 10 MG/1
10 TABLET ORAL NIGHTLY
Status: DISCONTINUED | OUTPATIENT
Start: 2018-11-22 | End: 2018-11-22 | Stop reason: HOSPADM

## 2018-11-22 RX ORDER — BUPIVACAINE HYDROCHLORIDE 7.5 MG/ML
INJECTION, SOLUTION INTRASPINAL PRN
Status: DISCONTINUED | OUTPATIENT
Start: 2018-11-22 | End: 2018-11-22 | Stop reason: SDUPTHER

## 2018-11-22 RX ORDER — MAGNESIUM HYDROXIDE 1200 MG/15ML
LIQUID ORAL CONTINUOUS PRN
Status: COMPLETED | OUTPATIENT
Start: 2018-11-22 | End: 2018-11-22

## 2018-11-22 RX ORDER — KETAMINE HYDROCHLORIDE 10 MG/ML
INJECTION, SOLUTION INTRAMUSCULAR; INTRAVENOUS PRN
Status: DISCONTINUED | OUTPATIENT
Start: 2018-11-22 | End: 2018-11-22 | Stop reason: SDUPTHER

## 2018-11-22 RX ORDER — CEFAZOLIN SODIUM 1 G/3ML
INJECTION, POWDER, FOR SOLUTION INTRAMUSCULAR; INTRAVENOUS PRN
Status: DISCONTINUED | OUTPATIENT
Start: 2018-11-22 | End: 2018-11-22 | Stop reason: SDUPTHER

## 2018-11-22 RX ADMIN — HYDROMORPHONE HYDROCHLORIDE 0.5 MG: 1 INJECTION, SOLUTION INTRAMUSCULAR; INTRAVENOUS; SUBCUTANEOUS at 05:40

## 2018-11-22 RX ADMIN — OXYCODONE HYDROCHLORIDE AND ACETAMINOPHEN 2 TABLET: 5; 325 TABLET ORAL at 01:34

## 2018-11-22 RX ADMIN — KETAMINE HYDROCHLORIDE 10 MG: 10 INJECTION, SOLUTION INTRAMUSCULAR; INTRAVENOUS at 09:25

## 2018-11-22 RX ADMIN — LIDOCAINE HYDROCHLORIDE: 10 INJECTION, SOLUTION INFILTRATION; PERINEURAL at 05:49

## 2018-11-22 RX ADMIN — FENTANYL CITRATE 50 MCG: 50 INJECTION INTRAMUSCULAR; INTRAVENOUS at 09:14

## 2018-11-22 RX ADMIN — SODIUM CHLORIDE: 9 INJECTION, SOLUTION INTRAVENOUS at 09:11

## 2018-11-22 RX ADMIN — MOMETASONE FUROATE AND FORMOTEROL FUMARATE DIHYDRATE 2 PUFF: 200; 5 AEROSOL RESPIRATORY (INHALATION) at 08:32

## 2018-11-22 RX ADMIN — ONDANSETRON 4 MG: 2 INJECTION INTRAMUSCULAR; INTRAVENOUS at 13:21

## 2018-11-22 RX ADMIN — PROPOFOL 75 MCG/KG/MIN: 10 INJECTION, EMULSION INTRAVENOUS at 09:25

## 2018-11-22 RX ADMIN — BUPIVACAINE HYDROCHLORIDE IN DEXTROSE 2 ML: 7.5 INJECTION, SOLUTION SUBARACHNOID at 09:17

## 2018-11-22 RX ADMIN — HYDROMORPHONE HYDROCHLORIDE 0.5 MG: 1 INJECTION, SOLUTION INTRAMUSCULAR; INTRAVENOUS; SUBCUTANEOUS at 01:07

## 2018-11-22 RX ADMIN — MIDAZOLAM HYDROCHLORIDE 2 MG: 1 INJECTION, SOLUTION INTRAMUSCULAR; INTRAVENOUS at 09:14

## 2018-11-22 RX ADMIN — PROPOFOL 30 MG: 10 INJECTION, EMULSION INTRAVENOUS at 09:25

## 2018-11-22 RX ADMIN — OXYCODONE HYDROCHLORIDE AND ACETAMINOPHEN 2 TABLET: 5; 325 TABLET ORAL at 07:24

## 2018-11-22 RX ADMIN — PHENYLEPHRINE HYDROCHLORIDE 200 MCG: 10 INJECTION INTRAVENOUS at 09:41

## 2018-11-22 RX ADMIN — OXYCODONE HYDROCHLORIDE AND ACETAMINOPHEN 2 TABLET: 5; 325 TABLET ORAL at 12:24

## 2018-11-22 RX ADMIN — HYDROMORPHONE HYDROCHLORIDE 1 MG: 1 INJECTION, SOLUTION INTRAMUSCULAR; INTRAVENOUS; SUBCUTANEOUS at 08:26

## 2018-11-22 RX ADMIN — CEFAZOLIN 2000 MG: 330 INJECTION, POWDER, FOR SOLUTION INTRAMUSCULAR; INTRAVENOUS at 09:30

## 2018-11-22 RX ADMIN — Medication 10 ML: at 08:45

## 2018-11-22 RX ADMIN — TOPIRAMATE 50 MG: 50 TABLET, FILM COATED ORAL at 11:04

## 2018-11-22 RX ADMIN — SODIUM CHLORIDE: 9 INJECTION, SOLUTION INTRAVENOUS at 08:43

## 2018-11-22 ASSESSMENT — PULMONARY FUNCTION TESTS
PIF_VALUE: 0
PIF_VALUE: 1
PIF_VALUE: 0
PIF_VALUE: 1
PIF_VALUE: 1
PIF_VALUE: 0
PIF_VALUE: 1
PIF_VALUE: 1
PIF_VALUE: 0
PIF_VALUE: 1
PIF_VALUE: 0
PIF_VALUE: 0
PIF_VALUE: 1
PIF_VALUE: 0
PIF_VALUE: 1
PIF_VALUE: 0
PIF_VALUE: 1
PIF_VALUE: 1
PIF_VALUE: 0
PIF_VALUE: 0
PIF_VALUE: 1
PIF_VALUE: 0
PIF_VALUE: 1
PIF_VALUE: 0
PIF_VALUE: 0
PIF_VALUE: 1
PIF_VALUE: 0
PIF_VALUE: 1
PIF_VALUE: 1
PIF_VALUE: 0
PIF_VALUE: 1
PIF_VALUE: 1
PIF_VALUE: 0
PIF_VALUE: 1

## 2018-11-22 ASSESSMENT — PAIN SCALES - GENERAL
PAINLEVEL_OUTOF10: 8
PAINLEVEL_OUTOF10: 0
PAINLEVEL_OUTOF10: 8
PAINLEVEL_OUTOF10: 2
PAINLEVEL_OUTOF10: 8
PAINLEVEL_OUTOF10: 8
PAINLEVEL_OUTOF10: 9
PAINLEVEL_OUTOF10: 7
PAINLEVEL_OUTOF10: 8
PAINLEVEL_OUTOF10: 0
PAINLEVEL_OUTOF10: 0
PAINLEVEL_OUTOF10: 3
PAINLEVEL_OUTOF10: 0
PAINLEVEL_OUTOF10: 0

## 2018-11-22 ASSESSMENT — PAIN DESCRIPTION - PAIN TYPE
TYPE: ACUTE PAIN
TYPE: ACUTE PAIN
TYPE: ACUTE PAIN;SURGICAL PAIN

## 2018-11-22 ASSESSMENT — PAIN DESCRIPTION - DESCRIPTORS: DESCRIPTORS: ACHING;DISCOMFORT

## 2018-11-22 ASSESSMENT — PAIN DESCRIPTION - LOCATION
LOCATION: LEG

## 2018-11-22 ASSESSMENT — PAIN DESCRIPTION - ORIENTATION
ORIENTATION: LEFT

## 2018-11-22 NOTE — H&P
[facility]    HISTORY AND PHYSICAL EXAMINATION            Date:   11/22/2018  Patient name:  Karol Funes  Date of admission:  11/21/2018  1:23 PM  MRN:   [id number]  Account:  [de-identified]  YOB: 1960  PCP:    Sis Pop PA-C  [facility]   [id number]  Code Status:    Full Code    Chief Complaint:     Chief Complaint   Patient presents with    Knee Pain     left       History Obtained From:     patient, electronic medical record    History of Present Illness: The patient is a 63 y.o. female with PMH significant for asthma, arthritis, PE 8 years ago s/p treatment on coumadin who presented after a fall. Patient states she was walking down the stairs in her house when she had a mechanical fall. Landed on her back, no injury to arms or head per patient. [facility] with complaints of left knee pain. States she twisted her knee during fall. In ER XR demonstrating left tibial plateau fracture. Orthopedic surgery consulted, patient transferred to [facility] for further treatment. Seen by orthopedic surgery with plans for external fixation. No history of DM, MI, CVA, CKD, CHF. Currently only complaining of knee pain. No recent fever/chills, nausea/vomiting. History of remote tobacco use.       Past Medical History:     Past Medical History:   Diagnosis Date    Acromioclavicular joint arthritis 4/18/2016    Acute gastroenteritis 10/11/2017    Arthritis     Asthma 1980    On Inhaler    Bursitis     left shoulder    Chronic right shoulder pain 7/24/2018    Dry eyes     Essential hypertension 8/21/2017    GERD (gastroesophageal reflux disease)     not since RNY and weight loss    H/O bariatric surgery     15 MONTHS AGO/LOST 160#    Head ache     Hearing loss     Hiatal hernia with gastroesophageal reflux     History of bladder infections     History of depression     History of DVT (deep vein thrombosis) 2015    History of gastritis     History of hemorrhoids     History of pulmonary embolism 2015    History of smoking     Hyperlipidemia     not since weight loss    Hypertension     Not anymore after Bariatric Surgery    Hypertension     presently off medication    Knee pain 3/6/2015    Mild intermittent asthma     Morbid obesity ([address] 2015    Nausea and vomiting     Obesity     Obesity (BMI 30-39. 9) 2016    KEVIN on CPAP     at night    Osteoarthritis     Osteopenia     Pulmonary embolism ([address]     from foot  trauma, was treated for six months with anticoagulation. She has no inferior vena cava filter.  S/P gastric bypass 12-29-15    Dr. Ysabel Nice, [facility], hosp wt = 280lb, initial wt = 328lb    Status post gastric bypass for obesity 2015    Vitamin D deficiency     Wears dentures     Wears glasses     Weight loss of 160 lbs since surgery  2017        Past Surgical History:     Past Surgical History:   Procedure Laterality Date     SECTION  1986,  1987     SECTION      CHOLECYSTECTOMY      COLONOSCOPY      found hital hernia    CYSTOSCOPY  16    EXCISION / BIOPSY SKIN LESION OF HEAD / NECK N/A 4/3/2017     EXCISION POSTERIOR NECK CYST performed by Josie 62 IV, DO at [address] [place] [address]  10/14/2016    excision back lipoma with drain placement    IA OFFICE/OUTPT VISIT,PROCEDURE ONLY Right 10/8/2018    RIGHT SHOULDER  LOUIE PROCEDURE performed by Ellen Gonzáles MD at [place] [address]  12/29/15    liver bx, EGD    SHOULDER SURGERY Left 2016    Louie procedure    SHOULDER SURGERY      bone spurs 2016    SHOULDER SURGERY Right 10/08/2018    TONSILLECTOMY  1979    UPPER GASTROINTESTINAL ENDOSCOPY  Aug. 2015        Medications Prior to Admission:     Prior to Admission medications    Medication Sig Start Date End Date Taking? Authorizing Provider   oxyCODONE-acetaminophen (PERCOCET) 5-325 MG per tablet Take 1-2 tablets by mouth every 6 hours as needed for Pain for up to 14 days. . 11/13/18 11/27/18  Renita Garcia MD   albuterol sulfate HFA (PROAIR HFA) 108 (90 Base) MCG/ACT inhaler 2 puffs as needed    Historical Provider, MD   oxyCODONE-acetaminophen (PERCOCET) 5-325 MG per tablet 1 tablet as needed    Historical Provider, MD   mometasone-formoterol (DULERA) 100-5 MCG/ACT inhaler 2 puffs    Historical Provider, MD   Calcium Citrate (REBECCA-CITRATE) 150 MG CAPS     Historical Provider, MD   topiramate (TOPAMAX) 50 MG tablet TAKE 1 TABLET BY MOUTH ONCE DAILY 10/15/18   Elyse Ignacio PA-C   ondansetron (ZOFRAN) 4 MG tablet Take 1 tablet by mouth daily as needed for Nausea or Vomiting 10/8/18   Marianna May DO   docusate sodium (COLACE) 100 MG capsule Take 1 capsule by mouth daily as needed for Constipation 10/8/18   Marianna May DO   montelukast (SINGULAIR) 10 MG tablet Take 10 mg by mouth nightly    Historical Provider, MD   mometasone-formoterol (DULERA) 200-5 MCG/ACT inhaler Inhale 2 puffs into the lungs 2 times daily 8/10/18   Divina Weinstein MD   FIBER COMPLETE PO Take by mouth    Historical Provider, MD   sodium chloride (VINEET 128) 2 % ophthalmic solution Place 1 drop into both eyes 2 times daily    Historical Provider, MD   albuterol sulfate HFA (PROVENTIL HFA) 108 (90 Base) MCG/ACT inhaler Inhale 2 puffs into the lungs every 6 hours as needed for Wheezing 8/21/17   Divina Weinstein MD   albuterol (PROVENTIL) (2.5 MG/3ML) 0.083% nebulizer solution Take 3 mLs by nebulization every 6 hours as needed for Wheezing 8/21/17   Divina Weinstein MD   RESTASIS 0.05 % ophthalmic emulsion  3/1/17   Historical Provider, MD   Omega-3 Fatty Acids (OMEGA 3 PO) Take by mouth daily    Historical Provider, MD   Multiple Vitamin (MVI, CELEBRATE, CHEWABLE TABLET) Take 1 tablet by mouth 2 times daily Using Flinstone for not d/t nausea with Celebrate (LMP Unknown)   SpO2 100%   BMI 35.07 kg/m²   Temp (24hrs), Av.8 °F (37.1 °C), Min:98.1 °F (36.7 °C), Max:99.4 °F (37.4 °C)    No results for input(s): POCGLU in the last 72 hours. No intake or output data in the 24 hours ending 18 0800    General Appearance:  alert, mild distress  Mental status: oriented to person, place, and time with normal affect  Head:  normocephalic, atraumatic.   Eye: no icterus, redness, pupils equal and reactive, extraocular eye movements intact, conjunctiva clear  Ear: normal external ear, no discharge, hearing intact  Nose:  no drainage noted  Mouth: mucous membranes moist  Neck: supple, no carotid bruits, thyroid not palpable  Lungs: Bilateral equal air entry, clear to ausculation, no wheezing  Cardiovascular: normal rate, regular rhythm  Abdomen: Soft, nontender, nondistended, normal bowel sounds  Neurologic: There are no new focal motor or sensory deficits, normal muscle tone and bulk, no abnormal sensation, normal speech, cranial nerves II through XII grossly intact  Skin: No gross lesions, rashes, bruising or bleeding on exposed skin area  Extremities:  peripheral pulses palpable, brace on L knee  Psych: normal affect    Investigations:      Laboratory Testing:  Recent Results (from the past 24 hour(s))   Basic Metabolic Panel    Collection Time: 18  4:10 PM   Result Value Ref Range    Glucose 100 (H) 70 - 99 mg/dL    BUN 12 6 - 20 mg/dL    CREATININE 0.58 0.50 - 0.90 mg/dL    Bun/Cre Ratio NOT REPORTED 9 - 20    Calcium 9.6 8.6 - 10.4 mg/dL    Sodium 140 135 - 144 mmol/L    Potassium 4.1 3.7 - 5.3 mmol/L    Chloride 105 98 - 107 mmol/L    CO2 22 20 - 31 mmol/L    Anion Gap 13 9 - 17 mmol/L    GFR Non-African American >60 >60 mL/min    GFR African American >60 >60 mL/min    GFR Comment          GFR Staging NOT REPORTED    CBC Auto Differential    Collection Time: 18  4:10 PM   Result Value Ref Range    WBC 12.4 (H) 3.5 - 11.0 k/uL    RBC 4.49 4.0 - 5.2 m/uL administration of intravenous contrast.  Multiplanar reformatted images are provided for review. Dose modulation, iterative reconstruction, and/or weight based adjustment of the mA/kV was utilized to reduce the radiation dose to as low as reasonably achievable. COMPARISON: Left knee radiographs obtained earlier on the same day. HISTORY ORDERING SYSTEM PROVIDED HISTORY: Identify fracture Morphology TECHNOLOGIST PROVIDED HISTORY: Ordering Physician Provided Reason for Exam: Left sided knee fracture Acuity: Acute Type of Exam: Initial Mechanism of Injury: Fall down stairs today FINDINGS: Bones: Acute comminuted fracture through the proximal left tibial epiphysis with extension into the articular surfaces of the medial and lateral tibial plateaus. 3 mm cortical step-off deformity at the site of intra-articular extension into the lateral tibial plateau. 4 mm cortical step-off deformity at the sites of intra-articular extension into the medial plateau. Acute nondisplaced fracture through the proximal left fibular head, is occult on earlier radiographs. No fracture of the distal femur. Soft Tissue:  No periarticular soft tissue masses or abnormal fluid collections. Joint:  Large lipohemarthrosis. 1.  Acute comminuted fracture through the proximal left tibial epiphysis with extension into the articular surfaces of the medial and lateral tibial plateaus. 2.  Acute nondisplaced fracture of the left fibular head. Assessment :      Primary Problem  Closed fracture of proximal end of left tibia    Active Hospital Problems    Diagnosis Date Noted    Closed fracture of proximal end of left tibia [S82.102A] 11/22/2018    S/p tibial fracture [Z87.81] 11/21/2018    KEVIN on CPAP 12 cm h20 [G47.33, Z99.89] 08/22/2016    Obesity (BMI 30-39. 9) [E66.9] 07/01/2016    Acromioclavicular joint arthritis [M19.019] 04/18/2016    Status post gastric bypass for obesity [Z98.84] 12/30/2015    History of pulmonary embolism [Q50.000] 06/16/2015    Asthma [J45.909] 03/06/2015       Plan:     Patient status Admit as inpatient in the  Med/Surge    - Orthopedic surgery following - plans for OR today  - Pain control   - Anti-spasmodics  - PT/OT post op  - DVT ppx when ok with surgery  - Resume home asthma medications  - Supplemental O2 post op  - Class I Risk - 0.4 % Risk of Major Cardiac Event        Consultations:   IP CONSULT TO SOCIAL WORK  IP CONSULT TO ORTHOPEDIC SURGERY  IP CONSULT TO SOCIAL WORK    Patient is admitted as inpatient status because of co-morbidities listed above, severity of signs and symptoms as outlined, requirement for current medical therapies and most importantly because of direct risk to patient if care not provided in a hospital setting.     Courtney Anguiano MD  11/22/2018  8:00 AM    Copy sent to Dr. Thom Gaston PA-C

## 2018-11-22 NOTE — CONSULTS
328lb    Status post gastric bypass for obesity 2015    Vitamin D deficiency     Wears dentures     Wears glasses     Weight loss of 160 lbs since surgery  2017       Past Surgical History:    Past Surgical History:   Procedure Laterality Date     SECTION  1986,  1987     SECTION      CHOLECYSTECTOMY  1987    COLONOSCOPY      found hital hernia    CYSTOSCOPY  16    EXCISION / BIOPSY SKIN LESION OF HEAD / NECK N/A 4/3/2017     EXCISION POSTERIOR NECK CYST performed by Grant Guerra IV, DO at [facility]  10/14/2016    excision back lipoma with drain placement    IN OFFICE/OUTPT VISIT,PROCEDURE ONLY Right 10/8/2018    RIGHT SHOULDER  LOUIE PROCEDURE performed by Tricia Avalos MD at [place] [address]  12/29/15    liver bx, EGD    SHOULDER SURGERY Left 2016    Louie procedure    SHOULDER SURGERY      bone spurs 2016    SHOULDER SURGERY Right 10/08/2018    TONSILLECTOMY  1979    UPPER GASTROINTESTINAL ENDOSCOPY  Aug. 2015       Medications Prior to Admission:   Prior to Admission medications    Medication Sig Start Date End Date Taking? Authorizing Provider   oxyCODONE-acetaminophen (PERCOCET) 5-325 MG per tablet Take 1-2 tablets by mouth every 6 hours as needed for Pain for up to 14 days. . 18  Tricia Avalos MD   albuterol sulfate HFA (PROAIR HFA) 108 (90 Base) MCG/ACT inhaler 2 puffs as needed    Historical Provider, MD   oxyCODONE-acetaminophen (PERCOCET) 5-325 MG per tablet 1 tablet as needed    Historical Provider, MD   Multiple Vitamins-Minerals (MULTI COMPLETE/IRON PO)     Historical Provider, MD   mometasone-formoterol ([place]) 100-5 MCG/ACT inhaler 2 puffs    Historical Provider, MD   Calcium Citrate (REBECCA-CITRATE) 150 MG CAPS     Historical Provider, MD   topiramate (TOPAMAX) 50 MG tablet TAKE 1 TABLET BY MOUTH ONCE DAILY 10/15/18   Lorene Barajas PA-C   ondansetron

## 2018-11-22 NOTE — PROGRESS NOTES
Occupational Therapy   Occupational Therapy Initial Assessment  Date: 2018   Patient Name: Ranjith López  MRN: [id number]     : 1960    Date of Service: 2018    Discharge Recommendations:  [address] (pt currently requires a high level of assistance for mobility and ADLs, at this time pt would not be safe to return home)     Patient Diagnosis(es): The encounter diagnosis was Closed fracture of proximal end of left tibia, unspecified fracture morphology, initial encounter. has a past medical history of Acromioclavicular joint arthritis; Acute gastroenteritis; Arthritis; Asthma; Bursitis; Chronic right shoulder pain; Dry eyes; Essential hypertension; GERD (gastroesophageal reflux disease); H/O bariatric surgery; Head ache; Hearing loss; Hiatal hernia with gastroesophageal reflux; History of bladder infections; History of depression; History of DVT (deep vein thrombosis); History of gastritis; History of hemorrhoids; History of pulmonary embolism; History of smoking; Hyperlipidemia; Hypertension; Hypertension; Knee pain; Mild intermittent asthma; Morbid obesity ([address] Nausea and vomiting; Obesity; Obesity (BMI 30-39.9); KEVIN on CPAP; Osteoarthritis; Osteopenia; Pulmonary embolism (HCC); S/P gastric bypass; Status post gastric bypass for obesity; Vitamin D deficiency; Wears dentures; Wears glasses; and Weight loss of 160 lbs since surgery . has a past surgical history that includes Tonsillectomy (); Colonoscopy ();  section (1986,  1987); Cholecystectomy (); Upper gastrointestinal endoscopy (Aug. 2015); [place] Gastric Bypass (12/29/15); Cystoscopy (16); shoulder surgery (Left, 2016); shoulder surgery; other surgical history (10/14/2016); EXCISION / BIOPSY SKIN LESION OF HEAD / NECK (N/A, 4/3/2017); shoulder surgery (Right, 10/08/2018); pr office/outpt visit,procedure only (Right, 10/8/2018);   section; and knee

## 2018-11-22 NOTE — PLAN OF CARE
Problem: Pain:  Goal: Pain level will decrease  Pain level will decrease   LLE pain is controlled with medication and ice.

## 2018-11-22 NOTE — PROGRESS NOTES
Physical Therapy    Facility/Department: [address] ORTHO/MED SURG  Initial Assessment    NAME: Catrachita Wong  : 1960  MRN: [id number]    Date of Service: 2018    Discharge Recommendations:  Subacute/Skilled Nursing Facility - Pt required Mod A for bed mobility and Max A x2 to complete sit to stand transfer with green lift walker and bed elevated. Pt unable to ambulate this date. Pt is very unsafe to return home d/t high fall risk, level of assist required for mobility and 4 steps to enter. Patient Diagnosis(es): The encounter diagnosis was Closed fracture of proximal end of left tibia, unspecified fracture morphology, initial encounter. has a past medical history of Acromioclavicular joint arthritis; Acute gastroenteritis; Arthritis; Asthma; Bursitis; Chronic right shoulder pain; Dry eyes; Essential hypertension; GERD (gastroesophageal reflux disease); H/O bariatric surgery; Head ache; Hearing loss; Hiatal hernia with gastroesophageal reflux; History of bladder infections; History of depression; History of DVT (deep vein thrombosis); History of gastritis; History of hemorrhoids; History of pulmonary embolism; History of smoking; Hyperlipidemia; Hypertension; Hypertension; Knee pain; Mild intermittent asthma; Morbid obesity ([address] Nausea and vomiting; Obesity; Obesity (BMI 30-39.9); KEVIN on CPAP; Osteoarthritis; Osteopenia; Pulmonary embolism (HCC); S/P gastric bypass; Status post gastric bypass for obesity; Vitamin D deficiency; Wears dentures; Wears glasses; and Weight loss of 160 lbs since surgery . has a past surgical history that includes Tonsillectomy (); Colonoscopy ();  section (1986,  1987); Cholecystectomy (); Upper gastrointestinal endoscopy (Aug. 2015); [place] Gastric Bypass (12/29/15);  Cystoscopy (16); shoulder surgery (Left, 2016); shoulder surgery; other surgical history (10/14/2016); EXCISION / BIOPSY SKIN LESION OF HEAD / NECK (N/A, 4/3/2017); shoulder surgery (Right, 10/08/2018); pr office/outpt visit,procedure only (Right, 10/8/2018);  section; and knee surgery (Left, 2018). Restrictions  Restrictions/Precautions  Restrictions/Precautions: Weight Bearing, Fall Risk  Required Braces or Orthoses?:  (LLE external fixator)  Position Activity Restriction  Other position/activity restrictions: NWB LLE  Vision/Hearing  Vision: Impaired  Vision Exceptions: Wears glasses at all times  Hearing: Within functional limits     Subjective  General  Patient assessed for rehabilitation services?: Yes  Response To Previous Treatment: Not applicable  Family / Caregiver Present: Yes (multiple family members at bedside)  Follows Commands: Within Functional Limits  Subjective  Subjective: RN and pt agreeable to PT. Pt supine in bed upon arrival, pleasant and cooperative throughout. Pt c/o nausea with emesis during mobility, RN notified and present to administer medication.   Pain Screening  Patient Currently in Pain: Yes  Pain Assessment  Pain Assessment: 0-10  Pain Level: 3  Pain Type: Acute pain  Pain Location: Leg  Pain Orientation: Left  Pain Descriptors: Aching;Discomfort  Vital Signs  Patient Currently in Pain: Yes       Orientation  Orientation  Overall Orientation Status: Within Functional Limits  Social/Functional History  Social/Functional History  Lives With: Family (, daughter, granddaughter)  Type of Home: House  Home Layout: One level  Home Access: Stairs to enter with rails  Entrance Stairs - Number of Steps: 4  Entrance Stairs - Rails: Both  Bathroom Shower/Tub: Tub/Shower unit  Bathroom Toilet: Handicap height  Bathroom Equipment: Shower chair  Bathroom Accessibility: Accessible  Home Equipment: Cane, [employer]  ADL Assistance: Independent  Homemaking Assistance: Independent  Homemaking Responsibilities: Yes  Ambulation Assistance: Independent  Transfer Assistance: Independent  Active : Yes  Occupation: Unemployed  Leisure & Hobbies: Knitting    Objective          Joint Mobility  Spine: WFL  ROM RLE: ankle WFL, knee and hip not assessed d/t external fixator  ROM LLE: WFL  ROM RUE: AROM shoulder flexion ~80 degrees d/t frozen shoulder  ROM LUE: WFL     Tone RLE  RLE Tone: Normotonic  Tone LLE  LLE Tone: Normotonic  Motor Control  Gross Motor?: WFL  Sensation  Overall Sensation Status: Impaired (pt reports tingling in BLE- attributes to spinal)  Bed mobility  Supine to Sit: Moderate assistance (for LLE and trunk progression)  Sit to Supine: Moderate assistance (for LLE and trunk progression)  Scooting: Maximal assistance (while sitting EOB)  Transfers  Sit to Stand: Maximum Assistance (x2)  Stand to sit: Maximum Assistance (x2)  Comment: Attempted 4 sit to stand trials. Pt unable to achieve standing despite Max x2 with bed elevated and RW for 3 attempts. Pt completed sit to stand with green lift walker and Max x2 and bed elevated on final attempt. Ambulation  Ambulation?:  (No, unable to unweight RLE to attempt hopping despite use of green lift walker and Max x2.)  Stairs/Curb  Stairs?: No (unsafe to attempt this date)     Balance  Posture: Good  Sitting - Static: Good  Sitting - Dynamic: Good  Standing - Static: Poor;+  Standing - Dynamic: Poor  Comments: standing balance assessed with green lift walker        Assessment   Body structures, Functions, Activity limitations: Decreased functional mobility ; Decreased endurance;Decreased balance;Decreased ADL status; Decreased strength;Decreased safe awareness  Assessment: Pt required Mod A for bed mobility and Max x2 to perform sit to stand transfer with bed elevated. Pt unable to ambulate this date despite Max A x2 with green lift walker. Pt is extremely unsafe to return home d/t high fall risk. Recommend SNF placement upon discharge d/t high fall risk and level of assist required for mobility.   Prognosis: Good  Decision Making: Medium Complexity  Patient Education: POC, importance of mobility, safety, d/c rec  REQUIRES PT FOLLOW UP: Yes  Activity Tolerance  Activity Tolerance: Patient limited by endurance         Plan   Plan  Times per week: 6-7x  Current Treatment Recommendations: Strengthening, Balance Training, Functional Mobility Training, Transfer Training, Gait Training, Endurance Training, Home Exercise Program, Safety Education & Training, Patient/Caregiver Education & Training  Safety Devices  Type of devices: Left in bed, Gait belt, Call light within reach, Nurse notified  Restraints  Initially in place: No    G-Code  PT G-Codes  Functional Limitation: Mobility: Walking and moving around  Mobility: Walking and Moving Around Current Status (): At least 60 percent but less than 80 percent impaired, limited or restricted  Mobility: Walking and Moving Around Goal Status ():  At least 20 percent but less than 40 percent impaired, limited or restricted    AM-PAC Score  AM-PAC Inpatient Mobility Raw Score : 11  AM-PAC Inpatient T-Scale Score : 33.86  Mobility Inpatient CMS 0-100% Score: 72.57  Mobility Inpatient CMS G-Code Modifier : CL          Goals  Short term goals  Time Frame for Short term goals: 14 visits  Short term goal 1: Perform bed mobility with Min A  Short term goal 2: Perform sit to stand transfer with Mod A  Short term goal 3: Demo Fair- dynamic standing balance to decrease risk of falls  Short term goal 4: Ambulate 10ft with least restrictive AD and Min A  Short term goal 5: Propel wheelchair 300ft with BUE and supervision       Therapy Time   Individual Concurrent Group Co-treatment   Time In 1023         Time Out 1101         Minutes 38         Timed Code Treatment Minutes: 15 Minutes       Eual , PT

## 2018-11-22 NOTE — ANESTHESIA PRE PROCEDURE
nebulizer solution Take 3 mLs by nebulization every 6 hours as needed for Wheezing 8/21/17   Gillian Marsh MD   RESTASIS 0.05 % ophthalmic emulsion  3/1/17   Historical Provider, MD   Omega-3 Fatty Acids (OMEGA 3 PO) Take by mouth daily    Historical Provider, MD   Multiple Vitamin (MVI, CELEBRATE, CHEWABLE TABLET) Take 1 tablet by mouth 2 times daily Using Flinstone for not d/t nausea with Celebrate    Historical Provider, MD       Current medications:    Current Facility-Administered Medications   Medication Dose Route Frequency Provider Last Rate Last Dose    cyclobenzaprine (FLEXERIL) tablet 10 mg  10 mg Oral TID PRN Shine Corey MD        HYDROmorphone (DILAUDID) injection 0.5 mg  0.5 mg Intravenous Q3H PRN Shine Corey MD        Or    HYDROmorphone (DILAUDID) injection 1 mg  1 mg Intravenous Q3H PRN Shine Corey MD   1 mg at 11/22/18 0826    albuterol (PROVENTIL) nebulizer solution 2.5 mg  2.5 mg Nebulization Q6H PRN Shine Corey MD        mometasone-formoterol [facility]) 200-5 MCG/ACT inhaler 2 puff  2 puff Inhalation BID Shine Corey MD   2 puff at 11/22/18 0832    montelukast (SINGULAIR) tablet 10 mg  10 mg Oral Nightly Shine Corey MD        topiramate (TOPAMAX) tablet 50 mg  50 mg Oral Daily Shine Corey MD        0.9 % sodium chloride infusion   Intravenous Continuous Eddie Quiroga  mL/hr at 11/22/18 0843      sodium chloride flush 0.9 % injection 10 mL  10 mL Intravenous 2 times per day Shine Corey MD   10 mL at 11/22/18 0845    sodium chloride flush 0.9 % injection 10 mL  10 mL Intravenous PRN Shine Corey MD        potassium chloride (KLOR-CON M) extended release tablet 40 mEq  40 mEq Oral PRN Shine Corey MD        Or    potassium chloride 20 MEQ/15ML (10%) oral solution 40 mEq  40 mEq Oral PRN Shine Corey MD        Or    potassium chloride 10 mEq/100 mL IVPB (Peripheral Line)  10 mEq Intravenous PRN Shine Corey MD        magnesium sulfate 1 g in dextrose 5% 100 Weight loss of 160 lbs since surgery  R63.4    Acute gastroenteritis K52.9    Nausea and vomiting R11.2    Chronic right shoulder pain M25.511, G89.29    S/p tibial fracture Z87.81    Closed fracture of proximal end of left tibia S82.102A       Past Medical History:        Diagnosis Date    Acromioclavicular joint arthritis 2016    Acute gastroenteritis 10/11/2017    Arthritis     Asthma 1980    On Inhaler    Bursitis     left shoulder    Chronic right shoulder pain 2018    Dry eyes     Essential hypertension 2017    GERD (gastroesophageal reflux disease)     not since RNY and weight loss    H/O bariatric surgery     15 MONTHS AGO/LOST 160#    Head ache     Hearing loss     Hiatal hernia with gastroesophageal reflux     History of bladder infections     History of depression     History of DVT (deep vein thrombosis) 2015    History of gastritis     History of hemorrhoids     History of pulmonary embolism 2015    History of smoking     Hyperlipidemia     not since weight loss    Hypertension     Not anymore after Bariatric Surgery    Hypertension     presently off medication    Knee pain 3/6/2015    Mild intermittent asthma     Morbid obesity ([address] 2015    Nausea and vomiting     Obesity     Obesity (BMI 30-39. 9) 2016    KEVIN on CPAP     at night    Osteoarthritis     Osteopenia     Pulmonary embolism ([address]     from foot  trauma, was treated for six months with anticoagulation. She has no inferior vena cava filter.     S/P gastric bypass 12-29-15    Dr. Alon Dumont, [facility], hosp wt = 280lb, initial wt = 328lb    Status post gastric bypass for obesity 2015    Vitamin D deficiency     Wears dentures     Wears glasses     Weight loss of 160 lbs since surgery  2017       Past Surgical History:        Procedure Laterality Date     SECTION  1986,  1987   [facility] 11/22/2018     11/22/2018       CMP:   Lab Results   Component Value Date     11/22/2018    K 4.2 11/22/2018     11/22/2018    CO2 21 11/22/2018    BUN 11 11/22/2018    CREATININE 0.66 11/22/2018    GFRAA >60 11/22/2018    LABGLOM >60 11/22/2018    GLUCOSE 101 11/22/2018    PROT 7.2 04/17/2018    CALCIUM 8.7 11/22/2018    BILITOT 0.32 04/17/2018    ALKPHOS 90 04/17/2018    AST 15 04/17/2018    ALT 20 04/17/2018       POC Tests: No results for input(s): POCGLU, POCNA, POCK, POCCL, POCBUN, POCHEMO, POCHCT in the last 72 hours. Coags:   Lab Results   Component Value Date    PROTIME 10.7 11/22/2018    INR 1.0 11/22/2018    APTT 26.8 11/10/2015       HCG (If Applicable):   Lab Results   Component Value Date    PREGTESTUR NEGATIVE 05/03/2015        ABGs: No results found for: PHART, PO2ART, GQH4RDP, JJZ8HAC, BEART, D5LBYYIL     Type & Screen (If Applicable):  No results found for: LABABO, [address]    Anesthesia Evaluation  Patient summary reviewed and Nursing notes reviewed no history of anesthetic complications:   Airway: Mallampati: II  TM distance: >3 FB   Neck ROM: full  Mouth opening: > = 3 FB Dental:    (+) edentulous      Pulmonary:normal exam    (+) sleep apnea:  asthma:                            Cardiovascular:  Exercise tolerance: good (>4 METS),   (+) hypertension:,     (-) CAD, CABG/stent, dysrhythmias,  angina,  CHF, orthopnea and PND    ECG reviewed               Beta Blocker:  Not on Beta Blocker         Neuro/Psych:   (+) headaches:,             GI/Hepatic/Renal:   (+) GERD:,           Endo/Other: Negative Endo/Other ROS                    Abdominal:           Vascular:                                        Anesthesia Plan      general and spinal     ASA 3       Induction: intravenous. MIPS: Postoperative opioids intended and Prophylactic antiemetics administered. Anesthetic plan and risks discussed with patient.       Plan discussed with CRNA and surgical

## 2018-11-22 NOTE — ANESTHESIA POSTPROCEDURE EVALUATION
Department of Anesthesiology  Postprocedure Note    Patient: Willie Mooney  MRN: [id number]  YOB: 1960  Date of evaluation: 11/22/2018  Time:  3:45 PM     Procedure Summary     Date:  11/22/18 Room / Location:  [facility] OR [address] [place] [address] OR    Anesthesia Start:  0911 Anesthesia Stop:  9146    Procedure:  LEFT TIBIA PLATEAU EXTERNAL FIXATOR APPLICATION (Left ) Diagnosis:  (left tibia plateau fracture )    Surgeon:  Devaughn Noble MD Responsible Provider:  Josias Reyes MD    Anesthesia Type:  general, spinal ASA Status:  3          Anesthesia Type: general, spinal    Fariba Phase I: Fariba Score: 9    Fariba Phase II:      Last vitals: Reviewed and per EMR flowsheets.        Anesthesia Post Evaluation    Patient location during evaluation: PACU  Patient participation: complete - patient participated  Level of consciousness: awake and alert  Airway patency: patent  Nausea & Vomiting: no nausea and no vomiting  Complications: no  Cardiovascular status: hemodynamically stable  Respiratory status: room air  Hydration status: euvolemic

## 2018-11-22 NOTE — DISCHARGE SUMMARY
Views)    Result Date: 11/21/2018  EXAMINATION: 3 XRAY VIEWS OF THE LEFT KNEE 11/21/2018 2:18 pm COMPARISON: None. HISTORY: ORDERING SYSTEM PROVIDED HISTORY: Pain TECHNOLOGIST PROVIDED HISTORY: Pain Ordering Physician Provided Reason for Exam: Pt fell today, extreme pain to knee area. Unable to move due to pain Acuity: Acute Type of Exam: Initial Mechanism of Injury: fall today FINDINGS: There is a fracture involving the proximal tibia with extension to the tibial plateau. There is an effusion with a fat fluid level. Remaining visualized osseous structures appear intact and well aligned. Joint spaces appear preserved. Visualized osseous structures appear mildly demineralized. Complex fracture of the left tibial plateau with extension into the joint space with lipohemarthrosis. Xr Tibia Fibula Left (2 Views)    Result Date: 11/22/2018  EXAMINATION: FOUR XRAY VIEWS OF THE LEFT FEMUR; 2 XRAY VIEWS OF THE LEFT TIBIA AND FIBULA 11/22/2018 10:51 am COMPARISON: Right knee radiographs from 11/21/2018. Left knee CT from 11/21/2018. HISTORY: ORDERING SYSTEM PROVIDED HISTORY: Post op PACU please. TECHNOLOGIST PROVIDED HISTORY: Post op PACU please. FINDINGS: Similar appearance of the comminuted fracture of the proximal left tibia with intra-articular extension. There has been interval placement of external fixator device within the mid shafts of the left femur and left tibia. Remaining visualized osseous structures appear intact and well aligned. Nondisplaced left fibular head fracture is not well depicted on the basis of this exam.     Interval placement of external fixator device through the mid shafts of the left femur and left tibia, with similar appearance of the comminuted fracture of the proximal left tibia with intra-articular extension. Nondisplaced left fibular head fracture is not well visualized on the basis of this exam.  No new acute osseous abnormality.      Ct Knee Left Wo Contrast    Result Date: 11/21/2018  EXAMINATION: CT OF THE LEFT KNEE WITHOUT CONTRAST 11/21/2018 5:15 pm TECHNIQUE: CT of the left knee was performed without the administration of intravenous contrast.  Multiplanar reformatted images are provided for review. Dose modulation, iterative reconstruction, and/or weight based adjustment of the mA/kV was utilized to reduce the radiation dose to as low as reasonably achievable. COMPARISON: Left knee radiographs obtained earlier on the same day. HISTORY ORDERING SYSTEM PROVIDED HISTORY: Identify fracture Morphology TECHNOLOGIST PROVIDED HISTORY: Ordering Physician Provided Reason for Exam: Left sided knee fracture Acuity: Acute Type of Exam: Initial Mechanism of Injury: Fall down stairs today FINDINGS: Bones: Acute comminuted fracture through the proximal left tibial epiphysis with extension into the articular surfaces of the medial and lateral tibial plateaus. 3 mm cortical step-off deformity at the site of intra-articular extension into the lateral tibial plateau. 4 mm cortical step-off deformity at the sites of intra-articular extension into the medial plateau. Acute nondisplaced fracture through the proximal left fibular head, is occult on earlier radiographs. No fracture of the distal femur. Soft Tissue:  No periarticular soft tissue masses or abnormal fluid collections. Joint:  Large lipohemarthrosis. 1.  Acute comminuted fracture through the proximal left tibial epiphysis with extension into the articular surfaces of the medial and lateral tibial plateaus. 2.  Acute nondisplaced fracture of the left fibular head. Consultations:    Consults:     Final Specialist Recommendations/Findings:   IP CONSULT TO SOCIAL WORK  IP CONSULT TO ORTHOPEDIC SURGERY  IP CONSULT TO SOCIAL WORK      The patient was seen and examined on day of discharge and this discharge summary is in conjunction with any daily progress note from day of discharge.     Discharge plan:     Disposition: every 6 hours as needed for Pain for up to 14 days. .     * PERCOCET 5-325 MG per tablet  Generic drug:  oxyCODONE-acetaminophen     RESTASIS 0.05 % ophthalmic emulsion  Generic drug:  cycloSPORINE     topiramate 50 MG tablet  Commonly known as:  TOPAMAX  TAKE 1 TABLET BY MOUTH ONCE DAILY        * This list has 7 medication(s) that are the same as other medications prescribed for you. Read the directions carefully, and ask your doctor or other care provider to review them with you. STOP taking these medications    CALCIUM 500 + D3 500-600 MG-UNIT Tabs  Generic drug:  Calcium Carb-Cholecalciferol     HYDROcodone-acetaminophen 5-325 MG per tablet  Commonly known as:  NORCO     MULTI COMPLETE/IRON PO     TYLENOL ARTHRITIS PAIN 650 MG extended release tablet  Generic drug:  acetaminophen     venlafaxine 37.5 MG extended release capsule  Commonly known as:  EFFEXOR XR           Where to Get Your Medications      These medications were sent to [address] [place] [id number]    Phone:  [phone]   · cyclobenzaprine 10 MG tablet         Time Spent on discharge is  17 mins in patient examination, evaluation, counseling as well as medication reconciliation, prescriptions for required medications, discharge plan and follow up. Electronically signed by   Judith Singh MD  11/22/2018  5:18 PM      Thank you Dr. Sree Thurman PA-C for the opportunity to be involved in this patient's care.

## 2018-11-22 NOTE — ED NOTES
Pt departed with [facility] personnel via [facility] in stable condition and with all of their personal belongings.      Elyse Ordonez RN  11/21/18 1945

## 2018-11-26 ENCOUNTER — HOSPITAL ENCOUNTER (OUTPATIENT)
Dept: PHYSICAL THERAPY | Facility: CLINIC | Age: 58
Setting detail: THERAPIES SERIES
Discharge: HOME | End: 2018-11-26
Payer: MEDICARE

## 2018-11-28 ENCOUNTER — APPOINTMENT (OUTPATIENT)
Dept: PHYSICAL THERAPY | Facility: CLINIC | Age: 58
End: 2018-11-28
Payer: MEDICARE

## 2018-11-29 ENCOUNTER — OFFICE VISIT (OUTPATIENT)
Dept: ORTHOPEDIC SURGERY | Age: 58
End: 2018-11-29
Payer: MEDICARE

## 2018-11-29 VITALS — WEIGHT: 220.68 LBS | HEIGHT: 66 IN | BODY MASS INDEX: 35.47 KG/M2

## 2018-11-29 DIAGNOSIS — M19.011 ARTHRITIS OF RIGHT ACROMIOCLAVICULAR JOINT: ICD-10-CM

## 2018-11-29 DIAGNOSIS — S82.142A CLOSED DISPLACED BICONDYLAR FRACTURE OF LEFT TIBIA, INITIAL ENCOUNTER: Primary | ICD-10-CM

## 2018-11-29 PROCEDURE — G8417 CALC BMI ABV UP PARAM F/U: HCPCS | Performed by: ORTHOPAEDIC SURGERY

## 2018-11-29 PROCEDURE — 1036F TOBACCO NON-USER: CPT | Performed by: ORTHOPAEDIC SURGERY

## 2018-11-29 PROCEDURE — 99213 OFFICE O/P EST LOW 20 MIN: CPT | Performed by: ORTHOPAEDIC SURGERY

## 2018-11-29 PROCEDURE — 1111F DSCHRG MED/CURRENT MED MERGE: CPT | Performed by: ORTHOPAEDIC SURGERY

## 2018-11-29 PROCEDURE — G8427 DOCREV CUR MEDS BY ELIG CLIN: HCPCS | Performed by: ORTHOPAEDIC SURGERY

## 2018-11-29 PROCEDURE — 3017F COLORECTAL CA SCREEN DOC REV: CPT | Performed by: ORTHOPAEDIC SURGERY

## 2018-11-29 PROCEDURE — G8484 FLU IMMUNIZE NO ADMIN: HCPCS | Performed by: ORTHOPAEDIC SURGERY

## 2018-11-29 RX ORDER — OXYCODONE HYDROCHLORIDE AND ACETAMINOPHEN 5; 325 MG/1; MG/1
1-2 TABLET ORAL EVERY 6 HOURS PRN
Qty: 56 TABLET | Refills: 0 | Status: ON HOLD | OUTPATIENT
Start: 2018-11-29 | End: 2018-12-06 | Stop reason: HOSPADM

## 2018-11-29 RX ORDER — CEPHALEXIN 500 MG/1
500 CAPSULE ORAL 4 TIMES DAILY
Qty: 28 CAPSULE | Refills: 0 | Status: ON HOLD | OUTPATIENT
Start: 2018-11-29 | End: 2018-12-06 | Stop reason: HOSPADM

## 2018-12-05 ENCOUNTER — APPOINTMENT (OUTPATIENT)
Dept: GENERAL RADIOLOGY | Age: 58
DRG: 313 | End: 2018-12-05
Attending: ORTHOPAEDIC SURGERY
Payer: MEDICARE

## 2018-12-05 ENCOUNTER — HOSPITAL ENCOUNTER (INPATIENT)
Age: 58
LOS: 2 days | Discharge: HOME | DRG: 313 | End: 2018-12-07
Attending: ORTHOPAEDIC SURGERY | Admitting: ORTHOPAEDIC SURGERY
Payer: MEDICARE

## 2018-12-05 ENCOUNTER — ANESTHESIA (OUTPATIENT)
Dept: OPERATING ROOM | Age: 58
DRG: 313 | End: 2018-12-05
Payer: MEDICARE

## 2018-12-05 ENCOUNTER — ANESTHESIA EVENT (OUTPATIENT)
Dept: OPERATING ROOM | Age: 58
DRG: 313 | End: 2018-12-05
Payer: MEDICARE

## 2018-12-05 VITALS — DIASTOLIC BLOOD PRESSURE: 60 MMHG | TEMPERATURE: 98.4 F | SYSTOLIC BLOOD PRESSURE: 107 MMHG | OXYGEN SATURATION: 97 %

## 2018-12-05 DIAGNOSIS — S82.102S CLOSED FRACTURE OF PROXIMAL END OF LEFT TIBIA, UNSPECIFIED FRACTURE MORPHOLOGY, SEQUELA: Primary | ICD-10-CM

## 2018-12-05 PROBLEM — S82.142A TIBIAL PLATEAU FRACTURE, LEFT, CLOSED, INITIAL ENCOUNTER: Status: ACTIVE | Noted: 2018-12-05

## 2018-12-05 PROCEDURE — 3E0U3BZ INTRODUCTION OF ANESTHETIC AGENT INTO JOINTS, PERCUTANEOUS APPROACH: ICD-10-PCS | Performed by: ORTHOPAEDIC SURGERY

## 2018-12-05 PROCEDURE — 6360000002 HC RX W HCPCS: Performed by: ANESTHESIOLOGY

## 2018-12-05 PROCEDURE — 94664 DEMO&/EVAL PT USE INHALER: CPT

## 2018-12-05 PROCEDURE — 7100000001 HC PACU RECOVERY - ADDTL 15 MIN: Performed by: ORTHOPAEDIC SURGERY

## 2018-12-05 PROCEDURE — 6360000002 HC RX W HCPCS: Performed by: STUDENT IN AN ORGANIZED HEALTH CARE EDUCATION/TRAINING PROGRAM

## 2018-12-05 PROCEDURE — 7100000010 HC PHASE II RECOVERY - FIRST 15 MIN: Performed by: ORTHOPAEDIC SURGERY

## 2018-12-05 PROCEDURE — 3600000003 HC SURGERY LEVEL 3 BASE: Performed by: ORTHOPAEDIC SURGERY

## 2018-12-05 PROCEDURE — 0SPDX5Z REMOVAL OF EXTERNAL FIXATION DEVICE FROM LEFT KNEE JOINT, EXTERNAL APPROACH: ICD-10-PCS | Performed by: ORTHOPAEDIC SURGERY

## 2018-12-05 PROCEDURE — 0SJD4ZZ INSPECTION OF LEFT KNEE JOINT, PERCUTANEOUS ENDOSCOPIC APPROACH: ICD-10-PCS | Performed by: ORTHOPAEDIC SURGERY

## 2018-12-05 PROCEDURE — 6360000002 HC RX W HCPCS: Performed by: ORTHOPAEDIC SURGERY

## 2018-12-05 PROCEDURE — 99252 IP/OBS CONSLTJ NEW/EST SF 35: CPT | Performed by: NURSE PRACTITIONER

## 2018-12-05 PROCEDURE — 2500000003 HC RX 250 WO HCPCS: Performed by: ORTHOPAEDIC SURGERY

## 2018-12-05 PROCEDURE — 0QSH04Z REPOSITION LEFT TIBIA WITH INTERNAL FIXATION DEVICE, OPEN APPROACH: ICD-10-PCS | Performed by: ORTHOPAEDIC SURGERY

## 2018-12-05 PROCEDURE — 3700000001 HC ADD 15 MINUTES (ANESTHESIA): Performed by: ORTHOPAEDIC SURGERY

## 2018-12-05 PROCEDURE — 2580000003 HC RX 258: Performed by: ANESTHESIOLOGY

## 2018-12-05 PROCEDURE — 2580000003 HC RX 258: Performed by: STUDENT IN AN ORGANIZED HEALTH CARE EDUCATION/TRAINING PROGRAM

## 2018-12-05 PROCEDURE — 2580000003 HC RX 258: Performed by: NURSE ANESTHETIST, CERTIFIED REGISTERED

## 2018-12-05 PROCEDURE — 2720000010 HC SURG SUPPLY STERILE: Performed by: ORTHOPAEDIC SURGERY

## 2018-12-05 PROCEDURE — 1200000000 HC SEMI PRIVATE

## 2018-12-05 PROCEDURE — 73560 X-RAY EXAM OF KNEE 1 OR 2: CPT

## 2018-12-05 PROCEDURE — 2709999900 HC NON-CHARGEABLE SUPPLY: Performed by: ORTHOPAEDIC SURGERY

## 2018-12-05 PROCEDURE — 6370000000 HC RX 637 (ALT 250 FOR IP): Performed by: STUDENT IN AN ORGANIZED HEALTH CARE EDUCATION/TRAINING PROGRAM

## 2018-12-05 PROCEDURE — L1851 KO SINGLE UPRIGHT PREFAB OTS: HCPCS | Performed by: ORTHOPAEDIC SURGERY

## 2018-12-05 PROCEDURE — 2500000003 HC RX 250 WO HCPCS: Performed by: ANESTHESIOLOGY

## 2018-12-05 PROCEDURE — 94640 AIRWAY INHALATION TREATMENT: CPT

## 2018-12-05 PROCEDURE — 3600000013 HC SURGERY LEVEL 3 ADDTL 15MIN: Performed by: ORTHOPAEDIC SURGERY

## 2018-12-05 PROCEDURE — 7100000011 HC PHASE II RECOVERY - ADDTL 15 MIN: Performed by: ORTHOPAEDIC SURGERY

## 2018-12-05 PROCEDURE — 0RNJ0ZZ RELEASE RIGHT SHOULDER JOINT, OPEN APPROACH: ICD-10-PCS | Performed by: ORTHOPAEDIC SURGERY

## 2018-12-05 PROCEDURE — 6360000002 HC RX W HCPCS: Performed by: NURSE ANESTHETIST, CERTIFIED REGISTERED

## 2018-12-05 PROCEDURE — 2500000003 HC RX 250 WO HCPCS: Performed by: STUDENT IN AN ORGANIZED HEALTH CARE EDUCATION/TRAINING PROGRAM

## 2018-12-05 PROCEDURE — 3700000000 HC ANESTHESIA ATTENDED CARE: Performed by: ORTHOPAEDIC SURGERY

## 2018-12-05 PROCEDURE — 3E0U33Z INTRODUCTION OF ANTI-INFLAMMATORY INTO JOINTS, PERCUTANEOUS APPROACH: ICD-10-PCS | Performed by: ORTHOPAEDIC SURGERY

## 2018-12-05 PROCEDURE — 2500000003 HC RX 250 WO HCPCS: Performed by: NURSE ANESTHETIST, CERTIFIED REGISTERED

## 2018-12-05 PROCEDURE — 3209999900 FLUORO FOR SURGICAL PROCEDURES

## 2018-12-05 PROCEDURE — 7100000000 HC PACU RECOVERY - FIRST 15 MIN: Performed by: ORTHOPAEDIC SURGERY

## 2018-12-05 PROCEDURE — C1713 ANCHOR/SCREW BN/BN,TIS/BN: HCPCS | Performed by: ORTHOPAEDIC SURGERY

## 2018-12-05 DEVICE — SCREW BNE L38MM DIA3.5MM CORT S STL ST NONCANNULATED LOK: Type: IMPLANTABLE DEVICE | Site: TIBIA | Status: FUNCTIONAL

## 2018-12-05 DEVICE — SCREW BNE L60MM DIA3.5MM PROX TIB S STL ST FULL THRD T15: Type: IMPLANTABLE DEVICE | Site: LEG | Status: FUNCTIONAL

## 2018-12-05 DEVICE — SCREW BNE L80MM DIA3.5MM PROX TIB S STL ST FULL THRD T15: Type: IMPLANTABLE DEVICE | Site: LEG | Status: FUNCTIONAL

## 2018-12-05 DEVICE — SCREW BNE L44MM DIA3.5MM CORT S STL ST NONCANNULATED LOK: Type: IMPLANTABLE DEVICE | Site: LEG | Status: FUNCTIONAL

## 2018-12-05 DEVICE — SCREW BNE L40MM DIA3.5MM CORT S STL ST NONCANNULATED LOK: Type: IMPLANTABLE DEVICE | Site: LEG | Status: FUNCTIONAL

## 2018-12-05 DEVICE — SCREW BNE L75MM DIA3.5MM PROX TIB S STL ST FULL THRD T15: Type: IMPLANTABLE DEVICE | Site: LEG | Status: FUNCTIONAL

## 2018-12-05 DEVICE — SCREW BNE L65MM DIA3.5MM PROX TIB S STL ST FULL THRD T15: Type: IMPLANTABLE DEVICE | Site: LEG | Status: FUNCTIONAL

## 2018-12-05 DEVICE — SCREW BNE L50MM DIA3.5MM CORT S STL ST NONCANNULATED LOK: Type: IMPLANTABLE DEVICE | Site: LEG | Status: FUNCTIONAL

## 2018-12-05 DEVICE — IMPLANTABLE DEVICE: Type: IMPLANTABLE DEVICE | Site: LEG | Status: FUNCTIONAL

## 2018-12-05 DEVICE — PLATE BNE L87MM 4 H NONSTERILE L PROX TIB S STL VAR ANG LOK 02127211] DEPUY SYNTHES USA]: Type: IMPLANTABLE DEVICE | Site: LEG | Status: FUNCTIONAL

## 2018-12-05 DEVICE — SCREW BNE L34MM DIA3.5MM CORT S STL ST NONCANNULATED LOK: Type: IMPLANTABLE DEVICE | Site: LEG | Status: FUNCTIONAL

## 2018-12-05 DEVICE — TIM-TISSUE CANCELLOUS 30.0CC CRUSHED: Type: IMPLANTABLE DEVICE | Site: TIBIA | Status: FUNCTIONAL

## 2018-12-05 RX ORDER — ACETAMINOPHEN 500 MG
1000 TABLET ORAL EVERY 8 HOURS SCHEDULED
Status: DISCONTINUED | OUTPATIENT
Start: 2018-12-05 | End: 2018-12-07 | Stop reason: HOSPADM

## 2018-12-05 RX ORDER — CEFAZOLIN SODIUM 2 G/50ML
2 SOLUTION INTRAVENOUS
Status: CANCELLED | OUTPATIENT
Start: 2018-12-05 | End: 2018-12-05

## 2018-12-05 RX ORDER — DEXAMETHASONE SODIUM PHOSPHATE 10 MG/ML
INJECTION INTRAMUSCULAR; INTRAVENOUS PRN
Status: DISCONTINUED | OUTPATIENT
Start: 2018-12-05 | End: 2018-12-05 | Stop reason: SDUPTHER

## 2018-12-05 RX ORDER — ROCURONIUM BROMIDE 10 MG/ML
INJECTION, SOLUTION INTRAVENOUS PRN
Status: DISCONTINUED | OUTPATIENT
Start: 2018-12-05 | End: 2018-12-05 | Stop reason: SDUPTHER

## 2018-12-05 RX ORDER — ONDANSETRON 4 MG/1
4 TABLET, FILM COATED ORAL DAILY PRN
Status: DISCONTINUED | OUTPATIENT
Start: 2018-12-05 | End: 2018-12-07 | Stop reason: HOSPADM

## 2018-12-05 RX ORDER — OXYCODONE HYDROCHLORIDE 5 MG/1
5 TABLET ORAL EVERY 4 HOURS PRN
Status: DISCONTINUED | OUTPATIENT
Start: 2018-12-05 | End: 2018-12-07 | Stop reason: HOSPADM

## 2018-12-05 RX ORDER — HYDROMORPHONE HCL 110MG/55ML
0.5 PATIENT CONTROLLED ANALGESIA SYRINGE INTRAVENOUS EVERY 5 MIN PRN
Status: DISCONTINUED | OUTPATIENT
Start: 2018-12-05 | End: 2018-12-05 | Stop reason: HOSPADM

## 2018-12-05 RX ORDER — CYCLOBENZAPRINE HCL 10 MG
10 TABLET ORAL 3 TIMES DAILY PRN
Status: DISCONTINUED | OUTPATIENT
Start: 2018-12-05 | End: 2018-12-07 | Stop reason: HOSPADM

## 2018-12-05 RX ORDER — ALBUTEROL SULFATE 90 UG/1
2 AEROSOL, METERED RESPIRATORY (INHALATION) EVERY 6 HOURS PRN
Status: DISCONTINUED | OUTPATIENT
Start: 2018-12-05 | End: 2018-12-07 | Stop reason: HOSPADM

## 2018-12-05 RX ORDER — SODIUM CHLORIDE 0.9 % (FLUSH) 0.9 %
10 SYRINGE (ML) INJECTION EVERY 12 HOURS SCHEDULED
Status: DISCONTINUED | OUTPATIENT
Start: 2018-12-05 | End: 2018-12-07 | Stop reason: HOSPADM

## 2018-12-05 RX ORDER — SODIUM CHLORIDE, SODIUM LACTATE, POTASSIUM CHLORIDE, CALCIUM CHLORIDE 600; 310; 30; 20 MG/100ML; MG/100ML; MG/100ML; MG/100ML
INJECTION, SOLUTION INTRAVENOUS CONTINUOUS
Status: DISCONTINUED | OUTPATIENT
Start: 2018-12-05 | End: 2018-12-05

## 2018-12-05 RX ORDER — TRANEXAMIC ACID 100 MG/ML
INJECTION, SOLUTION INTRAVENOUS PRN
Status: DISCONTINUED | OUTPATIENT
Start: 2018-12-05 | End: 2018-12-05 | Stop reason: SDUPTHER

## 2018-12-05 RX ORDER — ALBUTEROL SULFATE 2.5 MG/3ML
2.5 SOLUTION RESPIRATORY (INHALATION) EVERY 6 HOURS PRN
Status: DISCONTINUED | OUTPATIENT
Start: 2018-12-05 | End: 2018-12-07 | Stop reason: HOSPADM

## 2018-12-05 RX ORDER — FENTANYL CITRATE 50 UG/ML
25 INJECTION, SOLUTION INTRAMUSCULAR; INTRAVENOUS EVERY 5 MIN PRN
Status: DISCONTINUED | OUTPATIENT
Start: 2018-12-05 | End: 2018-12-05 | Stop reason: HOSPADM

## 2018-12-05 RX ORDER — CLINDAMYCIN PHOSPHATE 150 MG/ML
INJECTION, SOLUTION INTRAVENOUS PRN
Status: DISCONTINUED | OUTPATIENT
Start: 2018-12-05 | End: 2018-12-05 | Stop reason: SDUPTHER

## 2018-12-05 RX ORDER — TOPIRAMATE 25 MG/1
50 TABLET ORAL DAILY
Status: DISCONTINUED | OUTPATIENT
Start: 2018-12-05 | End: 2018-12-07 | Stop reason: HOSPADM

## 2018-12-05 RX ORDER — DOCUSATE SODIUM 100 MG/1
100 CAPSULE, LIQUID FILLED ORAL DAILY PRN
Status: DISCONTINUED | OUTPATIENT
Start: 2018-12-05 | End: 2018-12-07 | Stop reason: HOSPADM

## 2018-12-05 RX ORDER — OXYCODONE HYDROCHLORIDE 5 MG/1
10 TABLET ORAL EVERY 4 HOURS PRN
Status: DISCONTINUED | OUTPATIENT
Start: 2018-12-05 | End: 2018-12-07 | Stop reason: HOSPADM

## 2018-12-05 RX ORDER — CYCLOBENZAPRINE HCL 10 MG
10 TABLET ORAL 3 TIMES DAILY PRN
COMMUNITY
End: 2018-12-11

## 2018-12-05 RX ORDER — FENTANYL CITRATE 50 UG/ML
25 INJECTION, SOLUTION INTRAMUSCULAR; INTRAVENOUS EVERY 5 MIN PRN
Status: COMPLETED | OUTPATIENT
Start: 2018-12-05 | End: 2018-12-05

## 2018-12-05 RX ORDER — TRAMADOL HYDROCHLORIDE 50 MG/1
50 TABLET ORAL EVERY 8 HOURS
Status: DISCONTINUED | OUTPATIENT
Start: 2018-12-05 | End: 2018-12-07 | Stop reason: HOSPADM

## 2018-12-05 RX ORDER — CLINDAMYCIN PHOSPHATE 900 MG/50ML
900 INJECTION INTRAVENOUS EVERY 8 HOURS
Status: COMPLETED | OUTPATIENT
Start: 2018-12-05 | End: 2018-12-06

## 2018-12-05 RX ORDER — MORPHINE SULFATE 2 MG/ML
2 INJECTION, SOLUTION INTRAMUSCULAR; INTRAVENOUS
Status: DISCONTINUED | OUTPATIENT
Start: 2018-12-05 | End: 2018-12-07 | Stop reason: HOSPADM

## 2018-12-05 RX ORDER — LABETALOL HYDROCHLORIDE 5 MG/ML
5 INJECTION, SOLUTION INTRAVENOUS EVERY 10 MIN PRN
Status: DISCONTINUED | OUTPATIENT
Start: 2018-12-05 | End: 2018-12-05 | Stop reason: HOSPADM

## 2018-12-05 RX ORDER — LABETALOL HYDROCHLORIDE 5 MG/ML
INJECTION, SOLUTION INTRAVENOUS PRN
Status: DISCONTINUED | OUTPATIENT
Start: 2018-12-05 | End: 2018-12-05 | Stop reason: SDUPTHER

## 2018-12-05 RX ORDER — ROPIVACAINE HYDROCHLORIDE 5 MG/ML
INJECTION, SOLUTION EPIDURAL; INFILTRATION; PERINEURAL PRN
Status: DISCONTINUED | OUTPATIENT
Start: 2018-12-05 | End: 2018-12-05 | Stop reason: SDUPTHER

## 2018-12-05 RX ORDER — PROPOFOL 10 MG/ML
INJECTION, EMULSION INTRAVENOUS PRN
Status: DISCONTINUED | OUTPATIENT
Start: 2018-12-05 | End: 2018-12-05 | Stop reason: SDUPTHER

## 2018-12-05 RX ORDER — PROMETHAZINE HYDROCHLORIDE 25 MG/ML
6.25 INJECTION, SOLUTION INTRAMUSCULAR; INTRAVENOUS
Status: DISCONTINUED | OUTPATIENT
Start: 2018-12-05 | End: 2018-12-05 | Stop reason: HOSPADM

## 2018-12-05 RX ORDER — SODIUM CHLORIDE, SODIUM LACTATE, POTASSIUM CHLORIDE, CALCIUM CHLORIDE 600; 310; 30; 20 MG/100ML; MG/100ML; MG/100ML; MG/100ML
INJECTION, SOLUTION INTRAVENOUS CONTINUOUS PRN
Status: DISCONTINUED | OUTPATIENT
Start: 2018-12-05 | End: 2018-12-05 | Stop reason: SDUPTHER

## 2018-12-05 RX ORDER — SODIUM CHLORIDE 0.9 % (FLUSH) 0.9 %
10 SYRINGE (ML) INJECTION PRN
Status: DISCONTINUED | OUTPATIENT
Start: 2018-12-05 | End: 2018-12-07 | Stop reason: HOSPADM

## 2018-12-05 RX ORDER — MONTELUKAST SODIUM 10 MG/1
10 TABLET ORAL NIGHTLY
Status: DISCONTINUED | OUTPATIENT
Start: 2018-12-05 | End: 2018-12-07 | Stop reason: HOSPADM

## 2018-12-05 RX ORDER — HYDRALAZINE HYDROCHLORIDE 20 MG/ML
5 INJECTION INTRAMUSCULAR; INTRAVENOUS EVERY 10 MIN PRN
Status: DISCONTINUED | OUTPATIENT
Start: 2018-12-05 | End: 2018-12-05 | Stop reason: HOSPADM

## 2018-12-05 RX ORDER — MORPHINE SULFATE 4 MG/ML
4 INJECTION, SOLUTION INTRAMUSCULAR; INTRAVENOUS
Status: DISCONTINUED | OUTPATIENT
Start: 2018-12-05 | End: 2018-12-07 | Stop reason: HOSPADM

## 2018-12-05 RX ORDER — ONDANSETRON 2 MG/ML
4 INJECTION INTRAMUSCULAR; INTRAVENOUS
Status: DISCONTINUED | OUTPATIENT
Start: 2018-12-05 | End: 2018-12-05 | Stop reason: HOSPADM

## 2018-12-05 RX ORDER — SODIUM CHLORIDE 9 MG/ML
INJECTION, SOLUTION INTRAVENOUS CONTINUOUS
Status: DISCONTINUED | OUTPATIENT
Start: 2018-12-05 | End: 2018-12-07 | Stop reason: HOSPADM

## 2018-12-05 RX ORDER — LIDOCAINE HYDROCHLORIDE 20 MG/ML
INJECTION, SOLUTION EPIDURAL; INFILTRATION; INTRACAUDAL; PERINEURAL PRN
Status: DISCONTINUED | OUTPATIENT
Start: 2018-12-05 | End: 2018-12-05 | Stop reason: SDUPTHER

## 2018-12-05 RX ORDER — FENTANYL CITRATE 50 UG/ML
INJECTION, SOLUTION INTRAMUSCULAR; INTRAVENOUS PRN
Status: DISCONTINUED | OUTPATIENT
Start: 2018-12-05 | End: 2018-12-05 | Stop reason: SDUPTHER

## 2018-12-05 RX ORDER — ONDANSETRON 2 MG/ML
INJECTION INTRAMUSCULAR; INTRAVENOUS PRN
Status: DISCONTINUED | OUTPATIENT
Start: 2018-12-05 | End: 2018-12-05 | Stop reason: SDUPTHER

## 2018-12-05 RX ORDER — LIDOCAINE HYDROCHLORIDE 10 MG/ML
INJECTION, SOLUTION INFILTRATION; PERINEURAL PRN
Status: DISCONTINUED | OUTPATIENT
Start: 2018-12-05 | End: 2018-12-05 | Stop reason: SDUPTHER

## 2018-12-05 RX ORDER — MIDAZOLAM HYDROCHLORIDE 1 MG/ML
INJECTION INTRAMUSCULAR; INTRAVENOUS PRN
Status: DISCONTINUED | OUTPATIENT
Start: 2018-12-05 | End: 2018-12-05 | Stop reason: SDUPTHER

## 2018-12-05 RX ADMIN — LABETALOL HYDROCHLORIDE 5 MG: 5 INJECTION, SOLUTION INTRAVENOUS at 14:16

## 2018-12-05 RX ADMIN — FENTANYL CITRATE 25 MCG: 50 INJECTION, SOLUTION INTRAMUSCULAR; INTRAVENOUS at 18:08

## 2018-12-05 RX ADMIN — FENTANYL CITRATE 50 MCG: 50 INJECTION, SOLUTION INTRAMUSCULAR; INTRAVENOUS at 15:55

## 2018-12-05 RX ADMIN — SODIUM CHLORIDE, POTASSIUM CHLORIDE, SODIUM LACTATE AND CALCIUM CHLORIDE: 600; 310; 30; 20 INJECTION, SOLUTION INTRAVENOUS at 11:23

## 2018-12-05 RX ADMIN — TRANEXAMIC ACID 1000 MG: 1 INJECTION, SOLUTION INTRAVENOUS at 15:16

## 2018-12-05 RX ADMIN — ENOXAPARIN SODIUM 40 MG: 40 INJECTION SUBCUTANEOUS at 20:41

## 2018-12-05 RX ADMIN — FENTANYL CITRATE 25 MCG: 50 INJECTION, SOLUTION INTRAMUSCULAR; INTRAVENOUS at 16:04

## 2018-12-05 RX ADMIN — FENTANYL CITRATE 50 MCG: 50 INJECTION, SOLUTION INTRAMUSCULAR; INTRAVENOUS at 12:46

## 2018-12-05 RX ADMIN — FENTANYL CITRATE 100 MCG: 50 INJECTION, SOLUTION INTRAMUSCULAR; INTRAVENOUS at 12:00

## 2018-12-05 RX ADMIN — LIDOCAINE HYDROCHLORIDE 5 ML: 10 INJECTION, SOLUTION INFILTRATION; PERINEURAL at 16:52

## 2018-12-05 RX ADMIN — MONTELUKAST SODIUM 10 MG: 10 TABLET, FILM COATED ORAL at 20:41

## 2018-12-05 RX ADMIN — TOPIRAMATE 50 MG: 25 TABLET, FILM COATED ORAL at 20:41

## 2018-12-05 RX ADMIN — SODIUM CHLORIDE: 9 INJECTION, SOLUTION INTRAVENOUS at 20:30

## 2018-12-05 RX ADMIN — ROCURONIUM BROMIDE 50 MG: 10 INJECTION, SOLUTION INTRAVENOUS at 12:00

## 2018-12-05 RX ADMIN — FENTANYL CITRATE 25 MCG: 50 INJECTION, SOLUTION INTRAMUSCULAR; INTRAVENOUS at 18:41

## 2018-12-05 RX ADMIN — FENTANYL CITRATE 25 MCG: 50 INJECTION, SOLUTION INTRAMUSCULAR; INTRAVENOUS at 11:56

## 2018-12-05 RX ADMIN — PROPOFOL 200 MG: 10 INJECTION, EMULSION INTRAVENOUS at 12:00

## 2018-12-05 RX ADMIN — SODIUM CHLORIDE, POTASSIUM CHLORIDE, SODIUM LACTATE AND CALCIUM CHLORIDE: 600; 310; 30; 20 INJECTION, SOLUTION INTRAVENOUS at 14:13

## 2018-12-05 RX ADMIN — ROPIVACAINE HYDROCHLORIDE 30 ML: 5 INJECTION, SOLUTION EPIDURAL; INFILTRATION; PERINEURAL at 16:52

## 2018-12-05 RX ADMIN — ACETAMINOPHEN 1000 MG: 500 TABLET ORAL at 20:41

## 2018-12-05 RX ADMIN — MOMETASONE FUROATE AND FORMOTEROL FUMARATE DIHYDRATE 2 PUFF: 200; 5 AEROSOL RESPIRATORY (INHALATION) at 20:41

## 2018-12-05 RX ADMIN — CLINDAMYCIN PHOSPHATE 900 MG: 900 INJECTION, SOLUTION INTRAVENOUS at 20:41

## 2018-12-05 RX ADMIN — FENTANYL CITRATE 50 MCG: 50 INJECTION, SOLUTION INTRAMUSCULAR; INTRAVENOUS at 12:57

## 2018-12-05 RX ADMIN — FENTANYL CITRATE 25 MCG: 50 INJECTION, SOLUTION INTRAMUSCULAR; INTRAVENOUS at 19:03

## 2018-12-05 RX ADMIN — FENTANYL CITRATE 100 MCG: 50 INJECTION, SOLUTION INTRAMUSCULAR; INTRAVENOUS at 12:19

## 2018-12-05 RX ADMIN — OXYCODONE HYDROCHLORIDE 10 MG: 5 TABLET ORAL at 21:55

## 2018-12-05 RX ADMIN — FENTANYL CITRATE 25 MCG: 50 INJECTION, SOLUTION INTRAMUSCULAR; INTRAVENOUS at 11:58

## 2018-12-05 RX ADMIN — FENTANYL CITRATE 25 MCG: 50 INJECTION, SOLUTION INTRAMUSCULAR; INTRAVENOUS at 15:58

## 2018-12-05 RX ADMIN — ONDANSETRON 4 MG: 2 INJECTION, SOLUTION INTRAMUSCULAR; INTRAVENOUS at 15:18

## 2018-12-05 RX ADMIN — TRAMADOL HYDROCHLORIDE 50 MG: 50 TABLET, FILM COATED ORAL at 20:41

## 2018-12-05 RX ADMIN — LABETALOL HYDROCHLORIDE 5 MG: 5 INJECTION, SOLUTION INTRAVENOUS at 12:59

## 2018-12-05 RX ADMIN — Medication 10 ML: at 20:31

## 2018-12-05 RX ADMIN — MIDAZOLAM 2 MG: 1 INJECTION INTRAMUSCULAR; INTRAVENOUS at 11:54

## 2018-12-05 RX ADMIN — LIDOCAINE HYDROCHLORIDE 100 MG: 20 INJECTION, SOLUTION EPIDURAL; INFILTRATION; INTRACAUDAL; PERINEURAL at 12:00

## 2018-12-05 RX ADMIN — FENTANYL CITRATE 25 MCG: 50 INJECTION, SOLUTION INTRAMUSCULAR; INTRAVENOUS at 17:53

## 2018-12-05 RX ADMIN — SODIUM CHLORIDE, POTASSIUM CHLORIDE, SODIUM LACTATE AND CALCIUM CHLORIDE: 600; 310; 30; 20 INJECTION, SOLUTION INTRAVENOUS at 11:54

## 2018-12-05 RX ADMIN — LABETALOL HYDROCHLORIDE 5 MG: 5 INJECTION, SOLUTION INTRAVENOUS at 15:39

## 2018-12-05 RX ADMIN — DEXAMETHASONE SODIUM PHOSPHATE 10 MG: 10 INJECTION INTRAMUSCULAR; INTRAVENOUS at 12:00

## 2018-12-05 RX ADMIN — FENTANYL CITRATE 100 MCG: 50 INJECTION, SOLUTION INTRAMUSCULAR; INTRAVENOUS at 14:00

## 2018-12-05 RX ADMIN — CLINDAMYCIN PHOSPHATE 900 MG: 150 INJECTION, SOLUTION INTRAVENOUS at 12:10

## 2018-12-05 ASSESSMENT — PAIN DESCRIPTION - PAIN TYPE
TYPE: SURGICAL PAIN

## 2018-12-05 ASSESSMENT — PULMONARY FUNCTION TESTS
PIF_VALUE: 17
PIF_VALUE: 16
PIF_VALUE: 2
PIF_VALUE: 11
PIF_VALUE: 11
PIF_VALUE: 16
PIF_VALUE: 15
PIF_VALUE: 10
PIF_VALUE: 19
PIF_VALUE: 20
PIF_VALUE: 17
PIF_VALUE: 10
PIF_VALUE: 16
PIF_VALUE: 11
PIF_VALUE: 19
PIF_VALUE: 19
PIF_VALUE: 11
PIF_VALUE: 1
PIF_VALUE: 19
PIF_VALUE: 11
PIF_VALUE: 15
PIF_VALUE: 0
PIF_VALUE: 11
PIF_VALUE: 17
PIF_VALUE: 11
PIF_VALUE: 20
PIF_VALUE: 16
PIF_VALUE: 16
PIF_VALUE: 17
PIF_VALUE: 19
PIF_VALUE: 18
PIF_VALUE: 10
PIF_VALUE: 17
PIF_VALUE: 16
PIF_VALUE: 17
PIF_VALUE: 19
PIF_VALUE: 11
PIF_VALUE: 16
PIF_VALUE: 15
PIF_VALUE: 11
PIF_VALUE: 17
PIF_VALUE: 0
PIF_VALUE: 17
PIF_VALUE: 0
PIF_VALUE: 13
PIF_VALUE: 11
PIF_VALUE: 11
PIF_VALUE: 10
PIF_VALUE: 16
PIF_VALUE: 14
PIF_VALUE: 17
PIF_VALUE: 19
PIF_VALUE: 11
PIF_VALUE: 11
PIF_VALUE: 16
PIF_VALUE: 17
PIF_VALUE: 16
PIF_VALUE: 17
PIF_VALUE: 16
PIF_VALUE: 17
PIF_VALUE: 18
PIF_VALUE: 16
PIF_VALUE: 15
PIF_VALUE: 18
PIF_VALUE: 10
PIF_VALUE: 16
PIF_VALUE: 17
PIF_VALUE: 17
PIF_VALUE: 16
PIF_VALUE: 1
PIF_VALUE: 20
PIF_VALUE: 0
PIF_VALUE: 15
PIF_VALUE: 15
PIF_VALUE: 19
PIF_VALUE: 14
PIF_VALUE: 15
PIF_VALUE: 15
PIF_VALUE: 11
PIF_VALUE: 15
PIF_VALUE: 17
PIF_VALUE: 16
PIF_VALUE: 11
PIF_VALUE: 11
PIF_VALUE: 20
PIF_VALUE: 17
PIF_VALUE: 17
PIF_VALUE: 10
PIF_VALUE: 10
PIF_VALUE: 17
PIF_VALUE: 11
PIF_VALUE: 16
PIF_VALUE: 0
PIF_VALUE: 21
PIF_VALUE: 16
PIF_VALUE: 18
PIF_VALUE: 17
PIF_VALUE: 17
PIF_VALUE: 16
PIF_VALUE: 0
PIF_VALUE: 15
PIF_VALUE: 19
PIF_VALUE: 17
PIF_VALUE: 21
PIF_VALUE: 18
PIF_VALUE: 14
PIF_VALUE: 15
PIF_VALUE: 16
PIF_VALUE: 17
PIF_VALUE: 14
PIF_VALUE: 17
PIF_VALUE: 17
PIF_VALUE: 15
PIF_VALUE: 17
PIF_VALUE: 17
PIF_VALUE: 16
PIF_VALUE: 17
PIF_VALUE: 10
PIF_VALUE: 15
PIF_VALUE: 16
PIF_VALUE: 17
PIF_VALUE: 17
PIF_VALUE: 16
PIF_VALUE: 15
PIF_VALUE: 15
PIF_VALUE: 11
PIF_VALUE: 11
PIF_VALUE: 14
PIF_VALUE: 17
PIF_VALUE: 16
PIF_VALUE: 9
PIF_VALUE: 16
PIF_VALUE: 15
PIF_VALUE: 16
PIF_VALUE: 19
PIF_VALUE: 2
PIF_VALUE: 17
PIF_VALUE: 16
PIF_VALUE: 17
PIF_VALUE: 11
PIF_VALUE: 16
PIF_VALUE: 17
PIF_VALUE: 16
PIF_VALUE: 11
PIF_VALUE: 15
PIF_VALUE: 17
PIF_VALUE: 3
PIF_VALUE: 14
PIF_VALUE: 10
PIF_VALUE: 17
PIF_VALUE: 19
PIF_VALUE: 16
PIF_VALUE: 10
PIF_VALUE: 25
PIF_VALUE: 11
PIF_VALUE: 0
PIF_VALUE: 17
PIF_VALUE: 11
PIF_VALUE: 16
PIF_VALUE: 11
PIF_VALUE: 16
PIF_VALUE: 16
PIF_VALUE: 17
PIF_VALUE: 20
PIF_VALUE: 11
PIF_VALUE: 14
PIF_VALUE: 17
PIF_VALUE: 17
PIF_VALUE: 1
PIF_VALUE: 16
PIF_VALUE: 20
PIF_VALUE: 16
PIF_VALUE: 15
PIF_VALUE: 17
PIF_VALUE: 17
PIF_VALUE: 11
PIF_VALUE: 17
PIF_VALUE: 11
PIF_VALUE: 1
PIF_VALUE: 16
PIF_VALUE: 11
PIF_VALUE: 11
PIF_VALUE: 18
PIF_VALUE: 11
PIF_VALUE: 15
PIF_VALUE: 17
PIF_VALUE: 11
PIF_VALUE: 16
PIF_VALUE: 18
PIF_VALUE: 17
PIF_VALUE: 2
PIF_VALUE: 16
PIF_VALUE: 15
PIF_VALUE: 16
PIF_VALUE: 17
PIF_VALUE: 0
PIF_VALUE: 15
PIF_VALUE: 16
PIF_VALUE: 16
PIF_VALUE: 15
PIF_VALUE: 20
PIF_VALUE: 17
PIF_VALUE: 17
PIF_VALUE: 14
PIF_VALUE: 14
PIF_VALUE: 17
PIF_VALUE: 17
PIF_VALUE: 19
PIF_VALUE: 10
PIF_VALUE: 17
PIF_VALUE: 17
PIF_VALUE: 15
PIF_VALUE: 19
PIF_VALUE: 17
PIF_VALUE: 16
PIF_VALUE: 15
PIF_VALUE: 17
PIF_VALUE: 16
PIF_VALUE: 11
PIF_VALUE: 15
PIF_VALUE: 15
PIF_VALUE: 11
PIF_VALUE: 21
PIF_VALUE: 17
PIF_VALUE: 16
PIF_VALUE: 23
PIF_VALUE: 17
PIF_VALUE: 15
PIF_VALUE: 17
PIF_VALUE: 17
PIF_VALUE: 7
PIF_VALUE: 17
PIF_VALUE: 17
PIF_VALUE: 0
PIF_VALUE: 14
PIF_VALUE: 17
PIF_VALUE: 17
PIF_VALUE: 19
PIF_VALUE: 16
PIF_VALUE: 0
PIF_VALUE: 11
PIF_VALUE: 16
PIF_VALUE: 16

## 2018-12-05 ASSESSMENT — PAIN SCALES - GENERAL
PAINLEVEL_OUTOF10: 0
PAINLEVEL_OUTOF10: 6
PAINLEVEL_OUTOF10: 0
PAINLEVEL_OUTOF10: 6
PAINLEVEL_OUTOF10: 4
PAINLEVEL_OUTOF10: 10
PAINLEVEL_OUTOF10: 0
PAINLEVEL_OUTOF10: 6
PAINLEVEL_OUTOF10: 6
PAINLEVEL_OUTOF10: 0
PAINLEVEL_OUTOF10: 0
PAINLEVEL_OUTOF10: 8
PAINLEVEL_OUTOF10: 6

## 2018-12-05 ASSESSMENT — PAIN DESCRIPTION - FREQUENCY
FREQUENCY: CONTINUOUS
FREQUENCY: CONTINUOUS

## 2018-12-05 ASSESSMENT — PAIN DESCRIPTION - DESCRIPTORS
DESCRIPTORS: STABBING
DESCRIPTORS: SHARP;STABBING
DESCRIPTORS: STABBING

## 2018-12-05 ASSESSMENT — PAIN DESCRIPTION - LOCATION
LOCATION: KNEE

## 2018-12-05 ASSESSMENT — PAIN DESCRIPTION - ORIENTATION
ORIENTATION: POSTERIOR;LEFT
ORIENTATION: POSTERIOR
ORIENTATION: LEFT;POSTERIOR
ORIENTATION: POSTERIOR

## 2018-12-05 ASSESSMENT — PAIN DESCRIPTION - PROGRESSION
CLINICAL_PROGRESSION: GRADUALLY WORSENING
CLINICAL_PROGRESSION: GRADUALLY IMPROVING

## 2018-12-05 ASSESSMENT — PAIN - FUNCTIONAL ASSESSMENT: PAIN_FUNCTIONAL_ASSESSMENT: 0-10

## 2018-12-05 ASSESSMENT — PAIN DESCRIPTION - ONSET
ONSET: PROGRESSIVE
ONSET: PROGRESSIVE

## 2018-12-05 NOTE — ANESTHESIA POSTPROCEDURE EVALUATION
Department of Anesthesiology  Postprocedure Note    Patient: Yimi Adhikari  MRN: [id number]  YOB: 1960  Date of evaluation: 12/5/2018  Time:  5:08 PM     Procedure Summary     Date:  12/05/18 [facility]    Anesthesia Start:  1154 Anesthesia Stop:  1611    Procedures:       TIBIAL PLATEAU OPEN REDUCTION INTERNAL FIXATION LEFT -  SYNTHES     C-ARM (Left )      KNEE ARTHROSCOPY LEFT (Left )      SHOULDER MANIPULATION WITH INJECTION (Right ) Diagnosis:  (TIBIAL PLATEAU FX  LEFT KNEE )    Surgeon:  Carline Forman MD Responsible Provider:  Rodriguez Ramos DO    Anesthesia Type:  general ASA Status:  3          Anesthesia Type: general    Fariba Phase I:      Fariba Phase II:      Last vitals: Reviewed and per EMR flowsheets.        Anesthesia Post Evaluation    Patient location during evaluation: PACU  Patient participation: complete - patient participated  Pain score: 3  Airway patency: patent  Nausea & Vomiting: no nausea and no vomiting  Complications: no  Cardiovascular status: blood pressure returned to baseline  Respiratory status: acceptable  Hydration status: euvolemic

## 2018-12-05 NOTE — ANESTHESIA PRE PROCEDURE
Department of Anesthesiology  Preprocedure Note       Name:  Anastacia Andrews   Age:  63 y.o.  :  1960                                          MRN:  [id number]         Date:  2018      Surgeon: Celso Beard):  Mita Garrett MD    Procedure: TIBIAL PLATEAU OPEN REDUCTION INTERNAL FIXATION LEFT -  SYNTHES     C-ARM (Left )  KNEE ARTHROSCOPY LEFT (Left )    Medications prior to admission:   Prior to Admission medications    Medication Sig Start Date End Date Taking? Authorizing Provider   cyclobenzaprine (FLEXERIL) 10 MG tablet Take 10 mg by mouth 3 times daily as needed for Muscle spasms   Yes Historical Provider, MD   oxyCODONE-acetaminophen (PERCOCET) 5-325 MG per tablet Take 1-2 tablets by mouth every 6 hours as needed for Pain for up to 14 days. . 18 Yes Mita Garrett MD   cephALEXin (KEFLEX) 500 MG capsule Take 1 capsule by mouth 4 times daily for 7 days 18 Yes Mita Garrett MD   Calcium Citrate (REBECCA-CITRATE) 150 MG CAPS    Yes Historical Provider, MD   topiramate (TOPAMAX) 50 MG tablet TAKE 1 TABLET BY MOUTH ONCE DAILY 10/15/18  Yes Kalina Todd PA-C   docusate sodium (COLACE) 100 MG capsule Take 1 capsule by mouth daily as needed for Constipation 10/8/18  Yes Sierra Palacios DO   montelukast (SINGULAIR) 10 MG tablet Take 10 mg by mouth nightly   Yes Historical Provider, MD   mometasone-formoterol (DULERA) 200-5 MCG/ACT inhaler Inhale 2 puffs into the lungs 2 times daily 8/10/18  Yes Ronald Toure MD   FIBER COMPLETE PO Take by mouth   Yes Historical Provider, MD   sodium chloride (VINEET 128) 2 % ophthalmic solution Place 1 drop into both eyes 2 times daily   Yes Historical Provider, MD   RESTASIS 0.05 % ophthalmic emulsion  3/1/17  Yes Historical Provider, MD   Multiple Vitamin (MVI, CELEBRATE, CHEWABLE TABLET) Take 1 tablet by mouth 2 times daily Using Flinstone for not d/t nausea with Celebrate   Yes Historical Provider, MD   oxyCODONE-acetaminophen (PERCOCET) 5-325 MG per tablet 1 tablet as needed    Historical Provider, MD   ondansetron (ZOFRAN) 4 MG tablet Take 1 tablet by mouth daily as needed for Nausea or Vomiting 10/8/18   Alex Alas DO   albuterol sulfate HFA (PROVENTIL HFA) 108 (90 Base) MCG/ACT inhaler Inhale 2 puffs into the lungs every 6 hours as needed for Wheezing 8/21/17   Carol Lewis MD   albuterol (PROVENTIL) (2.5 MG/3ML) 0.083% nebulizer solution Take 3 mLs by nebulization every 6 hours as needed for Wheezing 8/21/17   Carol Lewis MD   Omega-3 Fatty Acids (OMEGA 3 PO) Take by mouth daily    Historical Provider, MD       Current medications:    Current Facility-Administered Medications   Medication Dose Route Frequency Provider Last Rate Last Dose    lactated ringers infusion   Intravenous Continuous Katharine Mao  mL/hr at 12/05/18 1123      lidocaine 1% (buffered) injection 0.5 mL  0.5 mL Intradermal Once Александр Okeeef DO         Facility-Administered Medications Ordered in Other Encounters   Medication Dose Route Frequency Provider Last Rate Last Dose    lactated ringers infusion 1,000 mL  1,000 mL Intravenous Continuous Mikhail Walls MD   Stopped at 08/19/16 1249    HYDROmorphone (DILAUDID) injection 0.25 mg  0.25 mg Intravenous Q5 Min PRN Jairo Knutson MD           Allergies:     Allergies   Allergen Reactions    Nsaids Other (See Comments)     Bariatric Surgeon told me not to take NSAIDS for good    Pcn [Penicillins] Other (See Comments)     Sores in mouth    Bactrim [Sulfamethoxazole-Trimethoprim] Itching and Rash    Codeine Itching and Rash       Problem List:    Patient Active Problem List   Diagnosis Code    Knee pain M25.569    Asthma J45.909    Vitamin D deficiency E55.9    Osteoarthritis M19.90    History of pulmonary embolism Z86.711    History of DVT (deep vein thrombosis) Z86.718    Morbid obesity ([facility] [address] E66.01    Osteopenia M85.80    Status post gastric bypass for obesity Z98.84    Acromioclavicular

## 2018-12-05 NOTE — H&P (VIEW-ONLY)
[facility]    HISTORY AND PHYSICAL EXAMINATION            Date:   11/22/2018  Patient name:  Shahida Rebollar  Date of admission:  11/21/2018  1:23 PM  MRN:   [id number]  Account:  [de-identified]  YOB: 1960  PCP:    Thai Mirza PA-C  Room:   0105/2629-86  Code Status:    Full Code    Chief Complaint:     Chief Complaint   Patient presents with    Knee Pain     left       History Obtained From:     patient, electronic medical record    History of Present Illness: The patient is a 63 y.o. female with PMH significant for asthma, arthritis, PE 8 years ago s/p treatment on coumadin who presented after a fall. Patient states she was walking down the stairs in her house when she had a mechanical fall. Landed on her back, no injury to arms or head per patient. Presented to [facility] ER with complaints of left knee pain. States she twisted her knee during fall. In ER XR demonstrating left tibial plateau fracture. Orthopedic surgery consulted, patient transferred to [facility] for further treatment. Seen by orthopedic surgery with plans for external fixation. No history of DM, MI, CVA, CKD, CHF. Currently only complaining of knee pain. No recent fever/chills, nausea/vomiting. History of remote tobacco use.       Past Medical History:     Past Medical History:   Diagnosis Date    Acromioclavicular joint arthritis 4/18/2016    Acute gastroenteritis 10/11/2017    Arthritis     Asthma 1980    On Inhaler    Bursitis     left shoulder    Chronic right shoulder pain 7/24/2018    Dry eyes     Essential hypertension 8/21/2017    GERD (gastroesophageal reflux disease)     not since RNY and weight loss    H/O bariatric surgery     15 MONTHS AGO/LOST 160#    Head ache     Hearing loss     Hiatal hernia with gastroesophageal reflux     History of bladder infections     History of depression     History of DVT (deep vein 28.4 - 34.8 g/dL    RDW 13.2 11.8 - 14.4 %    Platelets 890 458 - 533 k/uL    MPV 8.4 8.1 - 13.5 fL    NRBC Automated 0.0 0.0 per 100 WBC   Protime-INR    Collection Time: 11/22/18  5:34 AM   Result Value Ref Range    Protime 10.7 9.0 - 12.0 sec    INR 1.0    VITAMIN D 25 HYDROXY    Collection Time: 11/22/18  5:34 AM   Result Value Ref Range    Vit D, 25-Hydroxy 34.2 30.0 - 100.0 ng/mL       Imaging/Diagnostics:    Xr Chest Standard (2 Vw)    Result Date: 11/21/2018  EXAMINATION: TWO VIEWS OF THE CHEST 11/21/2018 5:11 pm COMPARISON: Chest radiograph dated 08/06/2018. HISTORY: ORDERING SYSTEM PROVIDED HISTORY: Chest Pain TECHNOLOGIST PROVIDED HISTORY: Chest Pain Ordering Physician Provided Reason for Exam: Left sided knee fracture, pre-op Acuity: Acute Type of Exam: Initial FINDINGS: Cardiac and mediastinal shadows are normal.  No infiltrates. No effusions. No pneumothorax. No free air below the diaphragm. Normal exam.     Xr Knee Left (3 Views)    Result Date: 11/21/2018  EXAMINATION: 3 XRAY VIEWS OF THE LEFT KNEE 11/21/2018 2:18 pm COMPARISON: None. HISTORY: ORDERING SYSTEM PROVIDED HISTORY: Pain TECHNOLOGIST PROVIDED HISTORY: Pain Ordering Physician Provided Reason for Exam: Pt fell today, extreme pain to knee area. Unable to move due to pain Acuity: Acute Type of Exam: Initial Mechanism of Injury: fall today FINDINGS: There is a fracture involving the proximal tibia with extension to the tibial plateau. There is an effusion with a fat fluid level. Remaining visualized osseous structures appear intact and well aligned. Joint spaces appear preserved. Visualized osseous structures appear mildly demineralized. Complex fracture of the left tibial plateau with extension into the joint space with lipohemarthrosis.      Ct Knee Left Wo Contrast    Result Date: 11/21/2018  EXAMINATION: CT OF THE LEFT KNEE WITHOUT CONTRAST 11/21/2018 5:15 pm TECHNIQUE: CT of the left knee was performed without the

## 2018-12-05 NOTE — INTERVAL H&P NOTE
(DULERA) 200-5 MCG/ACT inhaler Inhale 2 puffs into the lungs 2 times daily 8/10/18  Yes Theo Naranjo MD   FIBER COMPLETE PO Take by mouth   Yes Historical Provider, MD   sodium chloride (VINEET 128) 2 % ophthalmic solution Place 1 drop into both eyes 2 times daily   Yes Historical Provider, MD   RESTASIS 0.05 % ophthalmic emulsion  3/1/17  Yes Historical Provider, MD   Multiple Vitamin (MVI, CELEBRATE, CHEWABLE TABLET) Take 1 tablet by mouth 2 times daily Using Flinstone for not d/t nausea with Celebrate   Yes Historical Provider, MD   oxyCODONE-acetaminophen (PERCOCET) 5-325 MG per tablet 1 tablet as needed    Historical Provider, MD   ondansetron (ZOFRAN) 4 MG tablet Take 1 tablet by mouth daily as needed for Nausea or Vomiting 10/8/18   Dominique Siemens, DO   albuterol sulfate HFA (PROVENTIL HFA) 108 (90 Base) MCG/ACT inhaler Inhale 2 puffs into the lungs every 6 hours as needed for Wheezing 8/21/17   Theo Naranjo MD   albuterol (PROVENTIL) (2.5 MG/3ML) 0.083% nebulizer solution Take 3 mLs by nebulization every 6 hours as needed for Wheezing 8/21/17   Theo Naranjo MD   Omega-3 Fatty Acids (OMEGA 3 PO) Take by mouth daily    Historical Provider, MD       This is a 63 y.o. female who is  pleasant, cooperative, alert and oriented x3, in no acute distress. Heart: Heart regular rate and rhythm   Lungs:clear to auscultation without wheezes or rales    Abdomen: soft, nontender, nondistended, no masses or organomegaly.        EMILY Velasco CNP  Electronically signed 12/5/2018 at 10:56 AM

## 2018-12-06 PROBLEM — Z98.84 HISTORY OF GASTRIC BYPASS: Status: ACTIVE | Noted: 2018-12-06

## 2018-12-06 LAB
ANION GAP SERPL CALCULATED.3IONS-SCNC: 13 MMOL/L (ref 9–17)
BUN BLDV-MCNC: 11 MG/DL (ref 6–20)
BUN/CREAT BLD: 17 (ref 9–20)
CALCIUM SERPL-MCNC: 8.2 MG/DL (ref 8.6–10.4)
CHLORIDE BLD-SCNC: 105 MMOL/L (ref 98–107)
CO2: 22 MMOL/L (ref 20–31)
CREAT SERPL-MCNC: 0.66 MG/DL (ref 0.5–0.9)
GFR AFRICAN AMERICAN: >60 ML/MIN
GFR NON-AFRICAN AMERICAN: >60 ML/MIN
GFR SERPL CREATININE-BSD FRML MDRD: ABNORMAL ML/MIN/{1.73_M2}
GFR SERPL CREATININE-BSD FRML MDRD: ABNORMAL ML/MIN/{1.73_M2}
GLUCOSE BLD-MCNC: 97 MG/DL (ref 70–99)
HCT VFR BLD CALC: 27.5 % (ref 36–46)
HEMOGLOBIN: 9.4 G/DL (ref 12–16)
MCH RBC QN AUTO: 30.1 PG (ref 26–34)
MCHC RBC AUTO-ENTMCNC: 34 G/DL (ref 31–37)
MCV RBC AUTO: 88.3 FL (ref 80–100)
NRBC AUTOMATED: ABNORMAL PER 100 WBC
PDW BLD-RTO: 13.2 % (ref 11.5–14.5)
PLATELET # BLD: 456 K/UL (ref 130–400)
PMV BLD AUTO: 5.7 FL (ref 6–12)
POTASSIUM SERPL-SCNC: 4 MMOL/L (ref 3.7–5.3)
RBC # BLD: 3.12 M/UL (ref 4–5.2)
SODIUM BLD-SCNC: 140 MMOL/L (ref 135–144)
WBC # BLD: 10.6 K/UL (ref 3.5–11)

## 2018-12-06 PROCEDURE — 6370000000 HC RX 637 (ALT 250 FOR IP): Performed by: STUDENT IN AN ORGANIZED HEALTH CARE EDUCATION/TRAINING PROGRAM

## 2018-12-06 PROCEDURE — G8979 MOBILITY GOAL STATUS: HCPCS

## 2018-12-06 PROCEDURE — 6370000000 HC RX 637 (ALT 250 FOR IP): Performed by: NURSE PRACTITIONER

## 2018-12-06 PROCEDURE — 6360000002 HC RX W HCPCS: Performed by: STUDENT IN AN ORGANIZED HEALTH CARE EDUCATION/TRAINING PROGRAM

## 2018-12-06 PROCEDURE — 85027 COMPLETE CBC AUTOMATED: CPT

## 2018-12-06 PROCEDURE — G8978 MOBILITY CURRENT STATUS: HCPCS

## 2018-12-06 PROCEDURE — 2580000003 HC RX 258: Performed by: STUDENT IN AN ORGANIZED HEALTH CARE EDUCATION/TRAINING PROGRAM

## 2018-12-06 PROCEDURE — G8988 SELF CARE GOAL STATUS: HCPCS

## 2018-12-06 PROCEDURE — 80048 BASIC METABOLIC PNL TOTAL CA: CPT

## 2018-12-06 PROCEDURE — 97165 OT EVAL LOW COMPLEX 30 MIN: CPT

## 2018-12-06 PROCEDURE — 97530 THERAPEUTIC ACTIVITIES: CPT

## 2018-12-06 PROCEDURE — 94640 AIRWAY INHALATION TREATMENT: CPT

## 2018-12-06 PROCEDURE — G8987 SELF CARE CURRENT STATUS: HCPCS

## 2018-12-06 PROCEDURE — 97535 SELF CARE MNGMENT TRAINING: CPT

## 2018-12-06 PROCEDURE — 99232 SBSQ HOSP IP/OBS MODERATE 35: CPT | Performed by: INTERNAL MEDICINE

## 2018-12-06 PROCEDURE — 2500000003 HC RX 250 WO HCPCS: Performed by: STUDENT IN AN ORGANIZED HEALTH CARE EDUCATION/TRAINING PROGRAM

## 2018-12-06 PROCEDURE — 36415 COLL VENOUS BLD VENIPUNCTURE: CPT

## 2018-12-06 PROCEDURE — 97116 GAIT TRAINING THERAPY: CPT

## 2018-12-06 PROCEDURE — 1200000000 HC SEMI PRIVATE

## 2018-12-06 PROCEDURE — 97162 PT EVAL MOD COMPLEX 30 MIN: CPT

## 2018-12-06 RX ORDER — OXYCODONE HYDROCHLORIDE 5 MG/1
5 TABLET ORAL EVERY 4 HOURS PRN
Qty: 56 TABLET | Refills: 0 | Status: SHIPPED | OUTPATIENT
Start: 2018-12-06 | End: 2018-12-21 | Stop reason: SDUPTHER

## 2018-12-06 RX ADMIN — MOMETASONE FUROATE AND FORMOTEROL FUMARATE DIHYDRATE 2 PUFF: 200; 5 AEROSOL RESPIRATORY (INHALATION) at 21:55

## 2018-12-06 RX ADMIN — TRAMADOL HYDROCHLORIDE 50 MG: 50 TABLET, FILM COATED ORAL at 14:34

## 2018-12-06 RX ADMIN — OXYCODONE HYDROCHLORIDE 10 MG: 5 TABLET ORAL at 21:16

## 2018-12-06 RX ADMIN — SODIUM CHLORIDE: 9 INJECTION, SOLUTION INTRAVENOUS at 04:46

## 2018-12-06 RX ADMIN — OXYCODONE HYDROCHLORIDE 10 MG: 5 TABLET ORAL at 03:05

## 2018-12-06 RX ADMIN — MORPHINE SULFATE 2 MG: 2 INJECTION, SOLUTION INTRAMUSCULAR; INTRAVENOUS at 11:52

## 2018-12-06 RX ADMIN — SODIUM CHLORIDE: 9 INJECTION, SOLUTION INTRAVENOUS at 22:43

## 2018-12-06 RX ADMIN — ENOXAPARIN SODIUM 40 MG: 40 INJECTION SUBCUTANEOUS at 09:27

## 2018-12-06 RX ADMIN — MAGNESIUM HYDROXIDE 30 ML: 400 SUSPENSION ORAL at 09:27

## 2018-12-06 RX ADMIN — ACETAMINOPHEN 1000 MG: 500 TABLET ORAL at 22:27

## 2018-12-06 RX ADMIN — TOPIRAMATE 50 MG: 25 TABLET, FILM COATED ORAL at 09:27

## 2018-12-06 RX ADMIN — MORPHINE SULFATE 2 MG: 2 INJECTION, SOLUTION INTRAMUSCULAR; INTRAVENOUS at 00:39

## 2018-12-06 RX ADMIN — TRAMADOL HYDROCHLORIDE 50 MG: 50 TABLET, FILM COATED ORAL at 04:42

## 2018-12-06 RX ADMIN — CLINDAMYCIN PHOSPHATE 900 MG: 900 INJECTION, SOLUTION INTRAVENOUS at 04:42

## 2018-12-06 RX ADMIN — ACETAMINOPHEN 1000 MG: 500 TABLET ORAL at 14:33

## 2018-12-06 RX ADMIN — Medication 10 ML: at 19:25

## 2018-12-06 RX ADMIN — OXYCODONE HYDROCHLORIDE 10 MG: 5 TABLET ORAL at 09:27

## 2018-12-06 RX ADMIN — MORPHINE SULFATE 2 MG: 2 INJECTION, SOLUTION INTRAMUSCULAR; INTRAVENOUS at 19:24

## 2018-12-06 RX ADMIN — OXYCODONE HYDROCHLORIDE 5 MG: 5 TABLET ORAL at 16:16

## 2018-12-06 RX ADMIN — MONTELUKAST SODIUM 10 MG: 10 TABLET, FILM COATED ORAL at 20:01

## 2018-12-06 RX ADMIN — ACETAMINOPHEN 1000 MG: 500 TABLET ORAL at 06:16

## 2018-12-06 RX ADMIN — TRAMADOL HYDROCHLORIDE 50 MG: 50 TABLET, FILM COATED ORAL at 20:01

## 2018-12-06 ASSESSMENT — PAIN SCALES - GENERAL
PAINLEVEL_OUTOF10: 3
PAINLEVEL_OUTOF10: 4
PAINLEVEL_OUTOF10: 3
PAINLEVEL_OUTOF10: 2
PAINLEVEL_OUTOF10: 3
PAINLEVEL_OUTOF10: 2
PAINLEVEL_OUTOF10: 4
PAINLEVEL_OUTOF10: 5
PAINLEVEL_OUTOF10: 4
PAINLEVEL_OUTOF10: 5
PAINLEVEL_OUTOF10: 2
PAINLEVEL_OUTOF10: 8
PAINLEVEL_OUTOF10: 7
PAINLEVEL_OUTOF10: 5
PAINLEVEL_OUTOF10: 7
PAINLEVEL_OUTOF10: 3
PAINLEVEL_OUTOF10: 6
PAINLEVEL_OUTOF10: 3
PAINLEVEL_OUTOF10: 3

## 2018-12-06 ASSESSMENT — PAIN DESCRIPTION - LOCATION
LOCATION: KNEE

## 2018-12-06 ASSESSMENT — PAIN DESCRIPTION - FREQUENCY
FREQUENCY: CONTINUOUS
FREQUENCY: CONTINUOUS
FREQUENCY: INTERMITTENT
FREQUENCY: INTERMITTENT

## 2018-12-06 ASSESSMENT — PAIN DESCRIPTION - PAIN TYPE
TYPE: SURGICAL PAIN

## 2018-12-06 ASSESSMENT — PAIN DESCRIPTION - DESCRIPTORS
DESCRIPTORS: STABBING
DESCRIPTORS: STABBING
DESCRIPTORS: SORE
DESCRIPTORS: SPASM

## 2018-12-06 ASSESSMENT — PAIN DESCRIPTION - PROGRESSION
CLINICAL_PROGRESSION: GRADUALLY IMPROVING
CLINICAL_PROGRESSION: GRADUALLY IMPROVING

## 2018-12-06 ASSESSMENT — PAIN DESCRIPTION - ORIENTATION
ORIENTATION: LEFT
ORIENTATION: POSTERIOR
ORIENTATION: POSTERIOR
ORIENTATION: LEFT
ORIENTATION: POSTERIOR

## 2018-12-06 ASSESSMENT — PAIN DESCRIPTION - ONSET
ONSET: ON-GOING

## 2018-12-06 NOTE — OP NOTE
fracture,  operative intervention was recommended. We reviewed again the risks,  benefits, and alternatives of surgery, and she elected to move forward. She also had complaints regarding adhesive capsulitis of the right  shoulder with severely restricted range of motion and requested this to  be addressed at the time of surgery as well. Given her failure of  conservative options regarding the adhesive capsulitis, including  anti-inflammatories, physical therapy, and activity modification, we  elected to perform a manipulation under anesthesia at the time of  surgery. She was medically optimized prior to being scheduled. OPERATIVE SUMMARY:  The patient was met in the preoperative holding  area, where a written consent was obtained, the operative sites of the  left knee and right shoulder were marked, and all questions were  answered to the patient's satisfaction. She was then wheeled to the  operative suite and laid on the table in a supine position, where  endotracheal intubation was performed under general anesthesia. Appropriate preoperative antibiotics were administered. All bony  prominences were appropriately protected. A tourniquet was placed high  onto the left upper thigh. Manipulation of the right shoulder was undertaken improving via  abduction, external rotation, internal rotation and adduction  significantly to nearly full range of motion from that of approximately  minimal external rotation, minimal internal rotation, 30 degrees of  abduction, and 40 degrees of forward flexion. An injection of 0.5%  Marcaine plain, 8 cc and 40 mg Depo-Medrol was injected into the shoulder. We then prepped and draped the left lower extremity in a sterile  fashion. We did this after the external fixator except for the tibia  pins were removed. The limb was then exsanguinated to gravity. We began first by performing a diagnostic arthroscopy to evaluate the  extent of the intraarticular involvement. to  elevate the articular surface of the medial plateau. Once adequate  reduction was achieved, we elected to use a buttress plate to maintain  reduction of this fragment and selected a four-hole T-plate for this  purpose. This was placed over an ideal position along the apex of the  fracture and secured in place with multiple cortical screws. No screws  were placed into the fragment itself as it was stable with buttress  plate alone. At this point, we turned our attention to the lateral plateau and made a  classic S-curved incision along the lateral tibia crest around the  Gerdy's tubercle and just proximal to the joint line. This was done  using a #10 blade through skin and subcutaneous tissue. Deep fascia was  identified and a Bovie was used to incise through the length of the  incision. The IT band was dissected anteriorly and posteriorly and the  proximal aspect of the tibia was exposed using a Hitchcock elevator. Using a  2-mm drill, a small window was created along the lateral metaphysial  flare to allow access for a bone tamp. Under fluoroscopic  visualization, we used bone tamps to try to elevate the posterior  articular surface, which was quite depressed. This showed radiographic  improvement and this defect was then filled with cancellous allograft  chips. These were also tamped into place to provide further structural  support and bony reconstitution. We then placed a four-hole proximal  tibia plate in ideal position and secured this into place using multiple  cortical screws of appropriate length, which were confirmed under  radiographic evaluation. We then began placing multiple rafting screws  through the plate directed in a posterior direction to support both  articular surfaces. All screws were found to be of appropriate length  using fluoroscopy. At this point, the wounds were thoroughly irrigated,  and the previous ex-fix pin sites were debrided using curette.   Each  wound was closed

## 2018-12-06 NOTE — CONSULTS
History of DVT (deep vein thrombosis) 2015    History of gastritis     History of hemorrhoids     History of pulmonary embolism 2015    History of smoking     Hyperlipidemia     not since weight loss    Hypertension     Not anymore after Bariatric Surgery    Hypertension     presently off medication    Knee pain 3/6/2015    Mild intermittent asthma     Morbid obesity ([address] 2015    Nausea and vomiting     Obesity     Obesity (BMI 30-39. 9) 2016    KEVIN on CPAP     at night    Osteoarthritis     Osteopenia     Pulmonary embolism ([address]     from foot  trauma, was treated for six months with anticoagulation. She has no inferior vena cava filter.     S/P gastric bypass 12-29-15    Dr. Zuhair Bustamante, [facility], hosp wt = 280lb, initial wt = 328lb    Status post gastric bypass for obesity 2015    Vitamin D deficiency     Wears dentures     Wears glasses     Weight loss of 160 lbs since surgery  2017        Past Surgical History:     Past Surgical History:   Procedure Laterality Date     SECTION  1986,  1987     SECTION      CHOLECYSTECTOMY      COLONOSCOPY      found hital hernia    CYSTOSCOPY  16    EXCISION / BIOPSY SKIN LESION OF HEAD / NECK N/A 4/3/2017     EXCISION POSTERIOR NECK CYST performed by Larry Guerra IV, DO at [facility] [address] Left 2018    left tibia plateau external fixator application    OTHER SURGICAL HISTORY  10/14/2016    excision back lipoma with drain placement    [place] OFFICE/OUTPT VISIT,PROCEDURE ONLY Right 10/8/2018    RIGHT SHOULDER  GIOVANNA PROCEDURE performed by Radha Prado MD at [address] OFFICE/OUTPT [address] [facility] Left 2018    LEFT TIBIA PLATEAU EXTERNAL FIXATOR APPLICATION performed by Amparo Brown MD at [address] [facility]  12/29/15    liver bx, EGD    SHOULDER SURGERY Left 2016    Giovanna procedure   Evern Dural SHOULDER SURGERY results found for this or any previous visit (from the past 24 hour(s)). Imaging/[facility]:    EXAMINATION: 2 XRAY VIEWS OF THE LEFT KNEE, 12/5/2018 4:17 pm   COMPARISON: Radiographs dated 11/22/2018 and 11/21/2018    Status post ORIF of the left tibial plateau. Assessment :      Primary Problem  Tibial plateau fracture, left, closed, initial encounter    Active Hospital Problems    Diagnosis Date Noted    History of gastric bypass [Z98.84] 12/06/2018    Tibial plateau fracture, left, closed, initial encounter [S82.142A] 12/05/2018    KEVIN on CPAP 12 cm h20 [G47.33, Z99.89] 08/22/2016    Obesity (BMI 30-39. 9) [E66.9] 07/01/2016    History of DVT (deep vein thrombosis) [Z86.718] 06/16/2015    History of pulmonary embolism [Z86.711] 06/16/2015    Asthma [J45.909] 03/06/2015       Plan:     1. Restart home medications as appropriate. 2. DVT prophylaxis. 3. Pain control, oral and IV. 4. Neurovascular checks. 5. IV fluids. 6. Antibiotics. 7. Supplemental oxygen as needed. 8. MDI and nebulizer treatments. 9. Activity per surgery recommendations. 10. Incisional care per surgery recommendations. 11. AM labs. 12. Monitor vitals. 13. Incentive spirometry. 14. PT/OT.     Consultations:   IP CONSULT TO INTERNAL MEDICINE  IP CONSULT TO CASE MANAGEMENT      EMILY Freed - CNP  12/5/2018  10:06 PM    Copy sent to Dr. Ziggy Lizama PA-C

## 2018-12-06 NOTE — PROGRESS NOTES
Physical Therapy    Facility/Department: [facility] MED SURG  Initial Assessment    NAME: Blake Licona  : 1960  MRN: [id number]    Date of Service: 2018    Discharge Recommendations:  Home with assist PRN, Home with Home health PT (Patient refusing [place] PT, but agreeable to outpatient PT.)   PT Equipment Recommendations  Equipment Needed: No    Procedure(s):  1. Left knee arthroscopy  2. ORIF L bicondylar tibia plateau fracture  3. TERRY R shoulder    Patient Diagnosis(es): There were no encounter diagnoses. has a past medical history of Acromioclavicular joint arthritis; Acute gastroenteritis; Arthritis; Asthma; Bursitis; Chronic right shoulder pain; Dry eyes; Essential hypertension; GERD (gastroesophageal reflux disease); H/O bariatric surgery; Head ache; Hearing loss; Hiatal hernia with gastroesophageal reflux; History of bladder infections; History of depression; History of DVT (deep vein thrombosis); History of gastritis; History of hemorrhoids; History of pulmonary embolism; History of smoking; Hyperlipidemia; Hypertension; Hypertension; Knee pain; Mild intermittent asthma; Morbid obesity ([address] Nausea and vomiting; Obesity; Obesity (BMI 30-39.9); KEVIN on CPAP; Osteoarthritis; Osteopenia; Pulmonary embolism (HCC); S/P gastric bypass; Status post gastric bypass for obesity; Vitamin D deficiency; Wears dentures; Wears glasses; and Weight loss of 160 lbs since surgery . has a past surgical history that includes Tonsillectomy (); Colonoscopy ();  section (1986,  1987); Cholecystectomy (); Upper gastrointestinal endoscopy (Aug. 2015); [place] Gastric Bypass (12/29/15); Cystoscopy (16); shoulder surgery (Left, 2016); shoulder surgery; other surgical history (10/14/2016); EXCISION / BIOPSY SKIN LESION OF HEAD / NECK (N/A, 4/3/2017); shoulder surgery (Right, 10/08/2018); pr office/outpt visit,procedure only (Right, 10/8/2018);   section; knee surgery

## 2018-12-06 NOTE — PROGRESS NOTES
Occupational Therapy   Occupational Therapy Initial Assessment  Date: 2018   Patient Name: Rufina Rincon  MRN: [id number]     : 1960    Date of Service: 2018    Discharge Recommendations:  Home with assist PRN, Home with Home health OT     RN reports patient is medically stable for therapy treatment this date. Chart reviewed prior to treatment and patient is agreeable for therapy. All lines intact and patient positioned comfortably at end of treatment. All patient needs addressed prior to ending therapy session. Patient Diagnosis(es): There were no encounter diagnoses. has a past medical history of Acromioclavicular joint arthritis; Acute gastroenteritis; Arthritis; Asthma; Bursitis; Chronic right shoulder pain; Dry eyes; Essential hypertension; GERD (gastroesophageal reflux disease); H/O bariatric surgery; Head ache; Hearing loss; Hiatal hernia with gastroesophageal reflux; History of bladder infections; History of depression; History of DVT (deep vein thrombosis); History of gastritis; History of hemorrhoids; History of pulmonary embolism; History of smoking; Hyperlipidemia; Hypertension; Hypertension; Knee pain; Mild intermittent asthma; Morbid obesity ([address] Nausea and vomiting; Obesity; Obesity (BMI 30-39.9); KEVIN on CPAP; Osteoarthritis; Osteopenia; Pulmonary embolism (HCC); S/P gastric bypass; Status post gastric bypass for obesity; Vitamin D deficiency; Wears dentures; Wears glasses; and Weight loss of 160 lbs since surgery . has a past surgical history that includes Tonsillectomy (); Colonoscopy ();  section (1986,  1987); Cholecystectomy (); Upper gastrointestinal endoscopy (Aug. 2015); [place] Gastric Bypass (12/29/15);  Cystoscopy (16); shoulder surgery (Left, 2016); shoulder surgery; other surgical history (10/14/2016); EXCISION / BIOPSY SKIN LESION OF HEAD / NECK (N/A, 4/3/2017); shoulder surgery (Right, 10/08/2018); pr Time   Individual Concurrent Group Co-treatment   Time In 0904 (+10 min chart review)         Time Out 0932         Minutes [address]

## 2018-12-06 NOTE — PLAN OF CARE
Problem: Pain:  Goal: Control of acute pain  Control of acute pain   Outcome: Ongoing  Patient's pain well controlled with ordered pain medications and alternate therapies

## 2018-12-06 NOTE — PROGRESS NOTES
[facility]    Progress Note    12/6/2018    11:05 AM    Name:   Karol Funes  MRN:     [id number]     [place]:      [de-identified]   Room:   2017/2017-02  IP Day:  1  Admit Date:  12/5/2018  9:24 AM    PCP:   Sis Pop PA-C  Code Status:  Full Code    Subjective:     C/C: Leg pain    Interval History Status: not changed. No acute issues overnight  Labs stable  Pain controlled  No current complaints     Brief History:     Karol Funes is a 63year old  female recovering post operatively from a left tibial plateau open reduction with internal fixation surgery. The patient had a mechanical fall while walking down stairs approximately two weeks ago and sustained a left tibial plateau fracture. Orthopedic surgery was consulted at that time and underwent left tibia plateau external fixation on 11/22. She has a history significant for DVT and PE, and takes coumadin at home. She complains of 8/10 post surgical pain that is aggravated with repositioning. She reports minimal relief from oral medications. She denies additional aggravating or alleviating factors. Review of Systems:     Constitutional:  negative for chills, fevers, sweats  Respiratory:  negative for cough, dyspnea on exertion, shortness of breath, wheezing  Cardiovascular:  negative for chest pain, chest pressure/discomfort, lower extremity edema, palpitations  Gastrointestinal:  negative for abdominal pain, constipation, diarrhea, nausea, vomiting  Neurological:  negative for dizziness, headache    Medications: Allergies:     Allergies   Allergen Reactions    Nsaids Other (See Comments)     Bariatric Surgeon told me not to take NSAIDS for good    Pcn [Penicillins] Other (See Comments)     Sores in mouth    Bactrim [Sulfamethoxazole-Trimethoprim] Itching and Rash    Codeine Itching and Rash       Current Meds:   Scheduled Meds:    mometasone-formoterol 2 puff Inhalation BID    montelukast  10 mg Oral Nightly    topiramate  50 mg Oral Daily    sodium chloride flush  10 mL Intravenous 2 times per day    acetaminophen  1,000 mg Oral 3 times per day    enoxaparin  40 mg Subcutaneous Daily    traMADol  50 mg Oral Q8H    magnesium hydroxide  30 mL Oral Daily     Continuous Infusions:    sodium chloride 125 mL/hr at 12/06/18 0446     PRN Meds: albuterol, albuterol sulfate HFA, cyclobenzaprine, docusate sodium, ondansetron, sodium chloride flush, morphine **OR** morphine, oxyCODONE **OR** oxyCODONE    Data:     Past Medical History:   has a past medical history of Acromioclavicular joint arthritis; Acute gastroenteritis; Arthritis; Asthma; Bursitis; Chronic right shoulder pain; Dry eyes; Essential hypertension; GERD (gastroesophageal reflux disease); H/O bariatric surgery; Head ache; Hearing loss; Hiatal hernia with gastroesophageal reflux; History of bladder infections; History of depression; History of DVT (deep vein thrombosis); History of gastritis; History of hemorrhoids; History of pulmonary embolism; History of smoking; Hyperlipidemia; Hypertension; Hypertension; Knee pain; Mild intermittent asthma; Morbid obesity ([address] Nausea and vomiting; Obesity; Obesity (BMI 30-39.9); KEVIN on CPAP; Osteoarthritis; Osteopenia; Pulmonary embolism (HCC); S/P gastric bypass; Status post gastric bypass for obesity; Vitamin D deficiency; Wears dentures; Wears glasses; and Weight loss of 160 lbs since surgery . Social History:   reports that she quit smoking about 31 years ago. Her smoking use included Cigarettes. She started smoking about 40 years ago. She has a 4.50 pack-year smoking history. She has never used smokeless tobacco. She reports that she does not drink alcohol or use drugs.      Family History:   Family History   Problem Relation Age of Onset   Emilee Fung Asthma Mother    Emilee Fung Cancer Father         leukemia    Other Father         Myelodysplastic syndrome, which

## 2018-12-06 NOTE — PROGRESS NOTES
Pt admitted to room 2017 per hospital bed in stable condition from PACU  VSS  Oriented to room and surroundings  Bed in lowest position, wheels locked, 2/4 side rails up  Call light in reach, room free of clutter, adequate lighting provided  Pt is not a current smoker   Denies any further questions at this time  Instructed to call out with any questions/concerns/new onset of pain and/or n/v   White board updated  Continue to monitor with hourly rounding

## 2018-12-07 VITALS
TEMPERATURE: 98.2 F | OXYGEN SATURATION: 98 % | DIASTOLIC BLOOD PRESSURE: 75 MMHG | BODY MASS INDEX: 34.53 KG/M2 | HEIGHT: 67 IN | HEART RATE: 96 BPM | RESPIRATION RATE: 16 BRPM | WEIGHT: 220 LBS | SYSTOLIC BLOOD PRESSURE: 115 MMHG

## 2018-12-07 PROCEDURE — 97535 SELF CARE MNGMENT TRAINING: CPT | Performed by: NURSE PRACTITIONER

## 2018-12-07 PROCEDURE — 99232 SBSQ HOSP IP/OBS MODERATE 35: CPT | Performed by: INTERNAL MEDICINE

## 2018-12-07 PROCEDURE — 97530 THERAPEUTIC ACTIVITIES: CPT | Performed by: NURSE PRACTITIONER

## 2018-12-07 PROCEDURE — 97110 THERAPEUTIC EXERCISES: CPT

## 2018-12-07 PROCEDURE — 2580000003 HC RX 258: Performed by: STUDENT IN AN ORGANIZED HEALTH CARE EDUCATION/TRAINING PROGRAM

## 2018-12-07 PROCEDURE — 94640 AIRWAY INHALATION TREATMENT: CPT

## 2018-12-07 PROCEDURE — 97116 GAIT TRAINING THERAPY: CPT

## 2018-12-07 PROCEDURE — 97110 THERAPEUTIC EXERCISES: CPT | Performed by: NURSE PRACTITIONER

## 2018-12-07 PROCEDURE — 6370000000 HC RX 637 (ALT 250 FOR IP): Performed by: STUDENT IN AN ORGANIZED HEALTH CARE EDUCATION/TRAINING PROGRAM

## 2018-12-07 PROCEDURE — 94760 N-INVAS EAR/PLS OXIMETRY 1: CPT

## 2018-12-07 PROCEDURE — 6360000002 HC RX W HCPCS: Performed by: STUDENT IN AN ORGANIZED HEALTH CARE EDUCATION/TRAINING PROGRAM

## 2018-12-07 PROCEDURE — 97530 THERAPEUTIC ACTIVITIES: CPT

## 2018-12-07 RX ADMIN — OXYCODONE HYDROCHLORIDE 10 MG: 5 TABLET ORAL at 11:47

## 2018-12-07 RX ADMIN — TRAMADOL HYDROCHLORIDE 50 MG: 50 TABLET, FILM COATED ORAL at 14:10

## 2018-12-07 RX ADMIN — ACETAMINOPHEN 1000 MG: 500 TABLET ORAL at 14:10

## 2018-12-07 RX ADMIN — Medication 10 ML: at 11:50

## 2018-12-07 RX ADMIN — SODIUM CHLORIDE: 9 INJECTION, SOLUTION INTRAVENOUS at 06:46

## 2018-12-07 RX ADMIN — TRAMADOL HYDROCHLORIDE 50 MG: 50 TABLET, FILM COATED ORAL at 05:30

## 2018-12-07 RX ADMIN — ENOXAPARIN SODIUM 40 MG: 40 INJECTION SUBCUTANEOUS at 11:47

## 2018-12-07 RX ADMIN — OXYCODONE HYDROCHLORIDE 10 MG: 5 TABLET ORAL at 07:37

## 2018-12-07 RX ADMIN — OXYCODONE HYDROCHLORIDE 10 MG: 5 TABLET ORAL at 03:33

## 2018-12-07 RX ADMIN — ACETAMINOPHEN 1000 MG: 500 TABLET ORAL at 05:30

## 2018-12-07 RX ADMIN — TOPIRAMATE 50 MG: 25 TABLET, FILM COATED ORAL at 11:47

## 2018-12-07 RX ADMIN — MOMETASONE FUROATE AND FORMOTEROL FUMARATE DIHYDRATE 2 PUFF: 200; 5 AEROSOL RESPIRATORY (INHALATION) at 07:22

## 2018-12-07 RX ADMIN — OXYCODONE HYDROCHLORIDE 10 MG: 5 TABLET ORAL at 15:34

## 2018-12-07 ASSESSMENT — PAIN DESCRIPTION - FREQUENCY
FREQUENCY: CONTINUOUS

## 2018-12-07 ASSESSMENT — PAIN DESCRIPTION - DESCRIPTORS
DESCRIPTORS: SHARP
DESCRIPTORS: SHARP
DESCRIPTORS: SHARP;STABBING
DESCRIPTORS: STABBING
DESCRIPTORS: SHARP;STABBING
DESCRIPTORS: STABBING
DESCRIPTORS: JABBING;STABBING
DESCRIPTORS: STABBING
DESCRIPTORS: SHARP

## 2018-12-07 ASSESSMENT — PAIN DESCRIPTION - LOCATION
LOCATION: KNEE

## 2018-12-07 ASSESSMENT — PAIN DESCRIPTION - PROGRESSION
CLINICAL_PROGRESSION: GRADUALLY IMPROVING
CLINICAL_PROGRESSION: GRADUALLY WORSENING
CLINICAL_PROGRESSION: GRADUALLY WORSENING
CLINICAL_PROGRESSION: GRADUALLY IMPROVING
CLINICAL_PROGRESSION: GRADUALLY WORSENING
CLINICAL_PROGRESSION: GRADUALLY IMPROVING
CLINICAL_PROGRESSION: GRADUALLY WORSENING

## 2018-12-07 ASSESSMENT — PAIN DESCRIPTION - ORIENTATION
ORIENTATION: LEFT

## 2018-12-07 ASSESSMENT — PAIN DESCRIPTION - PAIN TYPE
TYPE: ACUTE PAIN;SURGICAL PAIN

## 2018-12-07 ASSESSMENT — PAIN SCALES - GENERAL
PAINLEVEL_OUTOF10: 3
PAINLEVEL_OUTOF10: 6
PAINLEVEL_OUTOF10: 7
PAINLEVEL_OUTOF10: 7
PAINLEVEL_OUTOF10: 4
PAINLEVEL_OUTOF10: 4
PAINLEVEL_OUTOF10: 3
PAINLEVEL_OUTOF10: 4
PAINLEVEL_OUTOF10: 7
PAINLEVEL_OUTOF10: 3
PAINLEVEL_OUTOF10: 7

## 2018-12-07 ASSESSMENT — PAIN DESCRIPTION - ONSET
ONSET: ON-GOING

## 2018-12-07 NOTE — PLAN OF CARE
Problem: Falls - Risk of:  Goal: Will remain free from falls  Will remain free from falls   Outcome: Ongoing  Room free of clutter  Hourly rounding   Non-skid socks worn  Side rails up x2  Bed low and locked  Call light in reach  Instructed to call out before getting out of bed  Anticipatory needs met  Bed alarm on  Falling star at the door and on wristband      Problem: Risk for Impaired Skin Integrity  Goal: Tissue integrity - skin and mucous membranes  Structural intactness and normal physiological function of skin and  mucous membranes.    Outcome: Ongoing  Skin assessment completed and documented  Gustavo scale updated  Relieve pressure to bony prominences  Avoid shearing  Assess s/sx of infection   Turns side to side  Turned q 2 hours  Neha care given and barrier cream applied to prevent breakdown  Heels elevated  Structural intactness and normal physiological function of skin and mucous membranes      Problem: Pain:  Goal: Control of acute pain  Control of acute pain   Outcome: Ongoing  Pain level assessed and rated on a 0-10 scale  Assess characteristics of pain  PRN pain medication given per pt request  Non-pharmacological interventions implemented  Report ineffective pain management to physician  Update pt and family of any changes  Pt instructed to call out with new onset of pain  Continue to monitor

## 2018-12-07 NOTE — DISCHARGE INSTR - COC
SECTION    Prognosis: {Prognosis:7828293682}    Condition at Discharge: Mushtaq Celis Patient Condition:[id number]}    Rehab Potential (if transferring to Rehab): {Prognosis:7573965438}    Recommended Labs or Other Treatments After Discharge: ***    Physician Certification: I certify the above information and transfer of Yahaira Leonardo  is necessary for the continuing treatment of the diagnosis listed and that she requires {Admit to Appropriate Level of Care:14063} for {GREATER/LESS:055241180} 30 days.      Update Admission H&P: {CHP DME Changes [facility]432748471}    PHYSICIAN SIGNATURE:  {Esignature:288037271}

## 2018-12-07 NOTE — PROGRESS NOTES
2 puff Inhalation BID    montelukast  10 mg Oral Nightly    topiramate  50 mg Oral Daily    sodium chloride flush  10 mL Intravenous 2 times per day    acetaminophen  1,000 mg Oral 3 times per day    enoxaparin  40 mg Subcutaneous Daily    traMADol  50 mg Oral Q8H    magnesium hydroxide  30 mL Oral Daily     Continuous Infusions:    sodium chloride 125 mL/hr at 12/07/18 0646     PRN Meds: albuterol, albuterol sulfate HFA, cyclobenzaprine, docusate sodium, ondansetron, sodium chloride flush, morphine **OR** morphine, oxyCODONE **OR** oxyCODONE    Data:     Past Medical History:   has a past medical history of Acromioclavicular joint arthritis; Acute gastroenteritis; Arthritis; Asthma; Bursitis; Chronic right shoulder pain; Dry eyes; Essential hypertension; GERD (gastroesophageal reflux disease); H/O bariatric surgery; Head ache; Hearing loss; Hiatal hernia with gastroesophageal reflux; History of bladder infections; History of depression; History of DVT (deep vein thrombosis); History of gastritis; History of hemorrhoids; History of pulmonary embolism; History of smoking; Hyperlipidemia; Hypertension; Hypertension; Knee pain; Mild intermittent asthma; Morbid obesity ([address] Nausea and vomiting; Obesity; Obesity (BMI 30-39.9); KEVIN on CPAP; Osteoarthritis; Osteopenia; Pulmonary embolism (HCC); S/P gastric bypass; Status post gastric bypass for obesity; Vitamin D deficiency; Wears dentures; Wears glasses; and Weight loss of 160 lbs since surgery . Social History:   reports that she quit smoking about 31 years ago. Her smoking use included Cigarettes. She started smoking about 40 years ago. She has a 4.50 pack-year smoking history. She has never used smokeless tobacco. She reports that she does not drink alcohol or use drugs.      Family History:   Family History   Problem Relation Age of Onset   [facility] Asthma Mother    [facility] Cancer Father         leukemia    Other Father         Myelodysplastic syndrome, which converted to leukemia    Allergies Other         Family history    Heart Disease Neg Hx        Vitals:  /75   Pulse 96   Temp 98.2 °F (36.8 °C) (Oral)   Resp 16   Ht 5' 6.5\" (1.689 m)   Wt 220 lb (99.8 kg)   LMP  (LMP Unknown)   SpO2 98%   BMI 34.98 kg/m²   Temp (24hrs), Av.2 °F (36.8 °C), Min:97.9 °F (36.6 °C), Max:98.4 °F (36.9 °C)    No results for input(s): POCGLU in the last 72 hours. I/O (24Hr): Intake/Output Summary (Last 24 hours) at 18 1144  Last data filed at 18 1131   Gross per 24 hour   Intake          2220.18 ml   Output             2050 ml   Net           170.18 ml       Labs:    Hematology:  Recent Labs      18   0737   WBC  10.6   RBC  3.12*   HGB  9.4*   HCT  27.5*   MCV  88.3   MCH  30.1   MCHC  34.0   RDW  13.2   PLT  456*   MPV  5.7*     Chemistry:  Recent Labs      18   0737   NA  140   K  4.0   CL  105   CO2  22   GLUCOSE  97   BUN  11   CREATININE  0.66   ANIONGAP  13   LABGLOM  >60   GFRAA  >60   CALCIUM  8.2*     No results for input(s): PROT, LABALBU, LABA1C, K9MWKJV, H4DZNFL, FT4, TSH, AST, ALT, LDH, GGT, ALKPHOS, LABGGT, BILITOT, BILIDIR, AMMONIA, AMYLASE, LIPASE, LACTATE, CHOL, HDL, LDLCHOLESTEROL, CHOLHDLRATIO, TRIG, VLDL, ZOH28XW, PHENYTOIN, PHENYF, URICACID, POCGLU in the last 72 hours.       Lab Results   Component Value Date/Time    SPECIAL NOT REPORTED 2016 11:02 AM     Lab Results   Component Value Date/Time    CULTURE NO GROWTH 2016 11:02 AM    CULTURE  2016 11:02 AM     Performed at [facility], [address] [place] [address] [phone]       No results found for: POCPH, PHART, PH, POCPCO2, AZN6YIE, PCO2, POCPO2, PO2ART, PO2, POCHCO3, DJB2BYL, HCO3, NBEA, PBEA, BEART, BE, THGBART, THB, GKR6WUW, DHPV2QLB, J6HGONHO, O2SAT, FIO2        Physical Examination:        General appearance:  alert, cooperative and no distress  Mental Status:  oriented to person, place and time and normal affect  Lungs:  clear to

## 2018-12-07 NOTE — CARE COORDINATION
Yeison Chris from therapy has concerns about pt returning home due to inability to go up stairs to get into home. Pt couldn't tolerate going up a full step during therapy. She is considering recommending SNF and I discussed options with pt and . Pt refuses going to SNF. States \" I will not be locked up\". Discussed HC for nursing and therapy and pt refuses HC as well. Plan is to go to daughters home until son can build a ramp at patients home.  is supportive of patients decision and will assist her as needed.

## 2018-12-07 NOTE — PLAN OF CARE
Problem: Pain:  Intervention: Opioid analgesia side-effects  None observed. Intervention: Assess barriers to pain control  No barriers. Intervention: Promote participation in pain management plan  Pt. Able to ask for pain meds as  Needed. Reviewed oral meds for discharge. Problem: IP BALANCE  Goal: LTG - Patient will maintain balance to allow for safe/functional mobility  Outcome: Met This Shift  Pt pain is down to  4 at rest after receiving oral pain meds. Continue plan of care. Comments: Goals are being met. Continue plan of care.

## 2018-12-11 ENCOUNTER — OFFICE VISIT (OUTPATIENT)
Dept: FAMILY MEDICINE CLINIC | Age: 58
End: 2018-12-11
Payer: MEDICARE

## 2018-12-11 ENCOUNTER — PATIENT MESSAGE (OUTPATIENT)
Dept: FAMILY MEDICINE CLINIC | Age: 58
End: 2018-12-11

## 2018-12-11 ENCOUNTER — HOSPITAL ENCOUNTER (OUTPATIENT)
Dept: PHYSICAL THERAPY | Facility: CLINIC | Age: 58
Setting detail: THERAPIES SERIES
Discharge: HOME | End: 2018-12-11
Payer: MEDICARE

## 2018-12-11 VITALS
HEART RATE: 98 BPM | HEIGHT: 66 IN | OXYGEN SATURATION: 98 % | BODY MASS INDEX: 35.36 KG/M2 | WEIGHT: 220 LBS | DIASTOLIC BLOOD PRESSURE: 77 MMHG | SYSTOLIC BLOOD PRESSURE: 122 MMHG

## 2018-12-11 DIAGNOSIS — D64.9 ANEMIA, UNSPECIFIED TYPE: ICD-10-CM

## 2018-12-11 DIAGNOSIS — S82.202D CLOSED FRACTURE OF SHAFT OF LEFT TIBIA WITH ROUTINE HEALING, UNSPECIFIED FRACTURE MORPHOLOGY, SUBSEQUENT ENCOUNTER: ICD-10-CM

## 2018-12-11 DIAGNOSIS — G47.33 OSA ON CPAP: ICD-10-CM

## 2018-12-11 DIAGNOSIS — I10 ESSENTIAL HYPERTENSION: Primary | ICD-10-CM

## 2018-12-11 DIAGNOSIS — F32.A DEPRESSION, UNSPECIFIED DEPRESSION TYPE: ICD-10-CM

## 2018-12-11 DIAGNOSIS — Z98.84 HISTORY OF GASTRIC BYPASS: ICD-10-CM

## 2018-12-11 DIAGNOSIS — Z99.89 OSA ON CPAP: ICD-10-CM

## 2018-12-11 DIAGNOSIS — E55.9 VITAMIN D DEFICIENCY: ICD-10-CM

## 2018-12-11 DIAGNOSIS — J45.909 UNCOMPLICATED ASTHMA, UNSPECIFIED ASTHMA SEVERITY, UNSPECIFIED WHETHER PERSISTENT: ICD-10-CM

## 2018-12-11 PROCEDURE — 97016 VASOPNEUMATIC DEVICE THERAPY: CPT

## 2018-12-11 PROCEDURE — G8427 DOCREV CUR MEDS BY ELIG CLIN: HCPCS | Performed by: PHYSICIAN ASSISTANT

## 2018-12-11 PROCEDURE — 99214 OFFICE O/P EST MOD 30 MIN: CPT | Performed by: PHYSICIAN ASSISTANT

## 2018-12-11 PROCEDURE — 1111F DSCHRG MED/CURRENT MED MERGE: CPT | Performed by: PHYSICIAN ASSISTANT

## 2018-12-11 PROCEDURE — 3017F COLORECTAL CA SCREEN DOC REV: CPT | Performed by: PHYSICIAN ASSISTANT

## 2018-12-11 PROCEDURE — 1036F TOBACCO NON-USER: CPT | Performed by: PHYSICIAN ASSISTANT

## 2018-12-11 PROCEDURE — G8417 CALC BMI ABV UP PARAM F/U: HCPCS | Performed by: PHYSICIAN ASSISTANT

## 2018-12-11 PROCEDURE — 97110 THERAPEUTIC EXERCISES: CPT

## 2018-12-11 PROCEDURE — G8484 FLU IMMUNIZE NO ADMIN: HCPCS | Performed by: PHYSICIAN ASSISTANT

## 2018-12-11 PROCEDURE — 97161 PT EVAL LOW COMPLEX 20 MIN: CPT

## 2018-12-11 RX ORDER — IBANDRONATE SODIUM 150 MG/1
150 TABLET, FILM COATED ORAL
Qty: 12 TABLET | Refills: 0 | Status: SHIPPED | OUTPATIENT
Start: 2018-12-11 | End: 2019-09-17 | Stop reason: SDUPTHER

## 2018-12-11 RX ORDER — TIZANIDINE 4 MG/1
2 TABLET ORAL 3 TIMES DAILY
Qty: 40 TABLET | Refills: 0 | Status: SHIPPED | OUTPATIENT
Start: 2018-12-11 | End: 2019-08-02 | Stop reason: ALTCHOICE

## 2018-12-11 RX ORDER — FLUOXETINE HYDROCHLORIDE 20 MG/1
20 CAPSULE ORAL DAILY
Qty: 30 CAPSULE | Refills: 0 | Status: SHIPPED | OUTPATIENT
Start: 2018-12-11 | End: 2019-01-11 | Stop reason: SDUPTHER

## 2018-12-11 ASSESSMENT — ENCOUNTER SYMPTOMS
ABDOMINAL PAIN: 0
RHINORRHEA: 0
SHORTNESS OF BREATH: 0
ORTHOPNEA: 0
NAUSEA: 0
CHEST TIGHTNESS: 0
DIARRHEA: 0
WHEEZING: 0
SINUS PRESSURE: 0
EYE ITCHING: 0
WATER BRASH: 0
COUGH: 0
BLURRED VISION: 0
STRIDOR: 0
VOMITING: 0
SORE THROAT: 0
EYE DISCHARGE: 0
HEARTBURN: 1
FACIAL SWEATING: 0
GLOBUS SENSATION: 0
SWOLLEN GLANDS: 0

## 2018-12-11 ASSESSMENT — PATIENT HEALTH QUESTIONNAIRE - PHQ9
SUM OF ALL RESPONSES TO PHQ QUESTIONS 1-9: 0
2. FEELING DOWN, DEPRESSED OR HOPELESS: 0
1. LITTLE INTEREST OR PLEASURE IN DOING THINGS: 0
SUM OF ALL RESPONSES TO PHQ QUESTIONS 1-9: 0
SUM OF ALL RESPONSES TO PHQ9 QUESTIONS 1 & 2: 0

## 2018-12-11 NOTE — PROGRESS NOTES
Left 11/22/2018    LEFT TIBIA PLATEAU EXTERNAL FIXATOR APPLICATION performed by Sarahy Argueta MD at [address]  12/29/15    liver bx, EGD    SHOULDER SURGERY Left 08/19/2016    Giovanna procedure    SHOULDER SURGERY      bone spurs 8/19/2016    SHOULDER SURGERY Right 10/08/2018    TONSILLECTOMY  1979    UPPER GASTROINTESTINAL ENDOSCOPY  Aug. 2015       Family History   Problem Relation Age of Onset    Asthma Mother     Cancer Father         leukemia    Other Father         Myelodysplastic syndrome, which converted to leukemia    Allergies Other         Family history    Heart Disease Neg Hx        Social History   Substance Use Topics    Smoking status: Former Smoker     Packs/day: 0.50     Years: 9.00     Types: Cigarettes     Start date: 12/16/1978     Quit date: 1/1/1987    Smokeless tobacco: Never Used    Alcohol use No      Current Outpatient Prescriptions   Medication Sig Dispense Refill    ibandronate (BONIVA) 150 MG tablet Take 1 tablet by mouth every 30 days 12 tablet 0    FLUoxetine (PROZAC) 20 MG capsule Take 1 capsule by mouth daily 30 capsule 0    tiZANidine (ZANAFLEX) 4 MG tablet Take 0.5 tablets by mouth 3 times daily 40 tablet 0    oxyCODONE (ROXICODONE) 5 MG immediate release tablet Take 1 tablet by mouth every 4 hours as needed for Pain for up to 14 days. . 56 tablet 0    enoxaparin (LOVENOX) 40 MG/0.4ML injection Inject 0.4 mLs into the skin daily for 14 days 5.6 mL 0    Calcium Citrate (REBECCA-CITRATE) 150 MG CAPS       topiramate (TOPAMAX) 50 MG tablet TAKE 1 TABLET BY MOUTH ONCE DAILY 90 tablet 1    docusate sodium (COLACE) 100 MG capsule Take 1 capsule by mouth daily as needed for Constipation 30 capsule 0    montelukast (SINGULAIR) 10 MG tablet Take 10 mg by mouth nightly      mometasone-formoterol (DULERA) 200-5 MCG/ACT inhaler Inhale 2 puffs into the lungs 2 times daily 1 Inhaler 6    FIBER COMPLETE PO Take by mouth      albuterol (PROVENTIL)

## 2018-12-12 ENCOUNTER — TELEPHONE (OUTPATIENT)
Dept: ORTHOPEDIC SURGERY | Age: 58
End: 2018-12-12

## 2018-12-12 RX ORDER — DOCUSATE SODIUM 100 MG/1
100 CAPSULE, LIQUID FILLED ORAL 2 TIMES DAILY
Qty: 60 CAPSULE | Refills: 0 | Status: SHIPPED | OUTPATIENT
Start: 2018-12-12 | End: 2019-01-11

## 2018-12-12 NOTE — TELEPHONE ENCOUNTER
Pended accepted stool softener in this message to review. Please Approve or Refuse.   Send to Pharmacy per Pt's Request:      Next Visit Date:  1/21/2019   Last Visit Date: 12/11/2018    Hemoglobin A1C (%)   Date Value   01/04/2018 5.1   09/06/2017 5.3   11/16/2016 5.3             ( goal A1C is < 7)   BP Readings from Last 3 Encounters:   12/11/18 122/77   12/07/18 115/75   12/05/18 107/60          (goal 120/80)  BUN   Date Value Ref Range Status   12/06/2018 11 6 - 20 mg/dL Final     CREATININE   Date Value Ref Range Status   12/06/2018 0.66 0.50 - 0.90 mg/dL Final     Potassium   Date Value Ref Range Status   12/06/2018 4.0 3.7 - 5.3 mmol/L Final

## 2018-12-12 NOTE — TELEPHONE ENCOUNTER
We should do passive range of motion. He cannot do active range of motion at this time. She has to be nonweightbearing on the left leg. They can exercise her ankle actively and passively. They should also do active and passive motion of the right shoulder.

## 2018-12-14 ENCOUNTER — HOSPITAL ENCOUNTER (OUTPATIENT)
Dept: PHYSICAL THERAPY | Facility: CLINIC | Age: 58
Setting detail: THERAPIES SERIES
Discharge: HOME | End: 2018-12-14
Payer: MEDICARE

## 2018-12-14 PROCEDURE — 97016 VASOPNEUMATIC DEVICE THERAPY: CPT

## 2018-12-14 PROCEDURE — 97140 MANUAL THERAPY 1/> REGIONS: CPT

## 2018-12-14 PROCEDURE — 97110 THERAPEUTIC EXERCISES: CPT

## 2018-12-17 ENCOUNTER — HOSPITAL ENCOUNTER (OUTPATIENT)
Dept: PHYSICAL THERAPY | Facility: CLINIC | Age: 58
Setting detail: THERAPIES SERIES
Discharge: HOME | End: 2018-12-17
Payer: MEDICARE

## 2018-12-17 PROCEDURE — 97016 VASOPNEUMATIC DEVICE THERAPY: CPT

## 2018-12-17 PROCEDURE — 97110 THERAPEUTIC EXERCISES: CPT

## 2018-12-17 PROCEDURE — 97140 MANUAL THERAPY 1/> REGIONS: CPT

## 2018-12-17 NOTE — PROGRESS NOTES
[] [facility]       Outpatient Physical        Therapy       [address]       Phone: [phone]       Fax: [phone] [] [facility] at [address] [facility]       Phone: [phone]       Fax: [phone] [x] [facility]  [address] [place] [address]   Phone: [phone]   Fax:  [phone]     Physical Therapy Daily Treatment Note    Date:  2018  Patient Name:  Michael Bailey    :  [id number]: [id number]   Physician: Nany Mike MD                                 Insurance: [employer]  Medical Diagnosis: S82.102S (ICD-10-CM) - Closed fracture of proximal end of left tibia, unspecified fracture morphology, sequela, R shoulder pain                      Rehab Codes: DEC ROM, Dec Function,L knee pain  Onset date: 18                    Next Dr's appt.: 18  Visit# / total visits: 3/12  Cancels/No Shows: 0/0    Subjective:    Pain:  [x] Yes  [] No Location: L knee Pain Rating: (0-10 scale) 3/10  Pain altered Tx:  [] No  [] Yes  Action:  Comments: Pt reporting L knee pain is about the same as before, pt is hoping once staples come out tomorrow pain starts to go down. No neck or R shoulder pain this morning.      Objective:  Modalities: Vaso compression [id number] low compression 34 degrees L knee  Precautions: NWB L LE hinge brace locked at 0 degrees, L knee PROM only to 60 degrees flexion    Exercises:  Exercise Reps/ Time Weight/ Level Comments   Patellar mobs      PROM L knee   Supine and sitting with foot on board   PROM R shoulder      R UE alphabet x2     R UE press ups 2x20     Cane exercises 20x  R shoulder   Ankle pumps 2x20     Standing with walker NWB with L heel flat on floor      Quad sets 2x20  3 second hold    Other:    Specific Instructions for next treatment:     Treatment Charges: Mins Units   []  Modalities     [x]  Ther Exercise 15 1   [x]  Manual Therapy

## 2018-12-18 ENCOUNTER — OFFICE VISIT (OUTPATIENT)
Dept: ORTHOPEDIC SURGERY | Age: 58
End: 2018-12-18

## 2018-12-18 VITALS — BODY MASS INDEX: 35.36 KG/M2 | HEIGHT: 66 IN | WEIGHT: 220.02 LBS

## 2018-12-18 DIAGNOSIS — M75.01 ADHESIVE CAPSULITIS OF RIGHT SHOULDER: ICD-10-CM

## 2018-12-18 DIAGNOSIS — S82.142A TIBIAL PLATEAU FRACTURE, LEFT, CLOSED, INITIAL ENCOUNTER: Primary | ICD-10-CM

## 2018-12-18 PROCEDURE — 99024 POSTOP FOLLOW-UP VISIT: CPT | Performed by: ORTHOPAEDIC SURGERY

## 2018-12-19 ENCOUNTER — HOSPITAL ENCOUNTER (OUTPATIENT)
Dept: PHYSICAL THERAPY | Facility: CLINIC | Age: 58
Setting detail: THERAPIES SERIES
Discharge: HOME | End: 2018-12-19
Payer: MEDICARE

## 2018-12-19 PROCEDURE — 97140 MANUAL THERAPY 1/> REGIONS: CPT

## 2018-12-19 PROCEDURE — 97016 VASOPNEUMATIC DEVICE THERAPY: CPT

## 2018-12-19 PROCEDURE — 97110 THERAPEUTIC EXERCISES: CPT

## 2018-12-21 ENCOUNTER — HOSPITAL ENCOUNTER (OUTPATIENT)
Dept: PHYSICAL THERAPY | Facility: CLINIC | Age: 58
Setting detail: THERAPIES SERIES
Discharge: HOME | End: 2018-12-21
Payer: MEDICARE

## 2018-12-21 DIAGNOSIS — S82.102S CLOSED FRACTURE OF PROXIMAL END OF LEFT TIBIA, UNSPECIFIED FRACTURE MORPHOLOGY, SEQUELA: ICD-10-CM

## 2018-12-21 PROCEDURE — 97016 VASOPNEUMATIC DEVICE THERAPY: CPT

## 2018-12-21 PROCEDURE — 97140 MANUAL THERAPY 1/> REGIONS: CPT

## 2018-12-21 PROCEDURE — 97110 THERAPEUTIC EXERCISES: CPT

## 2018-12-21 RX ORDER — OXYCODONE HYDROCHLORIDE 5 MG/1
5 TABLET ORAL EVERY 6 HOURS PRN
Qty: 28 TABLET | Refills: 0 | Status: SHIPPED | OUTPATIENT
Start: 2018-12-21 | End: 2018-12-28

## 2018-12-21 NOTE — PROGRESS NOTES
Added 12/21   SAQ 10  Assisted added 12/21   Stretches   Seated HS/gastroc 3x30\"     Other:    Specific Instructions for next treatment:     Treatment Charges: Mins Units   []  Modalities     [x]  Ther Exercise 25 2   [x]  Manual Therapy 20 1   []  Ther Activities     []  Aquatics     [x]  Vasocompression 15 1   []  Other     Total Treatment time 60 4   ROM assessment: 12/19/18- PROM 3-80    Assessment: [x] Progressing toward goals. Progressed gentle quad/glut med strengthening to RLE this date with fair tolerance from pt. Assist required for SLR and SAQ with minimal increase in pain with SAQ only. Minimal progressions in shoulder strengthenig with no complications in pain, only vc to maintain position. Ended with seated HS/gastroc stretch to elongate tight tissue and improve ROM, followed by vaso for inflammation/pain relief. Pt was instructed to cont supine abd and stretches in HEP only at this time. [] No change. [] Other:    STG: (to be met in 6 treatments)  1. ? Pain: Patient to report 3/10 in L knee to improve tolerance to PROM  2. ? ROM: Patient to demonstrate 0-60 degrees L knee PROM   R shoulder flexion 100 degrees abduction 90 degrees and  proper scapulohumeral rhythm to improve independence with ADL's  3. ? Strength:  4. ? Function:  5. Independent with Home Exercise Programs  6. Demonstrate Knowledge of fall prevention  LTG: (to be met in 12 treatments)  1. 0/10 L knee pain  2. L knee PROM 90 degrees at 6 wks  R shoulder flexion 120 degrees abduction 110 degrees to improve independence with ADL's  3. Patient to report only 50 % impairment with functional mobility thru LEFS  4. No L knee edema                    Patient goals: To walk again    Pt. Education:  [x] Yes  [] No  [x] Reviewed Prior HEP/Ed Issued quad sets for HEP. Method of Education: [x] Verbal  [x] Demo  [] Written  Comprehension of Education:  [x] Verbalizes understanding. [x] Demonstrates understanding. [] Needs review.   []

## 2018-12-24 ENCOUNTER — HOSPITAL ENCOUNTER (OUTPATIENT)
Dept: PHYSICAL THERAPY | Facility: CLINIC | Age: 58
Setting detail: THERAPIES SERIES
Discharge: HOME | End: 2018-12-24
Payer: MEDICARE

## 2018-12-24 PROCEDURE — 97016 VASOPNEUMATIC DEVICE THERAPY: CPT

## 2018-12-24 PROCEDURE — 97110 THERAPEUTIC EXERCISES: CPT

## 2018-12-24 PROCEDURE — 97140 MANUAL THERAPY 1/> REGIONS: CPT

## 2018-12-24 NOTE — PROGRESS NOTES
[] [facility]       Outpatient Physical        Therapy       [address]       Phone: [phone]       Fax: [phone] [] [facility] at [address]       Phone: [phone]       Fax: [phone] [x] [facility]. [address] [place] [address] [facility]  [address] [place] [address]   Phone: [phone]   Fax:  [phone]     Physical Therapy Daily Treatment Note    Date:  2018  Patient Name:  Alvarez Worrell    :  30IQN: [id number]   Physician: Bharathi Simon MD                                 Insurance: [place] [employer]  Medical Diagnosis: S82.102S (ICD-10-CM) - Closed fracture of proximal end of left tibia, unspecified fracture morphology, sequela, R shoulder pain                      Rehab Codes: DEC ROM, Dec Function,L knee pain  Onset date: 18                    Next 's appt. : 19  Visit# / total visits:   Cancels/No Shows: 0/0    Subjective:    Pain:  [x] Yes  [] No Location: L knee Pain Rating: (0-10 scale) 4/10  Pain altered Tx:  [] No  [] Yes  Action:  Comments: pt reporting same level pain as last visit, noting main pain area is medial knee, as well as sensitive to the touch around incision.      Objective:  Modalities: Vaso compression [id number] low compression 34 degrees L knee  Precautions: NWB    Exercises: new script 18 for: R shoulder and left knee aggressive ROM and gentle strengthening  Exercise Reps/ Time Weight/ Level Comments   Patellar mobs      PROM L knee 2x10  5\" hold Supine and sitting with foot on board   PROM R shoulder      R UE alphabet x2 1#    3-punches  20x 1# Added    Cane exercises 20x 5\" hold R shoulder   Ankle pumps 2x20  D/C   Standing with walker NWB with L heel flat on floor      Quad sets/glut squeezes  2x20  3 second hold added glut squeeze    Heel prop knee ext 3'     Supine abd 2x10  Added    SLR 15  Assisted  Added    SAQ 10 review. [] Demonstrates/verbalizes HEP/Ed previously given. Plan: [x] Continue per plan of care.    [] Other:      Time In: 10:55 am            Time Out: 11:50    Electronically signed by:  Dutch Randle PTA

## 2018-12-26 ENCOUNTER — HOSPITAL ENCOUNTER (OUTPATIENT)
Dept: PHYSICAL THERAPY | Facility: CLINIC | Age: 58
Setting detail: THERAPIES SERIES
Discharge: HOME | End: 2018-12-26
Payer: MEDICARE

## 2018-12-26 PROCEDURE — 97016 VASOPNEUMATIC DEVICE THERAPY: CPT

## 2018-12-26 PROCEDURE — 97110 THERAPEUTIC EXERCISES: CPT

## 2018-12-26 PROCEDURE — 97140 MANUAL THERAPY 1/> REGIONS: CPT

## 2018-12-28 ENCOUNTER — HOSPITAL ENCOUNTER (OUTPATIENT)
Dept: PHYSICAL THERAPY | Facility: CLINIC | Age: 58
Setting detail: THERAPIES SERIES
Discharge: HOME | End: 2018-12-28
Payer: MEDICARE

## 2018-12-28 PROCEDURE — 97110 THERAPEUTIC EXERCISES: CPT

## 2018-12-28 PROCEDURE — 97140 MANUAL THERAPY 1/> REGIONS: CPT

## 2018-12-31 ENCOUNTER — HOSPITAL ENCOUNTER (OUTPATIENT)
Dept: PHYSICAL THERAPY | Facility: CLINIC | Age: 58
Setting detail: THERAPIES SERIES
Discharge: HOME | End: 2018-12-31
Payer: MEDICARE

## 2018-12-31 PROCEDURE — 97110 THERAPEUTIC EXERCISES: CPT

## 2018-12-31 PROCEDURE — 97016 VASOPNEUMATIC DEVICE THERAPY: CPT

## 2018-12-31 PROCEDURE — 97140 MANUAL THERAPY 1/> REGIONS: CPT

## 2019-01-02 ENCOUNTER — HOSPITAL ENCOUNTER (OUTPATIENT)
Dept: PHYSICAL THERAPY | Facility: CLINIC | Age: 59
Setting detail: THERAPIES SERIES
Discharge: HOME | End: 2019-01-02
Payer: MEDICARE

## 2019-01-02 ENCOUNTER — HOSPITAL ENCOUNTER (OUTPATIENT)
Age: 59
Discharge: HOME | End: 2019-01-04
Payer: MEDICARE

## 2019-01-02 ENCOUNTER — HOSPITAL ENCOUNTER (OUTPATIENT)
Dept: GENERAL RADIOLOGY | Age: 59
Discharge: HOME | End: 2019-01-04
Payer: MEDICARE

## 2019-01-02 DIAGNOSIS — S82.142A TIBIAL PLATEAU FRACTURE, LEFT, CLOSED, INITIAL ENCOUNTER: ICD-10-CM

## 2019-01-02 PROCEDURE — 97140 MANUAL THERAPY 1/> REGIONS: CPT

## 2019-01-02 PROCEDURE — 73562 X-RAY EXAM OF KNEE 3: CPT

## 2019-01-02 PROCEDURE — 97016 VASOPNEUMATIC DEVICE THERAPY: CPT

## 2019-01-02 PROCEDURE — 97110 THERAPEUTIC EXERCISES: CPT

## 2019-01-03 ENCOUNTER — OFFICE VISIT (OUTPATIENT)
Dept: ORTHOPEDIC SURGERY | Age: 59
End: 2019-01-03

## 2019-01-03 VITALS — BODY MASS INDEX: 35.36 KG/M2 | HEIGHT: 66 IN | WEIGHT: 220.02 LBS

## 2019-01-03 DIAGNOSIS — M19.011 ARTHRITIS OF RIGHT ACROMIOCLAVICULAR JOINT: ICD-10-CM

## 2019-01-03 DIAGNOSIS — M79.2 NEURALGIA OF LEFT LOWER EXTREMITY: Primary | ICD-10-CM

## 2019-01-03 DIAGNOSIS — S82.142D CLOSED FRACTURE OF LEFT TIBIAL PLATEAU WITH ROUTINE HEALING, SUBSEQUENT ENCOUNTER: ICD-10-CM

## 2019-01-03 PROCEDURE — 99024 POSTOP FOLLOW-UP VISIT: CPT | Performed by: ORTHOPAEDIC SURGERY

## 2019-01-03 RX ORDER — GABAPENTIN 300 MG/1
300 CAPSULE ORAL 3 TIMES DAILY
Qty: 90 CAPSULE | Refills: 0 | Status: SHIPPED | OUTPATIENT
Start: 2019-01-03 | End: 2019-08-02 | Stop reason: ALTCHOICE

## 2019-01-03 RX ORDER — OXYCODONE HYDROCHLORIDE AND ACETAMINOPHEN 5; 325 MG/1; MG/1
1-2 TABLET ORAL EVERY 6 HOURS PRN
Qty: 56 TABLET | Refills: 0 | Status: SHIPPED | OUTPATIENT
Start: 2019-01-03 | End: 2019-05-14

## 2019-01-04 ENCOUNTER — HOSPITAL ENCOUNTER (OUTPATIENT)
Dept: PHYSICAL THERAPY | Facility: CLINIC | Age: 59
Setting detail: THERAPIES SERIES
Discharge: HOME | End: 2019-01-04
Payer: MEDICARE

## 2019-01-04 PROCEDURE — 97110 THERAPEUTIC EXERCISES: CPT

## 2019-01-04 PROCEDURE — 97016 VASOPNEUMATIC DEVICE THERAPY: CPT

## 2019-01-07 ENCOUNTER — HOSPITAL ENCOUNTER (OUTPATIENT)
Dept: PHYSICAL THERAPY | Facility: CLINIC | Age: 59
Setting detail: THERAPIES SERIES
Discharge: HOME | End: 2019-01-07
Payer: MEDICARE

## 2019-01-09 ENCOUNTER — HOSPITAL ENCOUNTER (OUTPATIENT)
Dept: PHYSICAL THERAPY | Facility: CLINIC | Age: 59
Setting detail: THERAPIES SERIES
Discharge: HOME | End: 2019-01-09
Payer: MEDICARE

## 2019-01-09 PROCEDURE — 97016 VASOPNEUMATIC DEVICE THERAPY: CPT

## 2019-01-09 PROCEDURE — 97110 THERAPEUTIC EXERCISES: CPT

## 2019-01-11 ENCOUNTER — HOSPITAL ENCOUNTER (OUTPATIENT)
Dept: PHYSICAL THERAPY | Facility: CLINIC | Age: 59
Setting detail: THERAPIES SERIES
Discharge: HOME | End: 2019-01-11
Payer: MEDICARE

## 2019-01-11 PROCEDURE — 97016 VASOPNEUMATIC DEVICE THERAPY: CPT

## 2019-01-11 PROCEDURE — 97110 THERAPEUTIC EXERCISES: CPT

## 2019-01-11 RX ORDER — FLUOXETINE HYDROCHLORIDE 20 MG/1
20 CAPSULE ORAL DAILY
Qty: 30 CAPSULE | Refills: 0 | Status: SHIPPED | OUTPATIENT
Start: 2019-01-11 | End: 2019-01-21

## 2019-01-14 ENCOUNTER — HOSPITAL ENCOUNTER (OUTPATIENT)
Dept: PHYSICAL THERAPY | Facility: CLINIC | Age: 59
Setting detail: THERAPIES SERIES
Discharge: HOME | End: 2019-01-14
Payer: MEDICARE

## 2019-01-14 PROCEDURE — 97110 THERAPEUTIC EXERCISES: CPT

## 2019-01-14 PROCEDURE — 97016 VASOPNEUMATIC DEVICE THERAPY: CPT

## 2019-01-15 ENCOUNTER — HOSPITAL ENCOUNTER (OUTPATIENT)
Age: 59
Discharge: HOME | End: 2019-01-15
Payer: MEDICARE

## 2019-01-15 DIAGNOSIS — D64.9 ANEMIA, UNSPECIFIED TYPE: ICD-10-CM

## 2019-01-15 DIAGNOSIS — F32.A DEPRESSION, UNSPECIFIED DEPRESSION TYPE: ICD-10-CM

## 2019-01-15 DIAGNOSIS — E55.9 VITAMIN D DEFICIENCY: ICD-10-CM

## 2019-01-15 DIAGNOSIS — Z98.84 HISTORY OF GASTRIC BYPASS: ICD-10-CM

## 2019-01-15 DIAGNOSIS — I10 ESSENTIAL HYPERTENSION: ICD-10-CM

## 2019-01-15 LAB
ABSOLUTE EOS #: 0.2 K/UL (ref 0–0.44)
ABSOLUTE IMMATURE GRANULOCYTE: 0.13 K/UL (ref 0–0.3)
ABSOLUTE LYMPH #: 2.72 K/UL (ref 1.1–3.7)
ABSOLUTE MONO #: 0.67 K/UL (ref 0.1–1.2)
ALBUMIN SERPL-MCNC: 3.9 G/DL (ref 3.5–5.2)
ALBUMIN/GLOBULIN RATIO: 1.2 (ref 1–2.5)
ALP BLD-CCNC: 123 U/L (ref 35–104)
ALT SERPL-CCNC: 10 U/L (ref 5–33)
ANION GAP SERPL CALCULATED.3IONS-SCNC: 13 MMOL/L (ref 9–17)
AST SERPL-CCNC: 12 U/L
BASOPHILS # BLD: 1 % (ref 0–2)
BASOPHILS ABSOLUTE: 0.06 K/UL (ref 0–0.2)
BILIRUB SERPL-MCNC: 0.22 MG/DL (ref 0.3–1.2)
BUN BLDV-MCNC: 8 MG/DL (ref 6–20)
BUN/CREAT BLD: ABNORMAL (ref 9–20)
CALCIUM SERPL-MCNC: 9.1 MG/DL (ref 8.6–10.4)
CHLORIDE BLD-SCNC: 104 MMOL/L (ref 98–107)
CHOLESTEROL/HDL RATIO: 4.6
CHOLESTEROL: 207 MG/DL
CO2: 22 MMOL/L (ref 20–31)
CREAT SERPL-MCNC: 0.61 MG/DL (ref 0.5–0.9)
DIFFERENTIAL TYPE: ABNORMAL
EOSINOPHILS RELATIVE PERCENT: 2 % (ref 1–4)
ESTIMATED AVERAGE GLUCOSE: 97 MG/DL
FERRITIN: 91 UG/L (ref 13–150)
FOLATE: >20 NG/ML
GFR AFRICAN AMERICAN: >60 ML/MIN
GFR NON-AFRICAN AMERICAN: >60 ML/MIN
GFR SERPL CREATININE-BSD FRML MDRD: ABNORMAL ML/MIN/{1.73_M2}
GFR SERPL CREATININE-BSD FRML MDRD: ABNORMAL ML/MIN/{1.73_M2}
GLUCOSE BLD-MCNC: 88 MG/DL (ref 70–99)
HBA1C MFR BLD: 5 % (ref 4–6)
HCT VFR BLD CALC: 44.8 % (ref 36.3–47.1)
HDLC SERPL-MCNC: 45 MG/DL
HEMOGLOBIN: 13.2 G/DL (ref 11.9–15.1)
IMMATURE GRANULOCYTES: 2 %
INSULIN COMMENT: NORMAL
INSULIN REFERENCE RANGE:: NORMAL
INSULIN: 6.2 MU/L
IRON SATURATION: 19 % (ref 20–55)
IRON: 44 UG/DL (ref 37–145)
LDL CHOLESTEROL: 129 MG/DL (ref 0–130)
LYMPHOCYTES # BLD: 31 % (ref 24–43)
MCH RBC QN AUTO: 28.3 PG (ref 25.2–33.5)
MCHC RBC AUTO-ENTMCNC: 29.5 G/DL (ref 28.4–34.8)
MCV RBC AUTO: 96.1 FL (ref 82.6–102.9)
MONOCYTES # BLD: 8 % (ref 3–12)
NRBC AUTOMATED: 0 PER 100 WBC
PDW BLD-RTO: 13.8 % (ref 11.8–14.4)
PLATELET # BLD: 431 K/UL (ref 138–453)
PLATELET ESTIMATE: ABNORMAL
PMV BLD AUTO: 8.8 FL (ref 8.1–13.5)
POTASSIUM SERPL-SCNC: 4.4 MMOL/L (ref 3.7–5.3)
RBC # BLD: 4.66 M/UL (ref 3.95–5.11)
RBC # BLD: ABNORMAL 10*6/UL
SEG NEUTROPHILS: 56 % (ref 36–65)
SEGMENTED NEUTROPHILS ABSOLUTE COUNT: 5.03 K/UL (ref 1.5–8.1)
SODIUM BLD-SCNC: 139 MMOL/L (ref 135–144)
TOTAL IRON BINDING CAPACITY: 235 UG/DL (ref 250–450)
TOTAL PROTEIN: 7.2 G/DL (ref 6.4–8.3)
TRIGL SERPL-MCNC: 164 MG/DL
TSH SERPL DL<=0.05 MIU/L-ACNC: 0.99 MIU/L (ref 0.3–5)
UNSATURATED IRON BINDING CAPACITY: 191 UG/DL (ref 112–347)
VITAMIN B-12: 567 PG/ML (ref 232–1245)
VITAMIN D 25-HYDROXY: 45.3 NG/ML (ref 30–100)
VLDLC SERPL CALC-MCNC: ABNORMAL MG/DL (ref 1–30)
WBC # BLD: 8.8 K/UL (ref 3.5–11.3)
WBC # BLD: ABNORMAL 10*3/UL

## 2019-01-15 PROCEDURE — 80061 LIPID PANEL: CPT

## 2019-01-15 PROCEDURE — 84425 ASSAY OF VITAMIN B-1: CPT

## 2019-01-15 PROCEDURE — 36415 COLL VENOUS BLD VENIPUNCTURE: CPT

## 2019-01-15 PROCEDURE — 83525 ASSAY OF INSULIN: CPT

## 2019-01-15 PROCEDURE — 84443 ASSAY THYROID STIM HORMONE: CPT

## 2019-01-15 PROCEDURE — 85025 COMPLETE CBC W/AUTO DIFF WBC: CPT

## 2019-01-15 PROCEDURE — 82607 VITAMIN B-12: CPT

## 2019-01-15 PROCEDURE — 83550 IRON BINDING TEST: CPT

## 2019-01-15 PROCEDURE — 82306 VITAMIN D 25 HYDROXY: CPT

## 2019-01-15 PROCEDURE — 80053 COMPREHEN METABOLIC PANEL: CPT

## 2019-01-15 PROCEDURE — 83540 ASSAY OF IRON: CPT

## 2019-01-15 PROCEDURE — 83036 HEMOGLOBIN GLYCOSYLATED A1C: CPT

## 2019-01-15 PROCEDURE — 82746 ASSAY OF FOLIC ACID SERUM: CPT

## 2019-01-15 PROCEDURE — 82728 ASSAY OF FERRITIN: CPT

## 2019-01-16 ENCOUNTER — HOSPITAL ENCOUNTER (OUTPATIENT)
Dept: PHYSICAL THERAPY | Facility: CLINIC | Age: 59
Setting detail: THERAPIES SERIES
Discharge: HOME | End: 2019-01-16
Payer: MEDICARE

## 2019-01-16 PROCEDURE — 97016 VASOPNEUMATIC DEVICE THERAPY: CPT

## 2019-01-16 PROCEDURE — 97140 MANUAL THERAPY 1/> REGIONS: CPT

## 2019-01-16 PROCEDURE — 97116 GAIT TRAINING THERAPY: CPT

## 2019-01-16 PROCEDURE — 97110 THERAPEUTIC EXERCISES: CPT

## 2019-01-18 ENCOUNTER — HOSPITAL ENCOUNTER (OUTPATIENT)
Dept: PHYSICAL THERAPY | Facility: CLINIC | Age: 59
Setting detail: THERAPIES SERIES
Discharge: HOME | End: 2019-01-18
Payer: MEDICARE

## 2019-01-18 PROCEDURE — 97110 THERAPEUTIC EXERCISES: CPT

## 2019-01-18 PROCEDURE — 97016 VASOPNEUMATIC DEVICE THERAPY: CPT

## 2019-01-18 PROCEDURE — 97140 MANUAL THERAPY 1/> REGIONS: CPT

## 2019-01-20 LAB — VITAMIN B1 WHOLE BLOOD: 110 NMOL/L (ref 70–180)

## 2019-01-21 ENCOUNTER — HOSPITAL ENCOUNTER (OUTPATIENT)
Dept: PHYSICAL THERAPY | Facility: CLINIC | Age: 59
Setting detail: THERAPIES SERIES
Discharge: HOME | End: 2019-01-21
Payer: MEDICARE

## 2019-01-21 ENCOUNTER — OFFICE VISIT (OUTPATIENT)
Dept: FAMILY MEDICINE CLINIC | Age: 59
End: 2019-01-21
Payer: MEDICARE

## 2019-01-21 VITALS
BODY MASS INDEX: 31.39 KG/M2 | OXYGEN SATURATION: 98 % | HEART RATE: 91 BPM | HEIGHT: 67 IN | DIASTOLIC BLOOD PRESSURE: 71 MMHG | SYSTOLIC BLOOD PRESSURE: 116 MMHG | TEMPERATURE: 98.4 F | WEIGHT: 200 LBS

## 2019-01-21 DIAGNOSIS — E55.9 VITAMIN D DEFICIENCY: ICD-10-CM

## 2019-01-21 DIAGNOSIS — I10 ESSENTIAL HYPERTENSION: Primary | ICD-10-CM

## 2019-01-21 DIAGNOSIS — D64.9 ANEMIA, UNSPECIFIED TYPE: ICD-10-CM

## 2019-01-21 DIAGNOSIS — M25.562 CHRONIC PAIN OF LEFT KNEE: ICD-10-CM

## 2019-01-21 DIAGNOSIS — J45.909 UNCOMPLICATED ASTHMA, UNSPECIFIED ASTHMA SEVERITY, UNSPECIFIED WHETHER PERSISTENT: ICD-10-CM

## 2019-01-21 DIAGNOSIS — G89.29 CHRONIC PAIN OF LEFT KNEE: ICD-10-CM

## 2019-01-21 PROCEDURE — G8417 CALC BMI ABV UP PARAM F/U: HCPCS | Performed by: PHYSICIAN ASSISTANT

## 2019-01-21 PROCEDURE — 97110 THERAPEUTIC EXERCISES: CPT

## 2019-01-21 PROCEDURE — 99214 OFFICE O/P EST MOD 30 MIN: CPT | Performed by: PHYSICIAN ASSISTANT

## 2019-01-21 PROCEDURE — G8427 DOCREV CUR MEDS BY ELIG CLIN: HCPCS | Performed by: PHYSICIAN ASSISTANT

## 2019-01-21 PROCEDURE — 1036F TOBACCO NON-USER: CPT | Performed by: PHYSICIAN ASSISTANT

## 2019-01-21 PROCEDURE — 97016 VASOPNEUMATIC DEVICE THERAPY: CPT

## 2019-01-21 PROCEDURE — 96372 THER/PROPH/DIAG INJ SC/IM: CPT | Performed by: PHYSICIAN ASSISTANT

## 2019-01-21 PROCEDURE — 3017F COLORECTAL CA SCREEN DOC REV: CPT | Performed by: PHYSICIAN ASSISTANT

## 2019-01-21 PROCEDURE — G8484 FLU IMMUNIZE NO ADMIN: HCPCS | Performed by: PHYSICIAN ASSISTANT

## 2019-01-21 PROCEDURE — 97140 MANUAL THERAPY 1/> REGIONS: CPT

## 2019-01-21 RX ORDER — KETOROLAC TROMETHAMINE 30 MG/ML
60 INJECTION, SOLUTION INTRAMUSCULAR; INTRAVENOUS ONCE
Status: COMPLETED | OUTPATIENT
Start: 2019-01-21 | End: 2019-01-21

## 2019-01-21 RX ORDER — FLUOXETINE HYDROCHLORIDE 40 MG/1
40 CAPSULE ORAL DAILY
Qty: 90 CAPSULE | Refills: 2 | Status: SHIPPED | OUTPATIENT
Start: 2019-01-21 | End: 2019-10-15 | Stop reason: SDUPTHER

## 2019-01-21 RX ADMIN — KETOROLAC TROMETHAMINE 60 MG: 30 INJECTION, SOLUTION INTRAMUSCULAR; INTRAVENOUS at 08:06

## 2019-01-21 ASSESSMENT — ENCOUNTER SYMPTOMS
ABDOMINAL PAIN: 0
COUGH: 0
WHEEZING: 0
GLOBUS SENSATION: 0
BELCHING: 0
EYE DISCHARGE: 0
WATER BRASH: 0
SINUS PRESSURE: 0
NAUSEA: 0
STRIDOR: 0
SORE THROAT: 0
ORTHOPNEA: 0
HEARTBURN: 1
VOMITING: 0
CHEST TIGHTNESS: 0
SWOLLEN GLANDS: 0
DIARRHEA: 0
VISUAL CHANGE: 0
FACIAL SWEATING: 0
RHINORRHEA: 0
EYE ITCHING: 0
CHOKING: 0
BLURRED VISION: 0

## 2019-01-23 ENCOUNTER — APPOINTMENT (OUTPATIENT)
Dept: PHYSICAL THERAPY | Facility: CLINIC | Age: 59
End: 2019-01-23
Payer: MEDICARE

## 2019-01-23 ENCOUNTER — HOSPITAL ENCOUNTER (OUTPATIENT)
Dept: PHYSICAL THERAPY | Facility: CLINIC | Age: 59
Setting detail: THERAPIES SERIES
Discharge: HOME | End: 2019-01-23
Payer: MEDICARE

## 2019-01-23 PROCEDURE — 97110 THERAPEUTIC EXERCISES: CPT

## 2019-01-23 PROCEDURE — 97140 MANUAL THERAPY 1/> REGIONS: CPT

## 2019-01-23 PROCEDURE — 97016 VASOPNEUMATIC DEVICE THERAPY: CPT

## 2019-01-24 ENCOUNTER — OFFICE VISIT (OUTPATIENT)
Dept: ORTHOPEDIC SURGERY | Age: 59
End: 2019-01-24

## 2019-01-24 VITALS — WEIGHT: 200 LBS | BODY MASS INDEX: 31.39 KG/M2 | HEIGHT: 67 IN

## 2019-01-24 DIAGNOSIS — S82.142D CLOSED FRACTURE OF LEFT TIBIAL PLATEAU WITH ROUTINE HEALING, SUBSEQUENT ENCOUNTER: Primary | ICD-10-CM

## 2019-01-24 DIAGNOSIS — M19.011 ARTHRITIS OF RIGHT ACROMIOCLAVICULAR JOINT: ICD-10-CM

## 2019-01-24 PROCEDURE — 99024 POSTOP FOLLOW-UP VISIT: CPT | Performed by: ORTHOPAEDIC SURGERY

## 2019-01-25 ENCOUNTER — HOSPITAL ENCOUNTER (OUTPATIENT)
Dept: PHYSICAL THERAPY | Facility: CLINIC | Age: 59
Setting detail: THERAPIES SERIES
Discharge: HOME | End: 2019-01-25
Payer: MEDICARE

## 2019-01-25 PROCEDURE — 97016 VASOPNEUMATIC DEVICE THERAPY: CPT

## 2019-01-25 PROCEDURE — 97110 THERAPEUTIC EXERCISES: CPT

## 2019-01-25 PROCEDURE — 97116 GAIT TRAINING THERAPY: CPT

## 2019-01-28 ENCOUNTER — HOSPITAL ENCOUNTER (OUTPATIENT)
Dept: PHYSICAL THERAPY | Facility: CLINIC | Age: 59
Setting detail: THERAPIES SERIES
Discharge: HOME | End: 2019-01-28
Payer: MEDICARE

## 2019-01-28 PROCEDURE — 97116 GAIT TRAINING THERAPY: CPT

## 2019-01-28 PROCEDURE — 97110 THERAPEUTIC EXERCISES: CPT

## 2019-01-30 ENCOUNTER — HOSPITAL ENCOUNTER (OUTPATIENT)
Dept: PHYSICAL THERAPY | Facility: CLINIC | Age: 59
Setting detail: THERAPIES SERIES
Discharge: HOME | End: 2019-01-30
Payer: MEDICARE

## 2019-01-30 PROCEDURE — 97110 THERAPEUTIC EXERCISES: CPT

## 2019-01-30 PROCEDURE — 97016 VASOPNEUMATIC DEVICE THERAPY: CPT

## 2019-02-01 ENCOUNTER — HOSPITAL ENCOUNTER (OUTPATIENT)
Dept: PHYSICAL THERAPY | Facility: CLINIC | Age: 59
Setting detail: THERAPIES SERIES
Discharge: HOME | End: 2019-02-01
Payer: MEDICARE

## 2019-02-01 PROCEDURE — 97016 VASOPNEUMATIC DEVICE THERAPY: CPT

## 2019-02-01 PROCEDURE — 97110 THERAPEUTIC EXERCISES: CPT

## 2019-02-06 ENCOUNTER — HOSPITAL ENCOUNTER (OUTPATIENT)
Dept: PHYSICAL THERAPY | Facility: CLINIC | Age: 59
Setting detail: THERAPIES SERIES
Discharge: HOME | End: 2019-02-06
Payer: MEDICARE

## 2019-02-06 PROCEDURE — 97110 THERAPEUTIC EXERCISES: CPT

## 2019-02-07 ENCOUNTER — HOSPITAL ENCOUNTER (OUTPATIENT)
Dept: PHYSICAL THERAPY | Facility: CLINIC | Age: 59
Setting detail: THERAPIES SERIES
Discharge: HOME | End: 2019-02-07
Payer: MEDICARE

## 2019-02-07 PROCEDURE — 97110 THERAPEUTIC EXERCISES: CPT

## 2019-02-08 ENCOUNTER — HOSPITAL ENCOUNTER (OUTPATIENT)
Dept: PHYSICAL THERAPY | Facility: CLINIC | Age: 59
Setting detail: THERAPIES SERIES
Discharge: HOME | End: 2019-02-08
Payer: MEDICARE

## 2019-02-08 PROCEDURE — 97110 THERAPEUTIC EXERCISES: CPT

## 2019-02-08 PROCEDURE — 97016 VASOPNEUMATIC DEVICE THERAPY: CPT

## 2019-02-11 ENCOUNTER — HOSPITAL ENCOUNTER (OUTPATIENT)
Dept: PHYSICAL THERAPY | Facility: CLINIC | Age: 59
Setting detail: THERAPIES SERIES
Discharge: HOME | End: 2019-02-11
Payer: MEDICARE

## 2019-02-11 PROCEDURE — 97110 THERAPEUTIC EXERCISES: CPT

## 2019-02-11 PROCEDURE — 97016 VASOPNEUMATIC DEVICE THERAPY: CPT

## 2019-02-13 ENCOUNTER — HOSPITAL ENCOUNTER (OUTPATIENT)
Dept: PHYSICAL THERAPY | Facility: CLINIC | Age: 59
Setting detail: THERAPIES SERIES
Discharge: HOME | End: 2019-02-13
Payer: MEDICARE

## 2019-02-13 PROCEDURE — 97016 VASOPNEUMATIC DEVICE THERAPY: CPT

## 2019-02-13 PROCEDURE — 97110 THERAPEUTIC EXERCISES: CPT

## 2019-02-14 ENCOUNTER — OFFICE VISIT (OUTPATIENT)
Dept: ORTHOPEDIC SURGERY | Age: 59
End: 2019-02-14

## 2019-02-14 ENCOUNTER — HOSPITAL ENCOUNTER (OUTPATIENT)
Age: 59
Discharge: HOME | End: 2019-02-16
Payer: MEDICARE

## 2019-02-14 ENCOUNTER — HOSPITAL ENCOUNTER (OUTPATIENT)
Dept: GENERAL RADIOLOGY | Age: 59
Discharge: HOME | End: 2019-02-16
Payer: MEDICARE

## 2019-02-14 VITALS — HEIGHT: 67 IN | BODY MASS INDEX: 31.39 KG/M2 | WEIGHT: 200 LBS

## 2019-02-14 DIAGNOSIS — S82.142D CLOSED FRACTURE OF LEFT TIBIAL PLATEAU WITH ROUTINE HEALING, SUBSEQUENT ENCOUNTER: ICD-10-CM

## 2019-02-14 DIAGNOSIS — S82.142D CLOSED FRACTURE OF LEFT TIBIAL PLATEAU WITH ROUTINE HEALING, SUBSEQUENT ENCOUNTER: Primary | ICD-10-CM

## 2019-02-14 PROCEDURE — 99024 POSTOP FOLLOW-UP VISIT: CPT | Performed by: ORTHOPAEDIC SURGERY

## 2019-02-14 PROCEDURE — 73562 X-RAY EXAM OF KNEE 3: CPT

## 2019-02-15 ENCOUNTER — APPOINTMENT (OUTPATIENT)
Dept: PHYSICAL THERAPY | Facility: CLINIC | Age: 59
End: 2019-02-15
Payer: MEDICARE

## 2019-02-18 ENCOUNTER — APPOINTMENT (OUTPATIENT)
Dept: PHYSICAL THERAPY | Facility: CLINIC | Age: 59
End: 2019-02-18
Payer: MEDICARE

## 2019-02-20 ENCOUNTER — APPOINTMENT (OUTPATIENT)
Dept: PHYSICAL THERAPY | Facility: CLINIC | Age: 59
End: 2019-02-20
Payer: MEDICARE

## 2019-02-22 ENCOUNTER — APPOINTMENT (OUTPATIENT)
Dept: PHYSICAL THERAPY | Facility: CLINIC | Age: 59
End: 2019-02-22
Payer: MEDICARE

## 2019-02-25 ENCOUNTER — OFFICE VISIT (OUTPATIENT)
Dept: PULMONOLOGY | Age: 59
End: 2019-02-25
Payer: MEDICARE

## 2019-02-25 VITALS
HEIGHT: 66 IN | BODY MASS INDEX: 33.14 KG/M2 | WEIGHT: 206.2 LBS | RESPIRATION RATE: 18 BRPM | DIASTOLIC BLOOD PRESSURE: 70 MMHG | OXYGEN SATURATION: 94 % | SYSTOLIC BLOOD PRESSURE: 117 MMHG | HEART RATE: 68 BPM

## 2019-02-25 DIAGNOSIS — Z98.84 STATUS POST GASTRIC BYPASS FOR OBESITY: ICD-10-CM

## 2019-02-25 DIAGNOSIS — Z99.89 OSA ON CPAP: Primary | ICD-10-CM

## 2019-02-25 DIAGNOSIS — G47.33 OSA ON CPAP: Primary | ICD-10-CM

## 2019-02-25 DIAGNOSIS — J45.40 MODERATE PERSISTENT ASTHMA WITHOUT COMPLICATION: ICD-10-CM

## 2019-02-25 DIAGNOSIS — I10 ESSENTIAL HYPERTENSION: ICD-10-CM

## 2019-02-25 PROCEDURE — 1036F TOBACCO NON-USER: CPT | Performed by: INTERNAL MEDICINE

## 2019-02-25 PROCEDURE — 3017F COLORECTAL CA SCREEN DOC REV: CPT | Performed by: INTERNAL MEDICINE

## 2019-02-25 PROCEDURE — G8417 CALC BMI ABV UP PARAM F/U: HCPCS | Performed by: INTERNAL MEDICINE

## 2019-02-25 PROCEDURE — G8484 FLU IMMUNIZE NO ADMIN: HCPCS | Performed by: INTERNAL MEDICINE

## 2019-02-25 PROCEDURE — 99214 OFFICE O/P EST MOD 30 MIN: CPT | Performed by: INTERNAL MEDICINE

## 2019-02-25 PROCEDURE — G8427 DOCREV CUR MEDS BY ELIG CLIN: HCPCS | Performed by: INTERNAL MEDICINE

## 2019-02-28 ENCOUNTER — OFFICE VISIT (OUTPATIENT)
Dept: ORTHOPEDIC SURGERY | Age: 59
End: 2019-02-28

## 2019-02-28 VITALS — HEIGHT: 66 IN | WEIGHT: 206.13 LBS | BODY MASS INDEX: 33.13 KG/M2

## 2019-02-28 DIAGNOSIS — S82.142D CLOSED FRACTURE OF LEFT TIBIAL PLATEAU WITH ROUTINE HEALING, SUBSEQUENT ENCOUNTER: Primary | ICD-10-CM

## 2019-02-28 PROCEDURE — 99024 POSTOP FOLLOW-UP VISIT: CPT | Performed by: ORTHOPAEDIC SURGERY

## 2019-04-01 ENCOUNTER — PATIENT MESSAGE (OUTPATIENT)
Dept: PULMONOLOGY | Age: 59
End: 2019-04-01

## 2019-04-01 DIAGNOSIS — J45.20 MILD INTERMITTENT ASTHMA WITHOUT COMPLICATION: ICD-10-CM

## 2019-04-01 RX ORDER — ALBUTEROL SULFATE 90 UG/1
2 AEROSOL, METERED RESPIRATORY (INHALATION) EVERY 6 HOURS PRN
Qty: 1 INHALER | Refills: 4 | Status: SHIPPED | OUTPATIENT
Start: 2019-04-01 | End: 2020-10-12 | Stop reason: SDUPTHER

## 2019-04-01 RX ORDER — MONTELUKAST SODIUM 10 MG/1
10 TABLET ORAL NIGHTLY
Qty: 30 TABLET | Refills: 4 | Status: SHIPPED | OUTPATIENT
Start: 2019-04-01 | End: 2019-08-26 | Stop reason: SDUPTHER

## 2019-04-01 NOTE — TELEPHONE ENCOUNTER
Dr Komal Sousa, patient is current and due in for an appointment on 8/26/19. Per your last dictation:    Continue Dulera and use albuterol as needed    No mention of Singulair. Please sign for refills if ok. Thank you.

## 2019-04-10 ENCOUNTER — HOSPITAL ENCOUNTER (OUTPATIENT)
Dept: GENERAL RADIOLOGY | Age: 59
Discharge: HOME | End: 2019-04-12
Payer: MEDICARE

## 2019-04-10 ENCOUNTER — HOSPITAL ENCOUNTER (OUTPATIENT)
Age: 59
Discharge: HOME | End: 2019-04-12
Payer: MEDICARE

## 2019-04-10 DIAGNOSIS — S82.142D CLOSED FRACTURE OF LEFT TIBIAL PLATEAU WITH ROUTINE HEALING, SUBSEQUENT ENCOUNTER: ICD-10-CM

## 2019-04-10 DIAGNOSIS — S82.142D CLOSED FRACTURE OF LEFT TIBIAL PLATEAU WITH ROUTINE HEALING, SUBSEQUENT ENCOUNTER: Primary | ICD-10-CM

## 2019-04-10 PROCEDURE — 73562 X-RAY EXAM OF KNEE 3: CPT

## 2019-04-16 ENCOUNTER — OFFICE VISIT (OUTPATIENT)
Dept: ORTHOPEDIC SURGERY | Age: 59
End: 2019-04-16
Payer: MEDICARE

## 2019-04-16 VITALS — HEIGHT: 66 IN | WEIGHT: 206.13 LBS | BODY MASS INDEX: 33.13 KG/M2

## 2019-04-16 DIAGNOSIS — M17.32 POST-TRAUMATIC OSTEOARTHRITIS OF LEFT KNEE: ICD-10-CM

## 2019-04-16 DIAGNOSIS — M17.12 PRIMARY OSTEOARTHRITIS OF LEFT KNEE: Primary | ICD-10-CM

## 2019-04-16 PROCEDURE — 1036F TOBACCO NON-USER: CPT | Performed by: ORTHOPAEDIC SURGERY

## 2019-04-16 PROCEDURE — 99212 OFFICE O/P EST SF 10 MIN: CPT | Performed by: ORTHOPAEDIC SURGERY

## 2019-04-16 PROCEDURE — G8427 DOCREV CUR MEDS BY ELIG CLIN: HCPCS | Performed by: ORTHOPAEDIC SURGERY

## 2019-04-16 PROCEDURE — G8417 CALC BMI ABV UP PARAM F/U: HCPCS | Performed by: ORTHOPAEDIC SURGERY

## 2019-04-16 PROCEDURE — 3017F COLORECTAL CA SCREEN DOC REV: CPT | Performed by: ORTHOPAEDIC SURGERY

## 2019-04-16 PROCEDURE — 20610 DRAIN/INJ JOINT/BURSA W/O US: CPT | Performed by: ORTHOPAEDIC SURGERY

## 2019-04-16 RX ORDER — METHYLPREDNISOLONE ACETATE 40 MG/ML
40 INJECTION, SUSPENSION INTRA-ARTICULAR; INTRALESIONAL; INTRAMUSCULAR; SOFT TISSUE ONCE
Status: COMPLETED | OUTPATIENT
Start: 2019-04-16 | End: 2019-04-16

## 2019-04-16 RX ADMIN — METHYLPREDNISOLONE ACETATE 40 MG: 40 INJECTION, SUSPENSION INTRA-ARTICULAR; INTRALESIONAL; INTRAMUSCULAR; SOFT TISSUE at 10:25

## 2019-04-16 NOTE — PROGRESS NOTES
Chief Complaint   Patient presents with    Follow-up     closed fx of lt tibial plateau with routine healing      This patient with undergone open reduction internal fixation of bicondylar left tibial plateau fracture on December 5 is still having pain in the knee. She says most of the time the pain is at 2 out of 10 felt on medial and lateral side but when the pain gets so worse it is 4 out of 10 and sharp shooting pain over the center portion of the proximal tibia. He says a radiating pain and last only a few seconds. Semination: Her range of motion is excellent. There is no malalignment. The incisions are well healed. There is no instability. X-rays: 3 views of the left knee show the hardware is intact and the fracture is healed. There is a slight irregularity over the medial joint. This is less than 2 mm. Diagnosis: Painful possibly posttraumatic osteoarthritis left knee. Treatment: After thoroughly cleaning the skin with Betadine and alcohol I injected the left knee with 40 mg Depo-Medrol and 5 mL of 1% plain lidocaine. The patient is still continuing her own physical therapy and I will see her in 4 weeks.

## 2019-04-19 RX ORDER — TOPIRAMATE 50 MG/1
TABLET, FILM COATED ORAL
Qty: 90 TABLET | Refills: 1 | Status: SHIPPED | OUTPATIENT
Start: 2019-04-19 | End: 2019-09-17 | Stop reason: SDUPTHER

## 2019-05-14 ENCOUNTER — OFFICE VISIT (OUTPATIENT)
Dept: ORTHOPEDIC SURGERY | Age: 59
End: 2019-05-14
Payer: MEDICARE

## 2019-05-14 VITALS — WEIGHT: 206.13 LBS | BODY MASS INDEX: 33.13 KG/M2 | HEIGHT: 66 IN

## 2019-05-14 DIAGNOSIS — M19.011 ARTHRITIS OF RIGHT ACROMIOCLAVICULAR JOINT: ICD-10-CM

## 2019-05-14 PROCEDURE — 1036F TOBACCO NON-USER: CPT | Performed by: ORTHOPAEDIC SURGERY

## 2019-05-14 PROCEDURE — 99212 OFFICE O/P EST SF 10 MIN: CPT | Performed by: ORTHOPAEDIC SURGERY

## 2019-05-14 PROCEDURE — 3017F COLORECTAL CA SCREEN DOC REV: CPT | Performed by: ORTHOPAEDIC SURGERY

## 2019-05-14 PROCEDURE — G8427 DOCREV CUR MEDS BY ELIG CLIN: HCPCS | Performed by: ORTHOPAEDIC SURGERY

## 2019-05-14 PROCEDURE — G8417 CALC BMI ABV UP PARAM F/U: HCPCS | Performed by: ORTHOPAEDIC SURGERY

## 2019-05-14 RX ORDER — OXYCODONE HYDROCHLORIDE AND ACETAMINOPHEN 5; 325 MG/1; MG/1
2 TABLET ORAL EVERY 6 HOURS PRN
Qty: 56 TABLET | Refills: 0 | Status: SHIPPED | OUTPATIENT
Start: 2019-05-14 | End: 2019-05-21

## 2019-05-14 NOTE — PROGRESS NOTES
If complaint is that of pain in the left knee. This patient had undergone open reduction internal fixation of bicondylar tibial plateau fracture on December 5 and was progressing very well when last seen. They have moved into a new house and required a lot of running up and pulling out of the car parents and physical demand was made and therefore the left knee has been very painful. Her  is not able to help very much because he has COPD and heart failure. Examination: The wound remains solidly healed. She has excellent normal motion of the knee. Diagnosis: Status post ORIF bicondylar fracture left tibial plateau. Treatment: I have given her 64 Percocet but she will only take at the maximum to a day though she says that most of the time she only takes half. I will see her in 6 weeks' time.

## 2019-06-27 ENCOUNTER — OFFICE VISIT (OUTPATIENT)
Dept: ORTHOPEDIC SURGERY | Age: 59
End: 2019-06-27
Payer: MEDICARE

## 2019-06-27 VITALS — WEIGHT: 206.13 LBS | HEIGHT: 66 IN | BODY MASS INDEX: 33.13 KG/M2

## 2019-06-27 DIAGNOSIS — M17.32 POST-TRAUMATIC OSTEOARTHRITIS OF LEFT KNEE: ICD-10-CM

## 2019-06-27 DIAGNOSIS — M17.12 PRIMARY OSTEOARTHRITIS OF LEFT KNEE: Primary | ICD-10-CM

## 2019-06-27 PROCEDURE — G8417 CALC BMI ABV UP PARAM F/U: HCPCS | Performed by: ORTHOPAEDIC SURGERY

## 2019-06-27 PROCEDURE — 1036F TOBACCO NON-USER: CPT | Performed by: ORTHOPAEDIC SURGERY

## 2019-06-27 PROCEDURE — 3017F COLORECTAL CA SCREEN DOC REV: CPT | Performed by: ORTHOPAEDIC SURGERY

## 2019-06-27 PROCEDURE — G8428 CUR MEDS NOT DOCUMENT: HCPCS | Performed by: ORTHOPAEDIC SURGERY

## 2019-06-27 PROCEDURE — 99213 OFFICE O/P EST LOW 20 MIN: CPT | Performed by: ORTHOPAEDIC SURGERY

## 2019-06-27 PROCEDURE — 20610 DRAIN/INJ JOINT/BURSA W/O US: CPT | Performed by: ORTHOPAEDIC SURGERY

## 2019-06-27 RX ORDER — METHYLPREDNISOLONE ACETATE 40 MG/ML
40 INJECTION, SUSPENSION INTRA-ARTICULAR; INTRALESIONAL; INTRAMUSCULAR; SOFT TISSUE ONCE
Status: COMPLETED | OUTPATIENT
Start: 2019-06-27 | End: 2019-06-27

## 2019-06-27 RX ADMIN — METHYLPREDNISOLONE ACETATE 40 MG: 40 INJECTION, SUSPENSION INTRA-ARTICULAR; INTRALESIONAL; INTRAMUSCULAR; SOFT TISSUE at 10:27

## 2019-07-16 ENCOUNTER — OFFICE VISIT (OUTPATIENT)
Dept: ORTHOPEDIC SURGERY | Age: 59
End: 2019-07-16
Payer: MEDICARE

## 2019-07-16 VITALS — HEIGHT: 66 IN | BODY MASS INDEX: 33.13 KG/M2 | WEIGHT: 206.13 LBS

## 2019-07-16 DIAGNOSIS — S82.142D CLOSED FRACTURE OF LEFT TIBIAL PLATEAU WITH ROUTINE HEALING, SUBSEQUENT ENCOUNTER: Primary | ICD-10-CM

## 2019-07-16 PROBLEM — S82.142A CLOSED FRACTURE OF LEFT TIBIAL PLATEAU: Status: ACTIVE | Noted: 2019-07-16

## 2019-07-16 PROCEDURE — 3017F COLORECTAL CA SCREEN DOC REV: CPT | Performed by: ORTHOPAEDIC SURGERY

## 2019-07-16 PROCEDURE — 1036F TOBACCO NON-USER: CPT | Performed by: ORTHOPAEDIC SURGERY

## 2019-07-16 PROCEDURE — G8417 CALC BMI ABV UP PARAM F/U: HCPCS | Performed by: ORTHOPAEDIC SURGERY

## 2019-07-16 PROCEDURE — 99212 OFFICE O/P EST SF 10 MIN: CPT | Performed by: ORTHOPAEDIC SURGERY

## 2019-07-16 PROCEDURE — G8427 DOCREV CUR MEDS BY ELIG CLIN: HCPCS | Performed by: ORTHOPAEDIC SURGERY

## 2019-08-02 ENCOUNTER — HOSPITAL ENCOUNTER (OUTPATIENT)
Dept: PREADMISSION TESTING | Age: 59
Discharge: HOME | End: 2019-08-06
Payer: MEDICARE

## 2019-08-02 VITALS
RESPIRATION RATE: 16 BRPM | HEIGHT: 67 IN | WEIGHT: 195 LBS | BODY MASS INDEX: 30.61 KG/M2 | HEART RATE: 72 BPM | OXYGEN SATURATION: 100 % | SYSTOLIC BLOOD PRESSURE: 116 MMHG | TEMPERATURE: 98.2 F | DIASTOLIC BLOOD PRESSURE: 68 MMHG

## 2019-08-02 LAB
ABSOLUTE EOS #: 0.1 K/UL (ref 0–0.4)
ABSOLUTE IMMATURE GRANULOCYTE: ABNORMAL K/UL (ref 0–0.3)
ABSOLUTE LYMPH #: 2.1 K/UL (ref 1–4.8)
ABSOLUTE MONO #: 0.6 K/UL (ref 0.1–1.3)
ANION GAP SERPL CALCULATED.3IONS-SCNC: 10 MMOL/L (ref 9–17)
BASOPHILS # BLD: 1 % (ref 0–2)
BASOPHILS ABSOLUTE: 0 K/UL (ref 0–0.2)
BUN BLDV-MCNC: 13 MG/DL (ref 6–20)
BUN/CREAT BLD: NORMAL (ref 9–20)
CALCIUM SERPL-MCNC: 8.7 MG/DL (ref 8.6–10.4)
CHLORIDE BLD-SCNC: 107 MMOL/L (ref 98–107)
CO2: 22 MMOL/L (ref 20–31)
CREAT SERPL-MCNC: 0.64 MG/DL (ref 0.5–0.9)
DIFFERENTIAL TYPE: ABNORMAL
EOSINOPHILS RELATIVE PERCENT: 2 % (ref 0–4)
GFR AFRICAN AMERICAN: >60 ML/MIN
GFR NON-AFRICAN AMERICAN: >60 ML/MIN
GFR SERPL CREATININE-BSD FRML MDRD: NORMAL ML/MIN/{1.73_M2}
GFR SERPL CREATININE-BSD FRML MDRD: NORMAL ML/MIN/{1.73_M2}
GLUCOSE BLD-MCNC: 84 MG/DL (ref 70–99)
HCT VFR BLD CALC: 40.9 % (ref 36–46)
HEMOGLOBIN: 13.3 G/DL (ref 12–16)
IMMATURE GRANULOCYTES: ABNORMAL %
LYMPHOCYTES # BLD: 27 % (ref 24–44)
MCH RBC QN AUTO: 29.7 PG (ref 26–34)
MCHC RBC AUTO-ENTMCNC: 32.6 G/DL (ref 31–37)
MCV RBC AUTO: 91.2 FL (ref 80–100)
MONOCYTES # BLD: 8 % (ref 1–7)
NRBC AUTOMATED: ABNORMAL PER 100 WBC
PDW BLD-RTO: 13.8 % (ref 11.5–14.9)
PLATELET # BLD: 334 K/UL (ref 150–450)
PLATELET ESTIMATE: ABNORMAL
PMV BLD AUTO: 6.4 FL (ref 6–12)
POTASSIUM SERPL-SCNC: 4.6 MMOL/L (ref 3.7–5.3)
RBC # BLD: 4.48 M/UL (ref 4–5.2)
RBC # BLD: ABNORMAL 10*6/UL
SEG NEUTROPHILS: 62 % (ref 36–66)
SEGMENTED NEUTROPHILS ABSOLUTE COUNT: 4.9 K/UL (ref 1.3–9.1)
SODIUM BLD-SCNC: 139 MMOL/L (ref 135–144)
WBC # BLD: 7.8 K/UL (ref 3.5–11)
WBC # BLD: ABNORMAL 10*3/UL

## 2019-08-02 PROCEDURE — 80048 BASIC METABOLIC PNL TOTAL CA: CPT

## 2019-08-02 PROCEDURE — 85025 COMPLETE CBC W/AUTO DIFF WBC: CPT

## 2019-08-02 PROCEDURE — 36415 COLL VENOUS BLD VENIPUNCTURE: CPT

## 2019-08-02 PROCEDURE — 93005 ELECTROCARDIOGRAM TRACING: CPT | Performed by: ANESTHESIOLOGY

## 2019-08-02 ASSESSMENT — PAIN DESCRIPTION - PAIN TYPE: TYPE: CHRONIC PAIN

## 2019-08-02 ASSESSMENT — PAIN SCALES - GENERAL: PAINLEVEL_OUTOF10: 2

## 2019-08-02 ASSESSMENT — PAIN DESCRIPTION - LOCATION: LOCATION: KNEE

## 2019-08-02 ASSESSMENT — PAIN DESCRIPTION - ORIENTATION: ORIENTATION: LEFT

## 2019-08-02 NOTE — PROGRESS NOTES
Dr. Hunter Luna, anesthesia, was contacted and informed of the patient's history, scheduled surgery, and unconfirmed NSR EKG results from EKG done today in [facility]. No clearance required.

## 2019-08-03 ENCOUNTER — ANESTHESIA EVENT (OUTPATIENT)
Dept: OPERATING ROOM | Age: 59
End: 2019-08-03
Payer: MEDICARE

## 2019-08-03 RX ORDER — SODIUM CHLORIDE 0.9 % (FLUSH) 0.9 %
10 SYRINGE (ML) INJECTION EVERY 12 HOURS SCHEDULED
Status: CANCELLED | OUTPATIENT
Start: 2019-08-03

## 2019-08-03 RX ORDER — SODIUM CHLORIDE 0.9 % (FLUSH) 0.9 %
10 SYRINGE (ML) INJECTION PRN
Status: CANCELLED | OUTPATIENT
Start: 2019-08-03

## 2019-08-03 RX ORDER — LIDOCAINE HYDROCHLORIDE 10 MG/ML
1 INJECTION, SOLUTION EPIDURAL; INFILTRATION; INTRACAUDAL; PERINEURAL
Status: CANCELLED | OUTPATIENT
Start: 2019-08-03 | End: 2019-08-03

## 2019-08-03 RX ORDER — SODIUM CHLORIDE, SODIUM LACTATE, POTASSIUM CHLORIDE, CALCIUM CHLORIDE 600; 310; 30; 20 MG/100ML; MG/100ML; MG/100ML; MG/100ML
INJECTION, SOLUTION INTRAVENOUS CONTINUOUS
Status: CANCELLED | OUTPATIENT
Start: 2019-08-03

## 2019-08-04 LAB
EKG ATRIAL RATE: 61 BPM
EKG P AXIS: 23 DEGREES
EKG P-R INTERVAL: 144 MS
EKG Q-T INTERVAL: 422 MS
EKG QRS DURATION: 80 MS
EKG QTC CALCULATION (BAZETT): 424 MS
EKG R AXIS: 46 DEGREES
EKG T AXIS: 40 DEGREES
EKG VENTRICULAR RATE: 61 BPM

## 2019-08-04 PROCEDURE — 93010 ELECTROCARDIOGRAM REPORT: CPT | Performed by: INTERNAL MEDICINE

## 2019-08-16 ENCOUNTER — ANESTHESIA (OUTPATIENT)
Dept: OPERATING ROOM | Age: 59
End: 2019-08-16
Payer: MEDICARE

## 2019-08-16 ENCOUNTER — HOSPITAL ENCOUNTER (OUTPATIENT)
Age: 59
Setting detail: OUTPATIENT SURGERY
Discharge: HOME | End: 2019-08-16
Attending: SURGERY | Admitting: SURGERY
Payer: MEDICARE

## 2019-08-16 VITALS
DIASTOLIC BLOOD PRESSURE: 59 MMHG | HEART RATE: 80 BPM | RESPIRATION RATE: 16 BRPM | OXYGEN SATURATION: 94 % | HEIGHT: 67 IN | WEIGHT: 195 LBS | SYSTOLIC BLOOD PRESSURE: 127 MMHG | BODY MASS INDEX: 30.61 KG/M2 | TEMPERATURE: 99 F

## 2019-08-16 VITALS — DIASTOLIC BLOOD PRESSURE: 92 MMHG | SYSTOLIC BLOOD PRESSURE: 145 MMHG | OXYGEN SATURATION: 95 % | TEMPERATURE: 99.5 F

## 2019-08-16 PROCEDURE — 2500000003 HC RX 250 WO HCPCS: Performed by: SPECIALIST

## 2019-08-16 PROCEDURE — 2580000003 HC RX 258: Performed by: ANESTHESIOLOGY

## 2019-08-16 PROCEDURE — 6360000002 HC RX W HCPCS: Performed by: SURGERY

## 2019-08-16 PROCEDURE — 7100000010 HC PHASE II RECOVERY - FIRST 15 MIN: Performed by: SURGERY

## 2019-08-16 PROCEDURE — 3600000012 HC SURGERY LEVEL 2 ADDTL 15MIN: Performed by: SURGERY

## 2019-08-16 PROCEDURE — 7100000030 HC ASPR PHASE II RECOVERY - FIRST 15 MIN: Performed by: SURGERY

## 2019-08-16 PROCEDURE — 6370000000 HC RX 637 (ALT 250 FOR IP): Performed by: ANESTHESIOLOGY

## 2019-08-16 PROCEDURE — 3600000002 HC SURGERY LEVEL 2 BASE: Performed by: SURGERY

## 2019-08-16 PROCEDURE — 7100000000 HC PACU RECOVERY - FIRST 15 MIN: Performed by: SURGERY

## 2019-08-16 PROCEDURE — 3700000001 HC ADD 15 MINUTES (ANESTHESIA): Performed by: SURGERY

## 2019-08-16 PROCEDURE — 7100000031 HC ASPR PHASE II RECOVERY - ADDTL 15 MIN: Performed by: SURGERY

## 2019-08-16 PROCEDURE — 6360000002 HC RX W HCPCS: Performed by: SPECIALIST

## 2019-08-16 PROCEDURE — 7100000011 HC PHASE II RECOVERY - ADDTL 15 MIN: Performed by: SURGERY

## 2019-08-16 PROCEDURE — 6360000002 HC RX W HCPCS: Performed by: ANESTHESIOLOGY

## 2019-08-16 PROCEDURE — 3700000000 HC ANESTHESIA ATTENDED CARE: Performed by: SURGERY

## 2019-08-16 PROCEDURE — 7100000001 HC PACU RECOVERY - ADDTL 15 MIN: Performed by: SURGERY

## 2019-08-16 PROCEDURE — 88302 TISSUE EXAM BY PATHOLOGIST: CPT

## 2019-08-16 PROCEDURE — 2709999900 HC NON-CHARGEABLE SUPPLY: Performed by: SURGERY

## 2019-08-16 PROCEDURE — 2500000003 HC RX 250 WO HCPCS: Performed by: SURGERY

## 2019-08-16 RX ORDER — ROCURONIUM BROMIDE 10 MG/ML
INJECTION, SOLUTION INTRAVENOUS PRN
Status: DISCONTINUED | OUTPATIENT
Start: 2019-08-16 | End: 2019-08-16 | Stop reason: SDUPTHER

## 2019-08-16 RX ORDER — DEXAMETHASONE SODIUM PHOSPHATE 4 MG/ML
INJECTION, SOLUTION INTRA-ARTICULAR; INTRALESIONAL; INTRAMUSCULAR; INTRAVENOUS; SOFT TISSUE PRN
Status: DISCONTINUED | OUTPATIENT
Start: 2019-08-16 | End: 2019-08-16 | Stop reason: SDUPTHER

## 2019-08-16 RX ORDER — SODIUM CHLORIDE 0.9 % (FLUSH) 0.9 %
10 SYRINGE (ML) INJECTION EVERY 12 HOURS SCHEDULED
Status: DISCONTINUED | OUTPATIENT
Start: 2019-08-16 | End: 2019-08-16 | Stop reason: HOSPADM

## 2019-08-16 RX ORDER — BUPIVACAINE HYDROCHLORIDE AND EPINEPHRINE 2.5; 5 MG/ML; UG/ML
INJECTION, SOLUTION EPIDURAL; INFILTRATION; INTRACAUDAL; PERINEURAL PRN
Status: DISCONTINUED | OUTPATIENT
Start: 2019-08-16 | End: 2019-08-16 | Stop reason: ALTCHOICE

## 2019-08-16 RX ORDER — OXYCODONE HYDROCHLORIDE AND ACETAMINOPHEN 5; 325 MG/1; MG/1
2 TABLET ORAL PRN
Status: COMPLETED | OUTPATIENT
Start: 2019-08-16 | End: 2019-08-16

## 2019-08-16 RX ORDER — OXYCODONE HYDROCHLORIDE AND ACETAMINOPHEN 5; 325 MG/1; MG/1
1 TABLET ORAL PRN
Status: COMPLETED | OUTPATIENT
Start: 2019-08-16 | End: 2019-08-16

## 2019-08-16 RX ORDER — LIDOCAINE HYDROCHLORIDE 10 MG/ML
INJECTION, SOLUTION EPIDURAL; INFILTRATION; INTRACAUDAL; PERINEURAL PRN
Status: DISCONTINUED | OUTPATIENT
Start: 2019-08-16 | End: 2019-08-16 | Stop reason: SDUPTHER

## 2019-08-16 RX ORDER — MIDAZOLAM HYDROCHLORIDE 1 MG/ML
INJECTION INTRAMUSCULAR; INTRAVENOUS PRN
Status: DISCONTINUED | OUTPATIENT
Start: 2019-08-16 | End: 2019-08-16 | Stop reason: SDUPTHER

## 2019-08-16 RX ORDER — FENTANYL CITRATE 50 UG/ML
INJECTION, SOLUTION INTRAMUSCULAR; INTRAVENOUS PRN
Status: DISCONTINUED | OUTPATIENT
Start: 2019-08-16 | End: 2019-08-16 | Stop reason: SDUPTHER

## 2019-08-16 RX ORDER — SODIUM CHLORIDE 0.9 % (FLUSH) 0.9 %
10 SYRINGE (ML) INJECTION PRN
Status: DISCONTINUED | OUTPATIENT
Start: 2019-08-16 | End: 2019-08-16 | Stop reason: HOSPADM

## 2019-08-16 RX ORDER — PROPOFOL 10 MG/ML
INJECTION, EMULSION INTRAVENOUS PRN
Status: DISCONTINUED | OUTPATIENT
Start: 2019-08-16 | End: 2019-08-16 | Stop reason: SDUPTHER

## 2019-08-16 RX ORDER — LIDOCAINE HYDROCHLORIDE 10 MG/ML
1 INJECTION, SOLUTION EPIDURAL; INFILTRATION; INTRACAUDAL; PERINEURAL
Status: DISCONTINUED | OUTPATIENT
Start: 2019-08-16 | End: 2019-08-16 | Stop reason: HOSPADM

## 2019-08-16 RX ORDER — ONDANSETRON 2 MG/ML
INJECTION INTRAMUSCULAR; INTRAVENOUS PRN
Status: DISCONTINUED | OUTPATIENT
Start: 2019-08-16 | End: 2019-08-16 | Stop reason: SDUPTHER

## 2019-08-16 RX ORDER — LABETALOL 20 MG/4 ML (5 MG/ML) INTRAVENOUS SYRINGE
5 EVERY 10 MIN PRN
Status: DISCONTINUED | OUTPATIENT
Start: 2019-08-16 | End: 2019-08-16 | Stop reason: HOSPADM

## 2019-08-16 RX ORDER — SODIUM CHLORIDE, SODIUM LACTATE, POTASSIUM CHLORIDE, CALCIUM CHLORIDE 600; 310; 30; 20 MG/100ML; MG/100ML; MG/100ML; MG/100ML
INJECTION, SOLUTION INTRAVENOUS CONTINUOUS
Status: DISCONTINUED | OUTPATIENT
Start: 2019-08-16 | End: 2019-08-16 | Stop reason: HOSPADM

## 2019-08-16 RX ORDER — PROMETHAZINE HYDROCHLORIDE 25 MG/ML
6.25 INJECTION, SOLUTION INTRAMUSCULAR; INTRAVENOUS
Status: DISCONTINUED | OUTPATIENT
Start: 2019-08-16 | End: 2019-08-16 | Stop reason: HOSPADM

## 2019-08-16 RX ORDER — DIPHENHYDRAMINE HYDROCHLORIDE 50 MG/ML
12.5 INJECTION INTRAMUSCULAR; INTRAVENOUS
Status: DISCONTINUED | OUTPATIENT
Start: 2019-08-16 | End: 2019-08-16 | Stop reason: HOSPADM

## 2019-08-16 RX ORDER — EPINEPHRINE 1 MG/ML(1)
AMPUL (ML) INJECTION PRN
Status: DISCONTINUED | OUTPATIENT
Start: 2019-08-16 | End: 2019-08-16 | Stop reason: ALTCHOICE

## 2019-08-16 RX ORDER — ONDANSETRON 2 MG/ML
4 INJECTION INTRAMUSCULAR; INTRAVENOUS
Status: DISCONTINUED | OUTPATIENT
Start: 2019-08-16 | End: 2019-08-16 | Stop reason: HOSPADM

## 2019-08-16 RX ADMIN — SODIUM CHLORIDE, POTASSIUM CHLORIDE, SODIUM LACTATE AND CALCIUM CHLORIDE: 600; 310; 30; 20 INJECTION, SOLUTION INTRAVENOUS at 07:01

## 2019-08-16 RX ADMIN — FENTANYL CITRATE 50 MCG: 50 INJECTION, SOLUTION INTRAMUSCULAR; INTRAVENOUS at 08:42

## 2019-08-16 RX ADMIN — MIDAZOLAM 2 MG: 1 INJECTION INTRAMUSCULAR; INTRAVENOUS at 08:13

## 2019-08-16 RX ADMIN — ONDANSETRON 4 MG: 2 INJECTION INTRAMUSCULAR; INTRAVENOUS at 09:56

## 2019-08-16 RX ADMIN — HYDROMORPHONE HYDROCHLORIDE 0.5 MG: 1 INJECTION, SOLUTION INTRAMUSCULAR; INTRAVENOUS; SUBCUTANEOUS at 10:42

## 2019-08-16 RX ADMIN — DEXAMETHASONE SODIUM PHOSPHATE 4 MG: 4 INJECTION, SOLUTION INTRA-ARTICULAR; INTRALESIONAL; INTRAMUSCULAR; INTRAVENOUS; SOFT TISSUE at 08:26

## 2019-08-16 RX ADMIN — SODIUM CHLORIDE, POTASSIUM CHLORIDE, SODIUM LACTATE AND CALCIUM CHLORIDE: 600; 310; 30; 20 INJECTION, SOLUTION INTRAVENOUS at 08:15

## 2019-08-16 RX ADMIN — Medication 2 G: at 08:26

## 2019-08-16 RX ADMIN — OXYCODONE HYDROCHLORIDE AND ACETAMINOPHEN 2 TABLET: 5; 325 TABLET ORAL at 11:30

## 2019-08-16 RX ADMIN — FENTANYL CITRATE 50 MCG: 50 INJECTION, SOLUTION INTRAMUSCULAR; INTRAVENOUS at 08:18

## 2019-08-16 RX ADMIN — PROPOFOL 200 MG: 10 INJECTION, EMULSION INTRAVENOUS at 08:18

## 2019-08-16 RX ADMIN — ROCURONIUM BROMIDE 40 MG: 10 INJECTION, SOLUTION INTRAVENOUS at 08:18

## 2019-08-16 RX ADMIN — SUGAMMADEX 100 MG: 100 INJECTION, SOLUTION INTRAVENOUS at 10:06

## 2019-08-16 RX ADMIN — LIDOCAINE HYDROCHLORIDE 40 MG: 10 INJECTION, SOLUTION EPIDURAL; INFILTRATION; INTRACAUDAL; PERINEURAL at 08:18

## 2019-08-16 ASSESSMENT — PAIN SCALES - GENERAL
PAINLEVEL_OUTOF10: 6
PAINLEVEL_OUTOF10: 6
PAINLEVEL_OUTOF10: 0
PAINLEVEL_OUTOF10: 6
PAINLEVEL_OUTOF10: 6
PAINLEVEL_OUTOF10: 5
PAINLEVEL_OUTOF10: 5
PAINLEVEL_OUTOF10: 6

## 2019-08-16 ASSESSMENT — PULMONARY FUNCTION TESTS
PIF_VALUE: 18
PIF_VALUE: 16
PIF_VALUE: 17
PIF_VALUE: 15
PIF_VALUE: 16
PIF_VALUE: 13
PIF_VALUE: 17
PIF_VALUE: 16
PIF_VALUE: 17
PIF_VALUE: 15
PIF_VALUE: 17
PIF_VALUE: 18
PIF_VALUE: 17
PIF_VALUE: 16
PIF_VALUE: 17
PIF_VALUE: 13
PIF_VALUE: 17
PIF_VALUE: 17
PIF_VALUE: 16
PIF_VALUE: 18
PIF_VALUE: 19
PIF_VALUE: 18
PIF_VALUE: 13
PIF_VALUE: 17
PIF_VALUE: 13
PIF_VALUE: 18
PIF_VALUE: 10
PIF_VALUE: 16
PIF_VALUE: 15
PIF_VALUE: 17
PIF_VALUE: 15
PIF_VALUE: 15
PIF_VALUE: 22
PIF_VALUE: 17
PIF_VALUE: 17
PIF_VALUE: 18
PIF_VALUE: 17
PIF_VALUE: 17
PIF_VALUE: 18
PIF_VALUE: 1
PIF_VALUE: 17
PIF_VALUE: 13
PIF_VALUE: 15
PIF_VALUE: 18
PIF_VALUE: 17
PIF_VALUE: 17
PIF_VALUE: 16
PIF_VALUE: 16
PIF_VALUE: 18
PIF_VALUE: 17
PIF_VALUE: 12
PIF_VALUE: 16
PIF_VALUE: 16
PIF_VALUE: 17
PIF_VALUE: 17
PIF_VALUE: 13
PIF_VALUE: 15
PIF_VALUE: 15
PIF_VALUE: 17
PIF_VALUE: 8
PIF_VALUE: 17
PIF_VALUE: 17
PIF_VALUE: 16
PIF_VALUE: 2
PIF_VALUE: 3
PIF_VALUE: 1
PIF_VALUE: 15
PIF_VALUE: 17
PIF_VALUE: 15
PIF_VALUE: 17
PIF_VALUE: 17
PIF_VALUE: 15
PIF_VALUE: 17
PIF_VALUE: 16
PIF_VALUE: 16
PIF_VALUE: 18
PIF_VALUE: 17
PIF_VALUE: 17
PIF_VALUE: 16
PIF_VALUE: 15
PIF_VALUE: 18
PIF_VALUE: 17
PIF_VALUE: 2
PIF_VALUE: 24
PIF_VALUE: 2
PIF_VALUE: 17
PIF_VALUE: 16
PIF_VALUE: 17
PIF_VALUE: 17
PIF_VALUE: 26
PIF_VALUE: 17
PIF_VALUE: 17
PIF_VALUE: 16
PIF_VALUE: 17
PIF_VALUE: 14
PIF_VALUE: 18
PIF_VALUE: 15
PIF_VALUE: 16
PIF_VALUE: 1
PIF_VALUE: 5
PIF_VALUE: 14
PIF_VALUE: 18
PIF_VALUE: 17
PIF_VALUE: 16
PIF_VALUE: 19
PIF_VALUE: 17
PIF_VALUE: 14
PIF_VALUE: 14
PIF_VALUE: 17
PIF_VALUE: 15
PIF_VALUE: 17
PIF_VALUE: 17
PIF_VALUE: 18
PIF_VALUE: 17

## 2019-08-16 ASSESSMENT — PAIN DESCRIPTION - PAIN TYPE
TYPE: SURGICAL PAIN

## 2019-08-16 ASSESSMENT — PAIN DESCRIPTION - ORIENTATION
ORIENTATION: RIGHT;LEFT
ORIENTATION: LOWER

## 2019-08-16 ASSESSMENT — PAIN DESCRIPTION - LOCATION
LOCATION: ABDOMEN

## 2019-08-16 ASSESSMENT — PAIN DESCRIPTION - FREQUENCY
FREQUENCY: CONTINUOUS

## 2019-08-16 ASSESSMENT — PAIN DESCRIPTION - DESCRIPTORS
DESCRIPTORS: ACHING
DESCRIPTORS: ACHING

## 2019-08-16 ASSESSMENT — PAIN DESCRIPTION - ONSET: ONSET: AWAKENED FROM SLEEP

## 2019-08-16 ASSESSMENT — PAIN - FUNCTIONAL ASSESSMENT: PAIN_FUNCTIONAL_ASSESSMENT: 0-10

## 2019-08-16 ASSESSMENT — PAIN DESCRIPTION - PROGRESSION: CLINICAL_PROGRESSION: GRADUALLY WORSENING

## 2019-08-16 NOTE — H&P
HISTORY and [id number]       NAME:  Terri Beal  MRN: [id number]   YOB: 1960   Date: 2019   Age: 63 y.o. Gender: female       COMPLAINT AND PRESENT HISTORY:     Terri Beal is 63 y.o.,  female,here for Abdominoplasty. Pt lost 180 pounds after a Gastric Bypass 3.5 years ago, now has a very large Panus. Pt C/O of local irritation and discomfort. Pt denies any other symptoms. PAST MEDICAL HISTORY     Past Medical History:   Diagnosis Date    Acromioclavicular joint arthritis 2016    Acute gastroenteritis 10/11/2017    Arthritis     Asthma 1980    On Inhaler    Bursitis     left shoulder    Chronic right shoulder pain 2018    Depression     Dry eyes     GERD (gastroesophageal reflux disease)     not since Shelton-en-Y and weight loss    Head ache     on Topiramate    Hearing loss     mild    Hemorrhoids     Hiatal hernia     History of bladder infections     History of DVT (deep vein thrombosis)     started in left leg, went to lungs    History of gastritis     History of morbid obesity     had Shelton-en y    History of pulmonary embolism     from foot  trauma, was treated for six months with anticoagulation. She has no inferior vena cava filter.     Hyperlipidemia     not since weight loss    Hypertension     Not anymore after Bariatric Surgery, no medication    Knee pain 3/6/2015    Mild intermittent asthma     KEVIN on CPAP     at night    Osteoarthritis     Osteopenia     S/P gastric bypass 12-29-15    Dr. Lou Gonzalez, [facility], hosp wt = 280lb, initial wt = 328lb    Vitamin D deficiency     Wears dentures     Wears glasses        SURGICAL HISTORY       Past Surgical History:   Procedure Laterality Date    ARTHROSCOPY / ARTHROTOMY KNEE Left 2018    KNEE ARTHROSCOPY LEFT performed by Lurlean Pallas, MD at [address] [facility]  1986,  1987     SECTION      dysphagia, No localized tenderness. No guarding or rigidity. No palpable hepatosplenomegaly. LYMPHATICS:  No palpable cervical lymphadenopathy. LOCOMOTOR, BACK AND SPINE:  No tenderness or deformities. EXTREMITIES:  Symmetrical, no pretibial edema. Berenices sign negative. No discoloration or ulcerations. NEUROLOGIC:  The patient is conscious, alert, oriented,Cranial nerve II-XII intact, taste and smell were not examined. No apparent focal sensory or motor deficits. PROVISIONAL DIAGNOSES / SURGERY:      Abdominoplasty. Large Panus.     Patient Active Problem List    Diagnosis Date Noted    Closed fracture of left tibial plateau 27/55/1495    Anemia 12/11/2018    History of gastric bypass 12/06/2018    Tibial plateau fracture, left, closed, initial encounter 12/05/2018    Closed fracture of proximal end of left tibia 11/22/2018    Closed fracture of shaft of left tibia 11/22/2018    S/p tibial fracture 11/21/2018    Chronic right shoulder pain 07/24/2018    Nausea and vomiting     Acute gastroenteritis 10/11/2017    Essential hypertension 08/21/2017    Weight loss of 160 lbs since surgery  08/21/2017    KEVIN on CPAP 12 cm h20 08/22/2016    Obesity (BMI 30-39.9) 07/01/2016    Acromioclavicular joint arthritis 04/18/2016    Status post gastric bypass for obesity 12/30/2015    Morbid obesity ([address] 08/18/2015    Osteopenia 08/18/2015    History of pulmonary embolism 06/16/2015    History of DVT (deep vein thrombosis) 06/16/2015    Osteoarthritis     Vitamin D deficiency 03/20/2015    Knee pain 03/06/2015    Asthma 03/06/2015           CATERINA PETTY PA-C on 8/16/2019 at 7:06 AM

## 2019-08-16 NOTE — ANESTHESIA PRE PROCEDURE
Historical Provider, MD       Current medications:    No current facility-administered medications for this visit. No current outpatient medications on file. Facility-Administered Medications Ordered in Other Visits   Medication Dose Route Frequency Provider Last Rate Last Dose    ceFAZolin (ANCEF) 2 g in dextrose 5 % 50 mL IVPB  2 g Intravenous Once Gen Rico MD        lactated ringers infusion   Intravenous Continuous Vickie Guaman MD        lidocaine PF 1 % injection 1 mL  1 mL Intradermal Once PRN Ana Tom MD        sodium chloride flush 0.9 % injection 10 mL  10 mL Intravenous 2 times per day Ana Tom MD        sodium chloride flush 0.9 % injection 10 mL  10 mL Intravenous PRN Vickie Guaman MD        lactated ringers infusion 1,000 mL  1,000 mL Intravenous Continuous Mikhail Henderson MD   Stopped at 08/19/16 1249    HYDROmorphone (DILAUDID) injection 0.25 mg  0.25 mg Intravenous Q5 Min PRN Agustin Aguilar MD           Allergies: Allergies   Allergen Reactions    Nsaids Other (See Comments)     Bariatric Surgeon told me not to take NSAIDS for good    Pcn [Penicillins] Other (See Comments)     Sores in mouth    Bactrim [Sulfamethoxazole-Trimethoprim] Itching and Rash    Codeine Itching and Rash       Problem List:    Patient Active Problem List   Diagnosis Code    Knee pain M25.569    Asthma J45.909    Vitamin D deficiency E55.9    Osteoarthritis M19.90    History of pulmonary embolism Z86.711    History of DVT (deep vein thrombosis) Z86.718    Morbid obesity ([address] E66.01    Osteopenia M85.80    Status post gastric bypass for obesity Z98.84    Acromioclavicular joint arthritis M19.019    Obesity (BMI 30-39. 9) E66.9    KEVIN on CPAP 12 cm h20 G47.33, Z99.89    Essential hypertension I10    Weight loss of 160 lbs since surgery  R63.4    Acute gastroenteritis K52.9    Nausea and vomiting R11.2    Chronic right shoulder pain M25.511, G89.29    S/p tibial

## 2019-08-20 LAB — SURGICAL PATHOLOGY REPORT: NORMAL

## 2019-08-21 NOTE — OP NOTE
[address]                 [address] [place], [address]                                OPERATIVE REPORT    PATIENT NAME: Omar Zelaya            :        1960  MED REC NO:   [id number]                              ROOM:  ACCOUNT NO:   [de-identified]                           ADMIT DATE: 2019  PROVIDER:     Huy Mendez    DATE OF PROCEDURE:  2019    SURGEON:  Huy Mendez MD.    PRIMARY CARE PROVIDER:  Alisa Hall MD    PREOPERATIVE DIAGNOSIS:  Panniculitis secondary to massive weight loss. POSTOPERATIVE DIAGNOSIS:  Panniculitis secondary to massive weight loss. OPERATION PERFORMED:  Abdominal panniculectomy. ANESTHESIA:  General.    COMPLICATIONS:  None. ESTIMATED BLOOD LOSS:  100 mL. INDICATION AND SIGNIFICANT HISTORY:  The patient is a very pleasant  56-year-old female seen after massive weight loss and her weight has now  been stable for more than six months. She has a large ptotic, hanging  pannus with back pain, neck pain, difficulty with ambulating as well as  chronic rash that is nonresponsive to systemic antibiotics. She seeks panniculectomy. Risks associated with procedure including but not limited to bleeding,  infection, scars, wound healing problems, patient dissatisfaction,  possibility of hematoma, seroma with possibility of wound healing  problems being as high as 15%, possibility of patient dissatisfaction,  permanent pain, need for additional procedures to improve the cosmetic  appearance were all explained to her. She had the opportunity to ask me  a variety of questions, all of which were answered for her. She  demonstrates understanding, provides informed consent. DESCRIPTION OF OPERATIVE PROCEDURE:  The patient was marked in the  preoperative holding area in the upright position, then brought to the  operating room, placed supine on the operating table.   After adequate  sterile prep

## 2019-08-26 ENCOUNTER — OFFICE VISIT (OUTPATIENT)
Dept: PULMONOLOGY | Age: 59
End: 2019-08-26
Payer: MEDICARE

## 2019-08-26 VITALS
HEART RATE: 69 BPM | SYSTOLIC BLOOD PRESSURE: 107 MMHG | DIASTOLIC BLOOD PRESSURE: 68 MMHG | WEIGHT: 199.2 LBS | HEIGHT: 66 IN | BODY MASS INDEX: 32.02 KG/M2 | OXYGEN SATURATION: 97 %

## 2019-08-26 DIAGNOSIS — Z98.84 STATUS POST GASTRIC BYPASS FOR OBESITY: ICD-10-CM

## 2019-08-26 DIAGNOSIS — Z99.89 OSA ON CPAP: ICD-10-CM

## 2019-08-26 DIAGNOSIS — Z86.718 HISTORY OF DVT (DEEP VEIN THROMBOSIS): ICD-10-CM

## 2019-08-26 DIAGNOSIS — I10 ESSENTIAL HYPERTENSION: ICD-10-CM

## 2019-08-26 DIAGNOSIS — G47.33 OSA ON CPAP: ICD-10-CM

## 2019-08-26 DIAGNOSIS — J45.20 MILD INTERMITTENT ASTHMA WITHOUT COMPLICATION: Primary | ICD-10-CM

## 2019-08-26 PROCEDURE — 99214 OFFICE O/P EST MOD 30 MIN: CPT | Performed by: INTERNAL MEDICINE

## 2019-08-26 PROCEDURE — 1036F TOBACCO NON-USER: CPT | Performed by: INTERNAL MEDICINE

## 2019-08-26 PROCEDURE — G8427 DOCREV CUR MEDS BY ELIG CLIN: HCPCS | Performed by: INTERNAL MEDICINE

## 2019-08-26 PROCEDURE — 3017F COLORECTAL CA SCREEN DOC REV: CPT | Performed by: INTERNAL MEDICINE

## 2019-08-26 PROCEDURE — G8417 CALC BMI ABV UP PARAM F/U: HCPCS | Performed by: INTERNAL MEDICINE

## 2019-08-26 RX ORDER — MONTELUKAST SODIUM 10 MG/1
10 TABLET ORAL NIGHTLY
Qty: 30 TABLET | Refills: 4 | Status: SHIPPED | OUTPATIENT
Start: 2019-08-26 | End: 2020-01-13

## 2019-08-26 ASSESSMENT — SLEEP AND FATIGUE QUESTIONNAIRES
HOW LIKELY ARE YOU TO NOD OFF OR FALL ASLEEP WHILE SITTING AND READING: 2
HOW LIKELY ARE YOU TO NOD OFF OR FALL ASLEEP WHILE WATCHING TV: 2
HOW LIKELY ARE YOU TO NOD OFF OR FALL ASLEEP WHILE SITTING AND TALKING TO SOMEONE: 0
HOW LIKELY ARE YOU TO NOD OFF OR FALL ASLEEP IN A CAR, WHILE STOPPED FOR A FEW MINUTES IN TRAFFIC: 0
HOW LIKELY ARE YOU TO NOD OFF OR FALL ASLEEP WHILE SITTING QUIETLY AFTER LUNCH WITHOUT ALCOHOL: 0
HOW LIKELY ARE YOU TO NOD OFF OR FALL ASLEEP WHILE LYING DOWN TO REST IN THE AFTERNOON WHEN CIRCUMSTANCES PERMIT: 0
HOW LIKELY ARE YOU TO NOD OFF OR FALL ASLEEP WHEN YOU ARE A PASSENGER IN A CAR FOR AN HOUR WITHOUT A BREAK: 0
HOW LIKELY ARE YOU TO NOD OFF OR FALL ASLEEP WHILE SITTING INACTIVE IN A PUBLIC PLACE: 1
ESS TOTAL SCORE: 5

## 2019-09-17 ENCOUNTER — OFFICE VISIT (OUTPATIENT)
Dept: FAMILY MEDICINE CLINIC | Age: 59
End: 2019-09-17
Payer: MEDICARE

## 2019-09-17 VITALS
WEIGHT: 200 LBS | TEMPERATURE: 98.6 F | BODY MASS INDEX: 30.31 KG/M2 | HEIGHT: 68 IN | SYSTOLIC BLOOD PRESSURE: 108 MMHG | HEART RATE: 78 BPM | DIASTOLIC BLOOD PRESSURE: 62 MMHG | OXYGEN SATURATION: 97 %

## 2019-09-17 DIAGNOSIS — I10 ESSENTIAL HYPERTENSION: Primary | ICD-10-CM

## 2019-09-17 DIAGNOSIS — Z12.39 SCREENING FOR BREAST CANCER: ICD-10-CM

## 2019-09-17 DIAGNOSIS — Z99.89 OSA ON CPAP: ICD-10-CM

## 2019-09-17 DIAGNOSIS — Z13.220 SCREENING FOR LIPID DISORDERS: ICD-10-CM

## 2019-09-17 DIAGNOSIS — E66.9 OBESITY (BMI 30-39.9): ICD-10-CM

## 2019-09-17 DIAGNOSIS — M15.9 PRIMARY OSTEOARTHRITIS INVOLVING MULTIPLE JOINTS: ICD-10-CM

## 2019-09-17 DIAGNOSIS — D64.9 ANEMIA, UNSPECIFIED TYPE: ICD-10-CM

## 2019-09-17 DIAGNOSIS — Z98.84 HISTORY OF GASTRIC BYPASS: ICD-10-CM

## 2019-09-17 DIAGNOSIS — R73.9 HYPERGLYCEMIA: ICD-10-CM

## 2019-09-17 DIAGNOSIS — G47.33 OSA ON CPAP: ICD-10-CM

## 2019-09-17 DIAGNOSIS — E55.9 VITAMIN D DEFICIENCY: ICD-10-CM

## 2019-09-17 DIAGNOSIS — J45.909 UNCOMPLICATED ASTHMA, UNSPECIFIED ASTHMA SEVERITY, UNSPECIFIED WHETHER PERSISTENT: ICD-10-CM

## 2019-09-17 PROCEDURE — 1036F TOBACCO NON-USER: CPT | Performed by: PHYSICIAN ASSISTANT

## 2019-09-17 PROCEDURE — 90686 IIV4 VACC NO PRSV 0.5 ML IM: CPT | Performed by: PHYSICIAN ASSISTANT

## 2019-09-17 PROCEDURE — 3017F COLORECTAL CA SCREEN DOC REV: CPT | Performed by: PHYSICIAN ASSISTANT

## 2019-09-17 PROCEDURE — G8427 DOCREV CUR MEDS BY ELIG CLIN: HCPCS | Performed by: PHYSICIAN ASSISTANT

## 2019-09-17 PROCEDURE — G8417 CALC BMI ABV UP PARAM F/U: HCPCS | Performed by: PHYSICIAN ASSISTANT

## 2019-09-17 PROCEDURE — 90471 IMMUNIZATION ADMIN: CPT | Performed by: PHYSICIAN ASSISTANT

## 2019-09-17 PROCEDURE — 99214 OFFICE O/P EST MOD 30 MIN: CPT | Performed by: PHYSICIAN ASSISTANT

## 2019-09-17 RX ORDER — TOPIRAMATE 50 MG/1
TABLET, FILM COATED ORAL
Qty: 90 TABLET | Refills: 1 | Status: SHIPPED | OUTPATIENT
Start: 2019-09-17 | End: 2020-03-23 | Stop reason: SDUPTHER

## 2019-09-17 RX ORDER — IBANDRONATE SODIUM 150 MG/1
150 TABLET, FILM COATED ORAL
Qty: 12 TABLET | Refills: 0 | Status: SHIPPED | OUTPATIENT
Start: 2019-09-17 | End: 2020-06-22

## 2019-09-17 ASSESSMENT — ENCOUNTER SYMPTOMS
ORTHOPNEA: 0
STRIDOR: 0
GLOBUS SENSATION: 0
FACIAL SWEATING: 0
WATER BRASH: 0
SHORTNESS OF BREATH: 0
EYE DISCHARGE: 0
NAUSEA: 0
HOARSE VOICE: 0
HEARTBURN: 1
BLURRED VISION: 0
RHINORRHEA: 0
EYE ITCHING: 0
BELCHING: 0
WHEEZING: 0
SINUS PRESSURE: 0
SORE THROAT: 0
CHOKING: 0
CHEST TIGHTNESS: 0
DIARRHEA: 0

## 2019-09-17 NOTE — PROGRESS NOTES
[id number]  [address] [facility] [address]  Dept: [phone]  Dept Fax: [phone]    Jennifer Benoit is a 63 y.o. female who presents today for her medical conditions/complaints as noted below. Jennifer Benoit is c/o of   Chief Complaint   Patient presents with    Hypertension         HPI:     Hypertension   This is a chronic problem. Associated symptoms include headaches. Pertinent negatives include no anxiety, blurred vision, chest pain, malaise/fatigue, orthopnea, palpitations, peripheral edema, PND, shortness of breath or sweats. There is no history of chronic renal disease. Hyperlipidemia   This is a chronic problem. Exacerbating diseases include obesity. She has no history of chronic renal disease, liver disease or nephrotic syndrome. Pertinent negatives include no chest pain, focal sensory loss, focal weakness, leg pain, myalgias or shortness of breath. Headache    This is a chronic problem. The pain is at a severity of 3/10. The pain is moderate. Pertinent negatives include no abnormal behavior, blurred vision, dizziness, ear pain, facial sweating, hearing loss, insomnia, loss of balance, muscle aches, nausea, rhinorrhea, sinus pressure or sore throat. Her past medical history is significant for obesity. Gastroesophageal Reflux   She complains of heartburn. She reports no belching, no chest pain, no choking, no early satiety, no globus sensation, no hoarse voice, no nausea, no sore throat, no stridor, no tooth decay, no water brash or no wheezing. This is a chronic problem. Associated symptoms include anemia. Mental Health Problem     Additional symptoms of the illness include anhedonia. Additional symptoms of the illness do not include insomnia, euphoric mood, increased goal-directed activity, distractible or poor judgment. She does not admit to suicidal ideas. She does not have a plan to commit suicide.  She does not asthma     KEVIN on CPAP     at night    Osteoarthritis     Osteopenia     S/P gastric bypass 12-29-15    Dr. Artur Cote, [facility], hosp wt = 280lb, initial wt = 328lb    Vitamin D deficiency     Wears dentures     Wears glasses       Past Surgical History:   Procedure Laterality Date    ABDOMINOPLASTY N/A 2019    ABDOMINOPLASTY performed by Dhruv Morfin MD at [address] ARTHROSCOPY / ARTHROTOMY KNEE Left 2018    KNEE ARTHROSCOPY LEFT performed by Sukhwinder Post MD at [address] [facility] [address]  1986,  1987     SECTION      CHOLECYSTECTOMY  1987    CLOSED MANIPULATION SHOULDER Right 2018    SHOULDER MANIPULATION WITH INJECTION performed by Sukhwinder Post MD at [address] [facility]  16    EXCISION / BIOPSY SKIN LESION OF HEAD / NECK N/A 4/3/2017     EXCISION POSTERIOR NECK CYST performed by Oanh Guerra IV, DO at [address] Left 2018    left tibia plateau external fixator application    NECK SURGERY      cyst removed back of neck    ORIF PROXIMAL TIBIAL PLATEAU FRACTURE Left 2018    TIBIAL PLATEAU OPEN REDUCTION INTERNAL FIXATION LEFT -  SYNTHES     C-ARM performed by Sukhwinder Post MD at [address]  10/14/2016    excision back lipoma with drain placement    [place] OFFICE/OUTPT VISIT,PROCEDURE ONLY Right 10/8/2018    RIGHT SHOULDER  MAGDY PROCEDURE performed by Sukhwinder Post MD at [address] OFFICE/OUTPT [address] Left 2018    LEFT TIBIA PLATEAU EXTERNAL FIXATOR APPLICATION performed by Felipa Marie MD at [address] [place]  12/29/15    liver bx, EGD    SHOULDER SURGERY Left 2016    Magdy procedure, bone spurs 2016    SHOULDER SURGERY Right 10/08/2018    magdy    TONSILLECTOMY  1979    UPPER GASTROINTESTINAL ENDOSCOPY  2015    found hiatal hernia       Family History   Problem Relation Age of Onset    Asthma Mother    [facility] Other Visits   Medication Dose Route Frequency Provider Last Rate Last Dose    lactated ringers infusion 1,000 mL  1,000 mL Intravenous Continuous Mikhail Salazar MD   Stopped at 08/19/16 1249    HYDROmorphone (DILAUDID) injection 0.25 mg  0.25 mg Intravenous Q5 Min PRN Samina Sheppard MD         Allergies   Allergen Reactions    Nsaids Other (See Comments)     Bariatric Surgeon told me not to take NSAIDS for good    Pcn [Penicillins] Other (See Comments)     Sores in mouth    Bactrim [Sulfamethoxazole-Trimethoprim] Itching and Rash    Codeine Itching and Rash       Health Maintenance   Topic Date Due    Cervical cancer screen  05/17/2019    Flu vaccine (1) 09/01/2019    Breast cancer screen  04/30/2020    Potassium monitoring  08/02/2020    Creatinine monitoring  08/02/2020    DTaP/Tdap/Td vaccine (2 - Td) 09/14/2022    Lipid screen  01/15/2024    Colon cancer screen colonoscopy  04/03/2025    Shingles Vaccine  Completed    Pneumococcal 0-64 years Vaccine  Completed    Hepatitis C screen  Completed    HIV screen  Completed       Subjective:      Review of Systems   Constitutional: Negative for malaise/fatigue. HENT: Negative for ear discharge, ear pain, hearing loss, hoarse voice, postnasal drip, rhinorrhea, sinus pressure and sore throat. Eyes: Negative for blurred vision, discharge and itching. Respiratory: Negative for choking, chest tightness, shortness of breath and wheezing. Cardiovascular: Negative for chest pain, palpitations, orthopnea, leg swelling and PND. Gastrointestinal: Positive for heartburn. Negative for diarrhea and nausea. Genitourinary: Negative for dysuria and frequency. Musculoskeletal: Negative for myalgias and neck stiffness. Neurological: Negative for dizziness, focal weakness, light-headedness and loss of balance. Psychiatric/Behavioral: The patient does not have insomnia. All other systems reviewed and are negative.       Objective:     Physical Exam Constitutional: She is oriented to person, place, and time. She appears well-developed and well-nourished. No distress. /64   Pulse 88   Temp 98.1 °F (36.7 °C) (Oral)   Ht 5' 6.5\" (1.689 m)   Wt 213 lb (96.6 kg)   LMP  (LMP Unknown)   SpO2 98%   BMI 33.86 kg/m²      HENT:   Head: Normocephalic and atraumatic. Right Ear: External ear normal.   Left Ear: External ear normal.   Nose: Nose normal.   Mouth/Throat: Oropharynx is clear and moist.   Eyes: Pupils are equal, round, and reactive to light. Conjunctivae and EOM are normal. Right eye exhibits no discharge. Left eye exhibits no discharge. No scleral icterus. Neck: Normal range of motion. Neck supple. No tracheal deviation present. No thyromegaly present. Cardiovascular: Normal rate, regular rhythm and normal heart sounds. Exam reveals no gallop and no friction rub. No murmur heard. Pulmonary/Chest: Effort normal and breath sounds normal. No stridor. No respiratory distress. She has no wheezes. She has no rales. She exhibits no tenderness. Abdominal: Soft. Bowel sounds are normal. She exhibits no distension. There is no tenderness. There is no rebound and no guarding. Musculoskeletal: She exhibits no edema. Pain and decreased ROM L knee   Neurological: She is alert and oriented to person, place, and time. No cranial nerve deficit. Coordination and gait normal.   Skin: Skin is warm and dry. No rash noted. She is not diaphoretic. Psychiatric: She has a normal mood and affect. Her affect is not inappropriate. Nursing note and vitals reviewed. /62   Pulse 78   Temp 98.6 °F (37 °C) (Oral)   Ht 5' 8\" (1.727 m)   Wt 200 lb (90.7 kg)   LMP  (LMP Unknown)   SpO2 97%   BMI 30.41 kg/m²     Assessment:       Diagnosis Orders   1. Essential hypertension  CBC Auto Differential    Comprehensive Metabolic Panel    Insulin, Total    Hemoglobin A1C   2.  Uncomplicated asthma, unspecified asthma severity, unspecified whether

## 2019-10-15 RX ORDER — FLUOXETINE HYDROCHLORIDE 40 MG/1
40 CAPSULE ORAL DAILY
Qty: 90 CAPSULE | Refills: 2 | Status: SHIPPED | OUTPATIENT
Start: 2019-10-15 | End: 2020-07-09 | Stop reason: SDUPTHER

## 2019-11-04 ENCOUNTER — HOSPITAL ENCOUNTER (OUTPATIENT)
Dept: WOMENS IMAGING | Age: 59
Discharge: HOME | End: 2019-11-06
Payer: MEDICARE

## 2019-11-04 DIAGNOSIS — Z12.39 SCREENING FOR BREAST CANCER: ICD-10-CM

## 2019-11-04 PROCEDURE — 77063 BREAST TOMOSYNTHESIS BI: CPT

## 2019-12-23 ENCOUNTER — TELEPHONE (OUTPATIENT)
Dept: BARIATRICS/WEIGHT MGMT | Age: 59
End: 2019-12-23

## 2020-01-13 NOTE — TELEPHONE ENCOUNTER
Dr Monique Gibson, patient is current and due in for an appointment on 2/28/20. Per last dictation patient to continue this medication. Please sign for refill if ok. Thank you.

## 2020-01-14 RX ORDER — MONTELUKAST SODIUM 10 MG/1
10 TABLET ORAL NIGHTLY
Qty: 30 TABLET | Refills: 1 | Status: SHIPPED | OUTPATIENT
Start: 2020-01-14 | End: 2020-03-17

## 2020-02-28 ENCOUNTER — OFFICE VISIT (OUTPATIENT)
Dept: PULMONOLOGY | Age: 60
End: 2020-02-28
Payer: MEDICARE

## 2020-02-28 VITALS
HEART RATE: 65 BPM | WEIGHT: 223.8 LBS | BODY MASS INDEX: 35.97 KG/M2 | HEIGHT: 66 IN | SYSTOLIC BLOOD PRESSURE: 109 MMHG | DIASTOLIC BLOOD PRESSURE: 75 MMHG | OXYGEN SATURATION: 99 % | RESPIRATION RATE: 16 BRPM

## 2020-02-28 PROCEDURE — G8482 FLU IMMUNIZE ORDER/ADMIN: HCPCS | Performed by: INTERNAL MEDICINE

## 2020-02-28 PROCEDURE — G8427 DOCREV CUR MEDS BY ELIG CLIN: HCPCS | Performed by: INTERNAL MEDICINE

## 2020-02-28 PROCEDURE — G8417 CALC BMI ABV UP PARAM F/U: HCPCS | Performed by: INTERNAL MEDICINE

## 2020-02-28 PROCEDURE — 3017F COLORECTAL CA SCREEN DOC REV: CPT | Performed by: INTERNAL MEDICINE

## 2020-02-28 PROCEDURE — 1036F TOBACCO NON-USER: CPT | Performed by: INTERNAL MEDICINE

## 2020-02-28 PROCEDURE — 99214 OFFICE O/P EST MOD 30 MIN: CPT | Performed by: INTERNAL MEDICINE

## 2020-02-28 RX ORDER — FLUTICASONE PROPIONATE 110 UG/1
2 AEROSOL, METERED RESPIRATORY (INHALATION) 2 TIMES DAILY
Qty: 1 INHALER | Refills: 5 | Status: SHIPPED | OUTPATIENT
Start: 2020-02-28 | End: 2020-07-13 | Stop reason: SDUPTHER

## 2020-02-28 ASSESSMENT — SLEEP AND FATIGUE QUESTIONNAIRES
HOW LIKELY ARE YOU TO NOD OFF OR FALL ASLEEP WHILE SITTING QUIETLY AFTER LUNCH WITHOUT ALCOHOL: 0
HOW LIKELY ARE YOU TO NOD OFF OR FALL ASLEEP WHILE SITTING AND TALKING TO SOMEONE: 0
HOW LIKELY ARE YOU TO NOD OFF OR FALL ASLEEP WHEN YOU ARE A PASSENGER IN A CAR FOR AN HOUR WITHOUT A BREAK: 1
ESS TOTAL SCORE: 2
HOW LIKELY ARE YOU TO NOD OFF OR FALL ASLEEP WHILE SITTING AND READING: 1
HOW LIKELY ARE YOU TO NOD OFF OR FALL ASLEEP WHILE SITTING INACTIVE IN A PUBLIC PLACE: 0
HOW LIKELY ARE YOU TO NOD OFF OR FALL ASLEEP WHILE LYING DOWN TO REST IN THE AFTERNOON WHEN CIRCUMSTANCES PERMIT: 0
HOW LIKELY ARE YOU TO NOD OFF OR FALL ASLEEP IN A CAR, WHILE STOPPED FOR A FEW MINUTES IN TRAFFIC: 0
HOW LIKELY ARE YOU TO NOD OFF OR FALL ASLEEP WHILE WATCHING TV: 0

## 2020-02-28 NOTE — PROGRESS NOTES
Pheobe   2/28/2020      Pheobe   62 y.o. female presented for follow up . Obstructive sleep apnea and asthma. Patient is doing well shortness of breath is stable with exertion   she did not have any hospital visits for exacerbation or need steroids. no Sputum  no hemoptysis she is compliant with his inhalers. Last visit I decreased Dulera dose from 200 mcg 200 mcg. Patient has tolerated this well. She does notice dyspnea with exertion and raking her garden walking the dog. I have asked her to use albuterol before exertion. She is compliant with her CPAP denies excessive daytime sleepiness or fatigue [place] sleepiness score  Sleep Medicine 2/28/2020 8/26/2019 8/22/2016   Sitting and reading 1 2 1   Watching TV 0 2 0   Sitting, inactive in a public place (e.g. a theatre or a meeting) 0 1 0   As a passenger in a car for an hour without a break 1 0 3   Lying down to rest in the afternoon when circumstances permit 0 0 0   Sitting and talking to someone 0 0 0   Sitting quietly after a lunch without alcohol 0 0 0   In a car, while stopped for a few minutes in traffic 0 0 0   Total score 2 5 4      Last visit   Since her last visit, patient feels her asthma is gotten worse. Starting January. Her  health has gotten worse and he has been told not to shovel snow or cut grass and patient is under a lot of stress because of his health and that is also triggering her asthma attacks and since January. Patient has started putting on more weight, even though she had a bariatric surgery since she started putting on more weight. She started feeling more short of breath with exertion and doing activities which her  was doing like cutting grass shoveling snow taking garbage out. She is using her rescue inhaler every other day, but no nocturnal symptoms. Denies any cough or hemoptysis. He is using Flonase for allergies, which are in spring.   She is compliant with her Dulera. From [facility] sleep standpoint, she has started Tuesday use CPAP again at night and a compliance report for 90 days shows only 38% compliance with usage of 7 hours at night. We discussed in detail regarding improving compliance of CPAP machine. She doesn't notice any difference in her symptoms. She's never had excessive daytime sleepiness. The only reason she is started to use the machine again is because she is gaining weight          8/21/17  She is doing well from her asthma standpoint. Uses albuterol once a month, does not use nebulized albuterol until She has cold. Denies any cough, any sputum. No wheezing. No dyspnea. Her asthma symptoms are triggered during fall and spring. She is using CPAP compliance report showed 93% compliance. She does not want to use CPAP because she is uncomfortable with the mask. It wakes her up the pressure. [Patient had a good bariatric surgery and has lost significant weight. She was sent for re-titration and she had a split-night study, which showed that she still has obstructive sleep apnea AHI 12 /hr needed a CPAP of 12 cm of water pressure  He is using the CPAP machine. Initially it was an auto titrating now it has been set at 12 cm  She is on Rich Song  Her allergy testing was negative. IgE level was low]      Review of Systems -  General ROS: negative for - chills, fatigue, fever or weight loss  ENT ROS: negative for - headaches, oral lesions or sore throat  Cardiovascular ROS: no chest pain , orthopnea or pnd   Gastrointestinal ROS: Meeting  Skin - no rash   Neuro - no blurry vision , no loc .  No focal weakness   msk - is ambulating with the help of a cane   Vascular - no claudication , rest completed and negative   Lymphatic - complete and negative   Hematology - oncology - complete and negative   Allergy immunology - complete and negative    no burning or hematuria      Immunization History   Administered Date(s) Administered    Influenza Vaccine, unspecified formulation 09/11/2015    Influenza Virus Vaccine 09/05/2018    Influenza, MDCK Quadv, with preserv IM (Flucelvax 4 yrs and older) 03/21/2018    Influenza, [place], IM, PF (6 mo and older Fluzone, Flulaval, Fluarix, and 3 yrs and older Afluria) 08/23/2016, 09/05/2017, 09/05/2018, 09/17/2019    Pneumococcal Conjugate 13-valent ([id number]) 09/18/2015    Pneumococcal Polysaccharide ([id number]) 11/22/2016    Tdap (Boostrix, Adacel) 09/14/2012    Zoster Live (Zostavax) 06/19/2015    Zoster Recombinant (Shingrix) 03/20/2018, 09/05/2018          PAST MEDICAL HISTORY:         Diagnosis Date    Acromioclavicular joint arthritis 4/18/2016    Acute gastroenteritis 10/11/2017    Arthritis     Asthma 1980    On Inhaler    Bursitis     left shoulder    Chronic right shoulder pain 7/24/2018    Depression     Dry eyes     Former smoker     GERD (gastroesophageal reflux disease)     not since Shelton-en-Y and weight loss    Head ache     on Topiramate    Hearing loss     mild    Hemorrhoids     Hiatal hernia     History of bladder infections     History of DVT (deep vein thrombosis) 2010    started in left leg, went to lungs    History of gastritis     History of morbid obesity     had Shelton-en y    History of pulmonary embolism 2010    from foot  trauma, was treated for six months with anticoagulation. She has no inferior vena cava filter.     Hyperlipidemia     not since weight loss    Hypertension 2015    Not anymore after Bariatric Surgery, no medication    Knee pain 3/6/2015    Mild intermittent asthma     KEVIN on CPAP 2015    at night    Osteoarthritis     Osteopenia     S/P gastric bypass 12-29-15    Dr. Chery Xavier, hosp wt = 280lb, initial wt = 328lb    Status post gastric bypass for obesity     Vitamin D deficiency 2015    Wears dentures     Wears glasses        Family History:       Problem Relation Age of Onset    Asthma Mother     Cancer Father         leukemia  Other Father         Myelodysplastic syndrome, which converted to leukemia    Heart Disease Neg Hx        SURGICAL HISTORY:   Past Surgical History:   Procedure Laterality Date    ABDOMINOPLASTY N/A 2019    ABDOMINOPLASTY performed by Cheryle Coombe, MD at [address] ARTHROSCOPY / ARTHROTOMY KNEE Left 2018    KNEE ARTHROSCOPY LEFT performed by Nata Thayer MD at [address] [facility]  1986,  1987     SECTION      CHOLECYSTECTOMY  1987    CLOSED MANIPULATION SHOULDER Right 2018    SHOULDER MANIPULATION WITH INJECTION performed by Nata Thayer MD at [address] [facility]  16    EXCISION / BIOPSY SKIN LESION OF HEAD / NECK N/A 4/3/2017     EXCISION POSTERIOR NECK CYST performed by Manoj Guerra IV, DO at [facility]  Left 2018    left tibia plateau external fixator application    NECK SURGERY      cyst removed back of neck    ORIF PROXIMAL TIBIAL PLATEAU FRACTURE Left 2018    TIBIAL PLATEAU OPEN REDUCTION INTERNAL FIXATION LEFT -  SYNTHES     C-ARM performed by Nata Thayer MD at [facility]  10/14/2016    excision back lipoma with drain placement    CT OFFICE/OUTPT VISIT,PROCEDURE ONLY Right 10/8/2018    RIGHT SHOULDER  MAGDY PROCEDURE performed by Nata Thayer MD at [facility] OFF[facility]/OUTPT [facility] Left 2018    LEFT TIBIA PLATEAU EXTERNAL FIXATOR APPLICATION performed by Matt Josue MD at [address] [facility]  12/29/15    liver bx, EGD    SHOULDER SURGERY Left 2016    Magdy procedure, bone spurs 2016    SHOULDER SURGERY Right 10/08/2018    magdy    TONSILLECTOMY  1979    UPPER GASTROINTESTINAL ENDOSCOPY  2015    found hiatal hernia         LUNG CANCER SCREENING     1. CRITERIA MET    []     CT ORDERED  []      2. CRITERIA NOT MET   [x]      3.  REFUSED                    []        REASON CRITERIA NOT MET     1. SMOKING LESS THAN 30 PY  [x]      2. AGE LESS THAN 55 or GREATER 77 YEARS  []      3. QUIT SMOKING 15 YEARS OR GREATER   [x]      4. RECENT CT WITH IN 11 MONTHS    []      5. LIFE EXPECTANCY < 5 YEARS   []      6. SIGNS  AND SYMPTOMS OF LUNG CANCER   []                Not in a hospital admission. Allergies   Allergen Reactions    Nsaids Other (See Comments)     Bariatric Surgeon told me not to take NSAIDS for good    Pcn [Penicillins] Other (See Comments)     Sores in mouth    Bactrim [Sulfamethoxazole-Trimethoprim] Itching and Rash    Codeine Itching and Rash     Social History     Tobacco Use   Smoking Status Former Smoker    Packs/day: 0.50    Years: 9.00    Pack years: 4.50    Types: Cigarettes    Start date: 1978    Last attempt to quit: 1987    Years since quittin.1   Smokeless Tobacco Never Used     Prior to Admission medications    Medication Sig Start Date End Date Taking?  Authorizing Provider   fluticasone (FLOVENT HFA) 110 MCG/ACT inhaler Inhale 2 puffs into the lungs 2 times daily 20 Yes Leroy Jaquez MD   montelukast (SINGULAIR) 10 MG tablet TAKE 1 TABLET BY MOUTH NIGHTLY 20  Yes Ashley Monge MD   FLUoxetine (PROZAC) 40 MG capsule Take 1 capsule by mouth daily 10/15/19  Yes Jacqueline Pacheco PA-C   topiramate (TOPAMAX) 50 MG tablet Take 1 tablet by mouth once daily 19  Yes Jacqueline Pacheco PA-C   CALCIUM CITRATE PO Take 750 mg by mouth 2 times daily   Yes Historical Provider, MD   Multiple Vitamins-Minerals (MULTIVITAMIN ADULTS 50+ PO) Take 1 tablet by mouth daily   Yes Historical Provider, MD   KRILL OIL PO Take by mouth daily   Yes Historical Provider, MD   sodium chloride (VINEET 128) 2 % ophthalmic solution Place 1 drop into both eyes as needed   Yes Historical Provider, MD   vitamin D (CHOLECALCIFEROL) 1000 UNIT TABS tablet Take 1,000 Units by mouth daily   Yes Historical Provider, MD   albuterol sulfate HFA (VENTOLIN HFA) 108 (90 Base) MCG/ACT inhaler Inhale 2

## 2020-03-17 RX ORDER — MONTELUKAST SODIUM 10 MG/1
10 TABLET ORAL NIGHTLY
Qty: 30 TABLET | Refills: 3 | Status: SHIPPED | OUTPATIENT
Start: 2020-03-17 | End: 2020-07-07

## 2020-03-23 RX ORDER — TOPIRAMATE 50 MG/1
TABLET, FILM COATED ORAL
Qty: 90 TABLET | Refills: 1 | Status: SHIPPED
Start: 2020-03-23 | End: 2020-04-08 | Stop reason: DRUGHIGH

## 2020-03-23 NOTE — TELEPHONE ENCOUNTER
thrombosis)     Morbid obesity (HCC)     Osteopenia     Status post gastric bypass for obesity     Acromioclavicular joint arthritis     Obesity (BMI 30-39.9)     KEVIN on CPAP 12 cm h20     Essential hypertension     Weight loss of 160 lbs since surgery      Acute gastroenteritis     Nausea and vomiting     Chronic right shoulder pain     S/p tibial fracture     Closed fracture of proximal end of left tibia     Closed fracture of shaft of left tibia     Tibial plateau fracture, left, closed, initial encounter     History of gastric bypass     Anemia     Closed fracture of left tibial plateau

## 2020-04-07 ENCOUNTER — E-VISIT (OUTPATIENT)
Dept: FAMILY MEDICINE CLINIC | Age: 60
End: 2020-04-07
Payer: MEDICARE

## 2020-04-07 PROCEDURE — 99421 OL DIG E/M SVC 5-10 MIN: CPT | Performed by: FAMILY MEDICINE

## 2020-04-08 RX ORDER — TOPIRAMATE 100 MG/1
100 TABLET, FILM COATED ORAL 2 TIMES DAILY
Qty: 30 TABLET | Refills: 1 | Status: SHIPPED | OUTPATIENT
Start: 2020-04-08 | End: 2020-04-08 | Stop reason: SDUPTHER

## 2020-04-08 RX ORDER — TOPIRAMATE 100 MG/1
100 TABLET, FILM COATED ORAL DAILY
Qty: 30 TABLET | Refills: 1 | Status: SHIPPED | OUTPATIENT
Start: 2020-04-08 | End: 2020-06-09

## 2020-04-08 RX ORDER — SUMATRIPTAN 25 MG/1
25 TABLET, FILM COATED ORAL
Qty: 27 TABLET | Refills: 1 | Status: SHIPPED | OUTPATIENT
Start: 2020-04-08 | End: 2020-09-24

## 2020-05-29 ENCOUNTER — APPOINTMENT (OUTPATIENT)
Dept: CT IMAGING | Age: 60
End: 2020-05-29
Payer: MEDICARE

## 2020-05-29 ENCOUNTER — HOSPITAL ENCOUNTER (EMERGENCY)
Age: 60
Discharge: HOME | End: 2020-05-29
Attending: EMERGENCY MEDICINE
Payer: MEDICARE

## 2020-05-29 VITALS
TEMPERATURE: 98.4 F | OXYGEN SATURATION: 97 % | RESPIRATION RATE: 15 BRPM | WEIGHT: 220 LBS | DIASTOLIC BLOOD PRESSURE: 80 MMHG | SYSTOLIC BLOOD PRESSURE: 138 MMHG | BODY MASS INDEX: 35.51 KG/M2 | HEART RATE: 63 BPM

## 2020-05-29 PROCEDURE — 70450 CT HEAD/BRAIN W/O DYE: CPT

## 2020-05-29 PROCEDURE — 99284 EMERGENCY DEPT VISIT MOD MDM: CPT

## 2020-05-29 RX ORDER — ACETAMINOPHEN 500 MG
1000 TABLET ORAL ONCE
Status: DISCONTINUED | OUTPATIENT
Start: 2020-05-29 | End: 2020-05-29 | Stop reason: HOSPADM

## 2020-05-29 RX ORDER — ACETAMINOPHEN 325 MG/1
650 TABLET ORAL EVERY 6 HOURS PRN
Qty: 30 TABLET | Refills: 0 | Status: SHIPPED | OUTPATIENT
Start: 2020-05-29

## 2020-05-29 ASSESSMENT — PAIN DESCRIPTION - LOCATION: LOCATION: HEAD

## 2020-05-29 ASSESSMENT — PAIN DESCRIPTION - PAIN TYPE: TYPE: ACUTE PAIN

## 2020-05-29 ASSESSMENT — PAIN SCALES - GENERAL: PAINLEVEL_OUTOF10: 6

## 2020-05-29 NOTE — ED NOTES
Bed: 07  Expected date:   Expected time:   Means of arrival:   Comments:  No bed     Nelli Kapoor RN  05/29/20 [id number]

## 2020-05-29 NOTE — ED PROVIDER NOTES
[place] [facility]  Emergency Department Encounter  Emergency Medicine Resident     Pt Name: Dylan Roach  MRN: [id number]  Vickigfclive 1960  Date of evaluation: 5/29/20  PCP:  Nicole Hawley MD        This patient was evaluated in the Emergency Department for symptoms described in the history of present illness. He/she was evaluated in the context of the global COVID-19 pandemic, which necessitated consideration that the patient might be at risk for infection with the SARS-CoV-2 virus that causes COVID-19. Institutional protocols and algorithms that pertain to the evaluation of patients at risk for COVID-19 are in a state of rapid change based on information released by regulatory bodies including the CDC and federal and state organizations. These policies and algorithms were followed during the patient's care in the ED. CHIEF COMPLAINT       Chief Complaint   Patient presents with    Headache     s/p hitting head on car door today around 1000, denies LOC    Blurred Vision     none currently, intermittent s/p striking head on car door around 1000 today. Denies LOC       HISTORY OF PRESENT ILLNESS  (Location/Symptom, Timing/Onset, Context/Setting, Quality, Duration, Modifying Factors, Severity.)    Dylan Roach is a 62 y.o.  who presents to the emergency department complaining of headache and blurry vision. States that she was getting into her car around 10 AM when she slipped forward hitting her head, no loss of consciousness. She has had a headache that is been getting progressively worse since then. No treatments attempted prior to arrival.  Headache is 6 out of 10 severity not worst of life not thunderclap. Denies any neck pain memory problems dizziness altered mental status seizure-like activity vomiting or distortion of taste sound. States that she is having \"floaters\" which is typical for her.   She believes that she has a concussion and is coming to the emergency department because her daughter wanted her to get a CAT scan of the head. Medical history is significant for DVT PE hypertension hyperlipidemia. She does not take any anticoagulants or blood thinners. Denies tobacco alcohol or illicit drug use. PAST MEDICAL / SURGICAL / SOCIAL / FAMILY HISTORY    has a past medical history of Acromioclavicular joint arthritis, Acute gastroenteritis, Arthritis, Asthma, Bursitis, Chronic right shoulder pain, Depression, Dry eyes, Former smoker, GERD (gastroesophageal reflux disease), Head ache, Hearing loss, Hemorrhoids, Hiatal hernia, History of bladder infections, History of DVT (deep vein thrombosis), History of gastritis, History of morbid obesity, History of pulmonary embolism, Hyperlipidemia, Hypertension, Knee pain, Mild intermittent asthma, KEVIN on CPAP, Osteoarthritis, Osteopenia, S/P gastric bypass, Status post gastric bypass for obesity, Vitamin D deficiency, Wears dentures, and Wears glasses. has a past surgical history that includes Tonsillectomy ();  section (1986,  1987); Cholecystectomy (); Upper gastrointestinal endoscopy (2015); Shelton-en-Y Gastric Bypass (12/29/15); Cystoscopy (16); shoulder surgery (Left, 2016); other surgical history (10/14/2016); EXCISION / BIOPSY SKIN LESION OF HEAD / NECK (N/A, 4/3/2017); shoulder surgery (Right, 10/08/2018); pr office/outpt visit,procedure only (Right, 10/8/2018);  section; knee surgery (Left, 2018); pr office/outpt visit,procedure only (Left, 2018); ORIF PROXIMAL TIBIAL PLATEAU FRACTURE (Left, 2018); ARTHROSCOPY / ARTHROTOMY KNEE (Left, 2018); CLOSED MANIPULATION SHOULDER (Right, 2018); Colonoscopy (); Neck surgery; and Abdominoplasty (N/A, 2019).     Social History     Socioeconomic History    Marital status:      Spouse name: Garth Cervantes Number of children: 3    Years of education: Not on file    Highest education level: Not on file   Occupational History    Occupation: UNEMPLOYED    Social Needs    Financial resource strain: Not on file    Food insecurity     Worry: Not on file     Inability: Not on file   [employer] needs     Medical: Not on file     Non-medical: Not on file   Tobacco Use    Smoking status: Former Smoker     Packs/day: 0.50     Years: 9.00     Pack years: 4.50     Types: Cigarettes     Start date: 1978     Last attempt to quit: 1987     Years since quittin.4    Smokeless tobacco: Never Used   Substance and Sexual Activity    Alcohol use: No     Alcohol/week: 0.0 standard drinks    Drug use: No    Sexual activity: Yes     Partners: Male   Lifestyle    Physical activity     Days per week: Not on file     Minutes per session: Not on file    Stress: Not on file   Relationships    Social connections     Talks on phone: Not on file     Gets together: Not on file     Attends Islam service: Not on file     Active member of club or organization: Not on file     Attends meetings of clubs or organizations: Not on file     Relationship status: Not on file    Intimate partner violence     Fear of current or ex partner: Not on file     Emotionally abused: Not on file     Physically abused: Not on file     Forced sexual activity: Not on file   Other Topics Concern    Not on file   Social History Narrative    Not on file       Family History   Problem Relation Age of Onset    Asthma Mother     Cancer Father         leukemia    Other Father         Myelodysplastic syndrome, which converted to leukemia    Heart Disease Neg Hx        Allergies:    Nsaids; Pcn [penicillins]; Bactrim [sulfamethoxazole-trimethoprim]; and Codeine    Home Medications:  Prior to Admission medications    Medication Sig Start Date End Date Taking?  Authorizing Provider   acetaminophen (TYLENOL) 325 MG tablet Take 2 tablets by mouth every 6 hours as needed for Pain 20  Yes Esaw Buys DO [place]   SUMAtriptan (IMITREX) 25 MG tablet Take 1 tablet by mouth once as needed for Migraine (may repeat dose every 2 hours for a maximum dose of 200 mg in a 24 hour time period) 4/8/20 4/8/20  Damian Rueda MD   topiramate (TOPAMAX) 100 MG tablet Take 1 tablet by mouth daily 4/8/20   Damian Rueda MD   montelukast (SINGULAIR) 10 MG tablet Take 1 tablet by mouth nightly 3/17/20   Lalitha Posey MD   fluticasone (FLOVENT HFA) 110 MCG/ACT inhaler Inhale 2 puffs into the lungs 2 times daily 2/28/20 2/27/21  Lalitha Posey MD   FLUoxetine (PROZAC) 40 MG capsule Take 1 capsule by mouth daily 10/15/19   Carol Ann Ware PA-C   ibandronate (BONIVA) 150 MG tablet Take 1 tablet by mouth every 30 days  Patient not taking: Reported on 2/28/2020 9/17/19   Carol Ann Ware PA-C   CALCIUM CITRATE PO Take 750 mg by mouth 2 times daily    Historical Provider, MD   Multiple Vitamins-Minerals (MULTIVITAMIN ADULTS 50+ PO) Take 1 tablet by mouth daily    Historical Provider, MD   KRILL OIL PO Take by mouth daily    Historical Provider, MD   sodium chloride (VINEET 128) 2 % ophthalmic solution Place 1 drop into both eyes as needed    Historical Provider, MD   vitamin D (CHOLECALCIFEROL) 1000 UNIT TABS tablet Take 1,000 Units by mouth daily    Historical Provider, MD   albuterol sulfate HFA (VENTOLIN HFA) 108 (90 Base) MCG/ACT inhaler Inhale 2 puffs into the lungs every 6 hours as needed for Wheezing or Shortness of Breath 4/1/19   Lalitha Posey MD   FIBER COMPLETE PO Take by mouth daily     Historical Provider, MD   albuterol (PROVENTIL) (2.5 MG/3ML) 0.083% nebulizer solution Take 3 mLs by nebulization every 6 hours as needed for Wheezing 8/21/17   Lalitha Posey MD   RESTASIS 0.05 % ophthalmic emulsion Place 1 drop into both eyes 2 times daily  3/1/17   Historical Provider, MD       REVIEW OF SYSTEMS    (2-9 systems for level 4, 10 or more for level 5)    Gen: No Fever, No chills  EYES: No blurry visiion, no double vision  HENT: No sore throat, No runny nose. No cough  CV: No CP , No palpitation  RESP: No SOB, No respiratory distress  GI: No N/V, No Abdm pain  : No dysuria  SKIN: No rash  MSK: No back pain, no joint pain  NEURO: + HA, no weakness  PSYCH: No SI/HI      PHYSICAL EXAM   (up to 7 for level 4, 8 or more for level 5)     INITIAL VITALS:  weight is 220 lb (99.8 kg). Her oral temperature is 98.4 °F (36.9 °C). Her blood pressure is 138/80 and her pulse is 63. Her respiration is 15 and oxygen saturation is 97%. Physical Exam  GENERAL: upon initial examination, patient is well appearing, nontoxic, and not in acute respiratory distress. VSS speech not slurry. HENT: normocephalic , nose normal,   EYES: no occular discharge, no scleral icterus  NECK: no JVD, no tracheal deviation  CV: Normal S1 S2, no MRG  PULM / CHEST: CTA Bilaterally all fields, no WRR  ABDOMEN: SNTND, No peritoneal signs   / ANORECTAL: deferred  MSK: no gross deformity, no edema, no TTP. No stepoffs deformities or c/t/l TTP. NEURO:   Cranial Nerves:              CNII: Visual acuity normal, Visual fields full to confrontation              CNIII, IV, VI: Pupils equal, round and reactive to light, full extraoccular movements without nystagmus              CN V: Facial sensation intact bilaterally to fine touch and pinprick, masseter 5/5              CN VII: Facial muscles symmetric and strong              CN VIII: Hears finger rub well bilaterally              CN IX: Deferred              CN X: Palate elevates symmetrically              CN XI: Full strength shoulder shrug bilaterally              CN XII: Tongue protrusion full and midline  Sensation: Sensation is intact to proprioception, two point discrimination, and light touch  Cerebellar: Heel to shin intact bilaterally  Gait: Patient's gait is normal not ataxic wide-based, no shuffling.   DTRs:  +2/4 Left Bicep, Triceps, Brachioradialis  + 2/4 Right Bicep, Triceps, Brachioradialis  + 2 / 4 Left Patellar, Achilles  + 2/ 4 Right Patellar, Achillis     Muscle Strength:  + 5 / 5 Strength to LUE flexion, Extension  + 5 / 5 Strength to RUE flexion , Extension  + 5 / 5 Strength to flexion & extension of Left Hip leg plantar and dorsi flexion  + 5 / 5 Strength to flexion & extension of Right Hip leg plantar and dorsi flexion  No weakness upon cervical flexion to indicate critical spinal stenosis    SKIN: no rash, no erythema, cap refill < 2 sec  PSYCH / BEHAVIORAL: mood/affect normal, behavior normal, no flight of ideas  DIFFERENTIAL  DIAGNOSIS / MEDICAL DECISION MAKING    PLAN (LABS / Tatyana Sacks / EKG):  Orders Placed This Encounter   Procedures    CT HEAD WO CONTRAST       MEDICATIONS ORDERED:  Orders Placed This Encounter   Medications    acetaminophen (TYLENOL) tablet 1,000 mg    acetaminophen (TYLENOL) 325 MG tablet     Sig: Take 2 tablets by mouth every 6 hours as needed for Pain     Dispense:  30 tablet     Refill:  0             EMERGENCY DEPARTMENT COURSE:  ED Course as of May 29 1458   Fri May 29, 2020   1455 NO acute   CT HEAD WO CONTRAST [RB]      ED Course User Index  [RB] Ross Choi DO       MDM:    Dimitrios Leavitt is a 62 y.o. female who presents with headache after hitting head on car. Does not take any blood thinners. No memory loss nausea vomiting weakness numbness or neck pain. Impression appears to be consistent with concussion however based on patient's age and significant concern by patient for head trauma versus bleed, will obtain CT head as well as provide Tylenol. Low suspicion for intracranial pathology at this time as her no focal neurological deficits patient will require reassessment. DIAGNOSTIC RESULTS / EMERGENCYDEPARTMENT COURSE / MDM   LABS:  No results found for this visit on 05/29/20.     RADIOLOGY:  Ct Head Wo Contrast    Result Date: 5/29/2020  EXAMINATION: CT OF THE HEAD WITHOUT CONTRAST  5/29/2020 2:28 pm TECHNIQUE: CT of the head was performed without Decision To Discharge 05/29/2020 02:57:24 PM       Evaluation and treatment course in the ED, and plan of care upon discharge was discussed in length with the patient. Patient had no further questions prior to being discharged and was instructed to return to the ED for new or worsening symptoms. Any changes to existing medications or new prescriptions were reviewed with patient and they expressed understanding of how to correctly take their medications and the possible side effects. The patient understands that at this time there is no evidence for a more malignant underlying process, but the patient also understands that early in the process of an illness or injury, an emergency department workup can be falsely reassuring. Routine discharge counseling was given, and the patient understands that worsening, changing or persistent symptoms should prompt an immediate call or follow up with his/her primary physician or return to the emergency department. The importance of appropriate follow up was also discussed. More extensive discharge instructions were given in the patients discharge paperwork. PATIENT REFERRED TO:  Samuel Mcarthur MD  [address] [place] [address] [address]  [phone]    Schedule an appointment as soon as possible for a visit in 2 days  Return to the ER if worsening or any other concern      DISCHARGE MEDICATIONS:  New Prescriptions    ACETAMINOPHEN (TYLENOL) 325 MG TABLET    Take 2 tablets by mouth every 6 hours as needed for Pain       Dr. Ace Granados.  [address] [facility]  Emergency Medicine Resident Physician, PGY-2    (Please note that portions of this note were completed with a voice recognition program.  Efforts were made to edit the dictations but occasionally words are mis-transcribed.)          Naveen Rojas DO  Resident  05/29/20 [id number]

## 2020-05-29 NOTE — ED NOTES
Report hand off given to [facility] RN  Pt nondistressed  GCS=15         Oziel Moses RN  05/29/20 [id number]

## 2020-05-30 ENCOUNTER — CARE COORDINATION (OUTPATIENT)
Dept: CARE COORDINATION | Age: 60
End: 2020-05-30

## 2020-05-30 NOTE — CARE COORDINATION
Patient was called to follow up with most recent ER visit. There was no answer. A message was left on voicemail to have patient call back regarding ER visit.  Office number given [phone]

## 2020-06-01 NOTE — CARE COORDINATION
2nd attempt to reach patient. There was no answer. A message was left to have patient call back. Office number left. [phone].

## 2020-06-09 RX ORDER — TOPIRAMATE 100 MG/1
TABLET, FILM COATED ORAL
Qty: 30 TABLET | Refills: 0 | Status: SHIPPED | OUTPATIENT
Start: 2020-06-09 | End: 2020-07-09

## 2020-06-09 NOTE — TELEPHONE ENCOUNTER
fracture of proximal end of left tibia     Closed fracture of shaft of left tibia     Tibial plateau fracture, left, closed, initial encounter     History of gastric bypass     Anemia     Closed fracture of left tibial plateau

## 2020-06-22 ENCOUNTER — OFFICE VISIT (OUTPATIENT)
Dept: FAMILY MEDICINE CLINIC | Age: 60
End: 2020-06-22
Payer: MEDICARE

## 2020-06-22 VITALS
HEIGHT: 67 IN | SYSTOLIC BLOOD PRESSURE: 115 MMHG | WEIGHT: 233 LBS | DIASTOLIC BLOOD PRESSURE: 78 MMHG | BODY MASS INDEX: 36.57 KG/M2 | TEMPERATURE: 98.1 F | OXYGEN SATURATION: 97 % | HEART RATE: 71 BPM

## 2020-06-22 PROCEDURE — G8427 DOCREV CUR MEDS BY ELIG CLIN: HCPCS | Performed by: FAMILY MEDICINE

## 2020-06-22 PROCEDURE — 99214 OFFICE O/P EST MOD 30 MIN: CPT | Performed by: FAMILY MEDICINE

## 2020-06-22 PROCEDURE — G8417 CALC BMI ABV UP PARAM F/U: HCPCS | Performed by: FAMILY MEDICINE

## 2020-06-22 PROCEDURE — 1036F TOBACCO NON-USER: CPT | Performed by: FAMILY MEDICINE

## 2020-06-22 PROCEDURE — 3017F COLORECTAL CA SCREEN DOC REV: CPT | Performed by: FAMILY MEDICINE

## 2020-06-22 RX ORDER — BIOTIN 5 MG
TABLET ORAL
COMMUNITY
End: 2020-06-22

## 2020-06-22 RX ORDER — SUMATRIPTAN 25 MG/1
25 TABLET, FILM COATED ORAL
Qty: 27 TABLET | Refills: 1 | Status: CANCELLED | OUTPATIENT
Start: 2020-06-22 | End: 2020-06-22

## 2020-06-22 ASSESSMENT — PATIENT HEALTH QUESTIONNAIRE - PHQ9
1. LITTLE INTEREST OR PLEASURE IN DOING THINGS: 0
SUM OF ALL RESPONSES TO PHQ9 QUESTIONS 1 & 2: 0
SUM OF ALL RESPONSES TO PHQ QUESTIONS 1-9: 0
2. FEELING DOWN, DEPRESSED OR HOPELESS: 0
SUM OF ALL RESPONSES TO PHQ QUESTIONS 1-9: 0

## 2020-06-22 ASSESSMENT — ENCOUNTER SYMPTOMS
ABDOMINAL PAIN: 0
NAUSEA: 0
EYE PAIN: 0
BLURRED VISION: 1
EYE WATERING: 0
SCALP TENDERNESS: 0
GASTROINTESTINAL NEGATIVE: 1
VOMITING: 0
FACIAL SWEATING: 0
VISUAL CHANGE: 1
EYE REDNESS: 0
SINUS PRESSURE: 0
PHOTOPHOBIA: 1
COUGH: 0
SORE THROAT: 0
BACK PAIN: 0
SWOLLEN GLANDS: 0
RHINORRHEA: 0
RESPIRATORY NEGATIVE: 1

## 2020-06-22 NOTE — PROGRESS NOTES
tablet Take 1,000 Units by mouth daily      albuterol sulfate HFA (VENTOLIN HFA) 108 (90 Base) MCG/ACT inhaler Inhale 2 puffs into the lungs every 6 hours as needed for Wheezing or Shortness of Breath 1 Inhaler 4    FIBER COMPLETE PO Take by mouth daily       albuterol (PROVENTIL) (2.5 MG/3ML) 0.083% nebulizer solution Take 3 mLs by nebulization every 6 hours as needed for Wheezing 120 each 11    RESTASIS 0.05 % ophthalmic emulsion Place 1 drop into both eyes 2 times daily        No current facility-administered medications for this visit. Facility-Administered Medications Ordered in Other Visits   Medication Dose Route Frequency Provider Last Rate Last Dose    lactated ringers infusion 1,000 mL  1,000 mL Intravenous Continuous Mikhail Szymanski MD   Stopped at 08/19/16 1249    HYDROmorphone (DILAUDID) injection 0.25 mg  0.25 mg Intravenous Q5 Min PRN Cole Luis MD         Allergies   Allergen Reactions    Nsaids Other (See Comments)     Bariatric Surgeon told me not to take NSAIDS for good    Pcn [Penicillins] Other (See Comments)     Sores in mouth    Bactrim [Sulfamethoxazole-Trimethoprim] Itching and Rash    Codeine Itching and Rash       Health Maintenance   Topic Date Due    Cervical cancer screen  05/17/2019    Potassium monitoring  08/02/2020    Creatinine monitoring  08/02/2020    Breast cancer screen  11/04/2021    DTaP/Tdap/Td vaccine (2 - Td) 09/14/2022    Lipid screen  01/15/2024    Colon cancer screen colonoscopy  04/03/2025    Flu vaccine  Completed    Shingles Vaccine  Completed    Pneumococcal 0-64 years Vaccine  Completed    Hepatitis C screen  Completed    HIV screen  Completed    Hepatitis A vaccine  Aged Out    Hepatitis B vaccine  Aged Out    Hib vaccine  Aged Out    Meningococcal (ACWY) vaccine  Aged Out       Subjective:     Review of Systems   Constitutional: Negative. Negative for fever, malaise/fatigue and weight loss.    HENT: Positive for hearing loss and Referral Type:   Eval and Treat     Referral Reason:   Specialty Services Required     Referred to Provider:   Dianne Schaumann, DO     Requested Specialty:   Obstetrics & Gynecology     Number of Visits Requested:   1     Orders Placed This Encounter   Medications    Ubrogepant 50 MG TABS     Sig: Take 50 mg by mouth daily as needed (migraine)     Dispense:  30 tablet     Refill:  0       Patient given educational materials - see patient instructions. Discussed use, benefit, and side effects of prescribed medications. All patientquestions answered. Pt voiced understanding. Reviewed health maintenance. Instructedto continue current medications, diet and exercise. Patient agreed with treatmentplan. Follow up as directed.      Electronically signed by Bre Mathias MD on 6/25/2020 at 12:04 PM

## 2020-06-25 ASSESSMENT — ENCOUNTER SYMPTOMS
SHORTNESS OF BREATH: 0
ORTHOPNEA: 0

## 2020-07-07 RX ORDER — MONTELUKAST SODIUM 10 MG/1
10 TABLET ORAL NIGHTLY
Qty: 30 TABLET | Refills: 2 | Status: SHIPPED | OUTPATIENT
Start: 2020-07-07 | End: 2020-10-12 | Stop reason: SDUPTHER

## 2020-07-09 RX ORDER — FLUOXETINE HYDROCHLORIDE 40 MG/1
40 CAPSULE ORAL DAILY
Qty: 90 CAPSULE | Refills: 2 | Status: SHIPPED | OUTPATIENT
Start: 2020-07-09 | End: 2021-01-18 | Stop reason: SDUPTHER

## 2020-07-09 RX ORDER — TOPIRAMATE 100 MG/1
TABLET, FILM COATED ORAL
Qty: 30 TABLET | Refills: 0 | Status: SHIPPED | OUTPATIENT
Start: 2020-07-09 | End: 2020-09-24

## 2020-07-09 NOTE — TELEPHONE ENCOUNTER
Faxed medication request please advise thank you!       Next Visit Date:  Future Appointments   Date Time Provider Rhona Ceja   7/13/2020  9:45 AM Aron Naqvi MD Resp Spec [facility]   7/14/2020 10:45 AM DO Dayana Matthews OB/Gyn [facility]   7/15/2020  9:20 AM Gregor Neff MD Neuro Spec [facility]   12/21/2020 11:45 AM Radha Lira MD [facility] [address] Maintenance   Topic Date Due    Cervical cancer screen  05/17/2019    Potassium monitoring  08/02/2020    Creatinine monitoring  08/02/2020    Flu vaccine (1) 09/01/2020    Breast cancer screen  11/04/2021    DTaP/Tdap/Td vaccine (2 - Td) 09/14/2022    Lipid screen  01/15/2024    Colon cancer screen colonoscopy  04/03/2025    Shingles Vaccine  Completed    Pneumococcal 0-64 years Vaccine  Completed    Hepatitis C screen  Completed    HIV screen  Completed    Hepatitis A vaccine  Aged Out    Hepatitis B vaccine  Aged Out    Hib vaccine  Aged Out    Meningococcal (ACWY) vaccine  Aged Out       Hemoglobin A1C (%)   Date Value   01/15/2019 5.0   01/04/2018 5.1   09/06/2017 5.3             ( goal A1C is < 7)   No results found for: LABMICR  LDL Cholesterol (mg/dL)   Date Value   01/15/2019 129   04/17/2018 129       (goal LDL is <100)   AST (U/L)   Date Value   01/15/2019 12     ALT (U/L)   Date Value   01/15/2019 10     BUN (mg/dL)   Date Value   08/02/2019 13     BP Readings from Last 3 Encounters:   06/22/20 115/78   05/29/20 138/80   02/28/20 109/75          (goal 120/80)    All Future Testing planned in [facility]  Lab Frequency Next Occurrence   Lipid, Fasting Once 06/22/2020   Comprehensive Metabolic Panel, Fasting Once 06/22/2020   Iron And TIBC Once 06/22/2020   Ferritin Once 06/22/2020   TSH With Reflex Ft4 Once 06/22/2020   Vitamin B1 Once 06/22/2020   Vitamin B12 & Folate Once 06/22/2020   Vitamin D 25 Hydroxy Once 06/22/2020   CBC Auto Differential Once 06/22/2020   RAHUL DIGITAL SCREEN W CAD BILATERAL Once 11/05/2020

## 2020-07-13 ENCOUNTER — TELEMEDICINE (OUTPATIENT)
Dept: PULMONOLOGY | Age: 60
End: 2020-07-13
Payer: MEDICARE

## 2020-07-13 PROCEDURE — 99213 OFFICE O/P EST LOW 20 MIN: CPT | Performed by: INTERNAL MEDICINE

## 2020-07-13 PROCEDURE — 1036F TOBACCO NON-USER: CPT | Performed by: INTERNAL MEDICINE

## 2020-07-13 PROCEDURE — G8417 CALC BMI ABV UP PARAM F/U: HCPCS | Performed by: INTERNAL MEDICINE

## 2020-07-13 PROCEDURE — G8427 DOCREV CUR MEDS BY ELIG CLIN: HCPCS | Performed by: INTERNAL MEDICINE

## 2020-07-13 PROCEDURE — 3017F COLORECTAL CA SCREEN DOC REV: CPT | Performed by: INTERNAL MEDICINE

## 2020-07-13 RX ORDER — FLUTICASONE PROPIONATE 110 UG/1
2 AEROSOL, METERED RESPIRATORY (INHALATION) 2 TIMES DAILY
Qty: 1 INHALER | Refills: 5 | Status: SHIPPED | OUTPATIENT
Start: 2020-07-13 | End: 2020-10-12 | Stop reason: ALTCHOICE

## 2020-07-13 NOTE — PROGRESS NOTES
2020    TELEHEALTH EVALUATION -- Audio/Visual (During FKVKS-92 public health emergency)    Patient and physician are located in their individual locations. This is visit is completed via [email or web address] application / [email or web address] / Telephone     Chief Complaint   Patient presents with    Sleep Apnea     On CPAP    Asthma     Wants to discuss inhalers         HPI:    Pola Trejo (:  1960) has requested an audio/video evaluation for the following concern(s):    No asthma exacerbation requiring steroids or hospitalization  jim flovent well   Since last 2 weeks when weather has been humid and hot she has used albuterol daily   No cough         She is currently doing well using her positive airway pressure device. She is sleeping better at night with her machine and says she has less daytime sleepiness. There have been no driving issues and concentration is better when awake. The machine pressure settings are comfortable, the mask is reasonably comfortable and she is using the humidity. There have been no ER visits, hospital visits, any new issues or medication changes since the last visit here in sleep clinic. Review of Systems -  General ROS: negative for - chills, fatigue, fever or weight loss  ENT ROS: negative for - headaches, oral lesions or sore throat  Cardiovascular ROS: no chest pain , orthopnea or pnd   Gastrointestinal ROS: no abdominal pain, change in bowel habits, or black or bloody stools  Skin - no rash   Neuro - no blurry vision , no loc . No focal weakness   msk - no jt tenderness or swelling    Vascular - no claudication , rest completed and negative   Lymphatic - complete and negative   Hematology - oncology - complete and negative   Allergy immunology - complete and negative    no burning or hematuria    LUNG CANCER SCREENING     1. CRITERIA MET    []     CT ORDERED  []      2. CRITERIA NOT MET   [x]      3.  REFUSED                    []        REASON CRITERIA NOT MET 1. SMOKING LESS THAN 30 PY  []      2. AGE LESS THAN 55 or GREATER 77 YEARS  []      3. QUIT SMOKING 15 YEARS OR GREATER   []      4. RECENT CT WITH IN 11 MONTHS    []      5. LIFE EXPECTANCY < 5 YEARS   []      6.  SIGNS  AND SYMPTOMS OF LUNG CANCER   []         Immunization   Immunization History   Administered Date(s) Administered    Influenza Vaccine, unspecified formulation 09/11/2015    Influenza Virus Vaccine 09/05/2018    Influenza, MDCK Quadv, with preserv IM (Flucelvax 4 yrs and older) 03/21/2018    Influenza, Quadv, IM, PF (6 mo and older Fluzone, Flulaval, Fluarix, and 3 yrs and older Afluria) 08/23/2016, 09/05/2017, 09/05/2018, 09/17/2019    Pneumococcal Conjugate 13-valent ([id number]) 09/18/2015    Pneumococcal Polysaccharide ([id number]) 11/22/2016    Tdap (Boostrix, Adacel) 09/14/2012    Zoster Live (Zostavax) 06/19/2015    Zoster Recombinant (Shingrix) 03/20/2018, 09/05/2018        Pneumococcal Vaccine     [x] Up to date    [] Indicated   [] Refused  [] Contraindicated       Influenza Vaccine   [] Up to date    [x] Indicated   [] Refused  [] Contraindicated        Pulmonary Rehab   [] Completed   [] Indicated   [] Refused  [] Contraindicated   PAST MEDICAL HISTORY:       Diagnosis Date    Acromioclavicular joint arthritis 4/18/2016    Acute gastroenteritis 10/11/2017    Anemia 12/11/2018    Arthritis     Asthma 1980    On Inhaler    Bursitis     left shoulder    Chronic right shoulder pain 7/24/2018    Closed fracture of left tibial plateau 6/96/1878    Closed fracture of proximal end of left tibia 11/22/2018    Closed fracture of shaft of left tibia 11/22/2018    Depression     Dry eyes     Essential hypertension 8/21/2017    Former smoker     GERD (gastroesophageal reflux disease)     not since Shelton-en-Y and weight loss    Head ache     on Topiramate    Hearing loss     mild    Hemorrhoids     Hiatal hernia     History of bladder infections     History of DVT (deep vein thrombosis)     started in left leg, went to lungs    History of gastric bypass 2018    History of gastritis     History of morbid obesity     had Shelton-en y    History of pulmonary embolism     from foot  trauma, was treated for six months with anticoagulation. She has no inferior vena cava filter.  Hyperlipidemia     not since weight loss    Hypertension     Not anymore after Bariatric Surgery, no medication    Knee pain 3/6/2015    Mild intermittent asthma     Obesity (BMI 30-39. 9) 2016    KEVIN on CPAP     at night    Osteoarthritis     Osteopenia     S/P gastric bypass 12-29-15    Dr. Jonah Hermosillo, hosp wt = 280lb, initial wt = 328lb    S/p tibial fracture 2018    Status post gastric bypass for obesity     Tibial plateau fracture, left, closed, initial encounter 2018    Vitamin D deficiency     Wears dentures     Wears glasses     Weight loss of 160 lbs since surgery  2017         Family History:       Problem Relation Age of Onset    Asthma Mother     Depression Mother     Anxiety Disorder Mother     Cancer Father         leukemia    Other Father         Myelodysplastic syndrome, which converted to leukemia    Migraines Sister     No Known Problems Brother     Ovarian Cancer Maternal Aunt     No Known Problems Maternal Grandmother     No Known Problems Maternal Grandfather     Breast Cancer Paternal Grandmother     Heart Attack Paternal Grandfather        SURGICAL HISTORY:   Past Surgical History:   Procedure Laterality Date    ABDOMINOPLASTY N/A 2019    ABDOMINOPLASTY performed by Aubrie Gottlieb MD at [address] ARTHROSCOPY / ARTHROTOMY KNEE Left 2018    KNEE ARTHROSCOPY LEFT performed by Sol Rausch MD at [address] [facility] [address]  1986,  1987     SECTION      CHOLECYSTECTOMY      CLOSED MANIPULATION SHOULDER Right 2018    SHOULDER MANIPULATION WITH INJECTION performed [facility]  [facility]    EXCISION / BIOPSY SKIN LESION OF HEAD / NECK N/A 4/3/2017     EXCISION POSTERIOR NECK CYST performed by [place] Drivers Charlie DÍAZ DO at [facility] [place] [id number] Left 2018    left tibia plateau external fixator application    NECK SURGERY      cyst removed back of neck    ORIF PROXIMAL TIBIAL PLATEAU FRACTURE Left 2018    TIBIAL PLATEAU OPEN REDUCTION INTERNAL FIXATION LEFT -  SYNTHES     C-ARM performed by Phill Lacy MD at [address] [facility]  10/14/2016    excision back lipoma with drain placement    IA OFFICE/OUTPT VISIT,PROCEDURE ONLY Right 10/8/2018    RIGHT SHOULDER  MAGDY PROCEDURE performed by Phill Lacy MD at [address] OFFICE/OUTPT [facility] Left 2018    LEFT TIBIA PLATEAU EXTERNAL FIXATOR APPLICATION performed by Zeb March MD at [address] [facility]  12/29/15    liver bx, EGD    SHOULDER SURGERY Left 2016    Magdy procedure, bone spurs 2016    SHOULDER SURGERY Right 10/08/2018    magdy    TONSILLECTOMY  1979    UPPER GASTROINTESTINAL ENDOSCOPY  2015    found hiatal hernia           SOCIAL AND OCCUPATIONAL HEALTH:    Social History     Socioeconomic History    Marital status:      Spouse name: Marianna Leblanc Number of children: 3    Years of education: Not on file    Highest education level: Not on file   Occupational History    Occupation: UNEMPLOYED    Social Needs    Financial resource strain: Not on file    Food insecurity     Worry: Not on file     Inability: Not on file   [employer] needs     Medical: Not on file     Non-medical: Not on file   Tobacco Use    Smoking status: Former Smoker     Packs/day: 0.50     Years: 9.00     Pack years: 4.50     Types: Cigarettes     Start date: 1978     Last attempt to quit: 1987     Years since quittin.5    Smokeless tobacco: Never Used   Substance and Sexual Activity    Alcohol use: No     Alcohol/week: 0.0 standard drinks    Drug use: No    Sexual activity: Yes     Partners: Male     Comment: Has been together since [address] Physical activity     Days per week: Not on file     Minutes per session: Not on file    Stress: Not on file   Relationships    Social connections     Talks on phone: Not on file     Gets together: Not on file     Attends Islam service: Not on file     Active member of club or organization: Not on file     Attends meetings of clubs or organizations: Not on file     Relationship status: Not on file    Intimate partner violence     Fear of current or ex partner: Not on file     Emotionally abused: Not on file     Physically abused: Not on file     Forced sexual activity: Not on file   Other Topics Concern    Not on file   Social History Narrative    Not on file        TOBACCO:   reports that she quit smoking about 33 years ago. Her smoking use included cigarettes. She started smoking about 41 years ago. She has a 4.50 pack-year smoking history. She has never used smokeless tobacco.  ETOH:   reports no history of alcohol use. ALLERGIES:      Allergies   Allergen Reactions    Nsaids Other (See Comments)     Bariatric Surgeon told me not to take NSAIDS for good    Pcn [Penicillins] Other (See Comments)     Sores in mouth    Bactrim [Sulfamethoxazole-Trimethoprim] Itching and Rash    Codeine Itching and Rash         Home Meds:   Prior to Admission medications    Medication Sig Start Date End Date Taking?  Authorizing Provider   fluticasone (FLOVENT HFA) 110 MCG/ACT inhaler Inhale 2 puffs into the lungs 2 times daily 7/13/20 7/13/21 Yes Rene Corrales MD   topiramate (TOPAMAX) 100 MG tablet Take 1 tablet by mouth once daily 7/9/20  Yes William Du MD   FLUoxetine (PROZAC) 40 MG capsule Take 1 capsule by mouth daily 7/9/20  Yes William Du MD   montelukast (SINGULAIR) 10 MG tablet Take 1 tablet by mouth nightly 7/7/20 Yes Aron Naqvi MD   Ubrogepant 50 MG TABS Take 50 mg by mouth daily as needed (migraine) 6/22/20  Yes Radha Lira MD   acetaminophen (TYLENOL) 325 MG tablet Take 2 tablets by mouth every 6 hours as needed for Pain 5/29/20  Yes Jose Scales,    CALCIUM CITRATE PO Take 750 mg by mouth 2 times daily   Yes Historical Provider, MD   Multiple Vitamins-Minerals (MULTIVITAMIN ADULTS 50+ PO) Take 1 tablet by mouth daily   Yes Historical Provider, MD   KRILL OIL PO Take by mouth daily   Yes Historical Provider, MD   sodium chloride (VINEET 128) 2 % ophthalmic solution Place 1 drop into both eyes as needed   Yes Historical Provider, MD   vitamin D (CHOLECALCIFEROL) 1000 UNIT TABS tablet Take 1,000 Units by mouth daily   Yes Historical Provider, MD   albuterol sulfate HFA (VENTOLIN HFA) 108 (90 Base) MCG/ACT inhaler Inhale 2 puffs into the lungs every 6 hours as needed for Wheezing or Shortness of Breath 4/1/19  Yes Aron Naqvi MD   FIBER COMPLETE PO Take by mouth daily    Yes Historical Provider, MD   albuterol (PROVENTIL) (2.5 MG/3ML) 0.083% nebulizer solution Take 3 mLs by nebulization every 6 hours as needed for Wheezing 8/21/17  Yes Aron Naqvi MD   RESTASIS 0.05 % ophthalmic emulsion Place 1 drop into both eyes 2 times daily  3/1/17  Yes Historical Provider, MD   SUMAtriptan (IMITREX) 25 MG tablet Take 1 tablet by mouth once as needed for Migraine (may repeat dose every 2 hours for a maximum dose of 200 mg in a 24 hour time period) 4/8/20 6/22/20  Radha Lira MD            Wt Readings from Last 3 Encounters:   06/22/20 233 lb (105.7 kg)   05/29/20 220 lb (99.8 kg)   02/28/20 223 lb 12.8 oz (101.5 kg)           No results found. PHYSICAL EXAMINATION:  Due to this being a TeleHealth encounter, evaluation of the following organ systems is limited: Vitals/Constitutional/EENT/Resp/CV/GI//MS/Neuro/Skin/Heme-Lymph-Imm. Constitutional: [x] Appears well-developed and well-nourished.      [] Abnormal  Mental status  [x] Alert and awake  [x] Oriented to person/place/time [x]Able to follow commands    [x] No apparent distress      Eyes:  EOM    [x]  Normal  [] Abnormal-  Sclera  [x]  Normal  [] Abnormal -         Discharge [x]  None visible  [] Abnormal -    HENT:   [x] Normocephalic, atraumatic. [] Abnormal shaped head   [x] Mouth/Throat: Mucous membranes are moist.     Ears [x] Normal  [] Abnormal-    Neck: [x] Normal range of motion [x] Supple [x] No visualized mass. Pulmonary/Chest: [x] Respiratory effort normal.  [x] No visualized signs of difficulty breathing or respiratory distress        [] Abnormal      Musculoskeletal:   [x] Normal range of motion. [x] Normal gait with no signs of ataxia. [x]  No signs of cyanosis of the peripheral portions of extremities. [] Abnormal       Neurological:        [x] Normal cranial nerve (limited exam to video visit) [x] No focal weakness observed       [] Abnormal          Speech       [x] Normal   [] Abnormal     Skin:        [x] No rash on visible skin  [x] Normal  [] Abnormal     Psychiatric:       [x] Normal  [] Abnormal        [x] Normal Mood  [] Anxious appearing                  ASSESSMENT:   Diagnosis Orders   1. Mild intermittent asthma without complication  fluticasone (FLOVENT HFA) 110 MCG/ACT inhaler   2. KEVIN on CPAP 12 cm h20     3. Status post gastric bypass for obesity           Plan:   1. Cont cpap   2. Cont flovent for now   3. Use albuterol as needed   4. Avoid environmental exposure   5.  Follow up 6 months          Requested Prescriptions     Signed Prescriptions Disp Refills    fluticasone (FLOVENT HFA) 110 MCG/ACT inhaler 1 Inhaler 5     Sig: Inhale 2 puffs into the lungs 2 times daily       Medications Discontinued During This Encounter   Medication Reason    fluticasone (FLOVENT HFA) 110 MCG/ACT inhaler [facility] received counseling on the following healthy behaviors: nutrition, exercise and medication

## 2020-07-14 ENCOUNTER — OFFICE VISIT (OUTPATIENT)
Dept: OBGYN CLINIC | Age: 60
End: 2020-07-14
Payer: MEDICARE

## 2020-07-14 ENCOUNTER — HOSPITAL ENCOUNTER (OUTPATIENT)
Age: 60
Setting detail: SPECIMEN
Discharge: HOME | End: 2020-07-14
Payer: MEDICARE

## 2020-07-14 VITALS
WEIGHT: 235.25 LBS | HEIGHT: 67 IN | TEMPERATURE: 98.4 F | BODY MASS INDEX: 36.92 KG/M2 | DIASTOLIC BLOOD PRESSURE: 74 MMHG | RESPIRATION RATE: 16 BRPM | SYSTOLIC BLOOD PRESSURE: 122 MMHG

## 2020-07-14 PROCEDURE — 99396 PREV VISIT EST AGE 40-64: CPT | Performed by: OBSTETRICS & GYNECOLOGY

## 2020-07-15 ENCOUNTER — OFFICE VISIT (OUTPATIENT)
Dept: NEUROLOGY | Age: 60
End: 2020-07-15
Payer: MEDICARE

## 2020-07-15 VITALS
BODY MASS INDEX: 35.61 KG/M2 | TEMPERATURE: 97.2 F | WEIGHT: 235 LBS | SYSTOLIC BLOOD PRESSURE: 121 MMHG | DIASTOLIC BLOOD PRESSURE: 77 MMHG | HEIGHT: 68 IN | HEART RATE: 68 BPM

## 2020-07-15 PROCEDURE — G8417 CALC BMI ABV UP PARAM F/U: HCPCS | Performed by: PSYCHIATRY & NEUROLOGY

## 2020-07-15 PROCEDURE — G8427 DOCREV CUR MEDS BY ELIG CLIN: HCPCS | Performed by: PSYCHIATRY & NEUROLOGY

## 2020-07-15 PROCEDURE — 3017F COLORECTAL CA SCREEN DOC REV: CPT | Performed by: PSYCHIATRY & NEUROLOGY

## 2020-07-15 PROCEDURE — 99204 OFFICE O/P NEW MOD 45 MIN: CPT | Performed by: PSYCHIATRY & NEUROLOGY

## 2020-07-15 PROCEDURE — 1036F TOBACCO NON-USER: CPT | Performed by: PSYCHIATRY & NEUROLOGY

## 2020-07-15 RX ORDER — TOPIRAMATE 100 MG/1
100 TABLET, FILM COATED ORAL 2 TIMES DAILY
Qty: 60 TABLET | Refills: 3 | Status: SHIPPED | OUTPATIENT
Start: 2020-07-15 | End: 2020-09-24 | Stop reason: SDUPTHER

## 2020-07-15 RX ORDER — PREDNISONE 20 MG/1
TABLET ORAL
Qty: 18 TABLET | Refills: 0 | Status: SHIPPED | OUTPATIENT
Start: 2020-07-15 | End: 2020-07-25

## 2020-07-15 RX ORDER — SUMATRIPTAN 100 MG/1
TABLET, FILM COATED ORAL
Qty: 18 TABLET | Refills: 3 | Status: SHIPPED | OUTPATIENT
Start: 2020-07-15 | End: 2020-09-24 | Stop reason: SDUPTHER

## 2020-07-15 ASSESSMENT — ENCOUNTER SYMPTOMS
EYES NEGATIVE: 1
ALLERGIC/IMMUNOLOGIC NEGATIVE: 1
RESPIRATORY NEGATIVE: 1
GASTROINTESTINAL NEGATIVE: 1

## 2020-07-15 NOTE — PROGRESS NOTES
Subjective:      Patient ID: Miguel Angel Quiroga is a 62 y.o. female. HPI  63 yo wf with headaches. She reports to have be having headaches felt to be migraines for 4 to 5 years . These have gotten worse over the past 6 months unable to function . She is having headaches now every 2 days over bilateral frontal head area of throbbing character made worse by noise and light . Headache will be from grade 5 to 10 over 10 lasting most of day to at times 2 days . She will need to be half of the time in dark quiet place . There will be nausea with no vomiting for headaches . There will be no tingling , weakness or bulbar complaints  . With headaches she may have visual floaters . For the headache she has been on topamax 50 mg po qd for 2 years increased to 100 mg po qhs one month ago which has made no difference . For headache she is using imitrex 25 mg although will have effect only if she has taken 6 every 2 hours for total 125 mg tolerating this medication well . She can not use NSAID with prior bariatric surgery. Weather fronts may be trigger headaches. Significant medications topamax 100 mg po qhs , imitrex 25 mg PRN .  Testing Head CT normal , May 2020     Past Medical History:   Diagnosis Date    Acromioclavicular joint arthritis 4/18/2016    Acute gastroenteritis 10/11/2017    Anemia 12/11/2018    Arthritis     Asthma 1980    On Inhaler    Bursitis     left shoulder    Chronic right shoulder pain 7/24/2018    Closed fracture of left tibial plateau 2/63/8695    Closed fracture of proximal end of left tibia 11/22/2018    Closed fracture of shaft of left tibia 11/22/2018    Depression     Dry eyes     Essential hypertension 8/21/2017    Former smoker     GERD (gastroesophageal reflux disease)     not since Shelton-en-Y and weight loss    Head ache     on Topiramate    Hearing loss     mild    Hemorrhoids     Hiatal hernia     History of bladder infections     History of DVT (deep vein thrombosis) 2010    started in left leg, went to lungs    History of gastric bypass 2018    History of gastritis     History of morbid obesity     had Shelton-en y    History of pulmonary embolism     from foot  trauma, was treated for six months with anticoagulation. She has no inferior vena cava filter.  Hyperlipidemia     not since weight loss    Hypertension     Not anymore after Bariatric Surgery, no medication    Knee pain 3/6/2015    Mild intermittent asthma     Obesity (BMI 30-39. 9) 2016    KEVIN on CPAP     at night    Osteoarthritis     Osteopenia     S/P gastric bypass 12-29-15    Dr. Kymberly Elizondo, [facility], hosp wt = 280lb, initial wt = 328lb    S/p tibial fracture 2018    Status post gastric bypass for obesity     Tibial plateau fracture, left, closed, initial encounter 2018    Vitamin D deficiency     Wears dentures     Wears glasses     Weight loss of 160 lbs since surgery  2017       Past Surgical History:   Procedure Laterality Date    ABDOMINOPLASTY N/A 2019    ABDOMINOPLASTY performed by Alexander Obregon MD at  [facility] ARTHROSCOPY / ARTHROTOMY KNEE Left 2018    KNEE ARTHROSCOPY LEFT performed by Lui Mendoza MD at [address] [facility] [address]  1986,  1987     SECTION      CHOLECYSTECTOMY      CLOSED MANIPULATION SHOULDER Right 2018    SHOULDER MANIPULATION WITH INJECTION performed by uLi Mendoza MD at [address] [facility]  16    EXCISION / BIOPSY SKIN LESION OF HEAD / NECK N/A 4/3/2017     EXCISION POSTERIOR NECK CYST performed by Kirk Guerra IV, DO at [facility] [address] Left 2018    left tibia plateau external fixator application    NECK SURGERY      cyst removed back of neck    ORIF PROXIMAL TIBIAL PLATEAU FRACTURE Left 2018    TIBIAL PLATEAU OPEN REDUCTION INTERNAL FIXATION LEFT -  SYNTHES     C-ARM performed by Lui Mendoza MD at  [facility] OTHER SURGICAL HISTORY  10/14/2016    excision back lipoma with drain placement    ID OFFICE/OUTPT VISIT,PROCEDURE ONLY Right 10/8/2018    RIGHT SHOULDER  MAGDY PROCEDURE performed by Ramon Mcmullen MD at [address] OFFICE/OUTPT [address] Left 2018    LEFT TIBIA PLATEAU EXTERNAL FIXATOR APPLICATION performed by Roger Roque MD at [address]  12/29/15    liver bx, EGD    SHOULDER SURGERY Left 2016    Magdy procedure, bone spurs 2016    SHOULDER SURGERY Right 10/08/2018    magdy    TONSILLECTOMY  1979    UPPER GASTROINTESTINAL ENDOSCOPY  2015    found hiatal hernia       Family History   Problem Relation Age of Onset    Asthma Mother     Depression Mother     Anxiety Disorder Mother     Cancer Father         leukemia    Other Father         Myelodysplastic syndrome, which converted to leukemia    Migraines Sister     No Known Problems Brother     Ovarian Cancer Maternal Aunt     No Known Problems Maternal Grandmother     No Known Problems Maternal Grandfather     Breast Cancer Paternal Grandmother     Heart Attack Paternal Grandfather        Social History     Socioeconomic History    Marital status:      Spouse name: Papa May Number of children: 1    Years of education: None    Highest education level: None   Occupational History    Occupation: UNEMPLOYED    Social Needs    Financial resource strain: None    Food insecurity     Worry: None     Inability: None    Transportation needs     Medical: None     Non-medical: None   Tobacco Use    Smoking status: Former Smoker     Packs/day: 0.50     Years: 9.00     Pack years: 4.50     Types: Cigarettes     Start date: 1978     Last attempt to quit: 1987     Years since quittin.5    Smokeless tobacco: Never Used   Substance and Sexual Activity    Alcohol use: No     Alcohol/week: 0.0 standard drinks    Drug use: No    Sexual activity: Yes     Partners: Male Comment: Has been together since 1982   Lifestyle    Physical activity     Days per week: None     Minutes per session: None    Stress: None   Relationships    Social connections     Talks on phone: None     Gets together: None     Attends [employer] service: None     Active member of club or organization: None     Attends meetings of clubs or organizations: None     Relationship status: None    Intimate partner violence     Fear of current or ex partner: None     Emotionally abused: None     Physically abused: None     Forced sexual activity: None   Other Topics Concern    None   Social History Narrative    None       Current Outpatient Medications   Medication Sig Dispense Refill    fluticasone (FLOVENT HFA) 110 MCG/ACT inhaler Inhale 2 puffs into the lungs 2 times daily 1 Inhaler 5    topiramate (TOPAMAX) 100 MG tablet Take 1 tablet by mouth once daily 30 tablet 0    FLUoxetine (PROZAC) 40 MG capsule Take 1 capsule by mouth daily 90 capsule 2    montelukast (SINGULAIR) 10 MG tablet Take 1 tablet by mouth nightly 30 tablet 2    Ubrogepant 50 MG TABS Take 50 mg by mouth daily as needed (migraine) 30 tablet 0    acetaminophen (TYLENOL) 325 MG tablet Take 2 tablets by mouth every 6 hours as needed for Pain 30 tablet 0    SUMAtriptan (IMITREX) 25 MG tablet Take 1 tablet by mouth once as needed for Migraine (may repeat dose every 2 hours for a maximum dose of 200 mg in a 24 hour time period) 27 tablet 1    CALCIUM CITRATE PO Take 750 mg by mouth 2 times daily      Multiple Vitamins-Minerals (MULTIVITAMIN ADULTS 50+ PO) Take 1 tablet by mouth daily      KRILL OIL PO Take by mouth daily      sodium chloride (VINEET 128) 2 % ophthalmic solution Place 1 drop into both eyes as needed      vitamin D (CHOLECALCIFEROL) 1000 UNIT TABS tablet Take 1,000 Units by mouth daily      albuterol sulfate HFA (VENTOLIN HFA) 108 (90 Base) MCG/ACT inhaler Inhale 2 puffs into the lungs every 6 hours as needed for Wheezing or Shortness of Breath 1 Inhaler 4    FIBER COMPLETE PO Take by mouth daily       albuterol (PROVENTIL) (2.5 MG/3ML) 0.083% nebulizer solution Take 3 mLs by nebulization every 6 hours as needed for Wheezing 120 each 11    RESTASIS 0.05 % ophthalmic emulsion Place 1 drop into both eyes 2 times daily        No current facility-administered medications for this visit. Facility-Administered Medications Ordered in Other Visits   Medication Dose Route Frequency Provider Last Rate Last Dose    lactated ringers infusion 1,000 mL  1,000 mL Intravenous Continuous Mikhail Betts MD   Stopped at 08/19/16 1249    HYDROmorphone (DILAUDID) injection 0.25 mg  0.25 mg Intravenous Q5 Min PRN Brandon Torres MD           Allergies   Allergen Reactions    Nsaids Other (See Comments)     Bariatric Surgeon told me not to take NSAIDS for good    Pcn [Penicillins] Other (See Comments)     Sores in mouth    Bactrim [Sulfamethoxazole-Trimethoprim] Itching and Rash    Codeine Itching and Rash       Review of Systems   Constitutional: Positive for fatigue. HENT: Negative. Eyes: Negative. Respiratory: Negative. Cardiovascular: Negative. Gastrointestinal: Negative. Endocrine: Negative. Genitourinary: Negative. Musculoskeletal: Negative. Skin: Negative. Allergic/Immunologic: Negative. Neurological: Negative. Hematological: Negative. Psychiatric/Behavioral: Negative. Objective:   Physical Exam  Vitals:    07/15/20 0912   BP: 121/77   Pulse: 68   Temp: 97.2 °F (36.2 °C)     weight: 235 lb (106.6 kg)    Neurological Examination  Constitutional .General exam well groomed   Head/Ears /Nose/Throat: external ear . Normal exam  Neck and thyroid . Normal size. No bruits  Respiratory . Breathsounds clear bilaterally  Cardiovascular:  Auscultation of heart with regular rate and rhythm  Musculoskeletal. Muscle bulk and tone normal                                                           Muscle strength 5/5 strength throughout                                                                                No dysmetria or dysdiadokinesis  No tremor   Normal fine motor  Gait normal   Orientation Alert and oriented x 3   Attention and concentration normal  Short term memory normal  Language process and speech normal . No aphasia   Cranial nerve 2 normal acuety and visual fields  Cranial nerve 3, 4 and 6 . Extraocular muscles are intact . Pupils are equal and reactive   Cranial nerve 5 . Normal strength of masseter and temporalis . Intact corneal reflex. Normal facial sensation  Cranial nerve 7 normal exam   Cranial nerve 8. Grossly intact hearing   Cranial nerve 9 and 10. Symmetric palate elevation   Cranial nerve 11 , 5 out of 5 strength   Cranial Nerve 12 midline tongue . No atrophy  Sensation . Normal proprioception . Intact Vibration . Normal pinprick and light touch   Deep Tendon Reflexes normal  Plantar response flexor bilaterally    Assessment:       Diagnosis Orders   1.  Migraine without aura and without status migrainosus, not intractable  MRI Brain WO Contrast   Patient is to increase topamax over 2 weeks to 100 mg po bid as headache prophylactic go on prednisone taper to break headache cycle to use imitrex 100 mg as abortive to undergo MRI of Head       Plan:      Orders Placed This Encounter   Procedures    MRI Brain WO Contrast     Standing Status:   Future     Standing Expiration Date:   7/15/2021           Иван Schwartz MD

## 2020-07-16 LAB
HPV SAMPLE: NORMAL
HPV, GENOTYPE 16: NOT DETECTED
HPV, GENOTYPE 18: NOT DETECTED
HPV, HIGH RISK OTHER: NOT DETECTED
HPV, INTERPRETATION: NORMAL
SPECIMEN DESCRIPTION: NORMAL

## 2020-07-17 LAB — CYTOLOGY REPORT: NORMAL

## 2020-07-17 NOTE — PROGRESS NOTES
History and Physical  [place] office  Karie Bender  7/14/2020              62 y.o. Chief Complaint   Patient presents with    Gynecologic Exam       No LMP recorded (lmp unknown). Patient is postmenopausal.             Primary Care Physician: Mary Siddiqi MD    HPI : Karie Bender is a 62 y.o. female No obstetric history on file. Patient doing very well  Due for pap smear   No complaints or issues, states no bleeding since menopause  ________________________________________________________________________  OB History   No obstetric history on file. Past Medical History:   Diagnosis Date    Acromioclavicular joint arthritis 4/18/2016    Acute gastroenteritis 10/11/2017    Anemia 12/11/2018    Arthritis     Asthma 1980    On Inhaler    Bursitis     left shoulder    Chronic right shoulder pain 7/24/2018    Closed fracture of left tibial plateau 7/28/0220    Closed fracture of proximal end of left tibia 11/22/2018    Closed fracture of shaft of left tibia 11/22/2018    Depression     Dry eyes     Essential hypertension 8/21/2017    Former smoker     GERD (gastroesophageal reflux disease)     not since Shelton-en-Y and weight loss    Head ache     on Topiramate    Hearing loss     mild    Hemorrhoids     Hiatal hernia     History of bladder infections     History of DVT (deep vein thrombosis) 2010    started in left leg, went to lungs    History of gastric bypass 12/6/2018    History of gastritis     History of morbid obesity     had Shelton-en y    History of pulmonary embolism 2010    from foot  trauma, was treated for six months with anticoagulation. She has no inferior vena cava filter.  Hyperlipidemia     not since weight loss    Hypertension 2015    Not anymore after Bariatric Surgery, no medication    Knee pain 3/6/2015    Mild intermittent asthma     Obesity (BMI 30-39. 9) 7/1/2016    KEVIN on CPAP 2015    at night    Osteoarthritis     Osteopenia     S/P gastric bypass 12-29-15    Dr. Nabil Thayer, [address], hosp wt = 280lb, initial wt = 328lb    S/p tibial fracture 2018    Status post gastric bypass for obesity     Tibial plateau fracture, left, closed, initial encounter 2018    Vitamin D deficiency 2015    Wears dentures     Wears glasses     Weight loss of 160 lbs since surgery  2017                                                                   Past Surgical History:   Procedure Laterality Date    ABDOMINOPLASTY N/A 2019    ABDOMINOPLASTY performed by Rhonda Doe MD at [facility] ARTHROSCOPY / ARTHROTOMY KNEE Left 2018    KNEE ARTHROSCOPY LEFT performed by Mariajose Wallace MD at [address] [facility] [address]  1986,  1987     SECTION      CHOLECYSTECTOMY  1987    CLOSED MANIPULATION SHOULDER Right 2018    SHOULDER MANIPULATION WITH INJECTION performed by Mariajose Wallace MD at [facility]  16    EXCISION / BIOPSY SKIN LESION OF HEAD / NECK N/A 4/3/2017     EXCISION POSTERIOR NECK CYST performed by Shaista Guerra IV, DO at [facility]  Left 2018    left tibia plateau external fixator application    NECK SURGERY      cyst removed back of neck    ORIF PROXIMAL TIBIAL PLATEAU FRACTURE Left 2018    TIBIAL PLATEAU OPEN REDUCTION INTERNAL FIXATION LEFT -  SYNTHES     C-ARM performed by Mariajose Wallace MD at [address]  10/14/2016    excision back lipoma with drain placement    [place] OFFICE/OUTPT VISIT,PROCEDURE ONLY Right 10/8/2018    RIGHT SHOULDER  LOUIE PROCEDURE performed by Mariajose Wallace MD at [address] [facility] OFFICE/OUTPT [facility] Left 2018    LEFT TIBIA PLATEAU EXTERNAL FIXATOR APPLICATION performed by Kimberly Archer MD at [address] [facility]  12/29/15    liver bx, EGD    SHOULDER SURGERY Left 2016    Louie procedure, bone spurs 2016    SHOULDER SURGERY Right 10/08/2018 magdy    TONSILLECTOMY  1979    UPPER GASTROINTESTINAL ENDOSCOPY  2015    found hiatal hernia     Family History   Problem Relation Age of Onset    Asthma Mother     Depression Mother     Anxiety Disorder Mother     Cancer Father         leukemia    Other Father         Myelodysplastic syndrome, which converted to leukemia    Migraines Sister     No Known Problems Brother     Ovarian Cancer Maternal Aunt     No Known Problems Maternal Grandmother     No Known Problems Maternal Grandfather     Breast Cancer Paternal Grandmother     Heart Attack Paternal Grandfather      Social History     Socioeconomic History    Marital status:      Spouse name: Fernando Langley Number of children: 1    Years of education: Not on file    Highest education level: Not on file   Occupational History    Occupation: UNEMPLOYED    Social Needs    Financial resource strain: Not on file    Food insecurity     Worry: Not on file     Inability: Not on file   [employer] needs     Medical: Not on file     Non-medical: Not on file   Tobacco Use    Smoking status: Former Smoker     Packs/day: 0.50     Years: 9.00     Pack years: 4.50     Types: Cigarettes     Start date: 1978     Last attempt to quit: 1987     Years since quittin.5    Smokeless tobacco: Never Used   Substance and Sexual Activity    Alcohol use: No     Alcohol/week: 0.0 standard drinks    Drug use: No    Sexual activity: Yes     Partners: Male     Comment: Has been together since    Lifestyle    Physical activity     Days per week: Not on file     Minutes per session: Not on file    Stress: Not on file   Relationships    Social connections     Talks on phone: Not on file     Gets together: Not on file     Attends Yazdanism service: Not on file     Active member of club or organization: Not on file     Attends meetings of clubs or organizations: Not on file     Relationship status: Not on file    Intimate partner violence  FIBER COMPLETE PO Take by mouth daily       albuterol (PROVENTIL) (2.5 MG/3ML) 0.083% nebulizer solution Take 3 mLs by nebulization every 6 hours as needed for Wheezing 120 each 11    RESTASIS 0.05 % ophthalmic emulsion Place 1 drop into both eyes 2 times daily        No current facility-administered medications for this visit. Facility-Administered Medications Ordered in Other Visits   Medication Dose Route Frequency Provider Last Rate Last Dose    lactated ringers infusion 1,000 mL  1,000 mL Intravenous Continuous Mikhail Alba MD   Stopped at 08/19/16 1249    HYDROmorphone (DILAUDID) injection 0.25 mg  0.25 mg Intravenous Q5 Min PRN Rolando Cherry MD               ALLERGIES:  Allergies as of 07/14/2020 - Review Complete 07/13/2020   Allergen Reaction Noted    Nsaids Other (See Comments) 07/01/2016    Pcn [penicillins] Other (See Comments) 03/06/2015    Bactrim [sulfamethoxazole-trimethoprim] Itching and Rash 01/12/2016    Codeine Itching and Rash 03/06/2015       Review Of Systems (11 point):  Constitutional: No fever, chills or malaise; No weight change or fatigue  Head and Eyes: No vision, Headache, Dizziness or trauma in last 12 months  ENT ROS: No hearing, Tinnitis, sinus or taste problems  Hematological and Lymphatic ROS:No Lymphoma, Von Willebrand's, Hemophillia or Bleeding History  Psych ROS: No Depression, Homicidal thoughts,suicidal thoughts, or anxiety  Breast ROS: No prior breast abnormalities or lumps  Respiratory ROS: No SOB, Pneumoniae,Cough, or Pulmonary Embolism History  Cardiovascular ROS: No Chest Pain with Exertion, Palpitations, Syncope, Edema, Arrhythmia  Gastrointestinal ROS: No Indigestion, Heartburn, Nausea, vomiting, Diarrhea, Constipation,or Bowel Changes; No Bloody Stools or melena  Genito-Urinary ROS: No Dysuria, Hematuria or Nocturia.  No Urinary Incontinence or Vaginal Discharge  Musculoskeletal ROS: No Arthralgia, Arthritis,Gout,Osteoporosis or Rheumatism  Neurological ROS: No CVA, Migraines, Epilepsy, Seizure Hx, or Limb Weakness  Dermatological ROS: No Rash, Itching, Hives, Mole Changes or Cancer                                                                                                                                                                                                                                  PHYSICAL Exam:     Constitutional:  Vitals:    07/14/20 1047   BP: 122/74   Site: Left Upper Arm   Position: Sitting   Cuff Size: Large Adult   Resp: 16   Temp: 98.4 °F (36.9 °C)   TempSrc: Temporal   Weight: 235 lb 4 oz (106.7 kg)   Height: 5' 6.5\" (1.689 m)         General Appearance: This  is a well Developed, well Nourished, well groomed female. Skin:  There was a Normal Inspection of the skin without rashes or lesions. Extremities: The patients extremities were without calf tenderness, edema, or varicosities. Abdomen: The abdomen was soft and non-tender. Psych: The patient had a normal Orientation to: Time, Place, Person, and Situation  There is no Mood / Affect changes  Breast:  (Chest)  normal appearance, no masses or tenderness  Self breast exams were reviewed in detail. Literature was given. Pelvic Exam:  Vulva and vagina appear normal. Bimanual exam reveals normal uterus and adnexa. Musculosk:  Normal Gait and station was noted. ASSESSMENT:      62 y.o. Annual   Diagnosis Orders   1.  Women's annual routine gynecological examination  PAP Smear          Chief Complaint   Patient presents with    Gynecologic Exam          Past Medical History:   Diagnosis Date    Acromioclavicular joint arthritis 4/18/2016    Acute gastroenteritis 10/11/2017    Anemia 12/11/2018    Arthritis     Asthma 1980    On Inhaler    Bursitis     left shoulder    Chronic right shoulder pain 7/24/2018    Closed fracture of left tibial plateau 9/42/8144    Closed fracture of proximal end of left tibia 11/22/2018    Closed fracture of shaft of left tibia 11/22/2018    Depression     Dry eyes     Essential hypertension 8/21/2017    Former smoker     GERD (gastroesophageal reflux disease)     not since Shelton-en-Y and weight loss    Head ache     on Topiramate    Hearing loss     mild    Hemorrhoids     Hiatal hernia     History of bladder infections     History of DVT (deep vein thrombosis) 2010    started in left leg, went to lungs    History of gastric bypass 12/6/2018    History of gastritis     History of morbid obesity     had Shelton-en y    History of pulmonary embolism 2010    from foot  trauma, was treated for six months with anticoagulation. She has no inferior vena cava filter.  Hyperlipidemia     not since weight loss    Hypertension 2015    Not anymore after Bariatric Surgery, no medication    Knee pain 3/6/2015    Mild intermittent asthma     Obesity (BMI 30-39. 9) 7/1/2016    KEVIN on CPAP 2015    at night    Osteoarthritis     Osteopenia     S/P gastric bypass 12-29-15    Dr. Farris, [facility] [place] wt = 280[facility], initial wt = 328lb    S/p tibial fracture 11/21/2018    Status post gastric bypass for obesity     Tibial plateau fracture, left, closed, initial encounter 12/5/2018    Vitamin D deficiency 2015    Wears dentures     Wears glasses     Weight loss of 160 lbs since surgery  8/21/2017         Patient Active Problem List    Diagnosis Date Noted    Closed fracture of left tibial plateau 12/43/6402    Anemia 12/11/2018    History of gastric bypass 12/06/2018    Tibial plateau fracture, left, closed, initial encounter 12/05/2018    Closed fracture of proximal end of left tibia 11/22/2018    Closed fracture of shaft of left tibia 11/22/2018    S/p tibial fracture 11/21/2018    Chronic right shoulder pain 07/24/2018    Acute gastroenteritis 10/11/2017    Essential hypertension 08/21/2017    Weight loss of 160 lbs since surgery  08/21/2017    KEVIN on CPAP 12 cm h20 08/22/2016    Obesity (BMI 30-39.9) 07/01/2016    Acromioclavicular joint arthritis 04/18/2016    Status post gastric bypass for obesity 12/30/2015    Morbid obesity ([address] 08/18/2015    Osteopenia 08/18/2015    History of pulmonary embolism 06/16/2015    History of DVT (deep vein thrombosis) 06/16/2015    Osteoarthritis     Vitamin D deficiency 03/20/2015    Knee pain 03/06/2015    Asthma 03/06/2015            PLAN:  Annual exam performed  Cervical cytology collected  RTO one year annual exam    Orders Placed This Encounter   Procedures    PAP Smear     Patient History:    No LMP recorded (lmp unknown). Patient is postmenopausal.  OBGYN Status: Postmenopausal  Past Surgical History:  8/16/2019: ABDOMINOPLASTY; N/A      Comment:  ABDOMINOPLASTY performed by Kenneth Rincon MD at [id number] [place] [address]  12/5/2018: ARTHROSCOPY / ARTHROTOMY KNEE; Left      Comment:  KNEE ARTHROSCOPY LEFT performed by Rosy Frederick MD at                [address] [place] [address]  8/1986,  9/1987: [address] [place] [address]  No date: [address] [facility] [address]: CHOLECYSTECTOMY  12/5/2018: CLOSED MANIPULATION SHOULDER; Right      Comment:  SHOULDER MANIPULATION WITH INJECTION performed by Alcides Dunlap MD at [address] [facility]: COLONOSCOPY  6/17/16: CYSTOSCOPY  4/3/2017: EXCISION / BIOPSY SKIN LESION OF HEAD / NECK; N/A      Comment:   EXCISION POSTERIOR NECK CYST performed by Byron Guerra IV, DO at [facility]  11/22/2018: [place]; Left      Comment:  left tibia plateau external fixator application  No date: NECK SURGERY      Comment:  cyst removed back of neck  12/5/2018: ORIF PROXIMAL TIBIAL PLATEAU FRACTURE; Left      Comment:  TIBIAL PLATEAU OPEN REDUCTION INTERNAL FIXATION LEFT -                 SYNTHES     C-ARM performed by Rosy Frederick MD at [address] [place] [address]  10/14/2016: OTHER SURGICAL HISTORY      Comment:  excision back lipoma with drain placement  10/8/2018: [place] OFFICE/OUTPT VISIT,PROCEDURE ONLY;  Right      Comment:  RIGHT SHOULDER  LOUIE PROCEDURE performed by James Michelle MD at [address] [place] [address]  2018: [place] OFFICE/OUTPT VISIT,PROCEDURE ONLY; Left      Comment:  LEFT TIBIA PLATEAU EXTERNAL FIXATOR APPLICATION                performed by Chano Tuttle MD at [place] [address]  12/29/15: SHELBY-EN-Y GASTRIC BYPASS      Comment:  liver bx, EGD  2016: SHOULDER SURGERY; Left      Comment:  Magdy procedure, bone spurs 2016  10/08/2018: SHOULDER SURGERY; Right      Comment:  magdy  1979: TONSILLECTOMY  2015: UPPER GASTROINTESTINAL ENDOSCOPY      Comment:  found hiatal hernia      Social History    Tobacco Use      Smoking status: Former Smoker        Packs/day: 0.50        Years: 9.00        Pack years: 4.5        Types: Cigarettes        Start date: 1978        Quit date: 1987        Years since quittin.5      Smokeless tobacco: Never Used       Standing Status:   Future     Standing Expiration Date:   2021     Order Specific Question:   Collection Type     Answer: Thin Prep     Order Specific Question:   Prior Abnormal Pap Test     Answer:   No     Order Specific Question:   Screening or Diagnostic     Answer:   Screening     Order Specific Question:   HPV Requested?      Answer:   Yes     Order Specific Question:   High Risk Patient     Answer:   N/A

## 2020-08-13 ENCOUNTER — HOSPITAL ENCOUNTER (OUTPATIENT)
Dept: WOMENS IMAGING | Age: 60
Discharge: HOME | End: 2020-08-15
Payer: MEDICARE

## 2020-08-13 ENCOUNTER — HOSPITAL ENCOUNTER (OUTPATIENT)
Dept: MRI IMAGING | Age: 60
Discharge: HOME | End: 2020-08-15
Payer: MEDICARE

## 2020-08-13 PROCEDURE — 77080 DXA BONE DENSITY AXIAL: CPT

## 2020-08-13 PROCEDURE — 70551 MRI BRAIN STEM W/O DYE: CPT

## 2020-08-14 RX ORDER — ALENDRONATE SODIUM 70 MG/1
70 TABLET ORAL
Qty: 12 TABLET | Refills: 1 | Status: SHIPPED | OUTPATIENT
Start: 2020-08-14 | End: 2020-08-17 | Stop reason: SINTOL

## 2020-09-10 RX ORDER — DENOSUMAB 60 MG/ML
60 INJECTION SUBCUTANEOUS
Qty: 1 ML | Refills: 1 | Status: SHIPPED | OUTPATIENT
Start: 2020-09-10 | End: 2021-10-13 | Stop reason: SDUPTHER

## 2020-09-11 PROBLEM — M81.0 AGE-RELATED OSTEOPOROSIS WITHOUT CURRENT PATHOLOGICAL FRACTURE: Status: ACTIVE | Noted: 2020-09-11

## 2020-09-11 RX ORDER — EPINEPHRINE 1 MG/ML
0.3 INJECTION, SOLUTION, CONCENTRATE INTRAVENOUS PRN
Status: CANCELLED | OUTPATIENT
Start: 2020-09-17

## 2020-09-11 RX ORDER — SODIUM CHLORIDE 9 MG/ML
INJECTION, SOLUTION INTRAVENOUS CONTINUOUS
Status: CANCELLED | OUTPATIENT
Start: 2020-09-17

## 2020-09-11 RX ORDER — METHYLPREDNISOLONE SODIUM SUCCINATE 125 MG/2ML
125 INJECTION, POWDER, LYOPHILIZED, FOR SOLUTION INTRAMUSCULAR; INTRAVENOUS ONCE
Status: CANCELLED | OUTPATIENT
Start: 2020-09-17

## 2020-09-11 RX ORDER — DIPHENHYDRAMINE HYDROCHLORIDE 50 MG/ML
50 INJECTION INTRAMUSCULAR; INTRAVENOUS ONCE
Status: CANCELLED | OUTPATIENT
Start: 2020-09-17

## 2020-09-24 ENCOUNTER — OFFICE VISIT (OUTPATIENT)
Dept: NEUROLOGY | Age: 60
End: 2020-09-24
Payer: MEDICARE

## 2020-09-24 VITALS
SYSTOLIC BLOOD PRESSURE: 123 MMHG | TEMPERATURE: 98 F | WEIGHT: 240 LBS | DIASTOLIC BLOOD PRESSURE: 82 MMHG | HEART RATE: 63 BPM | HEIGHT: 66 IN | BODY MASS INDEX: 38.57 KG/M2

## 2020-09-24 PROCEDURE — 1036F TOBACCO NON-USER: CPT | Performed by: PSYCHIATRY & NEUROLOGY

## 2020-09-24 PROCEDURE — G8428 CUR MEDS NOT DOCUMENT: HCPCS | Performed by: PSYCHIATRY & NEUROLOGY

## 2020-09-24 PROCEDURE — 99214 OFFICE O/P EST MOD 30 MIN: CPT | Performed by: PSYCHIATRY & NEUROLOGY

## 2020-09-24 PROCEDURE — G8417 CALC BMI ABV UP PARAM F/U: HCPCS | Performed by: PSYCHIATRY & NEUROLOGY

## 2020-09-24 PROCEDURE — 3017F COLORECTAL CA SCREEN DOC REV: CPT | Performed by: PSYCHIATRY & NEUROLOGY

## 2020-09-24 RX ORDER — TOPIRAMATE 100 MG/1
100 TABLET, FILM COATED ORAL 2 TIMES DAILY
Qty: 60 TABLET | Refills: 11 | Status: SHIPPED | OUTPATIENT
Start: 2020-09-24 | End: 2021-10-06 | Stop reason: SDUPTHER

## 2020-09-24 RX ORDER — SUMATRIPTAN 100 MG/1
TABLET, FILM COATED ORAL
Qty: 18 TABLET | Refills: 5 | Status: SHIPPED | OUTPATIENT
Start: 2020-09-24 | End: 2021-10-06 | Stop reason: SDUPTHER

## 2020-09-24 ASSESSMENT — ENCOUNTER SYMPTOMS
RESPIRATORY NEGATIVE: 1
ALLERGIC/IMMUNOLOGIC NEGATIVE: 1
GASTROINTESTINAL NEGATIVE: 1
EYES NEGATIVE: 1

## 2020-09-24 NOTE — PROGRESS NOTES
weight loss    Hypertension     Not anymore after Bariatric Surgery, no medication    Knee pain 3/6/2015    Mild intermittent asthma     Obesity (BMI 30-39. 9) 2016    KEVIN on CPAP     at night    Osteoarthritis     Osteopenia     S/P gastric bypass 12-29-15    Dr. Kymberly Elizondo, [address], hosp wt = 280lb, initial wt = 328lb    S/p tibial fracture 2018    Status post gastric bypass for obesity     Tibial plateau fracture, left, closed, initial encounter 2018    Vitamin D deficiency     Wears dentures     Wears glasses     Weight loss of 160 lbs since surgery  2017       Past Surgical History:   Procedure Laterality Date    ABDOMINOPLASTY N/A 2019    ABDOMINOPLASTY performed by Alexander Obregon MD at [address] ARTHROSCOPY / ARTHROTOMY KNEE Left 2018    KNEE ARTHROSCOPY LEFT performed by Lui Mendoza MD at [address] [place] [address] Road  1986,  1987     SECTION      CHOLECYSTECTOMY      CLOSED MANIPULATION SHOULDER Right 2018    SHOULDER MANIPULATION WITH INJECTION performed by Lui Mendoza MD at [facility]  16    EXCISION / BIOPSY SKIN LESION OF HEAD / NECK N/A 4/3/2017     EXCISION POSTERIOR NECK CYST performed by Kirk Guerra IV, DO at / [address] Left 2018    left tibia plateau external fixator application    NECK SURGERY      cyst removed back of neck    ORIF PROXIMAL TIBIAL PLATEAU FRACTURE Left 2018    TIBIAL PLATEAU OPEN REDUCTION INTERNAL FIXATION LEFT -  SYNTHES     C-ARM performed by Lui Mendoza MD at [place] [address]  10/14/2016    excision back lipoma with drain placement    [place] OFFICE/OUTPT VISIT,PROCEDURE ONLY Right 10/8/2018    RIGHT SHOULDER  LOUIE PROCEDURE performed by Lui Mendoza MD at [address] OFFICE/OUTPT [facility] Left 2018    LEFT TIBIA PLATEAU EXTERNAL FIXATOR APPLICATION performed by Luis Felipe Olvera MD at [address]  12/29/15    liver bx, EGD    SHOULDER SURGERY Left 2016    [place] procedure, bone spurs 2016    SHOULDER SURGERY Right 10/08/2018    [place]    TONSILLECTOMY  1979    UPPER GASTROINTESTINAL ENDOSCOPY  2015    found hiatal hernia       Family History   Problem Relation Age of Onset    Asthma Mother     Depression Mother     Anxiety Disorder Mother     Cancer Father         leukemia    Other Father         Myelodysplastic syndrome, which converted to leukemia    Migraines Sister     No Known Problems Brother     Ovarian Cancer Maternal Aunt     No Known Problems Maternal Grandmother     No Known Problems Maternal Grandfather     Breast Cancer Paternal Grandmother     Heart Attack Paternal Grandfather        Social History     Socioeconomic History    Marital status:      Spouse name: Roderick Sylvester Number of children: 1    Years of education: None    Highest education level: None   Occupational History    Occupation: UNEMPLOYED    Social Needs    Financial resource strain: None    Food insecurity     Worry: None     Inability: None    Transportation needs     Medical: None     Non-medical: None   Tobacco Use    Smoking status: Former Smoker     Packs/day: 0.50     Years: 9.00     Pack years: 4.50     Types: Cigarettes     Start date: 1978     Last attempt to quit: 1987     Years since quittin.7    Smokeless tobacco: Never Used   Substance and Sexual Activity    Alcohol use: No     Alcohol/week: 0.0 standard drinks    Drug use: No    Sexual activity: Yes     Partners: Male     Comment: Has been together since    Lifestyle    Physical activity     Days per week: None     Minutes per session: None    Stress: None   Relationships    Social connections     Talks on phone: None     Gets together: None     Attends Gnosticist service: None     Active member of club or organization: None     Attends meetings of clubs or organizations: None     Relationship status: None    Intimate partner violence     Fear of current or ex partner: None     Emotionally abused: None     Physically abused: None     Forced sexual activity: None   Other Topics Concern    None   Social History Narrative    None       Current Outpatient Medications   Medication Sig Dispense Refill    topiramate (TOPAMAX) 100 MG tablet Take 1 tablet by mouth 2 times daily 60 tablet 11    SUMAtriptan (IMITREX) 100 MG tablet Take one at HA onset . May repeat in 1 hour . No more 2 per day 4 days out of the week 18 tablet 5    fluticasone (FLOVENT HFA) 110 MCG/ACT inhaler Inhale 2 puffs into the lungs 2 times daily 1 Inhaler 5    FLUoxetine (PROZAC) 40 MG capsule Take 1 capsule by mouth daily 90 capsule 2    montelukast (SINGULAIR) 10 MG tablet Take 1 tablet by mouth nightly 30 tablet 2    acetaminophen (TYLENOL) 325 MG tablet Take 2 tablets by mouth every 6 hours as needed for Pain 30 tablet 0    CALCIUM CITRATE PO Take 750 mg by mouth 2 times daily      Multiple Vitamins-Minerals (MULTIVITAMIN ADULTS 50+ PO) Take 1 tablet by mouth daily      KRILL OIL PO Take by mouth daily      sodium chloride (VINEET 128) 2 % ophthalmic solution Place 1 drop into both eyes as needed      vitamin D (CHOLECALCIFEROL) 1000 UNIT TABS tablet Take 1,000 Units by mouth daily      albuterol sulfate HFA (VENTOLIN HFA) 108 (90 Base) MCG/ACT inhaler Inhale 2 puffs into the lungs every 6 hours as needed for Wheezing or Shortness of Breath 1 Inhaler 4    FIBER COMPLETE PO Take by mouth daily       albuterol (PROVENTIL) (2.5 MG/3ML) 0.083% nebulizer solution Take 3 mLs by nebulization every 6 hours as needed for Wheezing 120 each 11    denosumab (PROLIA) 60 MG/ML SOSY SC injection Inject 1 mL into the skin every 6 months (Patient not taking: Reported on 9/24/2020) 1 mL 1     No current facility-administered medications for this visit.       Facility-Administered Medications Ordered in Other Visits   Medication Dose Route Frequency Provider Last Rate Last Dose    lactated ringers infusion 1,000 mL  1,000 mL Intravenous Continuous Mikhail Milligan MD   Stopped at 08/19/16 1249    HYDROmorphone (DILAUDID) injection 0.25 mg  0.25 mg Intravenous Q5 Min PRN Katheryn Keller MD           Allergies   Allergen Reactions    Nsaids Other (See Comments)     Bariatric Surgeon told me not to take NSAIDS for good    Pcn [Penicillins] Other (See Comments)     Sores in mouth    Bactrim [Sulfamethoxazole-Trimethoprim] Itching and Rash    Codeine Itching and Rash       Review of Systems   Constitutional: Positive for fatigue. HENT: Negative. Eyes: Negative. Respiratory: Negative. Cardiovascular: Negative. Gastrointestinal: Negative. Endocrine: Negative. Genitourinary: Negative. Musculoskeletal: Negative. Skin: Negative. Allergic/Immunologic: Negative. Neurological: Negative. Hematological: Negative. Psychiatric/Behavioral: Negative. Objective:   Physical Exam  Vitals:    09/24/20 1255   BP: 123/82   Pulse: 63   Temp: 98 °F (36.7 °C)     weight: 240 lb (108.9 kg)    Neurological Examination  Constitutional .General exam well groomed   Head/Ears /Nose/Throat: external ear . Normal exam  Neck and thyroid . Normal size. No bruits  Respiratory . Breathsounds clear bilaterally  Cardiovascular: Auscultation of heart with regular rate and rhythm  Musculoskeletal. Muscle bulk and tone normal                                                           Muscle strength 5/5 strength throughout                                                                                No dysmetria or dysdiadokinesis  No tremor   Normal fine motor  Gait normal   Orientation Alert and oriented x 3   Attention and concentration normal  Short term memory normal  Language process and speech normal . No aphasia   Cranial nerve 2 normal acuety and visual fields  Cranial nerve 3, 4 and 6 . Extraocular muscles are intact . Pupils are equal and reactive   Cranial nerve 5 . Normal strength of masseter and temporalis . Intact corneal reflex. Normal facial sensation  Cranial nerve 7 normal exam   Cranial nerve 8. Grossly intact hearing   Cranial nerve 9 and 10. Symmetric palate elevation   Cranial nerve 11 , 5 out of 5 strength   Cranial Nerve 12 midline tongue . No atrophy  Sensation . Normal proprioception . Intact Vibration . Normal pinprick and light touch   Deep Tendon Reflexes normal  Plantar response flexor bilaterally    Assessment:       Diagnosis Orders   1.  Migraine without aura and without status migrainosus, not intractable     Headaches are under good control to continue current regimen       Plan:      As above         Prem Mosley MD

## 2020-10-08 ENCOUNTER — TELEPHONE (OUTPATIENT)
Dept: ONCOLOGY | Age: 60
End: 2020-10-08

## 2020-10-08 NOTE — TELEPHONE ENCOUNTER
Called and LM for patient to schedule prolia inj    papwerwork is in the pending/left message drawer

## 2020-10-09 ENCOUNTER — TELEPHONE (OUTPATIENT)
Dept: FAMILY MEDICINE CLINIC | Age: 60
End: 2020-10-09

## 2020-10-09 NOTE — TELEPHONE ENCOUNTER
Hetal Tavarez from [facility] called in stating they need calcium and creatine put in the system for patient to get prolia

## 2020-10-12 ENCOUNTER — OFFICE VISIT (OUTPATIENT)
Dept: PULMONOLOGY | Age: 60
End: 2020-10-12
Payer: MEDICARE

## 2020-10-12 VITALS
DIASTOLIC BLOOD PRESSURE: 66 MMHG | WEIGHT: 241 LBS | TEMPERATURE: 97.5 F | SYSTOLIC BLOOD PRESSURE: 97 MMHG | HEART RATE: 72 BPM | RESPIRATION RATE: 20 BRPM | BODY MASS INDEX: 38.9 KG/M2 | OXYGEN SATURATION: 97 %

## 2020-10-12 PROCEDURE — 3017F COLORECTAL CA SCREEN DOC REV: CPT | Performed by: INTERNAL MEDICINE

## 2020-10-12 PROCEDURE — G8484 FLU IMMUNIZE NO ADMIN: HCPCS | Performed by: INTERNAL MEDICINE

## 2020-10-12 PROCEDURE — G8417 CALC BMI ABV UP PARAM F/U: HCPCS | Performed by: INTERNAL MEDICINE

## 2020-10-12 PROCEDURE — G8427 DOCREV CUR MEDS BY ELIG CLIN: HCPCS | Performed by: INTERNAL MEDICINE

## 2020-10-12 PROCEDURE — 99214 OFFICE O/P EST MOD 30 MIN: CPT | Performed by: INTERNAL MEDICINE

## 2020-10-12 PROCEDURE — 1036F TOBACCO NON-USER: CPT | Performed by: INTERNAL MEDICINE

## 2020-10-12 RX ORDER — ALBUTEROL SULFATE 2.5 MG/3ML
2.5 SOLUTION RESPIRATORY (INHALATION) EVERY 6 HOURS PRN
Qty: 120 EACH | Refills: 11 | Status: SHIPPED | OUTPATIENT
Start: 2020-10-12

## 2020-10-12 RX ORDER — ALBUTEROL SULFATE 90 UG/1
2 AEROSOL, METERED RESPIRATORY (INHALATION) EVERY 6 HOURS PRN
Qty: 3 INHALER | Refills: 2 | Status: SHIPPED | OUTPATIENT
Start: 2020-10-12

## 2020-10-12 RX ORDER — MONTELUKAST SODIUM 10 MG/1
10 TABLET ORAL NIGHTLY
Qty: 90 TABLET | Refills: 3 | Status: SHIPPED | OUTPATIENT
Start: 2020-10-12 | End: 2021-10-08

## 2020-10-12 ASSESSMENT — SLEEP AND FATIGUE QUESTIONNAIRES
HOW LIKELY ARE YOU TO NOD OFF OR FALL ASLEEP WHILE WATCHING TV: 1
HOW LIKELY ARE YOU TO NOD OFF OR FALL ASLEEP WHILE SITTING QUIETLY AFTER LUNCH WITHOUT ALCOHOL: 1
HOW LIKELY ARE YOU TO NOD OFF OR FALL ASLEEP WHILE SITTING AND READING: 1
ESS TOTAL SCORE: 7
HOW LIKELY ARE YOU TO NOD OFF OR FALL ASLEEP IN A CAR, WHILE STOPPED FOR A FEW MINUTES IN TRAFFIC: 0
HOW LIKELY ARE YOU TO NOD OFF OR FALL ASLEEP WHILE SITTING INACTIVE IN A PUBLIC PLACE: 0
HOW LIKELY ARE YOU TO NOD OFF OR FALL ASLEEP WHEN YOU ARE A PASSENGER IN A CAR FOR AN HOUR WITHOUT A BREAK: 2
HOW LIKELY ARE YOU TO NOD OFF OR FALL ASLEEP WHILE SITTING AND TALKING TO SOMEONE: 0
HOW LIKELY ARE YOU TO NOD OFF OR FALL ASLEEP WHILE LYING DOWN TO REST IN THE AFTERNOON WHEN CIRCUMSTANCES PERMIT: 2

## 2020-10-14 NOTE — PROGRESS NOTES
Kaleb Duran  10/14/2020      Kaleb Duran  62 y.o. female presented for follow up . Obstructive sleep apnea and asthma. Patient asthma flareups have increased over the last 2 to 3 weeks she is feeling chest tightness anxious. This is because her son  suddenly. She is under a lot of stress because of this. She is compliant with her CPAP therapy  Denies excessive daytime sleepiness or fatigue  Mask and pressure not comfortable  Sleep Medicine 10/12/2020 2020 2019 2016   Sitting and reading 1 1 2 1   Watching TV 1 0 2 0   Sitting, inactive in a public place (e.g. a theatre or a meeting) 0 0 1 0   As a passenger in a car for an hour without a break 2 1 0 3   Lying down to rest in the afternoon when circumstances permit 2 0 0 0   Sitting and talking to someone 0 0 0 0   Sitting quietly after a lunch without alcohol 1 0 0 0   In a car, while stopped for a few minutes in traffic 0 0 0 0   Total score 7 2 5 4      Last visit   Since her last visit, patient feels her asthma is gotten worse. Starting January. Her  health has gotten worse and he has been told not to shovel snow or cut grass and patient is under a lot of stress because of his health and that is also triggering her asthma attacks and since January. Patient has started putting on more weight, even though she had a bariatric surgery since she started putting on more weight. She started feeling more short of breath with exertion and doing activities which her  was doing like cutting grass shoveling snow taking garbage out. She is using her rescue inhaler every other day, but no nocturnal symptoms. Denies any cough or hemoptysis. He is using Flonase for allergies, which are in spring. She is compliant with her Dulera. From [facility] sleep standpoint, she has started Tuesday use CPAP again at night and a compliance report for 90 days shows only 38% compliance with usage of 7 hours at night.   We discussed in detail regarding improving compliance of CPAP machine. She doesn't notice any difference in her symptoms. She's never had excessive daytime sleepiness. The only reason she is started to use the machine again is because she is gaining weight          He is using her albuterol more frequently. /21/17  She is doing well from her asthma standpoint. Uses albuterol once a month, does not use nebulized albuterol until She has cold. Denies any cough, any sputum. No wheezing. No dyspnea. Her asthma symptoms are triggered during fall and spring. She is using CPAP compliance report showed 93% compliance. She does not want to use CPAP because she is uncomfortable with the mask. It wakes her up the pressure. [Patient had a good bariatric surgery and has lost significant weight. She was sent for re-titration and she had a split-night study, which showed that she still has obstructive sleep apnea AHI 12 /hr needed a CPAP of 12 cm of water pressure  He is using the CPAP machine. Initially it was an auto titrating now it has been set at 12 cm  She is on [facility]  Her allergy testing was negative. IgE level was low]        Review of Systems -  General ROS: negative for - chills, fatigue, fever or weight loss  ENT ROS: negative for - headaches, oral lesions or sore throat  Cardiovascular ROS: no chest pain , orthopnea or pnd   Gastrointestinal ROS: no abdominal pain, change in bowel habits, or black or bloody stools  Skin - no rash   Neuro - no blurry vision , no loc .  No focal weakness   msk - no jt tenderness or swelling    Vascular - no claudication , rest completed and negative   Lymphatic - complete and negative   Hematology - oncology - complete and negative   Allergy immunology - complete and negative    no burning or hematuria      Immunization History   Administered Date(s) Administered    Influenza Vaccine, unspecified formulation 09/11/2015    Influenza Virus Vaccine 09/05/2018    Influenza, MDCK Quadv, with preserv IM (Flucelvax 4 yrs and older) 03/21/2018    Influenza, Harlen Kilts, IM, PF (6 mo and older Fluzone, Flulaval, Fluarix, and 3 yrs and older Afluria) 08/23/2016, 09/05/2017, 09/05/2018, 09/17/2019    Pneumococcal Conjugate 13-valent ([id number]) 09/18/2015    Pneumococcal Polysaccharide ([id number]) 11/22/2016    Tdap (Boostrix, Adacel) 09/14/2012    Zoster Live (Zostavax) 06/19/2015    Zoster Recombinant (Shingrix) 03/20/2018, 09/05/2018          PAST MEDICAL HISTORY:         Diagnosis Date    Acromioclavicular joint arthritis 4/18/2016    Acute gastroenteritis 10/11/2017    Anemia 12/11/2018    Arthritis     Asthma 1980    On Inhaler    Bursitis     left shoulder    Chronic right shoulder pain 7/24/2018    Closed fracture of left tibial plateau 6/94/3104    Closed fracture of proximal end of left tibia 11/22/2018    Closed fracture of shaft of left tibia 11/22/2018    Depression     Dry eyes     Essential hypertension 8/21/2017    Former smoker     GERD (gastroesophageal reflux disease)     not since Shelton-en-Y and weight loss    Head ache     on Topiramate    Hearing loss     mild    Hemorrhoids     Hiatal hernia     History of bladder infections     History of DVT (deep vein thrombosis) 2010    started in left leg, went to lungs    History of gastric bypass 12/6/2018    History of gastritis     History of morbid obesity     had Shelton-en y    History of pulmonary embolism 2010    from foot  trauma, was treated for six months with anticoagulation. She has no inferior vena cava filter.  Hyperlipidemia     not since weight loss    Hypertension 2015    Not anymore after Bariatric Surgery, no medication    Knee pain 3/6/2015    Mild intermittent asthma     Obesity (BMI 30-39. 9) 7/1/2016    KEVIN on CPAP 2015    at night    Osteoarthritis     Osteopenia     S/P gastric bypass 12-29-15    Dr. Cielo Hartman, hosp wt = 280lb, initial wt = 328lb    S/p tibial fracture 11/21/2018    Status post gastric bypass for obesity     Tibial plateau fracture, left, closed, initial encounter 12/5/2018    Vitamin D deficiency 2015    Wears dentures     Wears glasses     Weight loss of 160 lbs since surgery  8/21/2017       Family History:       Problem Relation Age of Onset    Asthma Mother     Depression Mother     Anxiety Disorder Mother     Cancer Father         leukemia    Other Father         Myelodysplastic syndrome, which converted to leukemia    Migraines Sister     No Known Problems Brother     Ovarian Cancer Maternal Aunt     No Known Problems Maternal Grandmother     No Known Problems Maternal Grandfather     Breast Cancer Paternal Grandmother     Heart Attack Paternal Grandfather        SURGICAL HISTORY:   Past Surgical History:   Procedure Laterality Date    ABDOMINOPLASTY N/A 8/16/2019    ABDOMINOPLASTY performed by Polo Ochoa MD at [address] [facility] ARTHROSCOPY / ARTHROTOMY KNEE Left 12/5/2018    KNEE ARTHROSCOPY LEFT performed by Mirian Mares MD at [address] [facility]  8/1986,  9/1987    [address] Right 12/5/2018    SHOULDER MANIPULATION WITH INJECTION performed by Mirian Mares MD at [facility]  6/17/16    EXCISION / BIOPSY SKIN LESION OF HEAD / NECK N/A 4/3/2017     EXCISION POSTERIOR NECK CYST performed by Gilberto Guerra IV, DO at . [place] [address] Left 11/22/2018    left tibia plateau external fixator application    NECK SURGERY      cyst removed back of neck    ORIF PROXIMAL TIBIAL PLATEAU FRACTURE Left 12/5/2018    TIBIAL PLATEAU OPEN REDUCTION INTERNAL FIXATION LEFT -  SYNTHES     C-ARM performed by Mirian Mares MD at [address] [facility]  10/14/2016    excision back lipoma with drain placement    MO OFFICE/OUTPT VISIT,PROCEDURE ONLY Right 10/8/2018    RIGHT SHOULDER  LOUIE PROCEDURE performed by Jaye Diallo Rufino Liao MD at [address] [facility] OFFICE/OUTPT [address] Left 2018    LEFT TIBIA PLATEAU EXTERNAL FIXATOR APPLICATION performed by David Velazquez MD at [address] [facility]  12/29/15    liver bx, EGD    SHOULDER SURGERY Left 2016    Giovanna procedure, bone spurs 2016    SHOULDER SURGERY Right 10/08/2018    [place]    TONSILLECTOMY  1979    UPPER GASTROINTESTINAL ENDOSCOPY  2015    found hiatal hernia         LUNG CANCER SCREENING     1. CRITERIA MET    []     CT ORDERED  []      2. CRITERIA NOT MET   [x]      3. REFUSED                    []        REASON CRITERIA NOT MET     1. SMOKING LESS THAN 30 PY  [x]      2. AGE LESS THAN 55 or GREATER 77 YEARS  []      3. QUIT SMOKING 15 YEARS OR GREATER   [x]      4. RECENT CT WITH IN 11 MONTHS    []      5. LIFE EXPECTANCY < 5 YEARS   []      6. SIGNS  AND SYMPTOMS OF LUNG CANCER   []                Not in a hospital admission. Allergies   Allergen Reactions    Nsaids Other (See Comments)     Bariatric Surgeon told me not to take NSAIDS for good    Pcn [Penicillins] Other (See Comments)     Sores in mouth    Bactrim [Sulfamethoxazole-Trimethoprim] Itching and Rash    Codeine Itching and Rash     Social History     Tobacco Use   Smoking Status Former Smoker    Packs/day: 0.50    Years: 9.00    Pack years: 4.50    Types: Cigarettes    Start date: 1978    Last attempt to quit: 1987    Years since quittin.8   Smokeless Tobacco Never Used     Prior to Admission medications    Medication Sig Start Date End Date Taking?  Authorizing Provider   montelukast (SINGULAIR) 10 MG tablet Take 1 tablet by mouth nightly 10/12/20  Yes Trae Jones MD   albuterol sulfate HFA (VENTOLIN HFA) 108 (90 Base) MCG/ACT inhaler Inhale 2 puffs into the lungs every 6 hours as needed for Wheezing or Shortness of Breath 10/12/20  Yes Trae Jones MD   albuterol (PROVENTIL) (2.5 MG/3ML) 0.083% nebulizer solution Take 3 mLs by side effects of prescribed medications. Barriers to medication compliance addressed. All patient questions answered. Pt voiced understanding.    Aster Palmer MD   Electronically signed by Aster Palmer MD on 10/14/2020 at 1:26 PM

## 2020-10-15 ENCOUNTER — HOSPITAL ENCOUNTER (OUTPATIENT)
Dept: INFUSION THERAPY | Age: 60
Discharge: HOME | End: 2020-10-15
Payer: MEDICARE

## 2020-10-15 VITALS
DIASTOLIC BLOOD PRESSURE: 74 MMHG | HEART RATE: 65 BPM | SYSTOLIC BLOOD PRESSURE: 108 MMHG | TEMPERATURE: 98.2 F | RESPIRATION RATE: 16 BRPM

## 2020-10-15 DIAGNOSIS — M81.0 AGE-RELATED OSTEOPOROSIS WITHOUT CURRENT PATHOLOGICAL FRACTURE: Primary | ICD-10-CM

## 2020-10-15 LAB
BUN BLDV-MCNC: 11 MG/DL (ref 6–20)
CALCIUM SERPL-MCNC: 9.3 MG/DL (ref 8.6–10.4)
CREAT SERPL-MCNC: 0.73 MG/DL (ref 0.5–0.9)
GFR AFRICAN AMERICAN: >60 ML/MIN
GFR NON-AFRICAN AMERICAN: >60 ML/MIN
GFR SERPL CREATININE-BSD FRML MDRD: NORMAL ML/MIN/{1.73_M2}
GFR SERPL CREATININE-BSD FRML MDRD: NORMAL ML/MIN/{1.73_M2}

## 2020-10-15 PROCEDURE — 82310 ASSAY OF CALCIUM: CPT

## 2020-10-15 PROCEDURE — 96401 CHEMO ANTI-NEOPL SQ/IM: CPT

## 2020-10-15 PROCEDURE — 84520 ASSAY OF UREA NITROGEN: CPT

## 2020-10-15 PROCEDURE — 82565 ASSAY OF CREATININE: CPT

## 2020-10-15 PROCEDURE — 6360000002 HC RX W HCPCS: Performed by: FAMILY MEDICINE

## 2020-10-15 PROCEDURE — 36415 COLL VENOUS BLD VENIPUNCTURE: CPT

## 2020-10-15 RX ORDER — SODIUM CHLORIDE 9 MG/ML
INJECTION, SOLUTION INTRAVENOUS CONTINUOUS
Status: CANCELLED | OUTPATIENT
Start: 2021-04-15

## 2020-10-15 RX ORDER — DIPHENHYDRAMINE HYDROCHLORIDE 50 MG/ML
50 INJECTION INTRAMUSCULAR; INTRAVENOUS ONCE
Status: CANCELLED | OUTPATIENT
Start: 2021-04-15

## 2020-10-15 RX ORDER — METHYLPREDNISOLONE SODIUM SUCCINATE 125 MG/2ML
125 INJECTION, POWDER, LYOPHILIZED, FOR SOLUTION INTRAMUSCULAR; INTRAVENOUS ONCE
Status: CANCELLED | OUTPATIENT
Start: 2021-04-15

## 2020-10-15 RX ORDER — EPINEPHRINE 1 MG/ML
0.3 INJECTION, SOLUTION, CONCENTRATE INTRAVENOUS PRN
Status: CANCELLED | OUTPATIENT
Start: 2021-04-15

## 2020-10-15 RX ADMIN — DENOSUMAB 60 MG: 60 INJECTION SUBCUTANEOUS at 10:41

## 2020-10-15 ASSESSMENT — PAIN DESCRIPTION - PAIN TYPE: TYPE: CHRONIC PAIN

## 2020-10-15 ASSESSMENT — PAIN DESCRIPTION - LOCATION: LOCATION: GENERALIZED

## 2020-10-15 ASSESSMENT — PAIN SCALES - GENERAL: PAINLEVEL_OUTOF10: 3

## 2020-10-15 NOTE — PROGRESS NOTES
Pt here for first Prolia injection. Arrives ambulatory by self. Denies any new complaints. Lab results reviewed. Written and verbal education provided on side effects. Injection complete without incident. D/c'd in stable condition. Returns in 6 mo for next injection.

## 2020-12-09 ENCOUNTER — TELEPHONE (OUTPATIENT)
Dept: BARIATRICS/WEIGHT MGMT | Age: 60
End: 2020-12-09

## 2020-12-14 ENCOUNTER — HOSPITAL ENCOUNTER (OUTPATIENT)
Age: 60
Discharge: HOME | End: 2020-12-14
Payer: MEDICARE

## 2020-12-14 LAB
ABSOLUTE EOS #: 0.13 K/UL (ref 0–0.44)
ABSOLUTE IMMATURE GRANULOCYTE: 0.07 K/UL (ref 0–0.3)
ABSOLUTE LYMPH #: 2.12 K/UL (ref 1.1–3.7)
ABSOLUTE MONO #: 0.54 K/UL (ref 0.1–1.2)
ALBUMIN SERPL-MCNC: 3.8 G/DL (ref 3.5–5.2)
ALBUMIN/GLOBULIN RATIO: 1.2 (ref 1–2.5)
ALP BLD-CCNC: 89 U/L (ref 35–104)
ALT SERPL-CCNC: 12 U/L (ref 5–33)
ANION GAP SERPL CALCULATED.3IONS-SCNC: 9 MMOL/L (ref 9–17)
AST SERPL-CCNC: 14 U/L
BASOPHILS # BLD: 1 % (ref 0–2)
BASOPHILS ABSOLUTE: 0.05 K/UL (ref 0–0.2)
BILIRUB SERPL-MCNC: 0.2 MG/DL (ref 0.3–1.2)
BUN BLDV-MCNC: 11 MG/DL (ref 6–20)
BUN/CREAT BLD: ABNORMAL (ref 9–20)
CALCIUM SERPL-MCNC: 8.5 MG/DL (ref 8.6–10.4)
CHLORIDE BLD-SCNC: 110 MMOL/L (ref 98–107)
CHOLESTEROL, FASTING: 205 MG/DL
CHOLESTEROL/HDL RATIO: 4.8
CO2: 20 MMOL/L (ref 20–31)
CREAT SERPL-MCNC: 0.65 MG/DL (ref 0.5–0.9)
DIFFERENTIAL TYPE: ABNORMAL
EOSINOPHILS RELATIVE PERCENT: 2 % (ref 1–4)
FERRITIN: 18 UG/L (ref 13–150)
GFR AFRICAN AMERICAN: >60 ML/MIN
GFR NON-AFRICAN AMERICAN: >60 ML/MIN
GFR SERPL CREATININE-BSD FRML MDRD: ABNORMAL ML/MIN/{1.73_M2}
GFR SERPL CREATININE-BSD FRML MDRD: ABNORMAL ML/MIN/{1.73_M2}
GLUCOSE FASTING: 90 MG/DL (ref 70–99)
HCT VFR BLD CALC: 43.6 % (ref 36.3–47.1)
HDLC SERPL-MCNC: 43 MG/DL
HEMOGLOBIN: 13.3 G/DL (ref 11.9–15.1)
IMMATURE GRANULOCYTES: 1 %
IRON SATURATION: 23 % (ref 20–55)
IRON: 68 UG/DL (ref 37–145)
LDL CHOLESTEROL: 131 MG/DL (ref 0–130)
LYMPHOCYTES # BLD: 31 % (ref 24–43)
MCH RBC QN AUTO: 29.4 PG (ref 25.2–33.5)
MCHC RBC AUTO-ENTMCNC: 30.5 G/DL (ref 28.4–34.8)
MCV RBC AUTO: 96.5 FL (ref 82.6–102.9)
MONOCYTES # BLD: 8 % (ref 3–12)
NRBC AUTOMATED: 0 PER 100 WBC
PDW BLD-RTO: 13.8 % (ref 11.8–14.4)
PLATELET # BLD: 343 K/UL (ref 138–453)
PLATELET ESTIMATE: ABNORMAL
PMV BLD AUTO: 8.7 FL (ref 8.1–13.5)
POTASSIUM SERPL-SCNC: 4.5 MMOL/L (ref 3.7–5.3)
RBC # BLD: 4.52 M/UL (ref 3.95–5.11)
RBC # BLD: ABNORMAL 10*6/UL
SEG NEUTROPHILS: 57 % (ref 36–65)
SEGMENTED NEUTROPHILS ABSOLUTE COUNT: 4.02 K/UL (ref 1.5–8.1)
SODIUM BLD-SCNC: 139 MMOL/L (ref 135–144)
TOTAL IRON BINDING CAPACITY: 302 UG/DL (ref 250–450)
TOTAL PROTEIN: 6.9 G/DL (ref 6.4–8.3)
TRIGLYCERIDE, FASTING: 155 MG/DL
TSH SERPL DL<=0.05 MIU/L-ACNC: 0.95 MIU/L (ref 0.3–5)
UNSATURATED IRON BINDING CAPACITY: 234 UG/DL (ref 112–347)
VITAMIN D 25-HYDROXY: 41 NG/ML (ref 30–100)
VLDLC SERPL CALC-MCNC: ABNORMAL MG/DL (ref 1–30)
WBC # BLD: 6.9 K/UL (ref 3.5–11.3)
WBC # BLD: ABNORMAL 10*3/UL

## 2020-12-14 PROCEDURE — 80053 COMPREHEN METABOLIC PANEL: CPT

## 2020-12-14 PROCEDURE — 82306 VITAMIN D 25 HYDROXY: CPT

## 2020-12-14 PROCEDURE — 80061 LIPID PANEL: CPT

## 2020-12-14 PROCEDURE — 84443 ASSAY THYROID STIM HORMONE: CPT

## 2020-12-14 PROCEDURE — 85025 COMPLETE CBC W/AUTO DIFF WBC: CPT

## 2020-12-14 PROCEDURE — 82746 ASSAY OF FOLIC ACID SERUM: CPT

## 2020-12-14 PROCEDURE — 82607 VITAMIN B-12: CPT

## 2020-12-14 PROCEDURE — 83550 IRON BINDING TEST: CPT

## 2020-12-14 PROCEDURE — 82728 ASSAY OF FERRITIN: CPT

## 2020-12-14 PROCEDURE — 84425 ASSAY OF VITAMIN B-1: CPT

## 2020-12-14 PROCEDURE — 83540 ASSAY OF IRON: CPT

## 2020-12-14 PROCEDURE — 36415 COLL VENOUS BLD VENIPUNCTURE: CPT

## 2020-12-16 LAB
FOLATE: >20 NG/ML
VITAMIN B-12: 502 PG/ML (ref 232–1245)

## 2020-12-17 LAB — VITAMIN B1 WHOLE BLOOD: 133 NMOL/L (ref 70–180)

## 2020-12-21 ENCOUNTER — OFFICE VISIT (OUTPATIENT)
Dept: FAMILY MEDICINE CLINIC | Age: 60
End: 2020-12-21
Payer: MEDICARE

## 2020-12-21 VITALS
SYSTOLIC BLOOD PRESSURE: 128 MMHG | HEIGHT: 66 IN | WEIGHT: 240 LBS | DIASTOLIC BLOOD PRESSURE: 68 MMHG | TEMPERATURE: 97.5 F | OXYGEN SATURATION: 99 % | BODY MASS INDEX: 38.57 KG/M2 | HEART RATE: 66 BPM

## 2020-12-21 PROBLEM — S82.102A CLOSED FRACTURE OF PROXIMAL END OF LEFT TIBIA: Status: RESOLVED | Noted: 2018-11-22 | Resolved: 2020-12-21

## 2020-12-21 PROBLEM — S82.142A TIBIAL PLATEAU FRACTURE, LEFT, CLOSED, INITIAL ENCOUNTER: Status: RESOLVED | Noted: 2018-12-05 | Resolved: 2020-12-21

## 2020-12-21 PROCEDURE — G8417 CALC BMI ABV UP PARAM F/U: HCPCS | Performed by: FAMILY MEDICINE

## 2020-12-21 PROCEDURE — G8427 DOCREV CUR MEDS BY ELIG CLIN: HCPCS | Performed by: FAMILY MEDICINE

## 2020-12-21 PROCEDURE — 1036F TOBACCO NON-USER: CPT | Performed by: FAMILY MEDICINE

## 2020-12-21 PROCEDURE — G8482 FLU IMMUNIZE ORDER/ADMIN: HCPCS | Performed by: FAMILY MEDICINE

## 2020-12-21 PROCEDURE — 99214 OFFICE O/P EST MOD 30 MIN: CPT | Performed by: FAMILY MEDICINE

## 2020-12-21 PROCEDURE — 3017F COLORECTAL CA SCREEN DOC REV: CPT | Performed by: FAMILY MEDICINE

## 2020-12-21 RX ORDER — BUSPIRONE HYDROCHLORIDE 5 MG/1
5 TABLET ORAL 2 TIMES DAILY
Qty: 60 TABLET | Refills: 0 | Status: SHIPPED | OUTPATIENT
Start: 2020-12-21 | End: 2021-01-18 | Stop reason: SDUPTHER

## 2020-12-21 RX ORDER — ATORVASTATIN CALCIUM 10 MG/1
10 TABLET, FILM COATED ORAL DAILY
Qty: 30 TABLET | Refills: 3 | Status: SHIPPED | OUTPATIENT
Start: 2020-12-21 | End: 2021-04-12

## 2020-12-21 RX ORDER — LORAZEPAM 0.5 MG/1
0.5 TABLET ORAL DAILY PRN
Qty: 12 TABLET | Refills: 0 | Status: SHIPPED | OUTPATIENT
Start: 2020-12-21 | End: 2021-01-18 | Stop reason: ALTCHOICE

## 2020-12-21 RX ORDER — LISINOPRIL 10 MG/1
10 TABLET ORAL DAILY
Qty: 30 TABLET | Refills: 2 | Status: SHIPPED | OUTPATIENT
Start: 2020-12-21 | End: 2020-12-22 | Stop reason: DRUGHIGH

## 2020-12-21 ASSESSMENT — PATIENT HEALTH QUESTIONNAIRE - PHQ9
2. FEELING DOWN, DEPRESSED OR HOPELESS: 3
SUM OF ALL RESPONSES TO PHQ QUESTIONS 1-9: 19
SUM OF ALL RESPONSES TO PHQ9 QUESTIONS 1 & 2: 6
3. TROUBLE FALLING OR STAYING ASLEEP: 3
SUM OF ALL RESPONSES TO PHQ QUESTIONS 1-9: 19
6. FEELING BAD ABOUT YOURSELF - OR THAT YOU ARE A FAILURE OR HAVE LET YOURSELF OR YOUR FAMILY DOWN: 0
4. FEELING TIRED OR HAVING LITTLE ENERGY: 3
9. THOUGHTS THAT YOU WOULD BE BETTER OFF DEAD, OR OF HURTING YOURSELF: 0
10. IF YOU CHECKED OFF ANY PROBLEMS, HOW DIFFICULT HAVE THESE PROBLEMS MADE IT FOR YOU TO DO YOUR WORK, TAKE CARE OF THINGS AT HOME, OR GET ALONG WITH OTHER PEOPLE: 2
7. TROUBLE CONCENTRATING ON THINGS, SUCH AS READING THE NEWSPAPER OR WATCHING TELEVISION: 2
1. LITTLE INTEREST OR PLEASURE IN DOING THINGS: 3
8. MOVING OR SPEAKING SO SLOWLY THAT OTHER PEOPLE COULD HAVE NOTICED. OR THE OPPOSITE, BEING SO FIGETY OR RESTLESS THAT YOU HAVE BEEN MOVING AROUND A LOT MORE THAN USUAL: 2
5. POOR APPETITE OR OVEREATING: 3
SUM OF ALL RESPONSES TO PHQ QUESTIONS 1-9: 19

## 2020-12-21 ASSESSMENT — COLUMBIA-SUICIDE SEVERITY RATING SCALE - C-SSRS
1. WITHIN THE PAST MONTH, HAVE YOU WISHED YOU WERE DEAD OR WISHED YOU COULD GO TO SLEEP AND NOT WAKE UP?: NO
2. HAVE YOU ACTUALLY HAD ANY THOUGHTS OF KILLING YOURSELF?: NO
6. HAVE YOU EVER DONE ANYTHING, STARTED TO DO ANYTHING, OR PREPARED TO DO ANYTHING TO END YOUR LIFE?: NO

## 2020-12-21 NOTE — PROGRESS NOTES
[address] PRIMARY CARE  [address] [place] [address] [address] [facility]  Dept: [phone]  Dept Fax: [phone]    Alisia Reese is a 62 y.o. female who presents today for her medical conditions/complaints as noted below. Alisia Reese is c/o of   Chief Complaint   Patient presents with    Hypertension    Discuss Labs         HPI:     The patient presents for check up. The patient and her  buried a son recently and there was no known cause of death. The patient is also homeschooling mutliple grandchildren due to online learning. She is not sleeping. The patient has been getting more headaches. The stress of everything is adding. She is having significant difficulty with sleep and has been getting close to panic attacks. She still is trying to do things to help with coping such as crocheting. She can't get herself back to reading. She has been gaining weight recently d/t being an emotional eater. Labs were reviewed with the patient. Hypertension  This is a chronic problem. The current episode started more than 1 year ago. The problem has been waxing and waning since onset. The problem is controlled. Associated symptoms include anxiety. Pertinent negatives include no blurred vision, chest pain, headaches, malaise/fatigue, neck pain, orthopnea, palpitations, peripheral edema, PND, shortness of breath or sweats. There are no associated agents to hypertension. Risk factors for coronary artery disease include dyslipidemia, family history, obesity, post-menopausal state, sedentary lifestyle and smoking/tobacco exposure. Past treatments include ACE inhibitors. The current treatment provides mild improvement. Compliance problems include diet.         Hemoglobin A1C (%)   Date Value   01/15/2019 5.0   01/04/2018 5.1   09/06/2017 5.3             ( goal A1Cis < 7)   No results found for: LABMICR  LDL Cholesterol (mg/dL)   Date Value 12/14/2020 131 (H)   01/15/2019 129   04/17/2018 129       (goal LDL is <100)   AST (U/L)   Date Value   12/14/2020 14     ALT (U/L)   Date Value   12/14/2020 12     BUN (mg/dL)   Date Value   12/14/2020 11     BP Readings from Last 3 Encounters:   12/21/20 128/68   10/15/20 108/74   10/12/20 97/66          (goal 120/80)    Past Medical History:   Diagnosis Date    Acromioclavicular joint arthritis 4/18/2016    Acute gastroenteritis 10/11/2017    Anemia 12/11/2018    Arthritis     Asthma 1980    On Inhaler    Bursitis     left shoulder    Chronic right shoulder pain 7/24/2018    Closed fracture of left tibial plateau 8/40/7424    Closed fracture of left tibial plateau 7/46/2917    Closed fracture of proximal end of left tibia 11/22/2018    Closed fracture of proximal end of left tibia 11/22/2018    Closed fracture of shaft of left tibia 11/22/2018    Closed fracture of shaft of left tibia 11/22/2018    Depression     Dry eyes     Essential hypertension 8/21/2017    Former smoker     GERD (gastroesophageal reflux disease)     not since Shelton-en-Y and weight loss    Head ache     on Topiramate    Hearing loss     mild    Hemorrhoids     Hiatal hernia     History of bladder infections     History of DVT (deep vein thrombosis) 2010    started in left leg, went to lungs    History of gastric bypass 12/6/2018    History of gastritis     History of morbid obesity     had Shelton-en y    History of pulmonary embolism 2010    from foot  trauma, was treated for six months with anticoagulation. She has no inferior vena cava filter.  Hyperlipidemia     not since weight loss    Hypertension 2015    Not anymore after Bariatric Surgery, no medication    Knee pain 3/6/2015    Knee pain 3/6/2015    Mild intermittent asthma     Obesity (BMI 30-39. 9) 7/1/2016    KEVIN on CPAP 2015    at night    Osteoarthritis     Osteopenia     S/P gastric bypass 12-29-15 Dr. Tobias Golden, hosp wt = 280lb, initial wt = 328lb    S/p tibial fracture 2018    Status post gastric bypass for obesity     Tibial plateau fracture, left, closed, initial encounter 2018    Tibial plateau fracture, left, closed, initial encounter 2018    Vitamin D deficiency 2015    Wears dentures     Wears glasses     Weight loss of 160 lbs since surgery  2017      Past Surgical History:   Procedure Laterality Date    ABDOMINOPLASTY N/A 2019    ABDOMINOPLASTY performed by Jazmine Wharton MD at [address] ARTHROSCOPY / ARTHROTOMY KNEE Left 2018    KNEE ARTHROSCOPY LEFT performed by Saintclair Darner, MD at [address] [facility] [address] [facility] [address]  1986,  1987     SECTION      CHOLECYSTECTOMY  1987    CLOSED MANIPULATION SHOULDER Right 2018    SHOULDER MANIPULATION WITH INJECTION performed by Saintclair Darner, MD at [address] [facility]  16    EXCISION / BIOPSY SKIN LESION OF HEAD / NECK N/A 4/3/2017     EXCISION POSTERIOR NECK CYST performed by Janes Guerra IV, DO at [facility]  Left 2018    left tibia plateau external fixator application    NECK SURGERY      cyst removed back of neck    ORIF PROXIMAL TIBIAL PLATEAU FRACTURE Left 2018    TIBIAL PLATEAU OPEN REDUCTION INTERNAL FIXATION LEFT -  SYNTHES     C-ARM performed by Saintclair Darner, MD at [address] [place] [address]  10/14/2016    excision back lipoma with drain placement    UT OFFICE/OUTPT VISIT,PROCEDURE ONLY Right 10/8/2018    RIGHT SHOULDER  LOUIE PROCEDURE performed by Saintclair Darner, MD at [address] OFFICE/OUTPT [address] [facility] Left 2018    LEFT TIBIA PLATEAU EXTERNAL FIXATOR APPLICATION performed by Jayesh Lai MD at [facility]  12/29/15    liver bx, EGD    SHOULDER SURGERY Left 2016    Pearblossom procedure, bone spurs 2016    SHOULDER SURGERY Right 10/08/2018    [place]  SUMAtriptan (IMITREX) 100 MG tablet Take one at HA onset . May repeat in 1 hour . No more 2 per day 4 days out of the week 18 tablet 5    denosumab (PROLIA) 60 MG/ML SOSY SC injection Inject 1 mL into the skin every 6 months 1 mL 1    FLUoxetine (PROZAC) 40 MG capsule Take 1 capsule by mouth daily 90 capsule 2    acetaminophen (TYLENOL) 325 MG tablet Take 2 tablets by mouth every 6 hours as needed for Pain 30 tablet 0    CALCIUM CITRATE PO Take 750 mg by mouth 2 times daily      Multiple Vitamins-Minerals (MULTIVITAMIN ADULTS 50+ PO) Take 1 tablet by mouth daily      KRILL OIL PO Take by mouth daily      sodium chloride (VINEET 128) 2 % ophthalmic solution Place 1 drop into both eyes as needed      vitamin D (CHOLECALCIFEROL) 1000 UNIT TABS tablet Take 1,000 Units by mouth daily      FIBER COMPLETE PO Take by mouth daily        No current facility-administered medications for this visit.       Facility-Administered Medications Ordered in Other Visits   Medication Dose Route Frequency Provider Last Rate Last Admin    lactated ringers infusion 1,000 mL  1,000 mL Intravenous Continuous Mikhail Hidalgo MD   Stopped at 08/19/16 1249    HYDROmorphone (DILAUDID) injection 0.25 mg  0.25 mg Intravenous Q5 Min PRN Va Garner MD         Allergies   Allergen Reactions    Nsaids Other (See Comments)     Bariatric Surgeon told me not to take NSAIDS for good    Pcn [Penicillins] Other (See Comments)     Sores in mouth    Bactrim [Sulfamethoxazole-Trimethoprim] Itching and Rash    Codeine Itching and Rash       Health Maintenance   Topic Date Due    Cervical cancer screen  07/14/2021    Breast cancer screen  11/04/2021    Lipid screen  12/14/2021    Potassium monitoring  12/14/2021    Creatinine monitoring  12/14/2021    DTaP/Tdap/Td vaccine (2 - Td) 09/14/2022    Colon cancer screen colonoscopy  04/03/2025    Flu vaccine  Completed    Shingles Vaccine  Completed  Pneumococcal 0-64 years Vaccine  Completed    Hepatitis C screen  Completed    HIV screen  Completed    Hepatitis A vaccine  Aged Out    Hepatitis B vaccine  Aged Out    Hib vaccine  Aged Out    Meningococcal (ACWY) vaccine  Aged Out       Subjective:     Review of Systems   Constitutional: Negative. Negative for malaise/fatigue. HENT: Negative. Eyes: Negative. Negative for blurred vision. Respiratory: Negative. Negative for shortness of breath. Cardiovascular: Negative. Negative for chest pain, palpitations, orthopnea and PND. Gastrointestinal: Negative. Genitourinary: Negative. Musculoskeletal: Negative. Negative for neck pain. Skin: Negative. Allergic/Immunologic: Negative for environmental allergies, food allergies and immunocompromised state. Neurological: Negative. Negative for headaches. Psychiatric/Behavioral: Positive for agitation, dysphoric mood and sleep disturbance. Negative for confusion, decreased concentration, hallucinations, self-injury and suicidal ideas. The patient is nervous/anxious. The patient is not hyperactive. Objective:     Physical Exam  Vitals signs and nursing note reviewed. Constitutional:       General: She is awake. She is not in acute distress. Appearance: Normal appearance. She is obese. She is not ill-appearing, toxic-appearing or diaphoretic. HENT:      Head: Normocephalic and atraumatic. Right Ear: External ear normal.      Left Ear: External ear normal.      Nose: Nose normal.      Mouth/Throat:      Mouth: Mucous membranes are moist.   Eyes:      Extraocular Movements: Extraocular movements intact. Conjunctiva/sclera: Conjunctivae normal.      Pupils: Pupils are equal, round, and reactive to light. Neck:      Musculoskeletal: Normal range of motion. Cardiovascular:      Rate and Rhythm: Normal rate and regular rhythm. Pulses: Normal pulses. HTN - bp initially elevated during visit but at the end of the visit it was down to 126/68. Patient encouraged to check bp at home and call with the readings. We will have her return in 4 weeks for recheck of grief reaction so we will recheck her bp at that time    Hyperlipid - d/t her labs and risk factors I would like her to start atorvastatin 10 mg daily. Recheck lipids in 6 months    Obesity - d/w patient the importance of diet and exercise to help prevent return of htn. Return in about 4 weeks (around 1/18/2021). Orders Placed This Encounter   Procedures    [facility] DIGITAL SCREEN SELF REFERRAL W OR WO CAD BILATERAL     Standing Status:   Future     Standing Expiration Date:   2/21/2022     Order Specific Question:   Reason for exam:     Answer:   [id number]     Orders Placed This Encounter   Medications    busPIRone (BUSPAR) 5 MG tablet     Sig: Take 1 tablet by mouth 2 times daily     Dispense:  60 tablet     Refill:  0    LORazepam (ATIVAN) 0.5 MG tablet     Sig: Take 1 tablet by mouth daily as needed for Anxiety for up to 30 days. Dispense:  12 tablet     Refill:  0    atorvastatin (LIPITOR) 10 MG tablet     Sig: Take 1 tablet by mouth daily     Dispense:  30 tablet     Refill:  3    DISCONTD: lisinopril (PRINIVIL;ZESTRIL) 10 MG tablet     Sig: Take 1 tablet by mouth daily     Dispense:  30 tablet     Refill:  2       Patient given educational materials - see patient instructions. Discussed use, benefit, and side effects of prescribed medications. All patientquestions answered. Pt voiced understanding. Reviewed health maintenance. Instructedto continue current medications, diet and exercise. Patient agreed with treatmentplan. Follow up as directed.      Electronically signed by Kat Hines MD on 12/22/2020 at 2:15 PM

## 2020-12-22 PROBLEM — S82.142A CLOSED FRACTURE OF LEFT TIBIAL PLATEAU: Status: RESOLVED | Noted: 2019-07-16 | Resolved: 2020-12-22

## 2020-12-22 PROBLEM — R63.4 WEIGHT LOSS: Status: RESOLVED | Noted: 2017-08-21 | Resolved: 2020-12-22

## 2020-12-22 PROBLEM — D64.9 ANEMIA: Status: RESOLVED | Noted: 2018-12-11 | Resolved: 2020-12-22

## 2020-12-22 PROBLEM — S82.202A CLOSED FRACTURE OF SHAFT OF LEFT TIBIA: Status: RESOLVED | Noted: 2018-11-22 | Resolved: 2020-12-22

## 2020-12-22 ASSESSMENT — ENCOUNTER SYMPTOMS
BLURRED VISION: 0
ORTHOPNEA: 0
EYES NEGATIVE: 1
GASTROINTESTINAL NEGATIVE: 1
SHORTNESS OF BREATH: 0
RESPIRATORY NEGATIVE: 1

## 2021-01-13 ENCOUNTER — HOSPITAL ENCOUNTER (OUTPATIENT)
Dept: WOMENS IMAGING | Age: 61
Discharge: HOME | End: 2021-01-15
Payer: MEDICARE

## 2021-01-13 DIAGNOSIS — Z12.31 VISIT FOR SCREENING MAMMOGRAM: ICD-10-CM

## 2021-01-13 PROCEDURE — 77063 BREAST TOMOSYNTHESIS BI: CPT

## 2021-01-15 ENCOUNTER — TELEMEDICINE (OUTPATIENT)
Dept: PULMONOLOGY | Age: 61
End: 2021-01-15
Payer: MEDICARE

## 2021-01-15 DIAGNOSIS — I10 ESSENTIAL HYPERTENSION: ICD-10-CM

## 2021-01-15 DIAGNOSIS — J45.20 MILD INTERMITTENT ASTHMA WITHOUT COMPLICATION: ICD-10-CM

## 2021-01-15 DIAGNOSIS — Z86.718 HISTORY OF DVT (DEEP VEIN THROMBOSIS): ICD-10-CM

## 2021-01-15 DIAGNOSIS — Z99.89 OSA ON CPAP: Primary | ICD-10-CM

## 2021-01-15 DIAGNOSIS — Z98.84 STATUS POST GASTRIC BYPASS FOR OBESITY: ICD-10-CM

## 2021-01-15 DIAGNOSIS — G47.33 OSA ON CPAP: Primary | ICD-10-CM

## 2021-01-15 PROCEDURE — 99214 OFFICE O/P EST MOD 30 MIN: CPT | Performed by: INTERNAL MEDICINE

## 2021-01-15 PROCEDURE — G8428 CUR MEDS NOT DOCUMENT: HCPCS | Performed by: INTERNAL MEDICINE

## 2021-01-15 PROCEDURE — 1036F TOBACCO NON-USER: CPT | Performed by: INTERNAL MEDICINE

## 2021-01-15 PROCEDURE — G8482 FLU IMMUNIZE ORDER/ADMIN: HCPCS | Performed by: INTERNAL MEDICINE

## 2021-01-15 PROCEDURE — G8417 CALC BMI ABV UP PARAM F/U: HCPCS | Performed by: INTERNAL MEDICINE

## 2021-01-15 PROCEDURE — 3017F COLORECTAL CA SCREEN DOC REV: CPT | Performed by: INTERNAL MEDICINE

## 2021-01-15 ASSESSMENT — ENCOUNTER SYMPTOMS
GASTROINTESTINAL NEGATIVE: 1
RESPIRATORY NEGATIVE: 1
EYES NEGATIVE: 1

## 2021-01-15 NOTE — PROGRESS NOTES
1/15/2021    TELEHEALTH EVALUATION -- Audio/Visual (During OKHVQ-25 public health emergency)    Patient and physician are located in their individual locations. This is visit is completed via [facility] application / [email or web address] / Telephone     Chief Complaint   Patient presents with    Asthma        HPI:    Cain Trujillo (:  1960) has requested an audio/video evaluation for the following concern(s):    Patient is doing well shortness of breath is stable with exertion she did not have any hospital visits for exacerbation or need steroids. No Sputum ,no hemoptysis . Asthma better controlled with dulera      . She is currently doing well using her positive airway pressure device. she doesnot have  daytime sleepiness. There have been no driving issues and concentration is better when awake. The machine pressure settings are comfortable, the mask is reasonably comfortable and she is using the humidity. There have been no ER visits, hospital visits, any new issues or medication changes since the last visit here in sleep clinic. Review of Systems   Constitutional: Negative. HENT: Negative. Eyes: Negative. Respiratory: Negative. Cardiovascular: Negative. Gastrointestinal: Negative. LUNG CANCER SCREENING     1. CRITERIA MET    []     CT ORDERED  []      2. CRITERIA NOT MET   [x]      3. REFUSED                    []        REASON CRITERIA NOT MET     1. SMOKING LESS THAN 30 PY  []      2. AGE LESS THAN 55 or GREATER 77 YEARS  []      3. QUIT SMOKING 15 YEARS OR GREATER   []      4. RECENT CT WITH IN 11 MONTHS    []      5. LIFE EXPECTANCY < 5 YEARS   []      6.  SIGNS  AND SYMPTOMS OF LUNG CANCER   []         Immunization   Immunization History   Administered Date(s) Administered    Influenza Vaccine, unspecified formulation 2015    Influenza Virus Vaccine 2018, 10/08/2020    Influenza, MDCK Quadv, with preserv IM (Flucelvax 4 yrs and older) 2018    Influenza, Quadv, IM, PF (6 mo and older Fluzone, Flulaval, Fluarix, and 3 yrs and older Afluria) 08/23/2016, 09/05/2017, 09/05/2018, 09/17/2019    Influenza,  Kocher, Recombinant, IM PF (Flublok 18 yrs and older) 10/08/2020    Pneumococcal Conjugate 13-valent ([id number]) 09/18/2015    Pneumococcal Polysaccharide ([id number]) 11/22/2016    Tdap (Boostrix, Adacel) 09/14/2012    Zoster Live (Zostavax) 06/19/2015    Zoster Recombinant (Shingrix) 03/20/2018, 09/05/2018            PAST MEDICAL HISTORY:       Diagnosis Date    Acromioclavicular joint arthritis 4/18/2016    Acute gastroenteritis 10/11/2017    Anemia 12/11/2018    Arthritis     Asthma 1980    On Inhaler    Bursitis     left shoulder    Chronic right shoulder pain 7/24/2018    Closed fracture of left tibial plateau 8/06/3166    Closed fracture of left tibial plateau 7/30/6582    Closed fracture of proximal end of left tibia 11/22/2018    Closed fracture of proximal end of left tibia 11/22/2018    Closed fracture of shaft of left tibia 11/22/2018    Closed fracture of shaft of left tibia 11/22/2018    Depression     Dry eyes     Essential hypertension 8/21/2017    Former smoker     GERD (gastroesophageal reflux disease)     not since Shelton-en-Y and weight loss    Head ache     on Topiramate    Hearing loss     mild    Hemorrhoids     Hiatal hernia     History of bladder infections     History of DVT (deep vein thrombosis) 2010    started in left leg, went to lungs    History of gastric bypass 12/6/2018    History of gastritis     History of morbid obesity     had Shelton-en y    History of pulmonary embolism 2010    from foot  trauma, was treated for six months with anticoagulation. She has no inferior vena cava filter.     Hyperlipidemia     not since weight loss    Hypertension 2015    Not anymore after Bariatric Surgery, no medication    Knee pain 3/6/2015    Knee pain 3/6/2015    Mild intermittent asthma     Obesity (BMI 30-39. 9) 2016    KEVIN on CPAP     at night    Osteoarthritis     Osteopenia     S/P gastric bypass 12-29-15    Dr. Maykel Kenny, hosp wt = 280lb, initial wt = 328lb    S/p tibial fracture 2018    Status post gastric bypass for obesity     Tibial plateau fracture, left, closed, initial encounter 2018    Tibial plateau fracture, left, closed, initial encounter 2018    Vitamin D deficiency     Wears dentures     Wears glasses     Weight loss of 160 lbs since surgery  2017         Family History:       Problem Relation Age of Onset    Asthma Mother     Depression Mother     Anxiety Disorder Mother     Cancer Father         leukemia    Other Father         Myelodysplastic syndrome, which converted to leukemia    Migraines Sister     No Known Problems Brother     Ovarian Cancer Maternal Aunt     No Known Problems Maternal Grandmother     No Known Problems Maternal Grandfather     Breast Cancer Paternal Grandmother     Heart Attack Paternal Grandfather        SURGICAL HISTORY:   Past Surgical History:   Procedure Laterality Date    ABDOMINOPLASTY N/A 2019    ABDOMINOPLASTY performed by Olivia Patrick MD at [facility] ARTHROSCOPY / ARTHROTOMY KNEE Left 2018    KNEE ARTHROSCOPY LEFT performed by Leeanna Hartmann MD at [address] [facility]  1986,  1987     SECTION      CHOLECYSTECTOMY      CLOSED MANIPULATION SHOULDER Right 2018    SHOULDER MANIPULATION WITH INJECTION performed by Leeanna Hartmann MD at [facility]  16    EXCISION / BIOPSY SKIN LESION OF HEAD / NECK N/A 4/3/2017     EXCISION POSTERIOR NECK CYST performed by Dina Guerra IV, DO at [place] [address] Left 2018    left tibia plateau external fixator application    NECK SURGERY      cyst removed back of neck    ORIF PROXIMAL TIBIAL PLATEAU FRACTURE Left 2018    TIBIAL PLATEAU OPEN REDUCTION INTERNAL FIXATION LEFT -  SYNTHES     C-ARM performed by Alek Fernandez MD at [address]  10/14/2016    excision back lipoma with drain placement    [place] OFFICE/OUTPT VISIT,PROCEDURE ONLY Right 10/8/2018    RIGHT SHOULDER  MAGDY PROCEDURE performed by Alek Fernandez MD at [address] OFFICE/OUTPT [address] [address] Left 11/22/2018    LEFT TIBIA PLATEAU EXTERNAL FIXATOR APPLICATION performed by Darling Phillips MD at [address] [facility] [address]  12/29/15    liver bx, EGD    SHOULDER SURGERY Left 08/19/2016    Magdy procedure, bone spurs 8/19/2016    SHOULDER SURGERY Right 10/08/2018    magdy    TONSILLECTOMY  1979    UPPER GASTROINTESTINAL ENDOSCOPY  08/2015    found hiatal hernia           SOCIAL AND OCCUPATIONAL HEALTH:          TOBACCO:   reports that she quit smoking about 34 years ago. Her smoking use included cigarettes. She started smoking about 42 years ago. She has a 4.50 pack-year smoking history. She has never used smokeless tobacco.  ETOH:   reports no history of alcohol use. ALLERGIES:      Allergies   Allergen Reactions    Nsaids Other (See Comments)     Bariatric Surgeon told me not to take NSAIDS for good    Pcn [Penicillins] Other (See Comments)     Sores in mouth    Bactrim [Sulfamethoxazole-Trimethoprim] Itching and Rash    Codeine Itching and Rash         Home Meds:   Prior to Admission medications    Medication Sig Start Date End Date Taking? Authorizing Provider   mometasone-formoterol [facility]) 100-5 MCG/ACT inhaler Inhale 2 puffs into the lungs 2 times daily 1/15/21  Yes Miya Reyes MD   busPIRone (BUSPAR) 5 MG tablet Take 1 tablet by mouth 2 times daily 12/21/20 1/20/21  Mike Vega MD   LORazepam (ATIVAN) 0.5 MG tablet Take 1 tablet by mouth daily as needed for Anxiety for up to 30 days.  12/21/20 1/20/21  Mike Vega MD   atorvastatin (LIPITOR) 10 MG tablet Take 1 tablet by mouth daily 12/21/20   Mike Vega MD   montelukast (SINGULAIR) 10 MG tablet Take 1 tablet by mouth nightly 10/12/20   Viviana Montiel MD   albuterol sulfate HFA (VENTOLIN HFA) 108 (90 Base) MCG/ACT inhaler Inhale 2 puffs into the lungs every 6 hours as needed for Wheezing or Shortness of Breath 10/12/20   Viviana Montiel MD   albuterol (PROVENTIL) (2.5 MG/3ML) 0.083% nebulizer solution Take 3 mLs by nebulization every 6 hours as needed for Wheezing 10/12/20   Viviana Montiel MD   topiramate (TOPAMAX) 100 MG tablet Take 1 tablet by mouth 2 times daily 9/24/20   Ivone Davis MD   SUMAtriptan (IMITREX) 100 MG tablet Take one at HA onset . May repeat in 1 hour . No more 2 per day 4 days out of the week 9/24/20   Ivone Davis MD   denosumab (PROLIA) 60 MG/ML SOSY SC injection Inject 1 mL into the skin every 6 months 9/10/20   Kendra Chavis MD   FLUoxetine (PROZAC) 40 MG capsule Take 1 capsule by mouth daily 7/9/20   Knedra Chavis MD   acetaminophen (TYLENOL) 325 MG tablet Take 2 tablets by mouth every 6 hours as needed for Pain 5/29/20   Sharmila Guardado DO   CALCIUM CITRATE PO Take 750 mg by mouth 2 times daily    Historical Provider, MD   Multiple Vitamins-Minerals (MULTIVITAMIN ADULTS 50+ PO) Take 1 tablet by mouth daily    Historical Provider, MD   KRILL OIL PO Take by mouth daily    Historical Provider, MD   sodium chloride (VINEET 128) 2 % ophthalmic solution Place 1 drop into both eyes as needed    Historical Provider, MD   vitamin D (CHOLECALCIFEROL) 1000 UNIT TABS tablet Take 1,000 Units by mouth daily    Historical Provider, MD   FIBER COMPLETE PO Take by mouth daily     Historical Provider, MD              Wt Readings from Last 3 Encounters:   12/21/20 240 lb (108.9 kg)   10/12/20 241 lb (109.3 kg)   09/24/20 240 lb (108.9 kg)               PHYSICAL EXAMINATION:  Due to this being a TeleHealth encounter, evaluation of the following organ systems is limited: Vitals/Constitutional/EENT/Resp/CV/GI//MS/Neuro/Skin/Heme-Lymph-Imm.   Constitutional: [x] Appears puffs into the lungs 2 times daily       Medications Discontinued During This Encounter   Medication Reason    mometasone-formoterol (DULERA) 100-5 MCG/ACT inhaler REORDER   I spent more than 30  minutes examining, evaluating, reviewing data and counseling the patient. Greater than 50% of time was spent face-to-face with the patient     Alta Nuñez received counseling on the following healthy behaviors: nutrition, exercise and medication adherence    Patient given educational materials : see patient instruction       Discussed use, benefit, and side effects of prescribed medications. Barriers to medication compliance addressed. All patient questions answered. Pt voiced understanding. An  electronic signature was used to authenticate this note. --Stephanie Perez MD on 1/15/2021 at 6:15 PM      Please note that this chart was generated using voice recognition Dragon dictation software. Although every effort was made to ensure the accuracy of this automated transcription, some errors in transcription may have occurred. Pursuant to the emergency declaration under the [facility], [facility] waiver authority and the [employer] and [employer] Act, this Virtual  Visit was conducted, with patient's consent, to reduce the patient's risk of exposure to COVID-19 and provide continuity of care for an established patient. Services were provided through a video synchronous discussion virtually to substitute for in-person clinic visit.

## 2021-01-18 ENCOUNTER — CLINICAL DOCUMENTATION (OUTPATIENT)
Dept: PSYCHOLOGY | Age: 61
End: 2021-01-18

## 2021-01-18 ENCOUNTER — TELEPHONE (OUTPATIENT)
Dept: PSYCHOLOGY | Age: 61
End: 2021-01-18

## 2021-01-18 ENCOUNTER — OFFICE VISIT (OUTPATIENT)
Dept: FAMILY MEDICINE CLINIC | Age: 61
End: 2021-01-18
Payer: MEDICARE

## 2021-01-18 VITALS
HEIGHT: 66 IN | WEIGHT: 239 LBS | SYSTOLIC BLOOD PRESSURE: 113 MMHG | BODY MASS INDEX: 38.41 KG/M2 | HEART RATE: 74 BPM | TEMPERATURE: 97.2 F | DIASTOLIC BLOOD PRESSURE: 73 MMHG

## 2021-01-18 DIAGNOSIS — E66.9 OBESITY (BMI 30-39.9): ICD-10-CM

## 2021-01-18 DIAGNOSIS — I10 ESSENTIAL HYPERTENSION: ICD-10-CM

## 2021-01-18 DIAGNOSIS — E66.01 MORBID OBESITY (HCC): ICD-10-CM

## 2021-01-18 DIAGNOSIS — F43.21 GRIEF REACTION: Primary | ICD-10-CM

## 2021-01-18 DIAGNOSIS — F51.5 NIGHTMARE: ICD-10-CM

## 2021-01-18 PROCEDURE — G8427 DOCREV CUR MEDS BY ELIG CLIN: HCPCS | Performed by: FAMILY MEDICINE

## 2021-01-18 PROCEDURE — G8417 CALC BMI ABV UP PARAM F/U: HCPCS | Performed by: FAMILY MEDICINE

## 2021-01-18 PROCEDURE — 1036F TOBACCO NON-USER: CPT | Performed by: FAMILY MEDICINE

## 2021-01-18 PROCEDURE — 99214 OFFICE O/P EST MOD 30 MIN: CPT | Performed by: FAMILY MEDICINE

## 2021-01-18 PROCEDURE — 3017F COLORECTAL CA SCREEN DOC REV: CPT | Performed by: FAMILY MEDICINE

## 2021-01-18 PROCEDURE — G8482 FLU IMMUNIZE ORDER/ADMIN: HCPCS | Performed by: FAMILY MEDICINE

## 2021-01-18 RX ORDER — FLUOXETINE HYDROCHLORIDE 20 MG/1
20 CAPSULE ORAL DAILY
Qty: 30 CAPSULE | Refills: 0 | Status: SHIPPED | OUTPATIENT
Start: 2021-01-18 | End: 2021-04-19 | Stop reason: SDUPTHER

## 2021-01-18 RX ORDER — BUSPIRONE HYDROCHLORIDE 5 MG/1
5 TABLET ORAL 2 TIMES DAILY
Qty: 60 TABLET | Refills: 2 | Status: SHIPPED | OUTPATIENT
Start: 2021-01-18 | End: 2021-04-19 | Stop reason: SDUPTHER

## 2021-01-18 ASSESSMENT — ENCOUNTER SYMPTOMS
EYES NEGATIVE: 1
RESPIRATORY NEGATIVE: 1
GASTROINTESTINAL NEGATIVE: 1

## 2021-01-18 ASSESSMENT — PATIENT HEALTH QUESTIONNAIRE - PHQ9
1. LITTLE INTEREST OR PLEASURE IN DOING THINGS: 1
SUM OF ALL RESPONSES TO PHQ QUESTIONS 1-9: 1

## 2021-01-18 NOTE — PROGRESS NOTES
[address] PRIMARY CARE  [address] [place] [address]  Dept: [phone]  Dept Fax: [phone]    Carloz Coughlin is a 62 y.o. female who is a Established patient, who presents today for her medical conditions/complaints as noted below:  Chief Complaint   Patient presents with    Other     bp check     Medication Check    Other     weird dreams          HPI:     The patient presents for recheck of grief reaction. The patient was placed on buspar and states her mood has improved. She is having nightmares to the point where she is lashing around and has hit her  in her dreams. Her dreams are typically related to her own death. She has had them for a while but they seem to getting worse. She is wondering if that could be a side effect to the prozac. She believes her stress and grief are improving so she would like to discuss changing her prozac. She has not taken any ativan in a long time.       Hemoglobin A1C (%)   Date Value   01/15/2019 5.0   01/04/2018 5.1   09/06/2017 5.3             ( goal A1Cis < 7)   No results found for: LABMICR  LDL Cholesterol (mg/dL)   Date Value   12/14/2020 131 (H)   01/15/2019 129   04/17/2018 129       (goal LDL is <100)   AST (U/L)   Date Value   12/14/2020 14     ALT (U/L)   Date Value   12/14/2020 12     BUN (mg/dL)   Date Value   12/14/2020 11     BP Readings from Last 3 Encounters:   01/18/21 113/73   12/21/20 128/68   10/15/20 108/74          (goal 120/80)    Past Medical History:   Diagnosis Date    Acromioclavicular joint arthritis 4/18/2016    Acute gastroenteritis 10/11/2017    Anemia 12/11/2018    Arthritis     Asthma 1980    On Inhaler    Bursitis     left shoulder    Chronic right shoulder pain 7/24/2018    Closed fracture of left tibial plateau 9/47/8228    Closed fracture of left tibial plateau 0/49/5587    Closed fracture of proximal end of left tibia 11/22/2018  Closed fracture of proximal end of left tibia 11/22/2018    Closed fracture of shaft of left tibia 11/22/2018    Closed fracture of shaft of left tibia 11/22/2018    Depression     Dry eyes     Essential hypertension 8/21/2017    Former smoker     GERD (gastroesophageal reflux disease)     not since Shelton-en-Y and weight loss    Head ache     on Topiramate    Hearing loss     mild    Hemorrhoids     Hiatal hernia     History of bladder infections     History of DVT (deep vein thrombosis) 2010    started in left leg, went to lungs    History of gastric bypass 12/6/2018    History of gastritis     History of morbid obesity     had Shelton-en y    History of pulmonary embolism 2010    from foot  trauma, was treated for six months with anticoagulation. She has no inferior vena cava filter.  Hyperlipidemia     not since weight loss    Hypertension 2015    Not anymore after Bariatric Surgery, no medication    Knee pain 3/6/2015    Knee pain 3/6/2015    Mild intermittent asthma     Obesity (BMI 30-39. 9) 7/1/2016    KEVIN on CPAP 2015    at night    Osteoarthritis     Osteopenia     S/P gastric bypass 12-29-15    Dr. Yaquelin Baird, [facility], hosp wt = 280lb, initial wt = 328lb    S/p tibial fracture 11/21/2018    Status post gastric bypass for obesity     Tibial plateau fracture, left, closed, initial encounter 12/5/2018    Tibial plateau fracture, left, closed, initial encounter 12/5/2018    Vitamin D deficiency 2015    Wears dentures     Wears glasses     Weight loss of 160 lbs since surgery  8/21/2017      Past Surgical History:   Procedure Laterality Date    ABDOMINOPLASTY N/A 8/16/2019    ABDOMINOPLASTY performed by Lindsay Gallego MD at [address] / ARTHROTOMY KNEE Left 12/5/2018    KNEE ARTHROSCOPY LEFT performed by Caroline Alvarez MD at [address]  8/1986,  9/1987   [address] Quit date: 1987     Years since quittin.0    Smokeless tobacco: Never Used   Substance Use Topics    Alcohol use: No     Alcohol/week: 0.0 standard drinks      Current Outpatient Medications   Medication Sig Dispense Refill    FLUoxetine (PROZAC) 20 MG capsule Take 1 capsule by mouth daily 30 capsule 0    busPIRone (BUSPAR) 5 MG tablet Take 1 tablet by mouth 2 times daily 60 tablet 2    mometasone-formoterol (DULERA) 100-5 MCG/ACT inhaler Inhale 2 puffs into the lungs 2 times daily 1 Inhaler 7    atorvastatin (LIPITOR) 10 MG tablet Take 1 tablet by mouth daily 30 tablet 3    montelukast (SINGULAIR) 10 MG tablet Take 1 tablet by mouth nightly 90 tablet 3    albuterol sulfate HFA (VENTOLIN HFA) 108 (90 Base) MCG/ACT inhaler Inhale 2 puffs into the lungs every 6 hours as needed for Wheezing or Shortness of Breath 3 Inhaler 2    albuterol (PROVENTIL) (2.5 MG/3ML) 0.083% nebulizer solution Take 3 mLs by nebulization every 6 hours as needed for Wheezing 120 each 11    topiramate (TOPAMAX) 100 MG tablet Take 1 tablet by mouth 2 times daily 60 tablet 11    SUMAtriptan (IMITREX) 100 MG tablet Take one at HA onset . May repeat in 1 hour . No more 2 per day 4 days out of the week 18 tablet 5    denosumab (PROLIA) 60 MG/ML SOSY SC injection Inject 1 mL into the skin every 6 months 1 mL 1    acetaminophen (TYLENOL) 325 MG tablet Take 2 tablets by mouth every 6 hours as needed for Pain 30 tablet 0    CALCIUM CITRATE PO Take 750 mg by mouth 2 times daily      Multiple Vitamins-Minerals (MULTIVITAMIN ADULTS 50+ PO) Take 1 tablet by mouth daily      KRILL OIL PO Take by mouth daily      sodium chloride (VINEET 128) 2 % ophthalmic solution Place 1 drop into both eyes as needed      vitamin D (CHOLECALCIFEROL) 1000 UNIT TABS tablet Take 1,000 Units by mouth daily      FIBER COMPLETE PO Take by mouth daily        No current facility-administered medications for this visit. Facility-Administered Medications Ordered in Other Visits   Medication Dose Route Frequency Provider Last Rate Last Admin    lactated ringers infusion 1,000 mL  1,000 mL Intravenous Continuous Mikhail Arrington MD   Stopped at 08/19/16 1249    HYDROmorphone (DILAUDID) injection 0.25 mg  0.25 mg Intravenous Q5 Min PRN Isis Wilkes MD         Allergies   Allergen Reactions    Nsaids Other (See Comments)     Bariatric Surgeon told me not to take NSAIDS for good    Pcn [Penicillins] Other (See Comments)     Sores in mouth    Bactrim [Sulfamethoxazole-Trimethoprim] Itching and Rash    Codeine Itching and Rash       Health Maintenance   Topic Date Due    Cervical cancer screen  07/14/2021    Lipid screen  12/14/2021    Potassium monitoring  12/14/2021    Creatinine monitoring  12/14/2021    DTaP/Tdap/Td vaccine (2 - Td) 09/14/2022    Breast cancer screen  01/13/2023    Colon cancer screen colonoscopy  04/03/2025    Flu vaccine  Completed    Shingles Vaccine  Completed    Pneumococcal 0-64 years Vaccine  Completed    Hepatitis C screen  Completed    HIV screen  Completed    Hepatitis A vaccine  Aged Out    Hepatitis B vaccine  Aged Out    Hib vaccine  Aged Out    Meningococcal (ACWY) vaccine  Aged Out       Subjective:     Review of Systems   Constitutional: Negative. HENT: Negative. Eyes: Negative. Respiratory: Negative. Cardiovascular: Negative. Gastrointestinal: Negative. Genitourinary: Negative. Musculoskeletal: Negative. Skin: Negative. Allergic/Immunologic: Negative for environmental allergies, food allergies and immunocompromised state. Neurological: Negative. Psychiatric/Behavioral: Negative. Objective:     Physical Exam  Vitals signs and nursing note reviewed. Constitutional:       General: She is not in acute distress. Appearance: Normal appearance. She is obese. She is not ill-appearing or toxic-appearing.    HENT: Head: Normocephalic and atraumatic. Right Ear: External ear normal.      Left Ear: External ear normal.   Eyes:      Extraocular Movements: Extraocular movements intact. Conjunctiva/sclera: Conjunctivae normal.   Cardiovascular:      Pulses: Normal pulses. Heart sounds: Normal heart sounds. No murmur. No friction rub. No gallop. Pulmonary:      Effort: No respiratory distress. Breath sounds: Normal breath sounds. No stridor. No wheezing, rhonchi or rales. Skin:     Capillary Refill: Capillary refill takes less than 2 seconds. Neurological:      General: No focal deficit present. Mental Status: She is alert and oriented to person, place, and time. Mental status is at baseline. Cranial Nerves: No cranial nerve deficit. Sensory: No sensory deficit. Motor: No weakness. Coordination: Coordination normal.      Gait: Gait normal.   Psychiatric:         Mood and Affect: Mood normal.         Behavior: Behavior normal.         Thought Content: Thought content normal.         Judgment: Judgment normal.       /73 (Site: Left Upper Arm, Position: Sitting, Cuff Size: Medium Adult)   Pulse 74   Temp 97.2 °F (36.2 °C) (Temporal)   Ht 5' 6\" (1.676 m)   Wt 239 lb (108.4 kg)   LMP  (LMP Unknown)   BMI 38.58 kg/m²     Assessment:       Diagnosis Orders   1. Grief reaction  FLUoxetine (PROZAC) 20 MG capsule    busPIRone (BUSPAR) 5 MG tablet   2. Nightmare  FLUoxetine (PROZAC) 20 MG capsule   3. Morbid obesity (HCC)     4. Obesity (BMI 30-39.9)     5. Essential hypertension               Plan:    Grief reaction - symptoms improved. Patient notes significant improvement in symptoms since starting buspar. Will continue buspar. I would like the patient to pursue counseling as well. I have d/w patient the options for treatment of nightmares. One treatment, counseling, will be started. I will also adjust the prozac down to see if that helps with the nightmares. Obesity - patient counseled about importance of healthy weight    HTN - resolved. Patient's bp has improved. No current medications being taken. Return in about 3 months (around 4/18/2021) for anxiety. No orders of the defined types were placed in this encounter. Orders Placed This Encounter   Medications    FLUoxetine (PROZAC) 20 MG capsule     Sig: Take 1 capsule by mouth daily     Dispense:  30 capsule     Refill:  0    busPIRone (BUSPAR) 5 MG tablet     Sig: Take 1 tablet by mouth 2 times daily     Dispense:  60 tablet     Refill:  2       Patient given educational materials - see patient instructions. Discussed use, benefit, and side effects of prescribed medications. All patientquestions answered. Pt voiced understanding. Reviewed health maintenance. Instructedto continue current medications, diet and exercise. Patient agreed with treatmentplan. Follow up as directed.      Electronically signed by Greg England MD on 1/18/2021 at 7:43 PM

## 2021-01-18 NOTE — TELEPHONE ENCOUNTER
Called pt after referral from PCP to offer behavioral health services; left voicemail including clinician's [employer] voice number and requested return call.

## 2021-01-18 NOTE — PROGRESS NOTES
Warm handoff requested by Zuleyma Linn MD for mood and grief reaction and was completed via telephone. Patient scheduled for future virtual health visit.  on Thursday 1/28 at 8am.

## 2021-02-04 ENCOUNTER — TELEMEDICINE (OUTPATIENT)
Dept: PSYCHOLOGY | Age: 61
End: 2021-02-04
Payer: MEDICARE

## 2021-02-04 DIAGNOSIS — F43.23 ADJUSTMENT DISORDER WITH MIXED ANXIETY AND DEPRESSED MOOD: Primary | ICD-10-CM

## 2021-02-04 PROCEDURE — 90791 PSYCH DIAGNOSTIC EVALUATION: CPT | Performed by: PSYCHOLOGIST

## 2021-02-04 PROCEDURE — 1036F TOBACCO NON-USER: CPT | Performed by: PSYCHOLOGIST

## 2021-02-04 ASSESSMENT — ANXIETY QUESTIONNAIRES
7. FEELING AFRAID AS IF SOMETHING AWFUL MIGHT HAPPEN: 0-NOT AT ALL
4. TROUBLE RELAXING: 0-NOT AT ALL
2. NOT BEING ABLE TO STOP OR CONTROL WORRYING: 0-NOT AT ALL
1. FEELING NERVOUS, ANXIOUS, OR ON EDGE: 1-SEVERAL DAYS
GAD7 TOTAL SCORE: 2

## 2021-02-04 ASSESSMENT — PATIENT HEALTH QUESTIONNAIRE - PHQ9
1. LITTLE INTEREST OR PLEASURE IN DOING THINGS: 0
8. MOVING OR SPEAKING SO SLOWLY THAT OTHER PEOPLE COULD HAVE NOTICED. OR THE OPPOSITE, BEING SO FIGETY OR RESTLESS THAT YOU HAVE BEEN MOVING AROUND A LOT MORE THAN USUAL: 0
2. FEELING DOWN, DEPRESSED OR HOPELESS: 1
4. FEELING TIRED OR HAVING LITTLE ENERGY: 1
SUM OF ALL RESPONSES TO PHQ QUESTIONS 1-9: 6
6. FEELING BAD ABOUT YOURSELF - OR THAT YOU ARE A FAILURE OR HAVE LET YOURSELF OR YOUR FAMILY DOWN: 0
SUM OF ALL RESPONSES TO PHQ QUESTIONS 1-9: 6
9. THOUGHTS THAT YOU WOULD BE BETTER OFF DEAD, OR OF HURTING YOURSELF: 0
7. TROUBLE CONCENTRATING ON THINGS, SUCH AS READING THE NEWSPAPER OR WATCHING TELEVISION: 2

## 2021-02-04 NOTE — PROGRESS NOTES
Magali Kruger M.A. Psychology Doctoral Trainee    Supervising Clinical Psychologists:  Rowan Mao, Ph.D. Yusra Aparicio,  Ph.D. Alejandra Woodjan    Visit Date: 2021   Time of appointment:  8:05AM - 8:50AM   Time spent with Patient: 45 minutes. This is patient's first appointment. Reason for Consult: Other (grief)     Referring Provider/PCP:    No ref. provider found  Karon Mcneil MD      Pt provided informed consent for the behavioral health program. Discussed with patient model of service to include the limits of confidentiality (i.e. abuse reporting, suicide intervention, etc.) and short-term intervention focused approach. Also discussed with patient that the service provider is a supervised clinician and in particular, is being supervised by Dr. Jason Schrader and/or Dr. Harleen Malik. Pt indicated understanding. Pt also signed the consent form agreeing to be seen by a supervised clinician. Pt provided verbal consent to engage in telehealth psychotherapy. This visit was completed virtually using [employer] visit due to contact restrictions related to the COVID-19 pandemic. Session was held in a private place (pt's home). Because of the virtual delivery method, certain behavioral observations were not assessed during this appointment. Pt provided verbal consent for the following emergency contact: pt's  Betzy Henderson at [phone]. [facility]/Clinician location: Clinician's home in [place], [place]. PRESENTING PROBLEM AND HISTORY  Philippe Bowden is a 62 y.o. female who presents for new evaluation and treatment of  anxiety, depression. She has the following symptoms: depressed mood, increased appetite, decreased sleep, fatigue/lack of energy, feeling nervous, anxious, or on edge and excessive anxiety and worry about specific stressors. Onset of symptoms was approximately 5 months ago when her son  suddenly in 2020.   Symptoms have been gradually improving since that time. She denies current suicidal and homicidal ideation. Family history significant for psychiatric illness, unknown. Risk factors: positive family history in  brother(s) and mother and negative life event (recent loss of son). Previous treatment includes Prozac. She complains of the following medication side effects: nightmares, concurrent with beginning prescription. Prozac is being tapered to discontinue, and pt is currently taking Buspar. She reports finding Buspar very helpful. Pt's current coping: venting with daughters, avoiding reminders of son, slowly integrating enjoyable activities into daily routine. Pt's stated goals for treatment: \"be less anxious. \"    MENTAL STATUS EXAM  Mood was sad and within normal limits with calm and congruent affect. Suicidal ideation was denied. Homicidal ideation was denied. Hygiene was good . Dress was appropriate. Behavior was Within Normal Limits with No self-report of difficulties ambulating. Attitude was Cooperative and Friendly. Eye-contact was good. Speech: rate - WNL, rhythm -  WNL, volume - WNL  Verbalizations were goal directed and coherent. Thought processes were intact and goal-oriented without evidence of delusions, hallucinations, obsessions, or bishop; with little cognitive distortions. Associations were characterized by intact cognitive processes. Pt was oriented to person, place, time, and general circumstances;  recent:  good and remote:  good. Insight and judgment were estimated to be good, AEB, a good  understanding of cyclical maladaptive patterns, and the ability to use insight to inform behavior change.        CURRENT MEDICATIONS    Current Outpatient Medications:     FLUoxetine (PROZAC) 20 MG capsule, Take 1 capsule by mouth daily, Disp: 30 capsule, Rfl: 0    busPIRone (BUSPAR) 5 MG tablet, Take 1 tablet by mouth 2 times daily, Disp: 60 tablet, Rfl: 2    mometasone-formoterol (DULERA) 100-5 MCG/ACT inhaler, Inhale 2 puffs into the lungs 2 times daily, Disp: 1 Inhaler, Rfl: 7    atorvastatin (LIPITOR) 10 MG tablet, Take 1 tablet by mouth daily, Disp: 30 tablet, Rfl: 3    montelukast (SINGULAIR) 10 MG tablet, Take 1 tablet by mouth nightly, Disp: 90 tablet, Rfl: 3    albuterol sulfate HFA (VENTOLIN HFA) 108 (90 Base) MCG/ACT inhaler, Inhale 2 puffs into the lungs every 6 hours as needed for Wheezing or Shortness of Breath, Disp: 3 Inhaler, Rfl: 2    albuterol (PROVENTIL) (2.5 MG/3ML) 0.083% nebulizer solution, Take 3 mLs by nebulization every 6 hours as needed for Wheezing, Disp: 120 each, Rfl: 11    topiramate (TOPAMAX) 100 MG tablet, Take 1 tablet by mouth 2 times daily, Disp: 60 tablet, Rfl: 11    SUMAtriptan (IMITREX) 100 MG tablet, Take one at HA onset . May repeat in 1 hour . No more 2 per day 4 days out of the week, Disp: 18 tablet, Rfl: 5    denosumab (PROLIA) 60 MG/ML SOSY SC injection, Inject 1 mL into the skin every 6 months, Disp: 1 mL, Rfl: 1    acetaminophen (TYLENOL) 325 MG tablet, Take 2 tablets by mouth every 6 hours as needed for Pain, Disp: 30 tablet, Rfl: 0    CALCIUM CITRATE PO, Take 750 mg by mouth 2 times daily, Disp: , Rfl:     Multiple Vitamins-Minerals (MULTIVITAMIN ADULTS 50+ PO), Take 1 tablet by mouth daily, Disp: , Rfl:     KRILL OIL PO, Take by mouth daily, Disp: , Rfl:     sodium chloride (VINEET 128) 2 % ophthalmic solution, Place 1 drop into both eyes as needed, Disp: , Rfl:     vitamin D (CHOLECALCIFEROL) 1000 UNIT TABS tablet, Take 1,000 Units by mouth daily, Disp: , Rfl:     FIBER COMPLETE PO, Take by mouth daily , Disp: , Rfl:      FAMILY MEDICAL/MH HISTORY   Her family history includes Anxiety Disorder in her mother; Asthma in her mother; Breast Cancer in her paternal grandmother; Cancer in her father; Depression in her mother; Heart Attack in her paternal grandfather; Migraines in her sister;  No Known Problems in her brother, maternal grandfather, and maternal grandmother; Other in her father; Ovarian Cancer in her maternal aunt. Pt reported a family history of mental health difficulties in her mother and brother, but was not aware of diagnoses. Pt reported that her granddaughter has been diagnosed with bipolar disorder, and her grandson has been recently diagnosed with depression, anxiety, and ADHD. PATIENT MENTAL HEALTH HISTORY  Pt was prescribed fluoxetine 2 years ago when she broke her leg. Dosage was increased 6 months later after her  experienced cardiac arrest and she provided CPR. Pt is currently working with PCP to taper down and end this regimen due to nightmares pt has experienced since beginning fluoxetine. Pt denied previous experience with therapy, or any psychiatric diagnoses. PSYCHOSOCIAL HISTORY  Current living situation: Pt lives with , daughter, and granddaughter. Work/Education: Pt dropped out of school before completing high school due to illness (chronic ear and throat infections). 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 Support system: Pt reports a close relationship with her , and relies on her older and middle daughter for social support. Orthodox/Spirituality: Pt denied any pertinent Nondenominational or spiritual beliefs. DRUG AND ALCOHOL CURRENT USE/HISTORY  TOBACCO:  She reports that she quit smoking about 34 years ago. Her smoking use included cigarettes. She started smoking about 42 years ago. She has a 4.50 pack-year smoking history. She has never used smokeless tobacco.  ALCOHOL:  She reports no history of alcohol use. OTHER SUBSTANCES: She reports no history of drug use.        ASSESSMENT  Martin Griffin presented to the appointment today for evaluation and treatment of symptoms of depression and anxiety, including low mood, difficulty with sleep, fatigue and low energy, difficulty concentrating, worry about specific stressors, and feelings of anxiousness and nervousness. Pt states her symptoms began after her son  in 2021, and have slowly been improving as she has been working through the grief process. She is currently deemed no risk to herself or others. She meets criteria for Adjustment Disorder with mixed anxiety and depressed mood. She will benefit from continued medication evaluation to assess continued efficacy of buspirone in managing anxiety, and if additional medication is needed after fluoxetine is discontinued. Pt's symptoms are fairly well-managed at this time, with impairment and distress lessening more recently. She will also benefit from brief and solution-focused consultation to provide cognitive and behavioral interventions (e.g., psychoeducation, relaxation and stress management training, cognitive restructuring) for symptoms of anxiety, based on pt's stated tx goal to \"be less anxious. \"  Crow Hillman was in agreement with recommendations. PHQ Scores 2020   PHQ2 Score 1 1 6 0 0 0 0   PHQ9 Score 6 1 19 0 0 0 0     Interpretation of Total Score Depression Severity: 1-4 = Minimal depression, 5-9 = Mild depression, 10-14 = Moderate depression, 15-19 = Moderately severe depression, 20-27 = Severe depression    How often pt has had thoughts of death or hurting self (if PHQ positive for depression):  Not at all    SACHI 7 SCORE 2021   SACHI-7 Total Score 2     Interpretation of SACHI-7 score: 5-9 = mild anxiety, 10-14 = moderate anxiety, 15+ = severe anxiety. Recommend referral to behavioral health for scores 10 or greater. DIAGNOSIS  Crow Hillman was seen today for other.     Diagnoses and all orders for this visit:    Adjustment disorder with mixed anxiety and depressed mood      INTERVENTION  Discussed self-care (sleep, nutrition, rewarding activities, social support, exercise), Established rapport, Conducted functional assessment,

## 2021-02-18 ENCOUNTER — TELEMEDICINE (OUTPATIENT)
Dept: PSYCHOLOGY | Age: 61
End: 2021-02-18
Payer: MEDICARE

## 2021-02-18 DIAGNOSIS — F43.23 ADJUSTMENT DISORDER WITH MIXED ANXIETY AND DEPRESSED MOOD: Primary | ICD-10-CM

## 2021-02-18 PROCEDURE — 1036F TOBACCO NON-USER: CPT | Performed by: PSYCHOLOGIST

## 2021-02-18 PROCEDURE — 90832 PSYTX W PT 30 MINUTES: CPT | Performed by: PSYCHOLOGIST

## 2021-02-18 NOTE — PROGRESS NOTES
Adrianne Elliott M.A. Psychology Doctoral Trainee    Supervising Clinical Psychologists:  Karson Nice, Ph.D. Raheel Youngblood,  Ph.D. Farrah Geronimoa        Visit Date: 2/18/2021   Time of appointment:  9:55AM - 10:25AM   Time spent with Patient: 30 minutes. This is patient's second appointment. Reason for Consult:  Anxiety and Depression     Referring Provider/PCP:    No ref. provider found  Rosanne Mon MD      Pt provided informed consent for the behavioral health program. Discussed with patient model of service to include the limits of confidentiality (i.e. abuse reporting, suicide intervention, etc.) and short-term intervention focused approach. Pt indicated understanding. Pt provided verbal consent to engage in telehealth psychotherapy. This visit was completed virtually using [employer] due to contact restrictions related to the COVID-19 pandemic. Session was held in a private place (pt's home). Because of the virtual delivery method, certain behavioral observations were not assessed during this appointment. Pt provided verbal consent for the following emergency contact: pt's  Amelia Al at [phone]. [facility]/Clinician location: [facility] in [place], [place]. Columba Juan is a 62 y.o. female who presents for follow up of depression and anxiety symptoms, including low mood, sleep disturbance, fatigue and low energy, difficulty concentrating, persistent worry about specific stressors, and feelings of anxiousness and nervousness. Session activities included clarification of treatment goals, functional assessment of pt's most recent experience of anxiety, and learning progressive muscle relaxation. Pt's stated treatment goal: \"feel less anxious. \"  Functional assessment of pt's most recent anxiety experience was conducted; pt reported distress related to physical symptoms (e.g., muscle tension, fidgeting and restlessness, difficulty concentrating, feelings of frustration, urge to scream), and denied any distressing cognitive experiences related to her anxiety. Psychoeducation on anxiety was begun, and progressive muscle relaxation was explained and demonstrated in session as a tool to improve management of physical symptoms. Previous Recommendations:   1. Pt will continue to incorporate enjoyable activities (azam, reading) daily. 2. Next appt scheduled in 1 week: will functionally assess anxiety and clarify tx goal, begin psychoed and teach a coping tool      MENTAL STATUS EXAM  Mood was euthymic with calm and congruent affect. Suicidal ideation was denied. Homicidal ideation was denied. Hygiene was good . Dress was appropriate. Behavior was Within Normal Limits with No observation of difficulties ambulating. Attitude was Cooperative and [place]. Eye-contact was good. Speech: rate - WNL, rhythm - WNL, volume - WNL. Verbalizations were goal directed and coherent. Thought processes were intact and goal-oriented without evidence of delusions, hallucinations, obsessions, or bishop; with little cognitive distortions. Associations were characterized by intact cognitive processes. Pt was oriented to person, place, time, and general circumstances;  recent:  good and remote:  good. Insight and judgment were estimated to be good, AEB, a good  understanding of cyclical maladaptive patterns, and the ability to use insight to inform behavior change. NIVIA  Maddy Power presented to the appointment today for f/u of symptoms of depression and anxiety, including low mood, sleep difficulties, fatigue and low energy, concentration difficulties, worry about specific stressors, and feelings of anxiousness and nervousness. Pt's symptoms began after her son  in 2020, and have slowly improved as she has worked through her grief.   She is currently deemed no risk to herself or others. She meets criteria for Adjustment Disorder with Mixed anxiety and depressed mood. Pt's stated goals for treatment are to \"be less anxious. \"  Treatment plan will focus on improving pt's understanding of anxiety through psychoeducation, and teaching pt targeted CBT-based coping skills (e.g., progressive muscle relaxation) and stress management training to improve pt's management of anxiety. Pt was in agreement with recommendations. PHQ Scores 2/8/2021 2/4/2021 1/18/2021 12/21/2020 6/22/2020 12/11/2018 9/5/2017   PHQ2 Score 0 1 1 6 0 0 0   PHQ9 Score 0 6 1 19 0 0 0     Interpretation of Total Score Depression Severity: 1-4 = Minimal depression, 5-9 = Mild depression, 10-14 = Moderate depression, 15-19 = Moderately severe depression, 20-27 = Severe depression    How often pt has had thoughts of death or hurting self (if PHQ positive for depression):       SACHI 7 SCORE 2/4/2021   SACHI-7 Total Score 2     Interpretation of SACHI-7 score: 5-9 = mild anxiety, 10-14 = moderate anxiety, 15+ = severe anxiety. Recommend referral to behavioral health for scores 10 or greater. DIAGNOSIS  Jacquelyn Barnett was seen today for anxiety and depression. Diagnoses and all orders for this visit:    Adjustment disorder with mixed anxiety and depressed mood      INTERVENTION  Trained in relaxation strategies, Discussed self-care (sleep, nutrition, rewarding activities, social support, exercise), Established rapport, Conducted functional assessment, [place]-setting to identify pt's primary goals for [facility] visit / overall health, Supportive techniques and Emphasized importance of regular practice of relaxation strategies to target / promote coping with physical symptoms of anxiety, Collaborative treatment planning, Clarified treatment goals      PLAN  1. Pt will continue engagement in hobbies, enjoyable activities, and healthy emotional expression. 2. Pt will practice progressive muscle relaxation nightly.   3. Next session in 2 weeks; will explore pt's experience with exercise, provide psychoeducation on anxiety, and continue building coping skills. INTERACTIVE COMPLEXITY  Is interactive complexity present?   No  Reason:  N/A  Additional Supporting Information:  N/A       Electronically signed by Mikc Snyder on 2/18/21 at 10:45 AM EST

## 2021-03-04 ENCOUNTER — TELEMEDICINE (OUTPATIENT)
Dept: PSYCHOLOGY | Age: 61
End: 2021-03-04
Payer: MEDICARE

## 2021-03-04 DIAGNOSIS — F43.23 ADJUSTMENT DISORDER WITH MIXED ANXIETY AND DEPRESSED MOOD: Primary | ICD-10-CM

## 2021-03-04 PROCEDURE — 1036F TOBACCO NON-USER: CPT | Performed by: PSYCHOLOGIST

## 2021-03-04 PROCEDURE — 90832 PSYTX W PT 30 MINUTES: CPT | Performed by: PSYCHOLOGIST

## 2021-03-04 NOTE — PROGRESS NOTES
Earlean Klinefelter, M.A. Psychology Doctoral Trainee    Supervising Clinical Psychologists:  Anayeli Obando, Ph.D. Albin Rojas,  Ph.D. Farooq Payton        Visit Date: 3/4/2021   Time of appointment:  10:01AM - 10:34AM   Time spent with Patient: 33 minutes. This is patient's third appointment. Reason for Consult:  Anxiety     Referring Provider/PCP:    No ref. provider found  Carrie Beal MD      Pt provided informed consent for the behavioral health program. Discussed with patient model of service to include the limits of confidentiality (i.e. abuse reporting, suicide intervention, etc.) and short-term intervention focused approach. Pt indicated understanding. Pt provided verbal consent to engage in telehealth psychotherapy. This visit was completed virtually using [employer] due to contact restrictions related to the COVID-19 pandemic. Session was held in a private place (pt's home). Because of the virtual delivery method, certain behavioral observations were not assessed during this appointment. Pt provided verbal consent for the following emergency contact: pt's  Lenora Vazquez at [phone]. [facility]/Clinician location: [facility] in [place], [place]. He Augustin is a 62 y.o. female who presents for follow up of depression and anxiety symptoms, including low mood, sleep disturbance, fatigue and low energy, difficulty concentrating, persistent worry about specific stressors, and feelings of anxiousness and nervousness. Session activities included review of self-care, exploration of grief, and psychoeducation on anxiety. Pt reported continuing to engage in enjoyable activities, stating that she has recently taken up quilting in addition to her other hobbies.   Pt's grief related to recent as well as distal losses was briefly explored, with review of pt's grief processing skills (spending time with loved ones, reminiscing, allowing emotions to come up). Pt stated that she practiced progressive muscle relaxation (PMR) regularly and found the exercise helpful in releasing muscle tension related to anxiety. Psychoeducation was provided on emotions as functional, and applied to pt's most recent experience of anxiety. The utility of awareness in managing anxiety was discussed. Previous Recommendations:   1. Pt will continue engagement in hobbies, enjoyable activities, and healthy emotional expression. 2. Pt will practice progressive muscle relaxation nightly. 3. Next session in 2 weeks; will explore pt's experience with exercise, provide psychoeducation on anxiety, and continue building coping skills. MENTAL STATUS EXAM  Mood was euthymic with calm and congruent affect. Suicidal ideation was denied. Homicidal ideation was denied. Hygiene was good . Dress was appropriate. Behavior was Within Normal Limits with No observation of difficulties ambulating. Attitude was Cooperative and [place]. Eye-contact was good. Speech: rate - WNL, rhythm - WNL, volume - WNL. Verbalizations were goal directed and coherent. Thought processes were intact and goal-oriented without evidence of delusions, hallucinations, obsessions, or bishop; with little cognitive distortions. Associations were characterized by intact cognitive processes. Pt was oriented to person, place, time, and general circumstances;  recent:  good and remote:  good. Insight and judgment were estimated to be good, AEB, a good  understanding of cyclical maladaptive patterns, and the ability to use insight to inform behavior change. ASSESSMENT  Daphnie Reynolds presented to the appointment today for f/u of symptoms of depression and anxiety, including low mood, sleep difficulties, fatigue and low energy, concentration difficulties, worry about specific stressors, and feelings of anxiousness and nervousness.   Pt's symptoms began after her son  in September 2020, and have slowly improved as she has worked through her grief. She is currently deemed no risk to herself or others. She meets criteria for Adjustment Disorder with Mixed anxiety and depressed mood. Pt's stated goals for treatment are to \"be less anxious. \"  Treatment plan will focus on improving pt's understanding of anxiety through psychoeducation, and teaching pt targeted CBT-based coping skills (e.g., progressive muscle relaxation) and stress management training to improve pt's management of anxiety. Pt was in agreement with recommendations. PHQ Scores 3/2/2021 2/8/2021 2/4/2021 1/18/2021 12/21/2020 6/22/2020 12/11/2018   PHQ2 Score 0 0 1 1 6 0 0   PHQ9 Score 0 0 6 1 19 0 0     Interpretation of Total Score Depression Severity: 1-4 = Minimal depression, 5-9 = Mild depression, 10-14 = Moderate depression, 15-19 = Moderately severe depression, 20-27 = Severe depression    How often pt has had thoughts of death or hurting self (if PHQ positive for depression):       SACHI 7 SCORE 2/4/2021   SACHI-7 Total Score 2     Interpretation of SACHI-7 score: 5-9 = mild anxiety, 10-14 = moderate anxiety, 15+ = severe anxiety. Recommend referral to behavioral health for scores 10 or greater. DIAGNOSIS  Joehemanth Palacios was seen today for anxiety. Diagnoses and all orders for this visit:    Adjustment disorder with mixed anxiety and depressed mood      INTERVENTION  Trained in relaxation strategies, Discussed self-care (sleep, nutrition, rewarding activities, social support, exercise), Established rapport, Conducted functional assessment, [place]-setting to identify pt's primary goals for [facility] visit / overall health, Supportive techniques, Provided Psychoeducation re: emotions (function, components of) and Emphasized importance of regular practice of relaxation strategies to target / promote coping with physical symptoms of anxiety, Collaborative treatment planning, Clarified treatment goals      PLAN  1.  Pt will continue engagement in hobbies, enjoyable activities, and healthy emotional expression. 2. Pt will practice progressive muscle relaxation nightly. 3. Pt will practice noticing and observing physiological sensations of anxiety as they occur. 4. Next session in 1 week; will explore pt's experience with exercises, continue building awareness, and utilize functional assessment to identify other points of intervention. INTERACTIVE COMPLEXITY  Is interactive complexity present?   No  Reason:  N/A  Additional Supporting Information:  N/A

## 2021-03-11 ENCOUNTER — TELEMEDICINE (OUTPATIENT)
Dept: PSYCHOLOGY | Age: 61
End: 2021-03-11
Payer: MEDICARE

## 2021-03-11 DIAGNOSIS — F43.23 ADJUSTMENT DISORDER WITH MIXED ANXIETY AND DEPRESSED MOOD: Primary | ICD-10-CM

## 2021-03-11 PROCEDURE — 90837 PSYTX W PT 60 MINUTES: CPT | Performed by: PSYCHOLOGIST

## 2021-03-11 PROCEDURE — 1036F TOBACCO NON-USER: CPT | Performed by: PSYCHOLOGIST

## 2021-03-11 NOTE — PROGRESS NOTES
Mónica Jordan M.A. Psychology Doctoral Trainee    Supervising Clinical Psychologists:  Myke Leal, Ph.D. Joellen Gastelum,  Ph.D. Vicky Deshaun        Visit Date: 3/11/2021   Time of appointment:  9:05AM - 10:03AM   Time spent with Patient: 58 minutes. This is patient's fourth appointment. Reason for Consult:  Anxiety and Depression     Referring Provider/PCP:    No ref. provider found  Yaneth Underwood MD      Pt provided informed consent for the behavioral health program. Discussed with patient model of service to include the limits of confidentiality (i.e. abuse reporting, suicide intervention, etc.) and short-term intervention focused approach. Pt indicated understanding. Pt provided verbal consent to engage in telehealth psychotherapy. This visit was completed virtually using [employer] due to contact restrictions related to the COVID-19 pandemic. Session was held in a private place (pt's home). Because of the virtual delivery method, certain behavioral observations were not assessed during this appointment. Pt provided verbal consent for the following emergency contact: pt's  Jenifer Milian at [phone]. [facility]/Clinician location: [facility] in [place], [place]. Anayeli Burks is a 62 y.o. female who presents for follow up of depression and anxiety symptoms, including low mood, sleep disturbance, fatigue and low energy, difficulty concentrating, persistent worry about specific stressors, and feelings of anxiousness and nervousness. Session activities included processing a recent challenging family interaction. Pt reported continuing to engage in enjoyable activities, noting that she read a book for the first time \"in a while\" and plans to continue reading. Pt described experiencing a significant disagreement with her family.   Pt's coping with the challenge was explored, and processing around pt's feelings was begun.    Previous Recommendations:   1. Pt will continue engagement in hobbies, enjoyable activities, and healthy emotional expression. 2. Pt will practice progressive muscle relaxation nightly. 3. Pt will practice noticing and observing physiological sensations of anxiety as they occur. 4. Next session in 1 week; will explore pt's experience with exercises, continue building awareness, and utilize functional assessment to identify other points of intervention. MENTAL STATUS EXAM  Mood was sad with congruent affect. Suicidal ideation was denied. Homicidal ideation was denied. Hygiene was good . Dress was appropriate. Behavior was Within Normal Limits with No observation of difficulties ambulating. Attitude was Cooperative and [place]. Eye-contact was good. Speech: rate - WNL, rhythm - WNL, volume - WNL. Verbalizations were goal directed, coherent and minimal.  Thought processes were intact and goal-oriented without evidence of delusions, hallucinations, obsessions, or bishop; with moderate cognitive distortions. Associations were characterized by intact cognitive processes. Pt was oriented to person, place, time, and general circumstances;  recent:  good and remote:  good. Insight and judgment were estimated to be fair, AEB, a fair understanding of cyclical maladaptive patterns, and the ability to use insight to inform behavior change. ASSESSMENT  Ne Kothari presented to the appointment today for f/u of symptoms of depression and anxiety, including low mood, sleep difficulties, fatigue and low energy, concentration difficulties, worry about specific stressors, and feelings of anxiousness and nervousness. Pt's symptoms began after her son  in 2020, and have slowly improved as she has worked through her grief. She is currently deemed no risk to herself or others. She meets criteria for Adjustment Disorder with Mixed anxiety and depressed mood.   Pt's stated goals for treatment are to \"be less anxious. \"  Treatment plan will focus on improving pt's understanding of anxiety through psychoeducation, and teaching pt targeted CBT-based coping skills (e.g., progressive muscle relaxation) and stress management training to improve pt's management of anxiety. Pt was in agreement with recommendations. PHQ Scores 3/2/2021 2/8/2021 2/4/2021 1/18/2021 12/21/2020 6/22/2020 12/11/2018   PHQ2 Score 0 0 1 1 6 0 0   PHQ9 Score 0 0 6 1 19 0 0     Interpretation of Total Score Depression Severity: 1-4 = Minimal depression, 5-9 = Mild depression, 10-14 = Moderate depression, 15-19 = Moderately severe depression, 20-27 = Severe depression    How often pt has had thoughts of death or hurting self (if PHQ positive for depression):       SACHI 7 SCORE 2/4/2021   SACHI-7 Total Score 2     Interpretation of SACHI-7 score: 5-9 = mild anxiety, 10-14 = moderate anxiety, 15+ = severe anxiety. Recommend referral to behavioral health for scores 10 or greater. DIAGNOSIS  Dion Hester was seen today for anxiety and depression. Diagnoses and all orders for this visit:    Adjustment disorder with mixed anxiety and depressed mood      INTERVENTION  Trained in relaxation strategies, Discussed self-care (sleep, nutrition, rewarding activities, social support, exercise), Established rapport, Conducted functional assessment, [place]-setting to identify pt's primary [facility], Supportive techniques, Provided Psychoeducation re: emotions (function, components of) and Emphasized importance of regular practice of relaxation strategies to target / promote coping with physical symptoms of anxiety, Collaborative treatment planning      PLAN  1. Pt will continue engagement in hobbies and enjoyable, relaxing activities (including PMR). 2. Pt will continue practice of noticing and observing physiological sensations of anxiety as they occur.   3. Next session in 1 week: will process last session and emotions related to challenging family situations, review use of behavioral activation and relaxation training. INTERACTIVE COMPLEXITY  Is interactive complexity present?   No  Reason:  N/A  Additional Supporting Information:  N/A

## 2021-03-15 ENCOUNTER — PATIENT MESSAGE (OUTPATIENT)
Dept: FAMILY MEDICINE CLINIC | Age: 61
End: 2021-03-15

## 2021-03-16 NOTE — TELEPHONE ENCOUNTER
From: Abdelrahman Reyes  To: Zuleyma Linn MD  Sent: 3/15/2021 3:03 PM EDT  Subject: Non-Urgent Medical Question    I need to reschedule my appointment with [facility] on Thursday @ 3. My  has a bone marrow procedure process at 1 that day and we may not be home yet at that time. I do not know of any other way to cancel with her but through you.  Thank you for your help

## 2021-04-06 ENCOUNTER — TELEMEDICINE (OUTPATIENT)
Dept: NEUROLOGY | Age: 61
End: 2021-04-06
Payer: MEDICARE

## 2021-04-06 DIAGNOSIS — G43.009 MIGRAINE WITHOUT AURA AND WITHOUT STATUS MIGRAINOSUS, NOT INTRACTABLE: Primary | ICD-10-CM

## 2021-04-06 PROCEDURE — G8417 CALC BMI ABV UP PARAM F/U: HCPCS | Performed by: PSYCHIATRY & NEUROLOGY

## 2021-04-06 PROCEDURE — 3017F COLORECTAL CA SCREEN DOC REV: CPT | Performed by: PSYCHIATRY & NEUROLOGY

## 2021-04-06 PROCEDURE — G8427 DOCREV CUR MEDS BY ELIG CLIN: HCPCS | Performed by: PSYCHIATRY & NEUROLOGY

## 2021-04-06 PROCEDURE — 1036F TOBACCO NON-USER: CPT | Performed by: PSYCHIATRY & NEUROLOGY

## 2021-04-06 PROCEDURE — 99214 OFFICE O/P EST MOD 30 MIN: CPT | Performed by: PSYCHIATRY & NEUROLOGY

## 2021-04-06 ASSESSMENT — ENCOUNTER SYMPTOMS
ALLERGIC/IMMUNOLOGIC NEGATIVE: 1
EYES NEGATIVE: 1
GASTROINTESTINAL NEGATIVE: 1
RESPIRATORY NEGATIVE: 1

## 2021-04-06 NOTE — PROGRESS NOTES
Subjective:      Patient ID: Cady Garza is a 62 y.o. female. HPI  Active problem common migraine headaches on topamax . The condition is she reports that her headaches are once per week . These are over frontal head of pressure throbbing up to grade 7 to 8 over 10 attenuated with imitrex 100 mg . There maybe blurred vision before headache . Headaches are in part from stressors taking care of grandson . She is tolerating topamax 100 mg po bid along with imitrex well . There will be no tingling , weakness or bulbar complaints . She can not use NSAID with prior bariatric surgery. Weather fronts may be trigger headaches. Significant medications topamax 100 mg po bid , imitrex 100 mg PRN . Testing Head CT normal , May 2020.  MRI of Head normal      Past Medical History:   Diagnosis Date    Acromioclavicular joint arthritis 4/18/2016    Acute gastroenteritis 10/11/2017    Anemia 12/11/2018    Arthritis     Asthma 1980    On Inhaler    Bursitis     left shoulder    Chronic right shoulder pain 7/24/2018    Closed fracture of left tibial plateau 6/59/9341    Closed fracture of left tibial plateau 0/98/1148    Closed fracture of proximal end of left tibia 11/22/2018    Closed fracture of proximal end of left tibia 11/22/2018    Closed fracture of shaft of left tibia 11/22/2018    Closed fracture of shaft of left tibia 11/22/2018    Depression     Dry eyes     Essential hypertension 8/21/2017    Former smoker     GERD (gastroesophageal reflux disease)     not since Shelton-en-Y and weight loss    Head ache     on Topiramate    Hearing loss     mild    Hemorrhoids     Hiatal hernia     History of bladder infections     History of DVT (deep vein thrombosis) 2010    started in left leg, went to lungs    History of gastric bypass 12/6/2018    History of gastritis     History of morbid obesity     had Shelton-en y    History of pulmonary embolism 2010    from foot  trauma, was treated for six months with anticoagulation. She has no inferior vena cava filter.  Hyperlipidemia     not since weight loss    Hypertension     Not anymore after Bariatric Surgery, no medication    Knee pain 3/6/2015    Knee pain 3/6/2015    Mild intermittent asthma     Obesity (BMI 30-39. 9) 2016    KEVIN on CPAP     at night    Osteoarthritis     Osteopenia     S/P gastric bypass 12-29-15    Dr. Hi Arellano, [address], hosp wt = 280lb, initial wt = 328lb    S/p tibial fracture 2018    Status post gastric bypass for obesity     Tibial plateau fracture, left, closed, initial encounter 2018    Tibial plateau fracture, left, closed, initial encounter 2018    Vitamin D deficiency     Wears dentures     Wears glasses     Weight loss of 160 lbs since surgery  2017       Past Surgical History:   Procedure Laterality Date    ABDOMINOPLASTY N/A 2019    ABDOMINOPLASTY performed by Hugo Lacy MD at [address] ARTHROSCOPY / ARTHROTOMY KNEE Left 2018    KNEE ARTHROSCOPY LEFT performed by Pasty Rubinstein, MD at [address] [facility]  1986,  1987     SECTION      CHOLECYSTECTOMY      CLOSED MANIPULATION SHOULDER Right 2018    SHOULDER MANIPULATION WITH INJECTION performed by Pasty Rubinstein, MD at [facility]  16    EXCISION / BIOPSY SKIN LESION OF HEAD / NECK N/A 4/3/2017     EXCISION POSTERIOR NECK CYST performed by Dara Guerra IV, DO at [address] Left 2018    left tibia plateau external fixator application    NECK SURGERY      cyst removed back of neck    ORIF PROXIMAL TIBIAL PLATEAU FRACTURE Left 2018    TIBIAL PLATEAU OPEN REDUCTION INTERNAL FIXATION LEFT -  SYNTHES     C-ARM performed by Pasty Rubinstein, MD at [address]  10/14/2016    excision back lipoma with drain placement    MD OFFICE/OUTPT VISIT,PROCEDURE ONLY Right 10/8/2018    RIGHT SHOULDER Social connections     Talks on phone: None     Gets together: None     Attends [employer] service: None     Active member of club or organization: None     Attends meetings of clubs or organizations: None     Relationship status: None    Intimate partner violence     Fear of current or ex partner: None     Emotionally abused: None     Physically abused: None     Forced sexual activity: None   Other Topics Concern    None   Social History Narrative    None       Current Outpatient Medications   Medication Sig Dispense Refill    FLUoxetine (PROZAC) 20 MG capsule Take 1 capsule by mouth daily 30 capsule 0    busPIRone (BUSPAR) 5 MG tablet Take 1 tablet by mouth 2 times daily 60 tablet 2    mometasone-formoterol (DULERA) 100-5 MCG/ACT inhaler Inhale 2 puffs into the lungs 2 times daily 1 Inhaler 7    atorvastatin (LIPITOR) 10 MG tablet Take 1 tablet by mouth daily 30 tablet 3    montelukast (SINGULAIR) 10 MG tablet Take 1 tablet by mouth nightly 90 tablet 3    albuterol sulfate HFA (VENTOLIN HFA) 108 (90 Base) MCG/ACT inhaler Inhale 2 puffs into the lungs every 6 hours as needed for Wheezing or Shortness of Breath 3 Inhaler 2    albuterol (PROVENTIL) (2.5 MG/3ML) 0.083% nebulizer solution Take 3 mLs by nebulization every 6 hours as needed for Wheezing 120 each 11    topiramate (TOPAMAX) 100 MG tablet Take 1 tablet by mouth 2 times daily 60 tablet 11    SUMAtriptan (IMITREX) 100 MG tablet Take one at HA onset . May repeat in 1 hour .  No more 2 per day 4 days out of the week 18 tablet 5    denosumab (PROLIA) 60 MG/ML SOSY SC injection Inject 1 mL into the skin every 6 months 1 mL 1    acetaminophen (TYLENOL) 325 MG tablet Take 2 tablets by mouth every 6 hours as needed for Pain 30 tablet 0    CALCIUM CITRATE PO Take 750 mg by mouth 2 times daily      Multiple Vitamins-Minerals (MULTIVITAMIN ADULTS 50+ PO) Take 1 tablet by mouth daily      KRILL OIL PO Take by mouth daily      sodium chloride (VINEET 128) 2 % ophthalmic solution Place 1 drop into both eyes as needed      vitamin D (CHOLECALCIFEROL) 1000 UNIT TABS tablet Take 1,000 Units by mouth daily      FIBER COMPLETE PO Take by mouth daily        No current facility-administered medications for this visit. Facility-Administered Medications Ordered in Other Visits   Medication Dose Route Frequency Provider Last Rate Last Admin    lactated ringers infusion 1,000 mL  1,000 mL Intravenous Continuous Mikhail Doty MD   Stopped at 08/19/16 1249    HYDROmorphone (DILAUDID) injection 0.25 mg  0.25 mg Intravenous Q5 Min PRN [place] MD           Allergies   Allergen Reactions    Nsaids Other (See Comments)     Bariatric Surgeon told me not to take NSAIDS for good    Pcn [Penicillins] Other (See Comments)     Sores in mouth    Bactrim [Sulfamethoxazole-Trimethoprim] Itching and Rash    Codeine Itching and Rash       Review of Systems   Constitutional: Positive for fatigue. HENT: Negative. Eyes: Negative. Respiratory: Negative. Cardiovascular: Negative. Gastrointestinal: Negative. Endocrine: Negative. Genitourinary: Negative. Musculoskeletal: Negative. Skin: Negative. Allergic/Immunologic: Negative. Neurological: Negative. Hematological: Negative. Psychiatric/Behavioral: Negative. Objective:   Physical Exam  There were no vitals filed for this visit. weight:      Neurological Examination  Constitutional .General exam well groomed   Head/Ears /Nose/Throat: external ear . Normal exam  Neck and thyroid . Normal size. No bruits  Respiratory . Breathsounds clear bilaterally  Cardiovascular:  Auscultation of heart with regular rate and rhythm  Musculoskeletal. Muscle bulk and tone normal                                                           Muscle strength 5/5 strength throughout                                                                                No dysmetria or dysdiadokinesis  No tremor   Normal fine

## 2021-04-11 DIAGNOSIS — E78.2 MIXED HYPERLIPIDEMIA: ICD-10-CM

## 2021-04-12 RX ORDER — ATORVASTATIN CALCIUM 10 MG/1
TABLET, FILM COATED ORAL
Qty: 30 TABLET | Refills: 0 | Status: SHIPPED | OUTPATIENT
Start: 2021-04-12 | End: 2021-04-19 | Stop reason: SDUPTHER

## 2021-04-12 NOTE — TELEPHONE ENCOUNTER
(deep vein thrombosis)     Morbid obesity (HCC)     Status post gastric bypass for obesity     Acromioclavicular joint arthritis     Obesity (BMI 30-39.9)     KEVIN on CPAP 12 cm h20     Essential hypertension     Acute gastroenteritis     Chronic right shoulder pain     S/p tibial fracture     History of gastric bypass     Age-related osteoporosis without current pathological fracture

## 2021-04-15 ENCOUNTER — HOSPITAL ENCOUNTER (OUTPATIENT)
Dept: INFUSION THERAPY | Age: 61
Discharge: HOME | End: 2021-04-15
Payer: MEDICARE

## 2021-04-15 DIAGNOSIS — M81.0 AGE-RELATED OSTEOPOROSIS WITHOUT CURRENT PATHOLOGICAL FRACTURE: Primary | ICD-10-CM

## 2021-04-15 PROCEDURE — 6360000002 HC RX W HCPCS: Performed by: FAMILY MEDICINE

## 2021-04-15 PROCEDURE — 96401 CHEMO ANTI-NEOPL SQ/IM: CPT

## 2021-04-15 RX ORDER — SODIUM CHLORIDE 9 MG/ML
INJECTION, SOLUTION INTRAVENOUS CONTINUOUS
Status: CANCELLED | OUTPATIENT
Start: 2021-10-14

## 2021-04-15 RX ORDER — DIPHENHYDRAMINE HYDROCHLORIDE 50 MG/ML
50 INJECTION INTRAMUSCULAR; INTRAVENOUS ONCE
Status: CANCELLED | OUTPATIENT
Start: 2021-10-14 | End: 2021-10-14

## 2021-04-15 RX ORDER — METHYLPREDNISOLONE SODIUM SUCCINATE 125 MG/2ML
125 INJECTION, POWDER, LYOPHILIZED, FOR SOLUTION INTRAMUSCULAR; INTRAVENOUS ONCE
Status: CANCELLED | OUTPATIENT
Start: 2021-10-14 | End: 2021-10-14

## 2021-04-15 RX ORDER — EPINEPHRINE 1 MG/ML
0.3 INJECTION, SOLUTION, CONCENTRATE INTRAVENOUS PRN
Status: CANCELLED | OUTPATIENT
Start: 2021-10-14

## 2021-04-15 RX ADMIN — DENOSUMAB 60 MG: 60 INJECTION SUBCUTANEOUS at 09:27

## 2021-04-15 NOTE — PROGRESS NOTES
Pt here for Prolia injection. Labs reviewed. Pt was treated without incident and discharged in stable condition. Pt will return in 6 months for next Prolia.

## 2021-04-19 ENCOUNTER — OFFICE VISIT (OUTPATIENT)
Dept: FAMILY MEDICINE CLINIC | Age: 61
End: 2021-04-19
Payer: MEDICARE

## 2021-04-19 VITALS
HEIGHT: 67 IN | WEIGHT: 218 LBS | HEART RATE: 77 BPM | TEMPERATURE: 97.3 F | SYSTOLIC BLOOD PRESSURE: 125 MMHG | OXYGEN SATURATION: 98 % | DIASTOLIC BLOOD PRESSURE: 71 MMHG | BODY MASS INDEX: 34.21 KG/M2

## 2021-04-19 DIAGNOSIS — M81.0 AGE-RELATED OSTEOPOROSIS WITHOUT CURRENT PATHOLOGICAL FRACTURE: ICD-10-CM

## 2021-04-19 DIAGNOSIS — E78.2 MIXED HYPERLIPIDEMIA: ICD-10-CM

## 2021-04-19 DIAGNOSIS — Z99.89 OSA ON CPAP: ICD-10-CM

## 2021-04-19 DIAGNOSIS — F51.5 NIGHTMARE: ICD-10-CM

## 2021-04-19 DIAGNOSIS — E55.9 VITAMIN D DEFICIENCY: ICD-10-CM

## 2021-04-19 DIAGNOSIS — F43.21 GRIEF REACTION: ICD-10-CM

## 2021-04-19 DIAGNOSIS — J45.20 MILD INTERMITTENT ASTHMA WITHOUT COMPLICATION: ICD-10-CM

## 2021-04-19 DIAGNOSIS — G47.33 OSA ON CPAP: ICD-10-CM

## 2021-04-19 DIAGNOSIS — I10 ESSENTIAL HYPERTENSION: Primary | ICD-10-CM

## 2021-04-19 PROBLEM — K52.9 ACUTE GASTROENTERITIS: Status: RESOLVED | Noted: 2017-10-11 | Resolved: 2021-04-19

## 2021-04-19 PROCEDURE — G8417 CALC BMI ABV UP PARAM F/U: HCPCS | Performed by: FAMILY MEDICINE

## 2021-04-19 PROCEDURE — G8427 DOCREV CUR MEDS BY ELIG CLIN: HCPCS | Performed by: FAMILY MEDICINE

## 2021-04-19 PROCEDURE — 3017F COLORECTAL CA SCREEN DOC REV: CPT | Performed by: FAMILY MEDICINE

## 2021-04-19 PROCEDURE — 99214 OFFICE O/P EST MOD 30 MIN: CPT | Performed by: FAMILY MEDICINE

## 2021-04-19 PROCEDURE — 1036F TOBACCO NON-USER: CPT | Performed by: FAMILY MEDICINE

## 2021-04-19 RX ORDER — BUSPIRONE HYDROCHLORIDE 5 MG/1
5 TABLET ORAL 2 TIMES DAILY
Qty: 180 TABLET | Refills: 1 | Status: SHIPPED | OUTPATIENT
Start: 2021-04-19 | End: 2021-05-27 | Stop reason: SDUPTHER

## 2021-04-19 RX ORDER — ATORVASTATIN CALCIUM 10 MG/1
10 TABLET, FILM COATED ORAL DAILY
Qty: 90 TABLET | Refills: 1 | Status: SHIPPED | OUTPATIENT
Start: 2021-04-19 | End: 2021-10-22

## 2021-04-19 RX ORDER — AZELASTINE HYDROCHLORIDE 0.5 MG/ML
1 SOLUTION/ DROPS OPHTHALMIC 2 TIMES DAILY
COMMUNITY
End: 2022-04-25

## 2021-04-19 RX ORDER — FLUOXETINE HYDROCHLORIDE 40 MG/1
40 CAPSULE ORAL DAILY
Qty: 90 CAPSULE | Refills: 1 | Status: SHIPPED | OUTPATIENT
Start: 2021-04-19 | End: 2021-04-20 | Stop reason: SINTOL

## 2021-04-19 ASSESSMENT — ENCOUNTER SYMPTOMS
GASTROINTESTINAL NEGATIVE: 1
EYES NEGATIVE: 1
BLURRED VISION: 0
SHORTNESS OF BREATH: 0
ORTHOPNEA: 0
RESPIRATORY NEGATIVE: 1

## 2021-04-19 NOTE — PROGRESS NOTES
[address] PRIMARY CARE  [address] [place] [address] [address] [place] [address]  Dept: [phone]  Dept Fax: [phone]    Romayne Roca is a 62 y.o. female who is a Established patient, who presents today for her medical conditions/complaints as noted below:  Chief Complaint   Patient presents with    Anxiety    Hypertension         HPI:     The patient is a 81-year-old female with past medical history significant for hypertension, asthma, sleep apnea, osteoporosis, vitamin D deficiency, anxiety and depression who presents for 3-month checkup. The patient was seen by ophthalmology and was prescribed eye drops for a retinal wrinkle and dry eyes. She does need surgery in the future but it isn't to the degree they want to operate on yet. The patient's has bee having more anxiety and stress due to her 's medical conditions, loss of a stepson and stress with her grandchildren. The patient did try counseling but it did not help very much. She is on medications and states they do help her but she believes she needs an increase in her dose. She denies any suicidal or homicidal ideation. Her support system includes her  who sometimes frustrates her but overall they love each other. The patient did recently have a Prolia injection for osteoporosis. She requests DEXA scan ordered. DEXA scan placed for 8/14/2021. She is seeing Dr. Og Urbina for her migraines and is on Topamax. Her migraines are very driven by her stress levels. Overall she believes the Topamax is helping but she still has breakthrough. Hypertension  This is a chronic problem. The current episode started more than 1 year ago. The problem is unchanged. The problem is controlled. Pertinent negatives include no anxiety, blurred vision, chest pain, headaches, malaise/fatigue, neck pain, orthopnea, palpitations, peripheral edema, PND, shortness of breath or sweats.  There are no associated lungs    History of gastric bypass 2018    History of gastritis     History of morbid obesity     had [place]-en y    History of pulmonary embolism     from foot  trauma, was treated for six months with anticoagulation. She has no inferior vena cava filter.  Hyperlipidemia     not since weight loss    Hypertension     Not anymore after Bariatric Surgery, no medication    Knee pain 3/6/2015    Knee pain 3/6/2015    Mild intermittent asthma     Obesity (BMI 30-39. 9) 2016    KEVIN on CPAP     at night    Osteoarthritis     Osteopenia     S/P gastric bypass 12-29-15    Dr. Aubree Ventura, [facility], hosp wt = 280lb, initial wt = 328lb    S/p tibial fracture 2018    Status post gastric bypass for obesity     Tibial plateau fracture, left, closed, initial encounter 2018    Tibial plateau fracture, left, closed, initial encounter 2018    Vitamin D deficiency     Wears dentures     Wears glasses     Weight loss of 160 lbs since surgery  2017      Past Surgical History:   Procedure Laterality Date    ABDOMINOPLASTY N/A 2019    ABDOMINOPLASTY performed by José Miguel Dickinson MD at [address] ARTHROSCOPY / ARTHROTOMY KNEE Left 2018    KNEE ARTHROSCOPY LEFT performed by Ashlee Whatley MD at [address]  1986,  1987     SECTION      CHOLECYSTECTOMY      CLOSED MANIPULATION SHOULDER Right 2018    SHOULDER MANIPULATION WITH INJECTION performed by Ashlee Whatley MD at [facility]  16    EXCISION / BIOPSY SKIN LESION OF HEAD / NECK N/A 4/3/2017     EXCISION POSTERIOR NECK CYST performed by Mary Guerra IV, DO at . [place] [id number] Left 2018    left tibia plateau external fixator application    NECK SURGERY      cyst removed back of neck    ORIF PROXIMAL TIBIAL PLATEAU FRACTURE Left 2018    TIBIAL PLATEAU OPEN REDUCTION INTERNAL FIXATION LEFT -  SYNTHES     C-ARM performed by Kendy Garcias MD at [facility]  10/14/2016    excision back lipoma with drain placement    IN OFFICE/OUTPT VISIT,PROCEDURE ONLY Right 10/8/2018    RIGHT SHOULDER  MAGDY PROCEDURE performed by Kendy Garcias MD at [address] OFFICE/OUTPT [address] Left 2018    LEFT TIBIA PLATEAU EXTERNAL FIXATOR APPLICATION [facility]  12/29/15    liver bx, EGD    SHOULDER SURGERY Left 2016    Magdy procedure, bone spurs 2016    SHOULDER SURGERY Right 10/08/2018    magdy    TONSILLECTOMY  1979    UPPER GASTROINTESTINAL ENDOSCOPY  2015    found hiatal hernia       Family History   Problem Relation Age of Onset    Asthma Mother     Depression Mother     Anxiety Disorder Mother     Cancer Father         leukemia    Other Father         Myelodysplastic syndrome, which converted to leukemia    Migraines Sister     No Known Problems Brother     Ovarian Cancer Maternal Aunt     No Known Problems Maternal Grandmother     No Known Problems Maternal Grandfather     Breast Cancer Paternal Grandmother     Heart Attack Paternal Grandfather        Social History     Tobacco Use    Smoking status: Former Smoker     Packs/day: 0.50     Years: 9.00     Pack years: 4.50     Types: Cigarettes     Start date: 1978     Quit date: 1987     Years since quittin.3    Smokeless tobacco: Never Used   Substance Use Topics    Alcohol use: No     Alcohol/week: 0.0 standard drinks      Current Outpatient Medications   Medication Sig Dispense Refill    Artificial Tear Solution (SOOTHE XP OP) Apply 1 drop to eye 4 times daily      azelastine (OPTIVAR) 0.05 % ophthalmic solution 1 drop 2 times daily      busPIRone (BUSPAR) 5 MG tablet Take 1 tablet by mouth 2 times daily 180 tablet 1    atorvastatin (LIPITOR) 10 MG tablet Take 1 tablet by mouth daily 90 tablet 1    FLUoxetine (PROZAC) 40 MG capsule Take 1 capsule by mouth daily 90 capsule 1    mometasone-formoterol (DULERA) 100-5 MCG/ACT inhaler Inhale 2 puffs into the lungs 2 times daily 1 Inhaler 7    montelukast (SINGULAIR) 10 MG tablet Take 1 tablet by mouth nightly 90 tablet 3    albuterol sulfate HFA (VENTOLIN HFA) 108 (90 Base) MCG/ACT inhaler Inhale 2 puffs into the lungs every 6 hours as needed for Wheezing or Shortness of Breath 3 Inhaler 2    albuterol (PROVENTIL) (2.5 MG/3ML) 0.083% nebulizer solution Take 3 mLs by nebulization every 6 hours as needed for Wheezing 120 each 11    topiramate (TOPAMAX) 100 MG tablet Take 1 tablet by mouth 2 times daily 60 tablet 11    SUMAtriptan (IMITREX) 100 MG tablet Take one at HA onset . May repeat in 1 hour . No more 2 per day 4 days out of the week 18 tablet 5    denosumab (PROLIA) 60 MG/ML SOSY SC injection Inject 1 mL into the skin every 6 months 1 mL 1    acetaminophen (TYLENOL) 325 MG tablet Take 2 tablets by mouth every 6 hours as needed for Pain 30 tablet 0    CALCIUM CITRATE PO Take 750 mg by mouth 2 times daily      Multiple Vitamins-Minerals (MULTIVITAMIN ADULTS 50+ PO) Take 1 tablet by mouth daily      KRILL OIL PO Take by mouth daily      sodium chloride (VINEET 128) 2 % ophthalmic solution Place 1 drop into both eyes as needed      vitamin D (CHOLECALCIFEROL) 1000 UNIT TABS tablet Take 1,000 Units by mouth daily      FIBER COMPLETE PO Take by mouth daily        No current facility-administered medications for this visit.       Facility-Administered Medications Ordered in Other Visits   Medication Dose Route Frequency Provider Last Rate Last Admin    lactated ringers infusion 1,000 mL  1,000 mL Intravenous Continuous Mikhail Doty MD   Stopped at 08/19/16 1249    HYDROmorphone (DILAUDID) injection 0.25 mg  0.25 mg Intravenous Q5 Min PRN New Blackburn MD         Allergies   Allergen Reactions    Nsaids Other (See Comments)     Bariatric Surgeon told me not to take NSAIDS for Normocephalic and atraumatic. Right Ear: External ear normal.      Left Ear: External ear normal.      Nose: Nose normal.      Mouth/Throat:      Mouth: Mucous membranes are moist.   Eyes:      Extraocular Movements: Extraocular movements intact. Conjunctiva/sclera: Conjunctivae normal.      Pupils: Pupils are equal, round, and reactive to light. Neck:      Musculoskeletal: Normal range of motion and neck supple. Cardiovascular:      Rate and Rhythm: Normal rate and regular rhythm. Pulses: Normal pulses. Heart sounds: Normal heart sounds. No murmur. No friction rub. No gallop. Pulmonary:      Effort: Pulmonary effort is normal. No respiratory distress. Breath sounds: Normal breath sounds. No stridor. No wheezing, rhonchi or rales. Abdominal:      General: Bowel sounds are normal.      Palpations: Abdomen is soft. Skin:     General: Skin is warm and dry. Capillary Refill: Capillary refill takes less than 2 seconds. Neurological:      General: No focal deficit present. Mental Status: She is alert and oriented to person, place, and time. Mental status is at baseline. Psychiatric:         Attention and Perception: Attention normal.         Mood and Affect: Mood and affect normal.         Speech: Speech normal.         Behavior: Behavior normal.         Thought Content: Thought content normal.         Judgment: Judgment normal.       /71   Pulse 77   Temp 97.3 °F (36.3 °C)   Ht 5' 6.5\" (1.689 m)   Wt 218 lb (98.9 kg)   LMP  (LMP Unknown)   SpO2 98%   BMI 34.66 kg/m²     Assessment:       Diagnosis Orders   1. Essential hypertension  Comprehensive Metabolic Panel, Fasting    CBC Auto Differential   2. Grief reaction  busPIRone (BUSPAR) 5 MG tablet    FLUoxetine (PROZAC) 40 MG capsule   3. Nightmare  FLUoxetine (PROZAC) 40 MG capsule   4. Mixed hyperlipidemia  atorvastatin (LIPITOR) 10 MG tablet    Lipid, Fasting    Comprehensive Metabolic Panel, Fasting   5. Mild intermittent asthma without complication     6. KEVIN on CPAP 12 cm h20     7. Age-related osteoporosis without current pathological fracture  DEXA BONE DENSITY 2 SITES   8. Vitamin D deficiency               Plan:    Hypertensionpatient has been doing well without medications lately. The blood pressure was likely driven by anxiety and stress. We will monitor at each visit. Encouraged healthy diet and lifestyle changes. Grief reaction/nightmares/anxiety/depressionpatient is going to increase her Prozac to 40 mg and continue on BuSpar. Discussed with her the importance of finding good coping skills. The patient's for system does include her family but they are also her the stress. We will follow her up in 3 months or sooner if any issues arise or do not get better. Hyperlipidemiapatient to get fasting labs, stay on atorvastatin for now. Asthmano increase in symptoms or concerns currently. The patient is doing well. KEVINpatient is compliant with her CPAP machine    Osteoporosispatient is gone to injections of Prolia. We will recheck DEXA scan in August.    Vitamin D deficiencycheck levels and replace with next order. Last vitamin D level was normal.  Return in about 3 months (around 7/19/2021) for anxiety/depression, htn.     Orders Placed This Encounter   Procedures    DEXA BONE DENSITY 2 SITES     Standing Status:   Future     Standing Expiration Date:   4/19/2022     Order Specific Question:   Reason for exam:     Answer:   osteoporosis on prolia    Lipid, Fasting     Standing Status:   Future     Standing Expiration Date:   4/19/2022    Comprehensive Metabolic Panel, Fasting     Standing Status:   Future     Standing Expiration Date:   4/19/2022    CBC Auto Differential     Standing Status:   Future     Standing Expiration Date:   4/19/2022     Orders Placed This Encounter   Medications    busPIRone (BUSPAR) 5 MG tablet     Sig: Take 1 tablet by mouth 2 times daily     Dispense: 180 tablet     Refill:  1    atorvastatin (LIPITOR) 10 MG tablet     Sig: Take 1 tablet by mouth daily     Dispense:  90 tablet     Refill:  1    FLUoxetine (PROZAC) 40 MG capsule     Sig: Take 1 capsule by mouth daily     Dispense:  90 capsule     Refill:  1       Patient given educational materials - see patient instructions. Discussed use, benefit, and side effects of prescribed medications. All patientquestions answered. Pt voiced understanding. Reviewed health maintenance. Instructedto continue current medications, diet and exercise. Patient agreed with treatmentplan. Follow up as directed.      Electronically signed by Stephanie Damian MD on 4/19/2021 at 11:03 AM

## 2021-04-29 DIAGNOSIS — F43.21 GRIEF REACTION: Primary | ICD-10-CM

## 2021-04-29 DIAGNOSIS — F51.5 NIGHTMARE: ICD-10-CM

## 2021-04-29 RX ORDER — SERTRALINE HYDROCHLORIDE 25 MG/1
25 TABLET, FILM COATED ORAL DAILY
Qty: 30 TABLET | Refills: 0 | Status: SHIPPED | OUTPATIENT
Start: 2021-04-29 | End: 2021-05-27 | Stop reason: SDUPTHER

## 2021-05-27 ENCOUNTER — OFFICE VISIT (OUTPATIENT)
Dept: FAMILY MEDICINE CLINIC | Age: 61
End: 2021-05-27
Payer: MEDICARE

## 2021-05-27 VITALS
DIASTOLIC BLOOD PRESSURE: 68 MMHG | BODY MASS INDEX: 38.45 KG/M2 | HEIGHT: 67 IN | TEMPERATURE: 97.2 F | WEIGHT: 245 LBS | SYSTOLIC BLOOD PRESSURE: 130 MMHG

## 2021-05-27 DIAGNOSIS — F51.5 NIGHTMARE: ICD-10-CM

## 2021-05-27 DIAGNOSIS — J30.2 SEASONAL ALLERGIES: ICD-10-CM

## 2021-05-27 DIAGNOSIS — F41.9 ANXIETY: Primary | ICD-10-CM

## 2021-05-27 PROBLEM — I10 ESSENTIAL HYPERTENSION: Status: RESOLVED | Noted: 2017-08-21 | Resolved: 2021-05-27

## 2021-05-27 PROCEDURE — 1036F TOBACCO NON-USER: CPT | Performed by: FAMILY MEDICINE

## 2021-05-27 PROCEDURE — G8417 CALC BMI ABV UP PARAM F/U: HCPCS | Performed by: FAMILY MEDICINE

## 2021-05-27 PROCEDURE — 99214 OFFICE O/P EST MOD 30 MIN: CPT | Performed by: FAMILY MEDICINE

## 2021-05-27 PROCEDURE — G8427 DOCREV CUR MEDS BY ELIG CLIN: HCPCS | Performed by: FAMILY MEDICINE

## 2021-05-27 PROCEDURE — 3017F COLORECTAL CA SCREEN DOC REV: CPT | Performed by: FAMILY MEDICINE

## 2021-05-27 RX ORDER — BUSPIRONE HYDROCHLORIDE 5 MG/1
5 TABLET ORAL 3 TIMES DAILY
Qty: 270 TABLET | Refills: 1
Start: 2021-05-27 | End: 2021-06-14 | Stop reason: SDUPTHER

## 2021-05-27 RX ORDER — LORATADINE 10 MG/1
10 TABLET ORAL DAILY
Qty: 30 TABLET | Refills: 0 | Status: SHIPPED | OUTPATIENT
Start: 2021-05-27 | End: 2021-06-26

## 2021-05-27 SDOH — ECONOMIC STABILITY: FOOD INSECURITY: WITHIN THE PAST 12 MONTHS, THE FOOD YOU BOUGHT JUST DIDN'T LAST AND YOU DIDN'T HAVE MONEY TO GET MORE.: NEVER TRUE

## 2021-05-27 SDOH — ECONOMIC STABILITY: FOOD INSECURITY: WITHIN THE PAST 12 MONTHS, YOU WORRIED THAT YOUR FOOD WOULD RUN OUT BEFORE YOU GOT MONEY TO BUY MORE.: NEVER TRUE

## 2021-05-27 ASSESSMENT — ENCOUNTER SYMPTOMS
EYES NEGATIVE: 1
BLURRED VISION: 0
RESPIRATORY NEGATIVE: 1
ORTHOPNEA: 0
GASTROINTESTINAL NEGATIVE: 1
SHORTNESS OF BREATH: 0

## 2021-05-27 ASSESSMENT — ANXIETY QUESTIONNAIRES
6. BECOMING EASILY ANNOYED OR IRRITABLE: 3
IF YOU CHECKED OFF ANY PROBLEMS ON THIS QUESTIONNAIRE, HOW DIFFICULT HAVE THESE PROBLEMS MADE IT FOR YOU TO DO YOUR WORK, TAKE CARE OF THINGS AT HOME, OR GET ALONG WITH OTHER PEOPLE: EXTREMELY DIFFICULT
7. FEELING AFRAID AS IF SOMETHING AWFUL MIGHT HAPPEN: 3
5. BEING SO RESTLESS THAT IT IS HARD TO SIT STILL: 3
3. WORRYING TOO MUCH ABOUT DIFFERENT THINGS: 3

## 2021-05-27 NOTE — PROGRESS NOTES
[address] PRIMARY CARE  [address] [place] [address] [address] [place] [address]  Dept: [phone]  Dept Fax: [phone]    Alex Matos is a 62 y.o. female who is a Established patient, who presents today for her medical conditions/complaints as noted below:  Chief Complaint   Patient presents with    Anxiety    Hypertension         HPI:     The patient is a 62year old female with a past medical history significant for hypertension, anxiety who presents for 4 week recheck. She is here today to discuss her anxiety increasing. She has been on Zoloft and BuSpar for a while now and notes improvement but still is experiencing significant anxious thoughts. Stressors include:  Daughter having a surgery, son in law having a surgery, grandson improving but is still stress,  is irritable about his chronic conditions, pandemic, fear about opening the country after the pandemic. The patient has had a history of anxiety depression but believes it has recently gotten worse due to everything going on. She believes the dose is too low for the Zoloft. She has tried counseling in the past with minimal effect. She is unsure if this is because she just did not connect with a counselor. Hypertension  This is a chronic problem. The current episode started more than 1 year ago. The problem has been gradually improving since onset. The problem is controlled. Associated symptoms include anxiety. Pertinent negatives include no blurred vision, chest pain, headaches, malaise/fatigue, neck pain, orthopnea, palpitations, peripheral edema, PND, shortness of breath or sweats. There are no associated agents to hypertension. Risk factors for coronary artery disease include obesity, sedentary lifestyle, post-menopausal state, dyslipidemia and family history. Past treatments include nothing. The current treatment provides mild improvement. There are no compliance problems.   There is no history of angina, kidney disease, CAD/MI, CVA, heart failure, left ventricular hypertrophy, PVD or retinopathy. Identifiable causes of hypertension include sleep apnea. Hemoglobin A1C (%)   Date Value   01/15/2019 5.0   01/04/2018 5.1   09/06/2017 5.3             ( goal A1Cis < 7)   No results found for: LABMICR  LDL Cholesterol (mg/dL)   Date Value   12/14/2020 131 (H)   01/15/2019 129   04/17/2018 129       (goal LDL is <100)   AST (U/L)   Date Value   12/14/2020 14     ALT (U/L)   Date Value   12/14/2020 12     BUN (mg/dL)   Date Value   12/14/2020 11     BP Readings from Last 3 Encounters:   05/27/21 130/68   04/19/21 125/71   01/18/21 113/73          (goal 120/80)    Past Medical History:   Diagnosis Date    Acromioclavicular joint arthritis 4/18/2016    Acute gastroenteritis 10/11/2017    Acute gastroenteritis 10/11/2017    Anemia 12/11/2018    Arthritis     Asthma 1980    On Inhaler    Bursitis     left shoulder    Chronic right shoulder pain 7/24/2018    Closed fracture of left tibial plateau 7/17/8882    Closed fracture of left tibial plateau 5/15/7673    Closed fracture of proximal end of left tibia 11/22/2018    Closed fracture of proximal end of left tibia 11/22/2018    Closed fracture of shaft of left tibia 11/22/2018    Closed fracture of shaft of left tibia 11/22/2018    Depression     Dry eyes     Essential hypertension 8/21/2017    Former smoker     GERD (gastroesophageal reflux disease)     not since Shelton-en-Y and weight loss    Head ache     on Topiramate    Hearing loss     mild    Hemorrhoids     Hiatal hernia     History of bladder infections     History of DVT (deep vein thrombosis) 2010    started in left leg, went to lungs    History of gastric bypass 12/6/2018    History of gastritis     History of morbid obesity     had Shelton-en y    History of pulmonary embolism 2010    from foot  trauma, was treated for six months with anticoagulation.  She has no inferior vena cava filter.  Hyperlipidemia     not since weight loss    Hypertension     Not anymore after Bariatric Surgery, no medication    Knee pain 3/6/2015    Knee pain 3/6/2015    Mild intermittent asthma     Morbid obesity ([address] 2015    Obesity (BMI 30-39. 9) 2016    KEVIN on CPAP     at night    Osteoarthritis     Osteopenia     S/P gastric bypass 12-29-15    Dr. Elsa Arellano, [address], hosp wt = 280lb, initial wt = 328lb    S/p tibial fracture 2018    Status post gastric bypass for obesity     Tibial plateau fracture, left, closed, initial encounter 2018    Tibial plateau fracture, left, closed, initial encounter 2018    Vitamin D deficiency     Wears dentures     Wears glasses     Weight loss of 160 lbs since surgery  2017      Past Surgical History:   Procedure Laterality Date    ABDOMINOPLASTY N/A 2019    ABDOMINOPLASTY performed by Pako Davila MD at [address] ARTHROSCOPY / ARTHROTOMY KNEE Left 2018    KNEE ARTHROSCOPY LEFT performed by Virginia Thomas MD at [address]  1986,  1987     SECTION      CHOLECYSTECTOMY  1987    CLOSED MANIPULATION SHOULDER Right 2018    SHOULDER MANIPULATION WITH INJECTION performed by Virginia Thomas MD at [facility]  16    EXCISION / BIOPSY SKIN LESION OF HEAD / NECK N/A 4/3/2017     EXCISION POSTERIOR NECK CYST performed by Marcelina Guerra IV, DO at [facility] Left 2018    left tibia plateau external fixator application    NECK SURGERY      cyst removed back of neck    ORIF PROXIMAL TIBIAL PLATEAU FRACTURE Left 2018    TIBIAL PLATEAU OPEN REDUCTION INTERNAL FIXATION LEFT -  SYNTHES     C-ARM performed by Virginia Thomas MD at [place] [address]  10/14/2016    excision back lipoma with drain placement    [facility] OFFICE/OUTPT VISIT,PROCEDURE ONLY Right 10/8/2018    RIGHT SHOULDER  LOUIE PROCEDURE performed by Ashlee Whatley MD at [address] [facility] OFFICE/OUTPT [address] [facility] [address] Left 2018    LEFT TIBIA PLATEAU EXTERNAL FIXATOR APPLICATION performed by Leonard Toledo MD at [address] [facility]  12/29/15    liver bx, EGD    SHOULDER SURGERY Left 2016    Giovanna procedure, bone spurs 2016    SHOULDER SURGERY Right 10/08/2018    [place]    TONSILLECTOMY  1979    UPPER GASTROINTESTINAL ENDOSCOPY  2015    found hiatal hernia       Family History   Problem Relation Age of Onset    Asthma Mother     Depression Mother     Anxiety Disorder Mother     Cancer Father         leukemia    Other Father         Myelodysplastic syndrome, which converted to leukemia    Migraines Sister     No Known Problems Brother     Ovarian Cancer Maternal Aunt     No Known Problems Maternal Grandmother     No Known Problems Maternal Grandfather     Breast Cancer Paternal Grandmother     Heart Attack Paternal Grandfather        Social History     Tobacco Use    Smoking status: Former Smoker     Packs/day: 0.50     Years: 9.00     Pack years: 4.50     Types: Cigarettes     Start date: 1978     Quit date: 1987     Years since quittin.4    Smokeless tobacco: Never Used   Substance Use Topics    Alcohol use: No     Alcohol/week: 0.0 standard drinks      Current Outpatient Medications   Medication Sig Dispense Refill    sertraline (ZOLOFT) 50 MG tablet Take 1 tablet by mouth daily 30 tablet 2    busPIRone (BUSPAR) 5 MG tablet Take 1 tablet by mouth 3 times daily 270 tablet 1    loratadine (CLARITIN) 10 MG tablet Take 1 tablet by mouth daily 30 tablet 0    Artificial Tear Solution (SOOTHE XP OP) Apply 1 drop to eye 4 times daily      azelastine (OPTIVAR) 0.05 % ophthalmic solution 1 drop 2 times daily      atorvastatin (LIPITOR) 10 MG tablet Take 1 tablet by mouth daily 90 tablet 1    mometasone-formoterol (DULERA) 100-5 MCG/ACT inhaler Inhale 2 puffs into the lungs 2 times daily 1 Inhaler 7    montelukast (SINGULAIR) 10 MG tablet Take 1 tablet by mouth nightly 90 tablet 3    albuterol sulfate HFA (VENTOLIN HFA) 108 (90 Base) MCG/ACT inhaler Inhale 2 puffs into the lungs every 6 hours as needed for Wheezing or Shortness of Breath 3 Inhaler 2    albuterol (PROVENTIL) (2.5 MG/3ML) 0.083% nebulizer solution Take 3 mLs by nebulization every 6 hours as needed for Wheezing 120 each 11    topiramate (TOPAMAX) 100 MG tablet Take 1 tablet by mouth 2 times daily 60 tablet 11    SUMAtriptan (IMITREX) 100 MG tablet Take one at HA onset . May repeat in 1 hour . No more 2 per day 4 days out of the week 18 tablet 5    denosumab (PROLIA) 60 MG/ML SOSY SC injection Inject 1 mL into the skin every 6 months 1 mL 1    acetaminophen (TYLENOL) 325 MG tablet Take 2 tablets by mouth every 6 hours as needed for Pain 30 tablet 0    CALCIUM CITRATE PO Take 750 mg by mouth 2 times daily      Multiple Vitamins-Minerals (MULTIVITAMIN ADULTS 50+ PO) Take 1 tablet by mouth daily      KRILL OIL PO Take by mouth daily      sodium chloride (VINEET 128) 2 % ophthalmic solution Place 1 drop into both eyes as needed      vitamin D (CHOLECALCIFEROL) 1000 UNIT TABS tablet Take 1,000 Units by mouth daily      FIBER COMPLETE PO Take by mouth daily        No current facility-administered medications for this visit.      Facility-Administered Medications Ordered in Other Visits   Medication Dose Route Frequency Provider Last Rate Last Admin    lactated ringers infusion 1,000 mL  1,000 mL Intravenous Continuous [facility] Kaity Bermeo MD   Stopped at 08/19/16 1249    HYDROmorphone (DILAUDID) injection 0.25 mg  0.25 mg Intravenous Q5 Min PRN Samira Perla MD         Allergies   Allergen Reactions    Prozac [Fluoxetine] Other (See Comments)     Caused nightmares    Nsaids Other (See Comments)     Bariatric Surgeon told me not to take NSAIDS for good    Pcn [Penicillins] Other (See Comments)     Sores in mouth    Bactrim External ear normal.      Nose: Nose normal.      Mouth/Throat:      Mouth: Mucous membranes are moist.   Eyes:      Extraocular Movements: Extraocular movements intact. Conjunctiva/sclera: Conjunctivae normal.   Cardiovascular:      Rate and Rhythm: Normal rate and regular rhythm. Pulses: Normal pulses. Heart sounds: Normal heart sounds. No murmur heard. No friction rub. No gallop. Pulmonary:      Effort: Pulmonary effort is normal. No respiratory distress. Breath sounds: Normal breath sounds. No stridor. No wheezing, rhonchi or rales. Abdominal:      Palpations: Abdomen is soft. Musculoskeletal:         General: Normal range of motion. Cervical back: Normal range of motion and neck supple. No rigidity or tenderness. Lymphadenopathy:      Cervical: No cervical adenopathy. Skin:     General: Skin is warm and dry. Capillary Refill: Capillary refill takes less than 2 seconds. Neurological:      General: No focal deficit present. Mental Status: She is alert and oriented to person, place, and time. Mental status is at baseline. Psychiatric:         Attention and Perception: Attention normal.         Mood and Affect: Affect normal. Mood is anxious. Speech: Speech normal.         Behavior: Behavior normal.         Thought Content: Thought content normal.         Judgment: Judgment normal.       /68 (Site: Left Upper Arm, Position: Sitting, Cuff Size: Large Adult)   Temp 97.2 °F (36.2 °C) (Temporal)   Ht 5' 6.5\" (1.689 m)   Wt 245 lb (111.1 kg)   LMP  (LMP Unknown)   BMI 38.95 kg/m²     Assessment:       Diagnosis Orders   1. Seasonal allergies  loratadine (CLARITIN) 10 MG tablet   2. Grief reaction  sertraline (ZOLOFT) 50 MG tablet    busPIRone (BUSPAR) 5 MG tablet    External Referral To Psychology   3.  Nightmare  sertraline (ZOLOFT) 50 MG tablet    External Referral To Psychology             Plan:    Grief reaction/nightmares/anxiety-increase Zoloft to 50 mg, increase BuSpar dosing to 3 times a day on the 5 mg dose. Encourage patient to reach out for counseling. [facility]. Patient encouraged to reach out if she has any suicidal or homicidal ideation. We will follow-up in 6 weeks. Seasonal allergies-patient has been using her rescue inhaler more frequently when the weather change. Encouraged her to add Claritin to the Singulair, Dulera and albuterol. If no resolution the patient is to contact her office. No follow-ups on file. Orders Placed This Encounter   Procedures    External Referral To Psychology     Referral Priority:   Routine     Referral Type:   Eval and Treat     Referral Reason:   Specialty Services Required     Requested Specialty:   Psychology     Number of Visits Requested:   1     Orders Placed This Encounter   Medications    sertraline (ZOLOFT) 50 MG tablet     Sig: Take 1 tablet by mouth daily     Dispense:  30 tablet     Refill:  2    busPIRone (BUSPAR) 5 MG tablet     Sig: Take 1 tablet by mouth 3 times daily     Dispense:  270 tablet     Refill:  1    loratadine (CLARITIN) 10 MG tablet     Sig: Take 1 tablet by mouth daily     Dispense:  30 tablet     Refill:  0       Patient given educational materials - see patient instructions. Discussed use, benefit, and side effects of prescribed medications. All patientquestions answered. Pt voiced understanding. Reviewed health maintenance. Instructedto continue current medications, diet and exercise. Patient agreed with treatmentplan. Follow up as directed.      Electronically signed by Ulysses Butcher MD on 5/27/2021 at 12:40 PM

## 2021-06-14 DIAGNOSIS — F41.9 ANXIETY: ICD-10-CM

## 2021-06-14 RX ORDER — BUSPIRONE HYDROCHLORIDE 10 MG/1
10 TABLET ORAL 3 TIMES DAILY
Qty: 270 TABLET | Refills: 1 | Status: SHIPPED | OUTPATIENT
Start: 2021-06-14 | End: 2022-01-01

## 2021-07-12 ENCOUNTER — HOSPITAL ENCOUNTER (OUTPATIENT)
Age: 61
Discharge: HOME | End: 2021-07-12
Payer: MEDICARE

## 2021-07-12 DIAGNOSIS — I10 ESSENTIAL HYPERTENSION: ICD-10-CM

## 2021-07-12 DIAGNOSIS — E78.2 MIXED HYPERLIPIDEMIA: ICD-10-CM

## 2021-07-12 LAB
ABSOLUTE EOS #: 0.16 K/UL (ref 0–0.44)
ABSOLUTE IMMATURE GRANULOCYTE: 0.08 K/UL (ref 0–0.3)
ABSOLUTE LYMPH #: 2.58 K/UL (ref 1.1–3.7)
ABSOLUTE MONO #: 0.58 K/UL (ref 0.1–1.2)
ALBUMIN SERPL-MCNC: 3.5 G/DL (ref 3.5–5.2)
ALBUMIN/GLOBULIN RATIO: 1.1 (ref 1–2.5)
ALP BLD-CCNC: 92 U/L (ref 35–104)
ALT SERPL-CCNC: 15 U/L (ref 5–33)
ANION GAP SERPL CALCULATED.3IONS-SCNC: 12 MMOL/L (ref 9–17)
AST SERPL-CCNC: 14 U/L
BASOPHILS # BLD: 1 % (ref 0–2)
BASOPHILS ABSOLUTE: 0.05 K/UL (ref 0–0.2)
BILIRUB SERPL-MCNC: 0.27 MG/DL (ref 0.3–1.2)
BUN BLDV-MCNC: 14 MG/DL (ref 8–23)
BUN/CREAT BLD: ABNORMAL (ref 9–20)
CALCIUM SERPL-MCNC: 8.2 MG/DL (ref 8.6–10.4)
CHLORIDE BLD-SCNC: 112 MMOL/L (ref 98–107)
CHOLESTEROL, FASTING: 184 MG/DL
CHOLESTEROL/HDL RATIO: 4.4
CO2: 19 MMOL/L (ref 20–31)
CREAT SERPL-MCNC: 0.79 MG/DL (ref 0.5–0.9)
DIFFERENTIAL TYPE: ABNORMAL
EOSINOPHILS RELATIVE PERCENT: 2 % (ref 1–4)
GFR AFRICAN AMERICAN: >60 ML/MIN
GFR NON-AFRICAN AMERICAN: >60 ML/MIN
GFR SERPL CREATININE-BSD FRML MDRD: ABNORMAL ML/MIN/{1.73_M2}
GFR SERPL CREATININE-BSD FRML MDRD: ABNORMAL ML/MIN/{1.73_M2}
GLUCOSE FASTING: 88 MG/DL (ref 70–99)
HCT VFR BLD CALC: 41.8 % (ref 36.3–47.1)
HDLC SERPL-MCNC: 42 MG/DL
HEMOGLOBIN: 12.4 G/DL (ref 11.9–15.1)
IMMATURE GRANULOCYTES: 1 %
LDL CHOLESTEROL: 114 MG/DL (ref 0–130)
LYMPHOCYTES # BLD: 33 % (ref 24–43)
MCH RBC QN AUTO: 28.6 PG (ref 25.2–33.5)
MCHC RBC AUTO-ENTMCNC: 29.7 G/DL (ref 28.4–34.8)
MCV RBC AUTO: 96.3 FL (ref 82.6–102.9)
MONOCYTES # BLD: 7 % (ref 3–12)
NRBC AUTOMATED: 0 PER 100 WBC
PDW BLD-RTO: 13.3 % (ref 11.8–14.4)
PLATELET # BLD: 305 K/UL (ref 138–453)
PLATELET ESTIMATE: ABNORMAL
PMV BLD AUTO: 8.7 FL (ref 8.1–13.5)
POTASSIUM SERPL-SCNC: 4.5 MMOL/L (ref 3.7–5.3)
RBC # BLD: 4.34 M/UL (ref 3.95–5.11)
RBC # BLD: ABNORMAL 10*6/UL
SEG NEUTROPHILS: 56 % (ref 36–65)
SEGMENTED NEUTROPHILS ABSOLUTE COUNT: 4.46 K/UL (ref 1.5–8.1)
SODIUM BLD-SCNC: 143 MMOL/L (ref 135–144)
TOTAL PROTEIN: 6.6 G/DL (ref 6.4–8.3)
TRIGLYCERIDE, FASTING: 140 MG/DL
VLDLC SERPL CALC-MCNC: NORMAL MG/DL (ref 1–30)
WBC # BLD: 7.9 K/UL (ref 3.5–11.3)
WBC # BLD: ABNORMAL 10*3/UL

## 2021-07-12 PROCEDURE — 36415 COLL VENOUS BLD VENIPUNCTURE: CPT

## 2021-07-12 PROCEDURE — 80061 LIPID PANEL: CPT

## 2021-07-12 PROCEDURE — 80053 COMPREHEN METABOLIC PANEL: CPT

## 2021-07-12 PROCEDURE — 85025 COMPLETE CBC W/AUTO DIFF WBC: CPT

## 2021-07-16 ENCOUNTER — OFFICE VISIT (OUTPATIENT)
Dept: PULMONOLOGY | Age: 61
End: 2021-07-16
Payer: MEDICARE

## 2021-07-16 VITALS
HEIGHT: 66 IN | TEMPERATURE: 97.9 F | HEART RATE: 66 BPM | DIASTOLIC BLOOD PRESSURE: 69 MMHG | RESPIRATION RATE: 18 BRPM | WEIGHT: 248.2 LBS | BODY MASS INDEX: 39.89 KG/M2 | OXYGEN SATURATION: 98 % | SYSTOLIC BLOOD PRESSURE: 122 MMHG

## 2021-07-16 DIAGNOSIS — G47.33 OSA ON CPAP: ICD-10-CM

## 2021-07-16 DIAGNOSIS — J45.20 MILD INTERMITTENT ASTHMA WITHOUT COMPLICATION: Primary | ICD-10-CM

## 2021-07-16 DIAGNOSIS — I10 ESSENTIAL HYPERTENSION: ICD-10-CM

## 2021-07-16 DIAGNOSIS — Z86.718 HISTORY OF DVT (DEEP VEIN THROMBOSIS): ICD-10-CM

## 2021-07-16 DIAGNOSIS — Z98.84 STATUS POST GASTRIC BYPASS FOR OBESITY: ICD-10-CM

## 2021-07-16 DIAGNOSIS — Z99.89 OSA ON CPAP: ICD-10-CM

## 2021-07-16 PROCEDURE — 3017F COLORECTAL CA SCREEN DOC REV: CPT | Performed by: INTERNAL MEDICINE

## 2021-07-16 PROCEDURE — G8417 CALC BMI ABV UP PARAM F/U: HCPCS | Performed by: INTERNAL MEDICINE

## 2021-07-16 PROCEDURE — G8427 DOCREV CUR MEDS BY ELIG CLIN: HCPCS | Performed by: INTERNAL MEDICINE

## 2021-07-16 PROCEDURE — 99213 OFFICE O/P EST LOW 20 MIN: CPT | Performed by: INTERNAL MEDICINE

## 2021-07-16 PROCEDURE — 1036F TOBACCO NON-USER: CPT | Performed by: INTERNAL MEDICINE

## 2021-07-17 NOTE — PROGRESS NOTES
Kristy Nurse  2021      Kristy Nurse  62 y.o. female presented for follow up . Obstructive sleep apnea and asthma. Patient is doing well shortness of breath is stable with exertion she did not have any hospital visits for  exacerbation or need steroids. Sputum is nonpurulent no hemoptysis she is compliant with her inhalers. . She is currently doing well using her positive airway pressure device. There have been no driving issues and concentration is better when awake. The machine pressure settings are comfortable, the mask is reasonably comfortable and she is using the humidity. There have been no ER visits, hospital visits, any new issues or medication changes since the last visit here in sleep clinic. Increase anxiety   epss- 5   Patient asthma flareups have increased over the last 2 to 3 weeks she is feeling chest tightness anxious. This is because her son  suddenly. She is under a lot of stress because of this. She is compliant with her CPAP therapy  Denies excessive daytime sleepiness or fatigue  Mask and pressure not comfortable  Sleep Medicine 10/12/2020 2020 2019 2016   Sitting and reading 1 1 2 1   Watching TV 1 0 2 0   Sitting, inactive in a public place (e.g. a theatre or a meeting) 0 0 1 0   As a passenger in a car for an hour without a break 2 1 0 3   Lying down to rest in the afternoon when circumstances permit 2 0 0 0   Sitting and talking to someone 0 0 0 0   Sitting quietly after a lunch without alcohol 1 0 0 0   In a car, while stopped for a few minutes in traffic 0 0 0 0   Total score 7 2 5 4      Last visit   Since her last visit, patient feels her asthma is gotten worse. Starting January. Her  health has gotten worse and he has been told not to shovel snow or cut grass and patient is under a lot of stress because of his health and that is also triggering her asthma attacks and since January.   Patient has started putting on more weight, even though she had a bariatric surgery since she started putting on more weight. She started feeling more short of breath with exertion and doing activities which her  was doing like cutting grass shoveling snow taking garbage out. She is using her rescue inhaler every other day, but no nocturnal symptoms. Denies any cough or hemoptysis. He is using Flonase for allergies, which are in spring. She is compliant with her Dulera. From [facility] sleep standpoint, she has started Tuesday use CPAP again at night and a compliance report for 90 days shows only 38% compliance with usage of 7 hours at night. We discussed in detail regarding improving compliance of CPAP machine. She doesn't notice any difference in her symptoms. She's never had excessive daytime sleepiness. The only reason she is started to use the machine again is because she is gaining weight          He is using her albuterol more frequently. /21/17  She is doing well from her asthma standpoint. Uses albuterol once a month, does not use nebulized albuterol until She has cold. Denies any cough, any sputum. No wheezing. No dyspnea. Her asthma symptoms are triggered during fall and spring. She is using CPAP compliance report showed 93% compliance. She does not want to use CPAP because she is uncomfortable with the mask. It wakes her up the pressure. [Patient had a good bariatric surgery and has lost significant weight. She was sent for re-titration and she had a split-night study, which showed that she still has obstructive sleep apnea AHI 12 /hr needed a CPAP of 12 cm of water pressure  He is using the CPAP machine. Initially it was an auto titrating now it has been set at 12 cm  She is on [facility]  Her allergy testing was negative.   IgE level was low]        Review of Systems -  General ROS: negative for - chills, fatigue, fever or weight loss  ENT ROS: negative for - headaches, oral lesions or sore throat  Cardiovascular ROS: no chest pain , orthopnea or pnd   Gastrointestinal ROS: no abdominal pain, change in bowel habits, or black or bloody stools  Skin - no rash   Neuro - no blurry vision , no loc .  No focal weakness   msk - no jt tenderness or swelling    Vascular - no claudication , rest completed and negative   Lymphatic - complete and negative   Hematology - oncology - complete and negative   Allergy immunology - complete and negative    no burning or hematuria      Immunization History   Administered Date(s) Administered    COVID-19, Moderna, PF, 100mcg/0.5mL 01/23/2021, 01/23/2021, 02/27/2021    Influenza Vaccine, unspecified formulation 09/11/2015    Influenza Virus Vaccine 09/11/2015, 09/05/2018, 10/08/2020    Influenza, MDCK Quadv, with preserv IM (Flucelvax 4 yrs and older) 03/21/2018    Influenza, Quadv, IM, PF (6 mo and older Fluzone, Flulaval, Fluarix, and 3 yrs and older Afluria) 08/23/2016, 09/05/2017, 09/05/2018, 09/17/2019    Influenza, [place], Recombinant, IM PF (Flublok 18 yrs and older) 10/08/2020    Pneumococcal Conjugate 13-valent ([id number]) 09/18/2015    Pneumococcal Polysaccharide ([id number]) 11/22/2016    Tdap (Boostrix, Adacel) 09/14/2012    Zoster Live (Zostavax) 06/19/2015    Zoster Recombinant (Shingrix) 03/20/2018, 09/05/2018          PAST MEDICAL HISTORY:         Diagnosis Date    Acromioclavicular joint arthritis 4/18/2016    Acute gastroenteritis 10/11/2017    Acute gastroenteritis 10/11/2017    Anemia 12/11/2018    Arthritis     Asthma 1980    On Inhaler    Bursitis     left shoulder    Chronic right shoulder pain 7/24/2018    Closed fracture of left tibial plateau 1/43/0017    Closed fracture of left tibial plateau 8/57/1909    Closed fracture of proximal end of left tibia 11/22/2018    Closed fracture of proximal end of left tibia 11/22/2018    Closed fracture of shaft of left tibia 11/22/2018    Closed fracture of shaft of left tibia 11/22/2018    Depression     Dry eyes     Essential hypertension 8/21/2017    Former smoker     GERD (gastroesophageal reflux disease)     not since Shelton-en-Y and weight loss    Head ache     on Topiramate    Hearing loss     mild    Hemorrhoids     Hiatal hernia     History of bladder infections     History of DVT (deep vein thrombosis) 2010    started in left leg, went to lungs    History of gastric bypass 12/6/2018    History of gastritis     History of morbid obesity     had Shelton-en y    History of pulmonary embolism 2010    from foot  trauma, was treated for six months with anticoagulation. She has no inferior vena cava filter.  Hyperlipidemia     not since weight loss    Hypertension 2015    Not anymore after Bariatric Surgery, no medication    Knee pain 3/6/2015    Knee pain 3/6/2015    Mild intermittent asthma     Morbid obesity ([address] 8/18/2015    Obesity (BMI 30-39. 9) 7/1/2016    KEVIN on CPAP 2015    at night    Osteoarthritis     Osteopenia     S/P gastric bypass 12-29-15    Dr. Boland File, hosp wt = 280lb, initial wt = 328lb    S/p tibial fracture 11/21/2018    Status post gastric bypass for obesity     Tibial plateau fracture, left, closed, initial encounter 12/5/2018    Tibial plateau fracture, left, closed, initial encounter 12/5/2018    Vitamin D deficiency 2015    Wears dentures     Wears glasses     Weight loss of 160 lbs since surgery  8/21/2017       Family History:       Problem Relation Age of Onset    Asthma Mother     Depression Mother     Anxiety Disorder Mother     Cancer Father         leukemia    Other Father         Myelodysplastic syndrome, which converted to leukemia    Migraines Sister     No Known Problems Brother     Ovarian Cancer Maternal Aunt     No Known Problems Maternal Grandmother     No Known Problems Maternal Grandfather     Breast Cancer Paternal Grandmother     Heart Attack Paternal Grandfather        SURGICAL HISTORY:   Past Surgical History:   Procedure Laterality Date    ABDOMINOPLASTY N/A 2019    ABDOMINOPLASTY performed by Anuel Guzman MD at [address] ARTHROSCOPY / ARTHROTOMY KNEE Left 2018    KNEE ARTHROSCOPY LEFT performed by Charo Bush MD at [facility]  1986,  1987     SECTION      CHOLECYSTECTOMY  1987    CLOSED MANIPULATION SHOULDER Right 2018    SHOULDER MANIPULATION WITH INJECTION performed by Charo Bush MD at [facility]  16    EXCISION / BIOPSY SKIN LESION OF HEAD / NECK N/A 4/3/2017     EXCISION POSTERIOR NECK CYST performed by Geovanni Guerra IV, DO at [address] [facility] Left 2018    left tibia plateau external fixator application    NECK SURGERY      cyst removed back of neck    ORIF PROXIMAL TIBIAL PLATEAU FRACTURE Left 2018    TIBIAL PLATEAU OPEN REDUCTION INTERNAL FIXATION LEFT -  SYNTHES     C-ARM performed by Charo Bush MD at [address]  10/14/2016    excision back lipoma with drain placement    IN OFFICE/OUTPT VISIT,PROCEDURE ONLY Right 10/8/2018    RIGHT SHOULDER  MAGDY PROCEDURE performed by Charo Bush MD at [facility] OFFICE/OUTPT [facility] Left 2018    LEFT TIBIA PLATEAU EXTERNAL FIXATOR APPLICATION performed by Mel Amaya MD at [facility]  12/29/15    liver bx, EGD    SHOULDER SURGERY Left 2016    Magdy procedure, bone spurs 2016    SHOULDER SURGERY Right 10/08/2018    magdy    TONSILLECTOMY  1979    UPPER GASTROINTESTINAL ENDOSCOPY  2015    found hiatal hernia         LUNG CANCER SCREENING     1. CRITERIA MET    []     CT ORDERED  []      2. CRITERIA NOT MET   [x]      3. REFUSED                    []        REASON CRITERIA NOT MET     1. SMOKING LESS THAN 30 PY  [x]      2. AGE LESS THAN 55 or GREATER 77 YEARS  []      3.  QUIT SMOKING 15 YEARS OR GREATER   [x] 4. RECENT CT WITH IN 11 MONTHS    []      5. LIFE EXPECTANCY < 5 YEARS   []      6. SIGNS  AND SYMPTOMS OF LUNG CANCER   []                Not in a hospital admission. Allergies   Allergen Reactions    Prozac [Fluoxetine] Other (See Comments)     Caused nightmares    Nsaids Other (See Comments)     Bariatric Surgeon told me not to take NSAIDS for good    Pcn [Penicillins] Other (See Comments)     Sores in mouth    Bactrim [Sulfamethoxazole-Trimethoprim] Itching and Rash    Codeine Itching and Rash     Social History     Tobacco Use   Smoking Status Former Smoker    Packs/day: 0.50    Years: 9.00    Pack years: 4.50    Types: Cigarettes    Start date: 1978   Bai Quit date: 1987    Years since quittin.5   Smokeless Tobacco Never Used     Prior to Admission medications    Medication Sig Start Date End Date Taking?  Authorizing Provider   busPIRone (BUSPAR) 10 MG tablet Take 1 tablet by mouth 3 times daily 21 Yes Rober Damon MD   sertraline (ZOLOFT) 50 MG tablet Take 1 tablet by mouth daily 21  Yes Rober Damon MD   Artificial Tear Solution (SOOTHE XP OP) Apply 1 drop to eye 4 times daily   Yes Historical Provider, MD   azelastine (OPTIVAR) 0.05 % ophthalmic solution 1 drop 2 times daily   Yes Historical Provider, MD   atorvastatin (LIPITOR) 10 MG tablet Take 1 tablet by mouth daily 21  Yes Rober Damon MD   mometasone-formoterol (DULERA) 100-5 MCG/ACT inhaler Inhale 2 puffs into the lungs 2 times daily 1/15/21  Yes Celso Sullivan MD   montelukast (SINGULAIR) 10 MG tablet Take 1 tablet by mouth nightly 10/12/20  Yes Celso Sullivan MD   albuterol sulfate HFA (VENTOLIN HFA) 108 (90 Base) MCG/ACT inhaler Inhale 2 puffs into the lungs every 6 hours as needed for Wheezing or Shortness of Breath 10/12/20  Yes Celso Sullivan MD   albuterol (PROVENTIL) (2.5 MG/3ML) 0.083% nebulizer solution Take 3 mLs by nebulization every 6 hours as needed for Wheezing 10/12/20  Yes Leroy MD Gisele   topiramate (TOPAMAX) 100 MG tablet Take 1 tablet by mouth 2 times daily 9/24/20  Yes Bhavesh Mccollum MD   SUMAtriptan (IMITREX) 100 MG tablet Take one at HA onset . May repeat in 1 hour . No more 2 per day 4 days out of the week 9/24/20  Yes Bhavesh Mccollum MD   denosumab (PROLIA) 60 MG/ML SOSY SC injection Inject 1 mL into the skin every 6 months 9/10/20  Yes Joanne Patton MD   acetaminophen (TYLENOL) 325 MG tablet Take 2 tablets by mouth every 6 hours as needed for Pain 5/29/20  Yes Ely Scales DO   CALCIUM CITRATE PO Take 750 mg by mouth 2 times daily   Yes Historical Provider, MD   Multiple Vitamins-Minerals (MULTIVITAMIN ADULTS 50+ PO) Take 1 tablet by mouth daily   Yes Historical Provider, MD   KRILL OIL PO Take by mouth daily   Yes Historical Provider, MD   sodium chloride (VINEET 128) 2 % ophthalmic solution Place 1 drop into both eyes as needed   Yes Historical Provider, MD   vitamin D (CHOLECALCIFEROL) 1000 UNIT TABS tablet Take 1,000 Units by mouth daily   Yes Historical Provider, MD   FIBER COMPLETE PO Take by mouth daily    Yes Historical Provider, MD   Physical exam  Head and neck atraumatic, normocephalic    Lymph nodes-no cervical, supraclavicular lymphadenopathy    Neck-no JVP elevation    Lungs - Ventilating all lobes without any rales, rhonchi, wheezes or dullness. No bronchial breath sounds. Chest expansion equal bilaterally    CVS- S1, S2 regular. No S3 no S4, no murmurs    Abdomen-nontender, nondistended. Bowel sounds are present. No organomegaly    Lower extremity-no edema    Upper extremity-no edema    Neurological-grossly normal cranial nerves.   No overt motor deficit    /69 (Site: Left Upper Arm, Position: Sitting, Cuff Size: Medium Adult)   Pulse 66   Temp 97.9 °F (36.6 °C) (Temporal)   Resp 18   Ht 5' 6\" (1.676 m)   Wt 248 lb 3.2 oz (112.6 kg)   LMP  (LMP Unknown)   SpO2 98% Comment: ROOM AIR AT REST  BMI 40.06 kg/m² Wt Readings from Last 3 Encounters:   07/16/21 248 lb 3.2 oz (112.6 kg)   05/27/21 245 lb (111.1 kg)   04/19/21 218 lb (98.9 kg)        Assessment   Diagnosis Orders   1. Mild intermittent asthma without complication     2. KEVIN on CPAP 12 cm h20     3. Status post gastric bypass for obesity     4. Essential hypertension     5. History of DVT (deep vein thrombosis)         Plan:  Asthma stable   Using CPAP  But gaining weight   Has anxiety and is not leaving home , no exercise due to worried about getting COVID -19 infection   Wants VV follow up   Cont bronchodilator , CPAP . Follow up 6 months VV  Requested Prescriptions      No prescriptions requested or ordered in this encounter       There are no discontinued medications. Adele Skinner received counseling on the following healthy behaviors: nutrition, exercise and medication adherence             Discussed use, benefit, and side effects of prescribed medications. Barriers to medication compliance addressed. All patient questions answered. Pt voiced understanding.    Alex Hdez MD   Electronically signed by Alex Hdez MD on 7/17/2021 at 2:41 PM

## 2021-07-31 ENCOUNTER — HOSPITAL ENCOUNTER (EMERGENCY)
Age: 61
Discharge: HOME | End: 2021-07-31
Attending: EMERGENCY MEDICINE
Payer: MEDICARE

## 2021-07-31 VITALS
DIASTOLIC BLOOD PRESSURE: 77 MMHG | RESPIRATION RATE: 18 BRPM | SYSTOLIC BLOOD PRESSURE: 123 MMHG | OXYGEN SATURATION: 95 % | BODY MASS INDEX: 37.67 KG/M2 | WEIGHT: 240 LBS | HEIGHT: 67 IN | HEART RATE: 65 BPM | TEMPERATURE: 98.3 F

## 2021-07-31 DIAGNOSIS — S61.217A LACERATION OF LEFT LITTLE FINGER WITHOUT FOREIGN BODY WITHOUT DAMAGE TO NAIL, INITIAL ENCOUNTER: Primary | ICD-10-CM

## 2021-07-31 PROCEDURE — 6360000002 HC RX W HCPCS: Performed by: GENERAL PRACTICE

## 2021-07-31 PROCEDURE — 90471 IMMUNIZATION ADMIN: CPT | Performed by: GENERAL PRACTICE

## 2021-07-31 PROCEDURE — 99284 EMERGENCY DEPT VISIT MOD MDM: CPT

## 2021-07-31 PROCEDURE — 90715 TDAP VACCINE 7 YRS/> IM: CPT | Performed by: GENERAL PRACTICE

## 2021-07-31 RX ADMIN — TETANUS TOXOID, REDUCED DIPHTHERIA TOXOID AND ACELLULAR PERTUSSIS VACCINE, ADSORBED 0.5 ML: 5; 2.5; 8; 8; 2.5 SUSPENSION INTRAMUSCULAR at 17:17

## 2021-07-31 ASSESSMENT — PAIN SCALES - GENERAL: PAINLEVEL_OUTOF10: 2

## 2021-07-31 NOTE — ED PROVIDER NOTES
[facility]  ED  Emergency Department Encounter  EmergencyMedicine Resident     Pt Dawood Diaz  MRN: [id number]  [place] 1960  Date of evaluation: 7/31/21  PCP:  Saintclair Kung, MD    [address] [place] [address]       Chief Complaint   Patient presents with    Laceration     left pinky laceration. was cutting fabric for a quilt when she slipped and cut her finger. tetanus up to date       HISTORY OF PRESENT ILLNESS  (Location/Symptom, Timing/Onset, Context/Setting, Quality, Duration, Modifying Factors, Severity.)      Rene Glilis is a 62 y.o. female who presents with facial laceration to the left small finger. Patient was cutting fabric and slipped causing a cut that does not extend through all layers of the skin.     PAST MEDICAL / SURGICAL / SOCIAL / FAMILY HISTORY      has a past medical history of Acromioclavicular joint arthritis, Acute gastroenteritis, Acute gastroenteritis, Anemia, Arthritis, Asthma, Bursitis, Chronic right shoulder pain, Closed fracture of left tibial plateau, Closed fracture of left tibial plateau, Closed fracture of proximal end of left tibia, Closed fracture of proximal end of left tibia, Closed fracture of shaft of left tibia, Closed fracture of shaft of left tibia, Depression, Dry eyes, Essential hypertension, Former smoker, GERD (gastroesophageal reflux disease), Head ache, Hearing loss, Hemorrhoids, Hiatal hernia, History of bladder infections, History of DVT (deep vein thrombosis), History of gastric bypass, History of gastritis, History of morbid obesity, History of pulmonary embolism, Hyperlipidemia, Hypertension, Knee pain, Knee pain, Mild intermittent asthma, Morbid obesity ([address] Obesity (BMI 30-39.9), KEVIN on CPAP, Osteoarthritis, Osteopenia, S/P gastric bypass, S/p tibial fracture, Status post gastric bypass for obesity, Tibial plateau fracture, left, closed, initial encounter, Tibial plateau fracture, left, closed, initial encounter, Worried About [facility] in the Last Year: Never true    [place] [employer] in the Last Year: Never true   Transportation Needs: No Transportation Needs    Lack of Transportation (Medical): No    Lack of Transportation (Non-Medical): No   Physical Activity:     Days of Exercise per Week:     Minutes of Exercise per Session:    Stress:     Feeling of Stress :    Social Connections:     Frequency of Communication with Friends and Family:     Frequency of Social Gatherings with Friends and Family:     Attends Yazidism Services:     Active Member of Clubs or Organizations:     Attends Club or Organization Meetings:     Marital Status:    Intimate Partner Violence:     Fear of Current or Ex-Partner:     Emotionally Abused:     Physically Abused:     Sexually Abused:        Family History   Problem Relation Age of Onset    Asthma Mother     Depression Mother     Anxiety Disorder Mother    Tori Colla Father         leukemia    Other Father         Myelodysplastic syndrome, which converted to leukemia    Migraines Sister     No Known Problems Brother     Ovarian Cancer Maternal Aunt     No Known Problems Maternal Grandmother     No Known Problems Maternal Grandfather     Breast Cancer Paternal Grandmother     Heart Attack Paternal Grandfather        Allergies:  Prozac [fluoxetine], Nsaids, Pcn [penicillins], Bactrim [sulfamethoxazole-trimethoprim], and Codeine    Home Medications:  Prior to Admission medications    Medication Sig Start Date End Date Taking?  Authorizing Provider   busPIRone (BUSPAR) 10 MG tablet Take 1 tablet by mouth 3 times daily 6/14/21 12/11/21  Joanne Patton MD   sertraline (ZOLOFT) 50 MG tablet Take 1 tablet by mouth daily 5/27/21   Joanne Patton MD   Artificial Tear Solution (SOOTHE XP OP) Apply 1 drop to eye 4 times daily    Historical Provider, MD   azelastine (OPTIVAR) 0.05 % ophthalmic solution 1 drop 2 times daily    Historical Provider, MD   atorvastatin (LIPITOR) 10 MG tablet Take 1 tablet by mouth daily 4/19/21   Saintclair Kung, MD   mometasone-formoterol [facility]) 100-5 MCG/ACT inhaler Inhale 2 puffs into the lungs 2 times daily 1/15/21   Clay Crooks MD   montelukast (SINGULAIR) 10 MG tablet Take 1 tablet by mouth nightly 10/12/20   Clay Crooks MD   albuterol sulfate HFA (VENTOLIN HFA) 108 (90 Base) MCG/ACT inhaler Inhale 2 puffs into the lungs every 6 hours as needed for Wheezing or Shortness of Breath 10/12/20   Clay Crooks MD   albuterol (PROVENTIL) (2.5 MG/3ML) 0.083% nebulizer solution Take 3 mLs by nebulization every 6 hours as needed for Wheezing 10/12/20   Clay Crooks MD   topiramate (TOPAMAX) 100 MG tablet Take 1 tablet by mouth 2 times daily 9/24/20   Ciro Feldman MD   SUMAtriptan (IMITREX) 100 MG tablet Take one at HA onset . May repeat in 1 hour . No more 2 per day 4 days out of the week 9/24/20   Ciro Feldman MD   denosumab (PROLIA) 60 MG/ML SOSY SC injection Inject 1 mL into the skin every 6 months 9/10/20   Saintclair Kung, MD   acetaminophen (TYLENOL) 325 MG tablet Take 2 tablets by mouth every 6 hours as needed for Pain 5/29/20   Shanti Ferris,    CALCIUM CITRATE PO Take 750 mg by mouth 2 times daily    Historical Provider, MD   Multiple Vitamins-Minerals (MULTIVITAMIN ADULTS 50+ PO) Take 1 tablet by mouth daily    Historical Provider, MD   KRILL OIL PO Take by mouth daily    Historical Provider, MD   sodium chloride (VINEET 128) 2 % ophthalmic solution Place 1 drop into both eyes as needed    Historical Provider, MD   vitamin D (CHOLECALCIFEROL) 1000 UNIT TABS tablet Take 1,000 Units by mouth daily    Historical Provider, MD   FIBER COMPLETE PO Take by mouth daily     Historical Provider, MD       REVIEW OF SYSTEMS    (2-9 systems for level 4, 10 or more for level 5)      Review of Systems    Review of Systems   Constitutional: Negative for chills and fever. Cardiovascular: Negative for chest pain and palpitations. Gastrointestinal: Negative for abdominal pain, nausea and vomiting. Genitourinary: Negative for dysuria. Musculoskeletal: Negative for gait problem. Skin: Positive for laceration    PHYSICAL EXAM   (up to 7 for level 4, 8 or more for level 5)      INITIAL VITALS:   /77   Pulse 65   Temp 98.3 °F (36.8 °C) (Tympanic)   Resp 18   Ht 5' 6.5\" (1.689 m)   Wt 240 lb (108.9 kg)   LMP  (LMP Unknown)   SpO2 95%   BMI 38.16 kg/m²     Physical Exam   Gen. Appearance: patient appears well, nondistressed. Head: head atraumatic, normocephalic. Cardiovascular: Regular rate and rhythm no murmurs  Skin: Superficial laceration not extending through all layers of the skin on the small finger of the left hand      DIFFERENTIAL  DIAGNOSIS     PLAN (LABS / IMAGING / EKG):  No orders of the defined types were placed in this encounter. MEDICATIONS ORDERED:  Orders Placed This Encounter   Medications    Tetanus-Diphth-Acell Pertussis (BOOSTRIX) injection 0.5 mL           DIAGNOSTIC RESULTS / EMERGENCY DEPARTMENT COURSE / MDM     LABS:  No results found for this visit on 07/31/21. RADIOLOGY:  None    EKG  None    All EKG's are interpreted by the Emergency Department Physician who either signs or Co-signs this chart in the absence of a cardiologist.    [address] [place] [address] MDM:  62 y.o. female who presents with superficial laceration to the small finger left hand. Steri-Strips applied. No indication for sutures at this time               PROCEDURES:  None    CONSULTS:  None    CRITICAL CARE:  None    FINAL IMPRESSION      1. Laceration of left little finger without foreign body without damage to nail, initial encounter          DISPOSITION / PLAN     DISPOSITION Decision To Discharge 07/31/2021 04:57:22 PM      PATIENT REFERRED TO:  No follow-up provider specified.     DISCHARGE MEDICATIONS:  Discharge Medication List as of 7/31/2021  4:58 PM          Shiela Polk DO  Emergency Medicine

## 2021-07-31 NOTE — ED PROVIDER NOTES
[facility]  Emergency Department  Faculty Attestation     I performed a history and physical examination of the patient and discussed management with the resident. I reviewed the residents note and agree with the documented findings and plan of care. Any areas of disagreement are noted on the chart. I was personally present for the key portions of any procedures. I have documented in the chart those procedures where I was not present during the key portions. I have reviewed the emergency nurses triage note. I agree with the chief complaint, past medical history, past surgical history, allergies, medications, social and family history as documented unless otherwise noted below. For Physician Assistant/ Nurse Practitioner cases/documentation I have personally evaluated this patient and have completed at least one if not all key elements of the E/M (history, physical exam, and MDM). Additional findings are as noted. Primary Care Physician:  Saintclair Kung, MD    Screenings:  [unfilled]    CHIEF COMPLAINT       Chief Complaint   Patient presents with    Laceration     left pinky laceration. was cutting fabric for a quilt when she slipped and cut her finger. tetanus up to date       RECENT VITALS:   Temp: 98.3 °F (36.8 °C),  Pulse: 65, Resp: 18, BP: 123/77    LABS:  Labs Reviewed - No data to display    Radiology  No orders to display           Attending Physician Additional  Notes    Patient cut the tip of her finger. There is no active bleeding and no foreign body. The wound is quite superficial.  Plan is update tetanus and Steri-Strip. Jovita Asher.  Kaye Dukes MD, [facility]  Attending Emergency  Physician               Felisha Rosa MD  07/31/21 3003

## 2021-07-31 NOTE — ED NOTES
Pt arrived to ED with c/o of laceration to left pinky finger. Pt states she was cutting fabric earlier today and cut her finger. Pt is alert and oriented x4. Pt appears to not be in any pain.       Dutch Mane RN  07/31/21 0791

## 2021-08-05 ENCOUNTER — OFFICE VISIT (OUTPATIENT)
Dept: FAMILY MEDICINE CLINIC | Age: 61
End: 2021-08-05
Payer: MEDICARE

## 2021-08-05 VITALS
OXYGEN SATURATION: 100 % | HEART RATE: 82 BPM | DIASTOLIC BLOOD PRESSURE: 65 MMHG | WEIGHT: 247 LBS | HEIGHT: 67 IN | SYSTOLIC BLOOD PRESSURE: 112 MMHG | TEMPERATURE: 97.6 F | BODY MASS INDEX: 38.77 KG/M2

## 2021-08-05 DIAGNOSIS — J45.20 MILD INTERMITTENT ASTHMA WITHOUT COMPLICATION: ICD-10-CM

## 2021-08-05 DIAGNOSIS — Z99.89 OSA ON CPAP: ICD-10-CM

## 2021-08-05 DIAGNOSIS — G47.33 OSA ON CPAP: ICD-10-CM

## 2021-08-05 DIAGNOSIS — F41.9 ANXIETY: Primary | ICD-10-CM

## 2021-08-05 PROCEDURE — 3017F COLORECTAL CA SCREEN DOC REV: CPT | Performed by: FAMILY MEDICINE

## 2021-08-05 PROCEDURE — 1036F TOBACCO NON-USER: CPT | Performed by: FAMILY MEDICINE

## 2021-08-05 PROCEDURE — 99214 OFFICE O/P EST MOD 30 MIN: CPT | Performed by: FAMILY MEDICINE

## 2021-08-05 PROCEDURE — G8417 CALC BMI ABV UP PARAM F/U: HCPCS | Performed by: FAMILY MEDICINE

## 2021-08-05 PROCEDURE — G8427 DOCREV CUR MEDS BY ELIG CLIN: HCPCS | Performed by: FAMILY MEDICINE

## 2021-08-05 RX ORDER — HYDROXYZINE PAMOATE 25 MG/1
25 CAPSULE ORAL 3 TIMES DAILY PRN
Qty: 30 CAPSULE | Refills: 1 | Status: SHIPPED | OUTPATIENT
Start: 2021-08-05 | End: 2021-08-19

## 2021-08-05 NOTE — PROGRESS NOTES
[address] PRIMARY CARE  [address] [place] [id number]  Dept: [phone]  Dept Fax: [phone]    Kaleb Duran is a 62 y.o. female who is a Established patient, who presents today for her medical conditions/complaints as noted below:  Chief Complaint   Patient presents with    Hypertension     ER visit for a cut does not need a ER FOLLOW upm    Anxiety    Discuss Labs         HPI:     Patient is a 52-year-old female with past medical history significant for asthma, anxiety, hypertension, KEVIN, obesity status post gastric bypass who presents today for recheck of her anxiety. The patient has been having increasing anxiety due to the pandemic as well as the new strain of the pandemic that is circulating. She has significant difficulty when it comes to going anywhere outside of her house. She has been counseling in the past and is taking her medications. She states that her medication she would likely have even worse anxiety.       Hemoglobin A1C (%)   Date Value   01/15/2019 5.0   01/04/2018 5.1   09/06/2017 5.3             ( goal A1Cis < 7)   No results found for: LABMICR  LDL Cholesterol (mg/dL)   Date Value   07/12/2021 114   12/14/2020 131 (H)   01/15/2019 129       (goal LDL is <100)   AST (U/L)   Date Value   07/12/2021 14     ALT (U/L)   Date Value   07/12/2021 15     BUN (mg/dL)   Date Value   07/12/2021 14     BP Readings from Last 3 Encounters:   08/05/21 112/65   07/31/21 123/77   07/16/21 122/69          (goal 120/80)    Past Medical History:   Diagnosis Date    Acromioclavicular joint arthritis 4/18/2016    Acute gastroenteritis 10/11/2017    Acute gastroenteritis 10/11/2017    Anemia 12/11/2018    Arthritis     Asthma 1980    On Inhaler    Bursitis     left shoulder    Chronic right shoulder pain 7/24/2018    Closed fracture of left tibial plateau 3/52/8560    Closed fracture of left tibial plateau 7/35/3180    Closed fracture of proximal end of left tibia 11/22/2018    Closed fracture of proximal end of left tibia 11/22/2018    Closed fracture of shaft of left tibia 11/22/2018    Closed fracture of shaft of left tibia 11/22/2018    Depression     Dry eyes     Essential hypertension 8/21/2017    Former smoker     GERD (gastroesophageal reflux disease)     not since Shelton-en-Y and weight loss    Head ache     on Topiramate    Hearing loss     mild    Hemorrhoids     Hiatal hernia     History of bladder infections     History of DVT (deep vein thrombosis) 2010    started in left leg, went to lungs    History of gastric bypass 12/6/2018    History of gastritis     History of morbid obesity     had Shelton-en y    History of pulmonary embolism 2010    from foot  trauma, was treated for six months with anticoagulation. She has no inferior vena cava filter.  Hyperlipidemia     not since weight loss    Hypertension 2015    Not anymore after Bariatric Surgery, no medication    Knee pain 3/6/2015    Knee pain 3/6/2015    Mild intermittent asthma     Morbid obesity ([address] 8/18/2015    Obesity (BMI 30-39. 9) 7/1/2016    KEVIN on CPAP 2015    at night    Osteoarthritis     Osteopenia     S/P gastric bypass 12-29-15    Dr. Jan Us, [employer], hosp wt = 280lb, initial wt = 328lb    S/p tibial fracture 11/21/2018    Status post gastric bypass for obesity     Tibial plateau fracture, left, closed, initial encounter 12/5/2018    Tibial plateau fracture, left, closed, initial encounter 12/5/2018    Vitamin D deficiency 2015    Wears dentures     Wears glasses     Weight loss of 160 lbs since surgery  8/21/2017      Past Surgical History:   Procedure Laterality Date    ABDOMINOPLASTY N/A 8/16/2019    ABDOMINOPLASTY performed by Aleshia Crum MD at [address] / ARTHROTOMY KNEE Left 12/5/2018    KNEE ARTHROSCOPY LEFT performed by Santo Collier MD at [address] [place] [address] Road  8/1986,  9/1987   [address] [place] [address]  CHOLECYSTECTOMY  1987    CLOSED MANIPULATION SHOULDER Right 12/5/2018    SHOULDER MANIPULATION WITH INJECTION performed by Gee Shen MD at [facility]  6/17/16    EXCISION / BIOPSY SKIN LESION OF HEAD / NECK N/A 4/3/2017     EXCISION POSTERIOR NECK CYST performed by Brody Guerra IV, DO at [facility] [place] [address] Left 11/22/2018    left tibia plateau external fixator application    NECK SURGERY      cyst removed back of neck    ORIF PROXIMAL TIBIAL PLATEAU FRACTURE Left 12/5/2018    TIBIAL PLATEAU OPEN REDUCTION INTERNAL FIXATION LEFT -  SYNTHES     C-ARM performed by Gee Shen MD at [facility]  10/14/2016    excision back lipoma with drain placement    [place] OFFICE/OUTPT VISIT,PROCEDURE ONLY Right 10/8/2018    RIGHT SHOULDER  MAGDY PROCEDURE performed by Gee Shen MD at [address] OFFICE/OUTPT [facility] Left 11/22/2018    LEFT TIBIA PLATEAU EXTERNAL FIXATOR APPLICATION performed by Francisco De Luna MD at [address] [facility]  12/29/15    liver bx, EGD    SHOULDER SURGERY Left 08/19/2016    Magdy procedure, bone spurs 8/19/2016    SHOULDER SURGERY Right 10/08/2018    magdy    TONSILLECTOMY  1979    UPPER GASTROINTESTINAL ENDOSCOPY  08/2015    found hiatal hernia       Family History   Problem Relation Age of Onset    Asthma Mother     Depression Mother     Anxiety Disorder Mother     Cancer Father         leukemia    Other Father         Myelodysplastic syndrome, which converted to leukemia    Migraines Sister     No Known Problems Brother     Ovarian Cancer Maternal Aunt     No Known Problems Maternal Grandmother     No Known Problems Maternal Grandfather     Breast Cancer Paternal Grandmother     Heart Attack Paternal Grandfather        Social History     Tobacco Use    Smoking status: Former Smoker     Packs/day: 0.50     Years: 9.00     Pack years: 4.50     Types: Cigarettes Start date: 1978     Quit date: 1987     Years since quittin.6    Smokeless tobacco: Never Used   Substance Use Topics    Alcohol use: No     Alcohol/week: 0.0 standard drinks      Current Outpatient Medications   Medication Sig Dispense Refill    hydrOXYzine (VISTARIL) 25 MG capsule Take 1 capsule by mouth 3 times daily as needed for Anxiety 30 capsule 1    busPIRone (BUSPAR) 10 MG tablet Take 1 tablet by mouth 3 times daily 270 tablet 1    sertraline (ZOLOFT) 50 MG tablet Take 1 tablet by mouth daily 30 tablet 2    Artificial Tear Solution (SOOTHE XP OP) Apply 1 drop to eye 4 times daily      azelastine (OPTIVAR) 0.05 % ophthalmic solution 1 drop 2 times daily      atorvastatin (LIPITOR) 10 MG tablet Take 1 tablet by mouth daily 90 tablet 1    mometasone-formoterol (DULERA) 100-5 MCG/ACT inhaler Inhale 2 puffs into the lungs 2 times daily 1 Inhaler 7    montelukast (SINGULAIR) 10 MG tablet Take 1 tablet by mouth nightly 90 tablet 3    albuterol sulfate HFA (VENTOLIN HFA) 108 (90 Base) MCG/ACT inhaler Inhale 2 puffs into the lungs every 6 hours as needed for Wheezing or Shortness of Breath 3 Inhaler 2    albuterol (PROVENTIL) (2.5 MG/3ML) 0.083% nebulizer solution Take 3 mLs by nebulization every 6 hours as needed for Wheezing 120 each 11    topiramate (TOPAMAX) 100 MG tablet Take 1 tablet by mouth 2 times daily 60 tablet 11    SUMAtriptan (IMITREX) 100 MG tablet Take one at HA onset . May repeat in 1 hour .  No more 2 per day 4 days out of the week 18 tablet 5    denosumab (PROLIA) 60 MG/ML SOSY SC injection Inject 1 mL into the skin every 6 months 1 mL 1    acetaminophen (TYLENOL) 325 MG tablet Take 2 tablets by mouth every 6 hours as needed for Pain 30 tablet 0    CALCIUM CITRATE PO Take 750 mg by mouth 2 times daily      Multiple Vitamins-Minerals (MULTIVITAMIN ADULTS 50+ PO) Take 1 tablet by mouth daily      KRILL OIL PO Take by mouth daily      sodium chloride (VINEET 128) 2 % ophthalmic solution Place 1 drop into both eyes as needed      vitamin D (CHOLECALCIFEROL) 1000 UNIT TABS tablet Take 1,000 Units by mouth daily      FIBER COMPLETE PO Take by mouth daily        No current facility-administered medications for this visit. Facility-Administered Medications Ordered in Other Visits   Medication Dose Route Frequency Provider Last Rate Last Admin    lactated ringers infusion 1,000 mL  1,000 mL Intravenous Continuous Mikhail Pereyra MD   Stopped at 08/19/16 1249    HYDROmorphone (DILAUDID) injection 0.25 mg  0.25 mg Intravenous Q5 Min PRN Tonie Alexandre MD         Allergies   Allergen Reactions    Prozac [Fluoxetine] Other (See Comments)     Caused nightmares    Nsaids Other (See Comments)     Bariatric Surgeon told me not to take NSAIDS for good    Pcn [Penicillins] Other (See Comments)     Sores in mouth    Bactrim [Sulfamethoxazole-Trimethoprim] Itching and Rash    Codeine Itching and Rash       Health Maintenance   Topic Date Due    Cervical cancer screen  07/14/2021    Flu vaccine (1) 09/01/2021    Lipid screen  07/12/2022    Breast cancer screen  01/13/2023    Colon cancer screen colonoscopy  04/03/2025    Pneumococcal 0-64 years Vaccine (2 of 2 - PPSV23) 01/10/2026    DTaP/Tdap/Td vaccine (3 - Td or Tdap) 07/31/2031    Shingles Vaccine  Completed    COVID-19 Vaccine  Completed    Hepatitis C screen  Completed    HIV screen  Completed    Hepatitis A vaccine  Aged Out    Hepatitis B vaccine  Aged Out    Hib vaccine  Aged Out    Meningococcal (ACWY) vaccine  Aged Out       Subjective:     Review of Systems   Constitutional: Negative. HENT: Negative. Eyes: Negative. Respiratory: Negative. Cardiovascular: Negative. Gastrointestinal: Negative. Genitourinary: Negative. Musculoskeletal: Negative. Skin: Negative. Allergic/Immunologic: Negative for environmental allergies, food allergies and immunocompromised state.    Neurological: Negative. Psychiatric/Behavioral: Positive for agitation, dysphoric mood and self-injury (If the patient is an uncomfortable physician or situation such as being in public she will scratch her arms if she does not have a fidget toy). Negative for behavioral problems, confusion, decreased concentration, hallucinations, sleep disturbance and suicidal ideas. The patient is nervous/anxious and is hyperactive. Objective:     Physical Exam  Vitals and nursing note reviewed. Constitutional:       General: She is awake. She is not in acute distress. Appearance: Normal appearance. She is obese. She is not ill-appearing, toxic-appearing or diaphoretic. HENT:      Head: Normocephalic and atraumatic. Right Ear: External ear normal.      Left Ear: External ear normal.      Nose: Nose normal.      Mouth/Throat:      Mouth: Mucous membranes are moist.   Eyes:      Extraocular Movements: Extraocular movements intact. Conjunctiva/sclera: Conjunctivae normal.   Cardiovascular:      Rate and Rhythm: Normal rate and regular rhythm. Pulses: Normal pulses. Heart sounds: Normal heart sounds. No murmur heard. No friction rub. No gallop. Pulmonary:      Effort: Pulmonary effort is normal. No respiratory distress. Breath sounds: Normal breath sounds. No stridor. No wheezing, rhonchi or rales. Abdominal:      General: Bowel sounds are normal.      Palpations: Abdomen is soft. Musculoskeletal:         General: Normal range of motion. Cervical back: Normal range of motion. Skin:     Comments: Faded excoriations on the patient's arms from previous scratching   Neurological:      General: No focal deficit present. Mental Status: She is alert and oriented to person, place, and time. Mental status is at baseline. Psychiatric:         Attention and Perception: Attention normal. She is attentive. Mood and Affect: Affect normal. Mood is anxious.          Speech: Speech normal. Behavior: Behavior is agitated. Behavior is not slowed, aggressive, withdrawn, hyperactive or combative. Behavior is cooperative. Thought Content: Thought content normal.         Judgment: Judgment normal.       /65   Pulse 82   Temp 97.6 °F (36.4 °C)   Ht 5' 6.5\" (1.689 m)   Wt 247 lb (112 kg)   LMP  (LMP Unknown)   SpO2 100%   BMI 39.27 kg/m²     Assessment:       Diagnosis Orders   1. Anxiety  hydrOXYzine (VISTARIL) 25 MG capsule   2. Mild intermittent asthma without complication     3. KEVIN on CPAP 12 cm h20               Plan:    AnxietyI would like the patient to take Vistaril when she is having a panic attacks and moments where she is having more anxiety. Continue on Zoloft, BuSpar. Encourage patient to consider counseling. Advised her the importance of continuing with interactions outside of the home and safe places. Patient does have her vaccination we talked through getting a booster when it is available. Asthmacontinue on albuterol as needed, Dulera daily. No current concerns of asthma exacerbation. KEVINcontinue on CPAP machine. Get retested if any concerns arise. Return in about 6 months (around 2/5/2022) for anxiety, depression, 30 minutes virtual visits. No orders of the defined types were placed in this encounter. Orders Placed This Encounter   Medications    hydrOXYzine (VISTARIL) 25 MG capsule     Sig: Take 1 capsule by mouth 3 times daily as needed for Anxiety     Dispense:  30 capsule     Refill:  1       Patient given educational materials - see patient instructions. Discussed use, benefit, and side effects of prescribed medications. All patientquestions answered. Pt voiced understanding. Reviewed health maintenance. Instructedto continue current medications, diet and exercise. Patient agreed with treatmentplan. Follow up as directed.      Electronically signed by Viktoria Grullon MD on 8/13/2021 at 8:43 AM

## 2021-08-13 ASSESSMENT — ENCOUNTER SYMPTOMS
GASTROINTESTINAL NEGATIVE: 1
RESPIRATORY NEGATIVE: 1
EYES NEGATIVE: 1

## 2021-08-19 ENCOUNTER — TELEMEDICINE (OUTPATIENT)
Dept: PSYCHOLOGY | Age: 61
End: 2021-08-19
Payer: MEDICARE

## 2021-08-19 DIAGNOSIS — F43.22 ADJUSTMENT DISORDER WITH ANXIETY: Primary | ICD-10-CM

## 2021-08-19 PROCEDURE — 90791 PSYCH DIAGNOSTIC EVALUATION: CPT | Performed by: PSYCHOLOGIST

## 2021-08-19 PROCEDURE — 1036F TOBACCO NON-USER: CPT | Performed by: PSYCHOLOGIST

## 2021-08-19 NOTE — PROGRESS NOTES
Jane Hines M.A. Psychology Doctoral Trainee    Supervised by Merced Mcmahon,  Ph.D.   Licensed Clinical Psychologist [phone])    Visit Date: 8/19/2021   Time of appointment:  10:00-10:34   Time spent with Patient: 34 minutes. This is patient's first appointment. Reason for Consult:  Anxiety     Referring Provider/PCP:    No ref. provider found  Frederick Multani MD      Pt provided informed consent for the behavioral health program. Discussed with patient model of service to include the limits of confidentiality (i.e. abuse reporting, suicide intervention, etc.) and short-term intervention focused approach. Pt indicated understanding. Pt consented to telehealth visit via [employer] due to COVID-19. Pt indicated that they are located in their home in private, in [place] with no others in the room. Clinician was located in [place], [place] at the time of the visit. PRESENTING PROBLEM AND HISTORY  Carol Virgen is a 62 y.o. female who presents for new evaluation and treatment of  anxiety. She has the following symptoms: feeling nervous, anxious, or on edge, racing worry thoughts, excessive anxiety and worry about specific stressors, excessive worry about a number of events or activities , inability to stop or control worry and avoidance of situations that provoke fear and anxiety. Onset of symptoms was approximately several months ago. Symptoms have been gradually worsening since that time. She denies current suicidal and homicidal ideation. Family history significant for no psychiatric illness. Risk factors: negative life event C19 pandemic. There has been no previous treatment. .  She complains of the following medication side effects: none. MENTAL STATUS EXAM  Mood was within normal limits with congruent affect. Suicidal ideation was denied. Homicidal ideation was denied. Hygiene was fair . Dress was appropriate.    Behavior was Within Normal Limits with No observation or self-report of difficulties ambulating. Attitude was Cooperative and [place]. Eye-contact was good. Speech: rate - WNL, rhythm -  WNL, volume - WNL  Verbalizations were goal directed and coherent. Thought processes were intact and goal-oriented without evidence of delusions, hallucinations, obsessions, or bishop; with little cognitive distortions. Associations were characterized by intact cognitive processes. Pt was oriented to person, place, time, and general circumstances;  recent:  good and remote:  good. Insight and judgment were estimated to be fair, AEB, a fair  understanding of cyclical maladaptive patterns, and the ability to use insight to inform behavior change.        CURRENT MEDICATIONS    Current Outpatient Medications:     sertraline (ZOLOFT) 50 MG tablet, Take 1 tablet by mouth once daily, Disp: 90 tablet, Rfl: 1    busPIRone (BUSPAR) 10 MG tablet, Take 1 tablet by mouth 3 times daily, Disp: 270 tablet, Rfl: 1    Artificial Tear Solution (SOOTHE XP OP), Apply 1 drop to eye 4 times daily, Disp: , Rfl:     azelastine (OPTIVAR) 0.05 % ophthalmic solution, 1 drop 2 times daily, Disp: , Rfl:     atorvastatin (LIPITOR) 10 MG tablet, Take 1 tablet by mouth daily, Disp: 90 tablet, Rfl: 1    mometasone-formoterol (DULERA) 100-5 MCG/ACT inhaler, Inhale 2 puffs into the lungs 2 times daily, Disp: 1 Inhaler, Rfl: 7    montelukast (SINGULAIR) 10 MG tablet, Take 1 tablet by mouth nightly, Disp: 90 tablet, Rfl: 3    albuterol sulfate HFA (VENTOLIN HFA) 108 (90 Base) MCG/ACT inhaler, Inhale 2 puffs into the lungs every 6 hours as needed for Wheezing or Shortness of Breath, Disp: 3 Inhaler, Rfl: 2    albuterol (PROVENTIL) (2.5 MG/3ML) 0.083% nebulizer solution, Take 3 mLs by nebulization every 6 hours as needed for Wheezing, Disp: 120 each, Rfl: 11    topiramate (TOPAMAX) 100 MG tablet, Take 1 tablet by mouth 2 times daily, Disp: 60 tablet, Rfl: 11    SUMAtriptan (IMITREX) 100 MG tablet, Take one at HA onset . May repeat in 1 hour . No more 2 per day 4 days out of the week, Disp: 18 tablet, Rfl: 5    denosumab (PROLIA) 60 MG/ML SOSY SC injection, Inject 1 mL into the skin every 6 months, Disp: 1 mL, Rfl: 1    acetaminophen (TYLENOL) 325 MG tablet, Take 2 tablets by mouth every 6 hours as needed for Pain, Disp: 30 tablet, Rfl: 0    CALCIUM CITRATE PO, Take 750 mg by mouth 2 times daily, Disp: , Rfl:     Multiple Vitamins-Minerals (MULTIVITAMIN ADULTS 50+ PO), Take 1 tablet by mouth daily, Disp: , Rfl:     KRILL OIL PO, Take by mouth daily, Disp: , Rfl:     sodium chloride (VINEET 128) 2 % ophthalmic solution, Place 1 drop into both eyes as needed, Disp: , Rfl:     vitamin D (CHOLECALCIFEROL) 1000 UNIT TABS tablet, Take 1,000 Units by mouth daily, Disp: , Rfl:     FIBER COMPLETE PO, Take by mouth daily , Disp: , Rfl:      FAMILY MEDICAL/MH HISTORY   Her family history includes Anxiety Disorder in her mother; Asthma in her mother; Breast Cancer in her paternal grandmother; Cancer in her father; Depression in her mother; Heart Attack in her paternal grandfather; Migraines in her sister; No Known Problems in her brother, maternal grandfather, and maternal grandmother; Other in her father; Ovarian Cancer in her maternal aunt. PATIENT MENTAL HEALTH HISTORY  Layla Austin describes a period of increased anxiety beginning around March 2020 and lasting to the present day. Specifically, she is afraid to leave the house and go places because she may contract [address]. Both Layla Austin and her  have pre-existing conditions that place them at high risk for morbidity and mortality from [address]. Shaista's biggest worry is that she will contract COVID19 and give it to her , resulting in his death. She did not report any other mental health history. PSYCHOSOCIAL HISTORY  Current living situation: Layla Austin currently lives with her  in a single family home.     Work/Education: Layla Austin did reasonably well in school and received a high school diploma. 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 Support system: Selwyn Blank has her , a sister, and a daughter who all live in [place]. She sees and speaks to them on a regular basis. She is happy with this support system and does not feel as if she needs more. Rastafarian/Spirituality: Selwyn Blank identifies as a Jewish. DRUG AND ALCOHOL CURRENT USE/HISTORY  TOBACCO:  She reports that she quit smoking about 34 years ago. Her smoking use included cigarettes. She started smoking about 42 years ago. She has a 4.50 pack-year smoking history. She has never used smokeless tobacco.  ALCOHOL:  She reports no history of alcohol use. OTHER SUBSTANCES: She reports no history of drug use. ASSESSMENT  Tarun Ramos presented to the appointment today for evaluation and treatment of symptoms of anxiety. She is currently deemed no risk to herself or others and meets criteria for adjustment disorder with anxiety. Barneys anxiety symptoms are not well controlled at this time. She will also benefit from brief and solution-focused consultation to address cognitive and behavioral interventions for anxiety symptoms. Selwyn Blank was in agreement with recommendations. PHQ Scores 8/6/2021 3/11/2021 3/2/2021 2/8/2021 2/4/2021 1/18/2021 12/21/2020   PHQ2 Score 1 2 0 0 1 1 6   PHQ9 Score 1 2 0 0 6 1 19     Interpretation of Total Score Depression Severity: 1-4 = Minimal depression, 5-9 = Mild depression, 10-14 = Moderate depression, 15-19 = Moderately severe depression, 20-27 = Severe depression    How often pt has had thoughts of death or hurting self (if PHQ positive for depression):       SACHI 7 SCORE 5/27/2021 2/4/2021   SACHI-7 Total Score 20 -   SACHI-7 Total Score - 2     Interpretation of SACHI-7 score: 5-9 = mild anxiety, 10-14 = moderate anxiety, 15+ = severe anxiety.  Recommend referral to behavioral health for scores 10 or greater. DIAGNOSIS  Debbie Zamudio was seen today for anxiety. Diagnoses and all orders for this visit:    Adjustment disorder with anxiety          INTERVENTION  Discussed prevalence of  anxiety for general population, Trained in strategies for increasing balanced thinking, Discussed potential treatments for  anxiety, Provided education, Established rapport, Conducted functional assessment and Supportive techniques      PLAN  Next session I scheduled for Tuesday, August 31st at 11:00 a.m. Debbie Zamudio will complete a tracking assignment to improve awareness of anxiety symptoms and help identify antecedents to Barneys anxiety. Clinician will prepare a CBT session with ACT elements (acceptance/fusion/change) as needed. INTERACTIVE COMPLEXITY  Is interactive complexity present?   No  Reason:  N/A  Additional Supporting Information:  N/A       Electronically signed by Prabhu Negron on 8/25/2021 at 11:42 AM

## 2021-08-24 ENCOUNTER — HOSPITAL ENCOUNTER (OUTPATIENT)
Dept: WOMENS IMAGING | Age: 61
Discharge: HOME | End: 2021-08-26
Payer: MEDICARE

## 2021-08-24 DIAGNOSIS — M81.0 AGE-RELATED OSTEOPOROSIS WITHOUT CURRENT PATHOLOGICAL FRACTURE: ICD-10-CM

## 2021-08-24 PROCEDURE — 77080 DXA BONE DENSITY AXIAL: CPT

## 2021-08-31 ENCOUNTER — TELEMEDICINE (OUTPATIENT)
Dept: PSYCHOLOGY | Age: 61
End: 2021-08-31
Payer: MEDICARE

## 2021-08-31 DIAGNOSIS — F43.22 ADJUSTMENT DISORDER WITH ANXIETY: Primary | ICD-10-CM

## 2021-08-31 PROCEDURE — 1036F TOBACCO NON-USER: CPT | Performed by: PSYCHOLOGIST

## 2021-08-31 PROCEDURE — 90834 PSYTX W PT 45 MINUTES: CPT | Performed by: PSYCHOLOGIST

## 2021-08-31 NOTE — PROGRESS NOTES
ADULT BEHAVIORAL HEALTH FOLLOW UP  Daniel ALFRED Psychology Doctoral Trainee    Supervising Clinical Psychologists:  Carleen Herron, Ph.D. Clementina Saha,  Ph.D. Lluvia [phone]        Visit Date: 8/31/2021   Time of appointment:  10:03 AM - 10:43 AM   Time spent with Patient: 40 minutes. This is patient's second appointment. Reason for Consult:  Anxiety     Referring Provider/PCP:    No ref. provider found  Corazon Márquez MD      Pt provided informed consent for the behavioral health program. Discussed with patient model of service to include the limits of confidentiality (i.e. abuse reporting, suicide intervention, etc.) and short-term intervention focused approach. Pt indicated understanding. Pt provided verbal consent to engage in telehealth psychotherapy. This visit was completed virtually using [employer] due to contact restrictions related to the COVID-19 pandemic. Session was held in a private place (pt's home). Because of the virtual delivery method, certain behavioral observations were not assessed during this appointment. Pt provided verbal consent for the following emergency contact: pt's  Pb Franklin at [phone]. [facility]/Clinician location: [facility] in [place], [place]. Inge Mcdaniel is a 62 y.o. female who presents for follow up of depression and anxiety symptoms, including low mood, sleep disturbance, fatigue and low energy, difficulty concentrating, persistent worry about specific stressors, and feelings of anxiousness and nervousness. Session activities included problem solving, pros and cons, supportive techniques, and socratic questioning. .    Pt reports continuing challenges associated with the C19 pandemic and continues to experience heightened fear and anxiety. She accompanied her  to a couple medical appts and one of her own over the past 2 weeks and reported feeling very anxious, but attending nonethelexs.  Pt and clinician collaboratively identified risks, such as leaving the house, being around other people, and seeing family members, and identified when the reward, such as spending time with her grandson, outweighed the risks. Clinician used socratic questioning to highlight riigid thinking and defusion techniques to help client defuse from her anxiety. Previous Recommendations: Sesar Mcdonough monitored fear and anxiety and identified patterns/themes to heightened anxiety. MENTAL STATUS EXAM  Mood was sad with congruent affect. Suicidal ideation was denied. Homicidal ideation was denied. Hygiene was good . Dress was appropriate. Behavior was Within Normal Limits with No observation of difficulties ambulating. Attitude was Cooperative and [place]. Eye-contact was good. Speech: rate - WNL, rhythm - WNL, volume - WNL. Verbalizations were goal directed, coherent and minimal.  Thought processes were intact and goal-oriented without evidence of delusions, hallucinations, obsessions, or bishop; with moderate cognitive distortions. Associations were characterized by intact cognitive processes. Pt was oriented to person, place, time, and general circumstances;  recent:  good and remote:  good. Insight and judgment were estimated to be fair, AEB, a fair understanding of cyclical maladaptive patterns, and the ability to use insight to inform behavior change. ASSESSMENT  Serafin Brush presented to the appointment today for f/u of symptoms of depression and anxiety, including low mood, sleep difficulties, fatigue and low energy, concentration difficulties, worry about specific stressors, and feelings of anxiousness and nervousness. Pt's symptoms began after her son  in 2020, and have slowly improved as she has worked through her grief. She is currently deemed no risk to herself or others. She meets criteria for Adjustment Disorder with Mixed anxiety and depressed mood.   Pt's stated goals for her home. INTERACTIVE COMPLEXITY  Is interactive complexity present?   No  Reason:  N/A  Additional Supporting Information:  N/A

## 2021-09-14 ENCOUNTER — TELEPHONE (OUTPATIENT)
Dept: PSYCHOLOGY | Age: 61
End: 2021-09-14

## 2021-10-06 ENCOUNTER — TELEMEDICINE (OUTPATIENT)
Dept: NEUROLOGY | Age: 61
End: 2021-10-06
Payer: MEDICARE

## 2021-10-06 ENCOUNTER — TELEPHONE (OUTPATIENT)
Dept: PULMONOLOGY | Age: 61
End: 2021-10-06

## 2021-10-06 DIAGNOSIS — Z99.89 OSA ON CPAP: Primary | ICD-10-CM

## 2021-10-06 DIAGNOSIS — G43.009 MIGRAINE WITHOUT AURA AND WITHOUT STATUS MIGRAINOSUS, NOT INTRACTABLE: Primary | ICD-10-CM

## 2021-10-06 DIAGNOSIS — G47.33 OSA ON CPAP: Primary | ICD-10-CM

## 2021-10-06 PROCEDURE — G8427 DOCREV CUR MEDS BY ELIG CLIN: HCPCS | Performed by: PSYCHIATRY & NEUROLOGY

## 2021-10-06 PROCEDURE — 3017F COLORECTAL CA SCREEN DOC REV: CPT | Performed by: PSYCHIATRY & NEUROLOGY

## 2021-10-06 PROCEDURE — 1036F TOBACCO NON-USER: CPT | Performed by: PSYCHIATRY & NEUROLOGY

## 2021-10-06 PROCEDURE — G8484 FLU IMMUNIZE NO ADMIN: HCPCS | Performed by: PSYCHIATRY & NEUROLOGY

## 2021-10-06 PROCEDURE — 99213 OFFICE O/P EST LOW 20 MIN: CPT | Performed by: PSYCHIATRY & NEUROLOGY

## 2021-10-06 PROCEDURE — G8417 CALC BMI ABV UP PARAM F/U: HCPCS | Performed by: PSYCHIATRY & NEUROLOGY

## 2021-10-06 RX ORDER — SUMATRIPTAN 6 MG/.5ML
INJECTION, SOLUTION SUBCUTANEOUS
Qty: 6 EACH | Refills: 5 | Status: SHIPPED | OUTPATIENT
Start: 2021-10-06 | End: 2022-04-19 | Stop reason: SDUPTHER

## 2021-10-06 RX ORDER — TOPIRAMATE 100 MG/1
100 TABLET, FILM COATED ORAL 2 TIMES DAILY
Qty: 60 TABLET | Refills: 11 | Status: SHIPPED | OUTPATIENT
Start: 2021-10-06 | End: 2022-09-26

## 2021-10-06 RX ORDER — SUMATRIPTAN 100 MG/1
TABLET, FILM COATED ORAL
Qty: 18 TABLET | Refills: 5 | Status: SHIPPED | OUTPATIENT
Start: 2021-10-06 | End: 2022-04-19 | Stop reason: SDUPTHER

## 2021-10-06 ASSESSMENT — ENCOUNTER SYMPTOMS
GASTROINTESTINAL NEGATIVE: 1
ALLERGIC/IMMUNOLOGIC NEGATIVE: 1
EYES NEGATIVE: 1
RESPIRATORY NEGATIVE: 1

## 2021-10-06 NOTE — PROGRESS NOTES
 History of morbid obesity     had [place]-en y    History of pulmonary embolism     from foot  trauma, was treated for six months with anticoagulation. She has no inferior vena cava filter.  Hyperlipidemia     not since weight loss    Hypertension     Not anymore after Bariatric Surgery, no medication    Knee pain 3/6/2015    Knee pain 3/6/2015    Mild intermittent asthma     Morbid obesity ([address] 2015    Obesity (BMI 30-39. 9) 2016    KEVIN on CPAP     at night    Osteoarthritis     Osteopenia     S/P gastric bypass 12-29-15    Dr. Jarod Branch, [facility], hosp wt = 280lb, initial wt = 328lb    S/p tibial fracture 2018    Status post gastric bypass for obesity     Tibial plateau fracture, left, closed, initial encounter 2018    Tibial plateau fracture, left, closed, initial encounter 2018    Vitamin D deficiency     Wears dentures     Wears glasses     Weight loss of 160 lbs since surgery  2017       Past Surgical History:   Procedure Laterality Date    ABDOMINOPLASTY N/A 2019    ABDOMINOPLASTY performed by Endy Mcgee MD at [address] ARTHROSCOPY / ARTHROTOMY KNEE Left 2018    KNEE ARTHROSCOPY LEFT performed by Mireya Suarez MD at [address] [facility] [address]  1986,  1987     SECTION      CHOLECYSTECTOMY      CLOSED MANIPULATION SHOULDER Right 2018    SHOULDER MANIPULATION WITH INJECTION performed by Mireya Suarez MD at [facility]  16    EXCISION / BIOPSY SKIN LESION OF HEAD / NECK N/A 4/3/2017     EXCISION POSTERIOR NECK CYST performed by Boby Guerra IV, DO at [facility]  Left 2018    left tibia plateau external fixator application    NECK SURGERY      cyst removed back of neck    ORIF PROXIMAL TIBIAL PLATEAU FRACTURE Left 2018    TIBIAL PLATEAU OPEN REDUCTION INTERNAL FIXATION LEFT -  SYNTHES     C-ARM performed by Mireya Suarez MD at [facility] OR    OTHER SURGICAL HISTORY  10/14/2016    excision back lipoma with drain placement    IA OFFICE/OUTPT VISIT,PROCEDURE ONLY Right 10/8/2018    RIGHT SHOULDER  MAGDY PROCEDURE performed by Danny Wyman MD at [address] OFFICE/OUTPT [facility] Left 2018    LEFT TIBIA PLATEAU EXTERNAL FIXATOR APPLICATION performed by Manuel Reyes MD at [place] [address]  12/29/15    liver bx, EGD    SHOULDER SURGERY Left 2016    Magdy procedure, bone spurs 2016    SHOULDER SURGERY Right 10/08/2018    magdy    TONSILLECTOMY  1979    UPPER GASTROINTESTINAL ENDOSCOPY  2015    found hiatal hernia       Family History   Problem Relation Age of Onset    Asthma Mother     Depression Mother     Anxiety Disorder Mother     Cancer Father         leukemia    Other Father         Myelodysplastic syndrome, which converted to leukemia    Migraines Sister     No Known Problems Brother     Ovarian Cancer Maternal Aunt     No Known Problems Maternal Grandmother     No Known Problems Maternal Grandfather     Breast Cancer Paternal Grandmother     Heart Attack Paternal Grandfather        Social History     Socioeconomic History    Marital status:      Spouse name: Jessie Grade Number of children: 1    Years of education: None    Highest education level: None   Occupational History    Occupation: UNEMPLOYED    Tobacco Use    Smoking status: Former Smoker     Packs/day: 0.50     Years: 9.00     Pack years: 4.50     Types: Cigarettes     Start date: 1978     Quit date: 1987     Years since quittin.7    Smokeless tobacco: Never Used   Vaping Use    Vaping Use: Never used   Substance and Sexual Activity    Alcohol use: No     Alcohol/week: 0.0 standard drinks    Drug use: No    Sexual activity: Yes     Partners: Male     Comment: Has been together since    Other Topics Concern    None   Social History Narrative    None     Social Determinants of (SINGULAIR) 10 MG tablet Take 1 tablet by mouth nightly 90 tablet 3    albuterol sulfate HFA (VENTOLIN HFA) 108 (90 Base) MCG/ACT inhaler Inhale 2 puffs into the lungs every 6 hours as needed for Wheezing or Shortness of Breath 3 Inhaler 2    albuterol (PROVENTIL) (2.5 MG/3ML) 0.083% nebulizer solution Take 3 mLs by nebulization every 6 hours as needed for Wheezing 120 each 11    denosumab (PROLIA) 60 MG/ML SOSY SC injection Inject 1 mL into the skin every 6 months 1 mL 1    acetaminophen (TYLENOL) 325 MG tablet Take 2 tablets by mouth every 6 hours as needed for Pain 30 tablet 0    CALCIUM CITRATE PO Take 750 mg by mouth 2 times daily      Multiple Vitamins-Minerals (MULTIVITAMIN ADULTS 50+ PO) Take 1 tablet by mouth daily      KRILL OIL PO Take by mouth daily      sodium chloride (VINEET 128) 2 % ophthalmic solution Place 1 drop into both eyes as needed      vitamin D (CHOLECALCIFEROL) 1000 UNIT TABS tablet Take 1,000 Units by mouth daily      FIBER COMPLETE PO Take by mouth daily        No current facility-administered medications for this visit. Facility-Administered Medications Ordered in Other Visits   Medication Dose Route Frequency Provider Last Rate Last Admin    lactated ringers infusion 1,000 mL  1,000 mL IntraVENous Continuous Mikhail Monge MD   Stopped at 08/19/16 1249    HYDROmorphone (DILAUDID) injection 0.25 mg  0.25 mg IntraVENous Q5 Min PRN Tamika Gaona MD           Allergies   Allergen Reactions    Prozac [Fluoxetine] Other (See Comments)     Caused nightmares    Nsaids Other (See Comments)     Bariatric Surgeon told me not to take NSAIDS for good    Pcn [Penicillins] Other (See Comments)     Sores in mouth    Bactrim [Sulfamethoxazole-Trimethoprim] Itching and Rash    Codeine Itching and Rash       Review of Systems   Constitutional: Positive for fatigue. HENT: Negative. Eyes: Negative. Respiratory: Negative. Cardiovascular: Negative.     Gastrointestinal: Negative. Endocrine: Negative. Genitourinary: Negative. Musculoskeletal: Negative. Skin: Negative. Allergic/Immunologic: Negative. Neurological: Negative. Hematological: Negative. Psychiatric/Behavioral: Negative. Objective:   Physical Exam  There were no vitals filed for this visit. weight:      Neurological Examination  Constitutional .General exam well groomed   Head/Ears /Nose/Throat: external ear . Normal exam  Neck and thyroid . Normal size. No bruits  Respiratory . Breathsounds clear bilaterally  Cardiovascular: Auscultation of heart with regular rate and rhythm  Musculoskeletal. Muscle bulk and tone normal                                                           Muscle strength 5/5 strength throughout                                                                                No dysmetria or dysdiadokinesis  No tremor   Normal fine motor  Gait normal   Orientation Alert and oriented x 3   Attention and concentration normal  Short term memory normal  Language process and speech normal . No aphasia   Cranial nerve 2 normal acuety and visual fields  Cranial nerve 3, 4 and 6 . Extraocular muscles are intact . Pupils are equal and reactive   Cranial nerve 5 . Normal strength of masseter and temporalis . Intact corneal reflex. Normal facial sensation  Cranial nerve 7 normal exam   Cranial nerve 8. Grossly intact hearing   Cranial nerve 9 and 10. Symmetric palate elevation   Cranial nerve 11 , 5 out of 5 strength   Cranial Nerve 12 midline tongue . No atrophy  Sensation . Normal proprioception . Intact Vibration . Normal pinprick and light touch   Deep Tendon Reflexes normal  Plantar response flexor bilaterally    Assessment:       Diagnosis Orders   1.  Migraine without aura and without status migrainosus, not intractable     She is to continue on topamax to use imitrex tablet as initial abortive to use SQ as back if not effective       Plan:      As above       Carmen Coronado Maribel Bonilla, was evaluated through a synchronous (real-time) audio-video encounter. The patient (or guardian if applicable) is aware that this is a billable service. Verbal consent to proceed has been obtained within the past 12 months. The visit was conducted pursuant to the emergency declaration under the [address] [place] [address] and the [employer] Act. Patient identification was verified, and a caregiver was present when appropriate. The patient was located in a state where the provider was credentialed to provide care. --Nettie Spencer MD on 10/6/2021 at 10:30 AM    An electronic signature was used to authenticate this note.         Nettie Spencer MD

## 2021-10-06 NOTE — TELEPHONE ENCOUNTER
Order for CPAP supplies to be sent to [facility] now called [facility].

## 2021-10-07 DIAGNOSIS — J45.20 MILD INTERMITTENT ASTHMA WITHOUT COMPLICATION: ICD-10-CM

## 2021-10-08 DIAGNOSIS — J45.20 MILD INTERMITTENT ASTHMA WITHOUT COMPLICATION: ICD-10-CM

## 2021-10-08 RX ORDER — MONTELUKAST SODIUM 10 MG/1
10 TABLET ORAL NIGHTLY
Qty: 90 TABLET | Refills: 2 | Status: SHIPPED | OUTPATIENT
Start: 2021-10-08 | End: 2022-06-30

## 2021-10-08 NOTE — TELEPHONE ENCOUNTER
Dr Mena Service, patient is current and is scheduled for follow up on 1/17/22. Please sign for refill if ok. Thank you.

## 2021-10-08 NOTE — TELEPHONE ENCOUNTER
Dr London Gonzáles, patient is current and is scheduled for follow up on 1/17/22. Please sign for refill if ok. Thank you.

## 2021-10-13 RX ORDER — DENOSUMAB 60 MG/ML
60 INJECTION SUBCUTANEOUS
Qty: 1 ML | Refills: 1 | Status: SHIPPED | OUTPATIENT
Start: 2021-10-13 | End: 2022-10-11 | Stop reason: SDUPTHER

## 2021-10-13 NOTE — TELEPHONE ENCOUNTER
Bobbi called to request a new order for patient's Prolia, which she is scheduled for tomorrow, 10/14/2021. Order will need to be faxed to [phone]. Please advise, thank you!

## 2021-10-14 ENCOUNTER — HOSPITAL ENCOUNTER (OUTPATIENT)
Dept: INFUSION THERAPY | Age: 61
Discharge: HOME | End: 2021-10-14
Payer: MEDICARE

## 2021-10-14 DIAGNOSIS — M81.0 AGE-RELATED OSTEOPOROSIS WITHOUT CURRENT PATHOLOGICAL FRACTURE: Primary | ICD-10-CM

## 2021-10-14 PROCEDURE — 96372 THER/PROPH/DIAG INJ SC/IM: CPT

## 2021-10-14 PROCEDURE — 6360000002 HC RX W HCPCS: Performed by: FAMILY MEDICINE

## 2021-10-14 RX ADMIN — DENOSUMAB 60 MG: 60 INJECTION SUBCUTANEOUS at 09:27

## 2021-11-21 DIAGNOSIS — N28.1 RENAL CYST: Primary | ICD-10-CM

## 2021-11-23 DIAGNOSIS — R10.9 FLANK PAIN: Primary | ICD-10-CM

## 2021-11-23 RX ORDER — LIDOCAINE 50 MG/G
1 PATCH TOPICAL DAILY
Qty: 30 PATCH | Refills: 0 | Status: SHIPPED | OUTPATIENT
Start: 2021-11-23 | End: 2022-04-25

## 2022-01-11 NOTE — PROGRESS NOTES
Subjective:      Virtual visit completed via [employer]    Patient ID: Sg Merchant is a 65 y.o. female. HPI    Patient here for follow-up for KEVIN and asthma. Patient was last seen in the office in July 2021 per Dr. Amanda Newton. Patient states that she does not leave her home. She is very nervous about COVID. She is up-to-date with her COVID-vaccine and booster. She states that she has been using her CPAP in the last 4 nights. Previously was not able to use it for 3 months due to her dog eating her mask. Unfortunately was not able to receive a replacement mask. Follows with [facility] for her DME. States that her breathing is doing okay as long as she is not outside. Cold weather causes exertional dyspnea as well as wheezing and a nonproductive cough. She is more concerned about her 's health at this time. Medications:   Singulair 10 mg:   Dulera 100-5 mcg: Albuterol HFA:    PRIOR WORKUP:  PFT:  PFT 3/19/2015: FVC and FEV1 are normal.  FEV1/FVC ratio is normal.  FEF 25-75% is decreased. No bronchodilator response with the exception of the FEF 25-75 which is borderline increased. Lung volumes are normal.  Diffusion capacity is normal.  Airway resistance is increased. Final impression: Normal FVC and FEV1 however FEF 25-75 is decreased with a borderline response to bronchodilators suggestive of small airway disease. Lung volumes are normal.  Normal diffusion capacity. Airway resistance is increased. If obstructive lung disease is a concern then a methylene choline challenge would be recommended. CT Imaging:  CT chest 5/30/2015: Pulmonary arteries are not well evaluated with contrast unable to exclude pulmonary embolism. Mild subsegmental atelectasis within the right lower lobe. Sleep Study:  Sleep study 6/20/2016: Split-night sleep study. With patient had 0 apneas and 40 hypopneas for an AHI of 12.4. AHI was 8.0 during NREM sleep compared to an AHI of 32.1 during REM sleep. Patient had minimum SPO2 of 86%. During the baseline portion of the night patient had 0 movements for a PLM index of 0 with 0 associated arousals. During the titration portion the patient was reported to have titration with CPAP with resolution of her underlying apnea and hypopnea at a setting of 12 cm H2O and a minimum SPO2 of 91%. There was 0 reported leg movements for a PLM index of 0 with no associated arousals.     Laboratory Evaluation:    Immunizations:   Immunization History   Administered Date(s) Administered    COVID-19, [facility], Primary or Immunocompromised, PF, 100mcg/0.5mL 01/23/2021, 01/23/2021, 02/27/2021, 10/28/2021    Influenza Vaccine, unspecified formulation 09/11/2015    Influenza Virus Vaccine 09/11/2015, 09/05/2018, 10/08/2020    Influenza, MDCK Loletta Flatten, with preserv IM (Flucelvax 2 yrs and older) 03/21/2018    Influenza, Loletta Flatten, IM, PF (6 mo and older Fluzone, Flulaval, Fluarix, and 3 yrs and older Afluria) 08/23/2016, 09/05/2017, 09/05/2018, 09/17/2019, 09/23/2021    Influenza, Loletta Flatten, Recombinant, IM PF (Flublok 18 yrs and older) 10/08/2020    Pneumococcal Conjugate 13-valent (Krejuii20) 09/18/2015    Pneumococcal Polysaccharide (Baicortrp10) 11/22/2016    Tdap (Boostrix, Adacel) 09/14/2012, 07/31/2021    Zoster Live (Zostavax) 06/19/2015    Zoster Recombinant (Shingrix) 03/20/2018, 09/05/2018        Sleep Medicine 10/12/2020 2/28/2020 8/26/2019 8/22/2016   Sitting and reading 1 1 2 1   Watching TV 1 0 2 0   Sitting, inactive in a public place (e.g. a theatre or a meeting) 0 0 1 0   As a passenger in a car for an hour without a break 2 1 0 3   Lying down to rest in the afternoon when circumstances permit 2 0 0 0   Sitting and talking to someone 0 0 0 0   Sitting quietly after a lunch without alcohol 1 0 0 0   In a car, while stopped for a few minutes in traffic 0 0 0 0   Total score 7 2 5 4       LMP  (LMP Unknown)     Past Medical History:   Diagnosis Date    Acromioclavicular joint arthritis 4/18/2016    Acute gastroenteritis 10/11/2017    Acute gastroenteritis 10/11/2017    Anemia 12/11/2018    Arthritis     Asthma 1980    On Inhaler    Bursitis     left shoulder    Chronic right shoulder pain 7/24/2018    Closed fracture of left tibial plateau 0/38/2688    Closed fracture of left tibial plateau 7/12/3808    Closed fracture of proximal end of left tibia 11/22/2018    Closed fracture of proximal end of left tibia 11/22/2018    Closed fracture of shaft of left tibia 11/22/2018    Closed fracture of shaft of left tibia 11/22/2018    Depression     Dry eyes     Essential hypertension 8/21/2017    Former smoker     GERD (gastroesophageal reflux disease)     not since Shelton-en-Y and weight loss    Head ache     on Topiramate    Hearing loss     mild    Hemorrhoids     Hiatal hernia     History of bladder infections     History of DVT (deep vein thrombosis) 2010    started in left leg, went to lungs    History of gastric bypass 12/6/2018    History of gastritis     History of morbid obesity     had Shelton-en y    History of pulmonary embolism 2010    from foot  trauma, was treated for six months with anticoagulation. She has no inferior vena cava filter.  Hyperlipidemia     not since weight loss    Hypertension 2015    Not anymore after Bariatric Surgery, no medication    Knee pain 3/6/2015    Knee pain 3/6/2015    Mild intermittent asthma     Morbid obesity ([address] 8/18/2015    Obesity (BMI 30-39. 9) 7/1/2016    KEVIN on CPAP 2015    at night    Osteoarthritis     Osteopenia     S/P gastric bypass 12-29-15    Dr. Tita Izquierdo, [facility] hosp wt = 280lb, initial wt = 328lb    S/p tibial fracture 11/21/2018    Status post gastric bypass for obesity     Tibial plateau fracture, left, closed, initial encounter 12/5/2018    Tibial plateau fracture, left, closed, initial encounter 12/5/2018    Vitamin D deficiency 2015    Wears dentures     Wears glasses     Weight loss of 160 lbs since surgery  2017     Past Surgical History:   Procedure Laterality Date    ABDOMINOPLASTY N/A 2019    ABDOMINOPLASTY performed by Teresa Samson MD at [facility] ARTHROSCOPY / ARTHROTOMY KNEE Left 2018    KNEE ARTHROSCOPY LEFT performed by Harris Limon MD at [address] [facility]  1986,  1987     SECTION      CHOLECYSTECTOMY  1987    CLOSED MANIPULATION SHOULDER Right 2018    SHOULDER MANIPULATION WITH INJECTION performed by Harris Limon MD at [facility]  16    EXCISION / BIOPSY SKIN LESION OF HEAD / NECK N/A 4/3/2017     EXCISION POSTERIOR NECK CYST performed by Mini Guerra IV, DO at [facility]  Left 2018    left tibia plateau external fixator application    NECK SURGERY      cyst removed back of neck    ORIF PROXIMAL TIBIAL PLATEAU FRACTURE Left 2018    TIBIAL PLATEAU OPEN REDUCTION INTERNAL FIXATION LEFT -  SYNTHES     C-ARM performed by Harris Limon MD at [facility]  10/14/2016    excision back lipoma with drain placement    LA OFFICE/OUTPT VISIT,PROCEDURE ONLY Right 10/8/2018    RIGHT SHOULDER  MAGDY PROCEDURE performed by Harris Limon MD at [address] OFFICE/OUTPT [facility] Left 2018    LEFT TIBIA PLATEAU EXTERNAL FIXATOR APPLICATION performed by Luis Alberto Maloney MD at [address] [facility]  12/29/15    liver bx, EGD    SHOULDER SURGERY Left 2016    Magdy procedure, bone spurs 2016    SHOULDER SURGERY Right 10/08/2018    magdy    TONSILLECTOMY  1979    UPPER GASTROINTESTINAL ENDOSCOPY  2015    found hiatal hernia     Family History   Problem Relation Age of Onset    Asthma Mother     Depression Mother     Anxiety Disorder Mother     Cancer Father         leukemia    Other Father         Myelodysplastic syndrome, which converted to leukemia    Migraines Sister     No Known Problems Brother     Ovarian Cancer Maternal Aunt     No Known Problems Maternal Grandmother     No Known Problems Maternal Grandfather     Breast Cancer Paternal Grandmother     Heart Attack Paternal Grandfather        Social History     Socioeconomic History    Marital status:      Spouse name: Lena Pagan Number of children: 3    Years of education: Not on file    Highest education level: Not on file   Occupational History    Occupation: UNEMPLOYED    Tobacco Use    Smoking status: Former Smoker     Packs/day: 0.50     Years: 9.00     Pack years: 4.50     Types: Cigarettes     Start date: 1978     Quit date: 1987     Years since quittin.0    Smokeless tobacco: Never Used   Vaping Use    Vaping Use: Never used   Substance and Sexual Activity    Alcohol use: No     Alcohol/week: 0.0 standard drinks    Drug use: No    Sexual activity: Yes     Partners: Male     Comment: Has been together since    Other Topics Concern    Not on file   Social History Narrative    Not on file     Social Determinants of Health     Financial Resource Strain: Low Risk     Difficulty of Paying Living Expenses: Not hard at all   Food Insecurity: No Food Insecurity    Worried About [address] in the Last Year: Never true    [facility] in the Last Year: Never true   Transportation Needs: No Transportation Needs    Lack of Transportation (Medical): No    Lack of Transportation (Non-Medical):  No   Physical Activity:     Days of Exercise per Week: Not on file    Minutes of Exercise per Session: Not on file   Stress:     Feeling of Stress : Not on file   Social Connections:     Frequency of Communication with Friends and Family: Not on file    Frequency of Social Gatherings with Friends and Family: Not on file    Attends Sikh Services: Not on file    Active Member of Clubs or Organizations: Not on file    Attends Club or Organization Meetings: Not on file    Marital Status: Not on file   Intimate Partner Violence:     Fear of Current or Ex-Partner: Not on file    Emotionally Abused: Not on file    Physically Abused: Not on file    Sexually Abused: Not on file   Housing Stability:     Unable to Pay for Housing in the Last Year: Not on file    Number of [place] in the Last Year: Not on file    Unstable Housing in the Last Year: Not on file       Review of Systems   Constitutional: Negative. HENT: Negative. Eyes: Negative. Respiratory:        Cold air is bothersome to patient with shortness of breath, wheezing, aggravated nonproductive cough. Cardiovascular: Negative. Gastrointestinal: Negative. Endocrine: Negative. Genitourinary: Negative. Musculoskeletal: Negative. Skin: Negative. Allergic/Immunologic: Negative. Neurological: Negative. Hematological: Negative. Psychiatric/Behavioral: Negative. Objective:       Physical Exam  General appearance - alert, well appearing, and in no distress, oriented to person, place, and time and overweight  Mental status - alert, oriented to person, place, and time, normal mood, behavior, speech, dress, motor activity, and thought processes  Eyes - pupils equal and reactive, extraocular eye movements intact  Ears - not examined-video visit  Nose - not examined-video visit  Mouth - not examined-video visit  Neck - not examined-video visit  Chest - not examined-video visit-no apparent distress.  No conversational dyspnea  Heart -not examined-video visit  Abdomen - not examined-video visit  Neuro- not examined-video visit  Extremities - not examined-video visit  Skin - not examined-video visit    Wt Readings from Last 3 Encounters:   08/05/21 247 lb (112 kg)   07/31/21 240 lb (108.9 kg)   07/16/21 248 lb 3.2 oz (112.6 kg)       Results for orders placed or performed during the hospital encounter of 07/12/21   Lipid, Fasting   Result Value Ref Range    Cholesterol, Fasting 184 <200 mg/dL    HDL 42 >40 mg/dL LDL Cholesterol 114 0 - 130 mg/dL    Chol/HDL Ratio 4.4 <5    Triglyceride, Fasting 140 <150 mg/dL    VLDL NOT REPORTED 1 - 30 mg/dL   Comprehensive Metabolic Panel, Fasting   Result Value Ref Range    Glucose, Fasting 88 70 - 99 mg/dL    BUN 14 8 - 23 mg/dL    CREATININE 0.79 0.50 - 0.90 mg/dL    Bun/Cre Ratio NOT REPORTED 9 - 20    Calcium 8.2 (L) 8.6 - 10.4 mg/dL    Sodium 143 135 - 144 mmol/L    Potassium 4.5 3.7 - 5.3 mmol/L    Chloride 112 (H) 98 - 107 mmol/L    CO2 19 (L) 20 - 31 mmol/L    Anion Gap 12 9 - 17 mmol/L    Alkaline Phosphatase 92 35 - 104 U/L    ALT 15 5 - 33 U/L    AST 14 <32 U/L    Total Bilirubin 0.27 (L) 0.3 - 1.2 mg/dL    Total Protein 6.6 6.4 - 8.3 g/dL    Albumin 3.5 3.5 - 5.2 g/dL    Albumin/Globulin Ratio 1.1 1.0 - 2.5    GFR Non-African American >60 >60 mL/min    GFR African American >60 >60 mL/min    GFR Comment          GFR Staging NOT REPORTED    CBC Auto Differential   Result Value Ref Range    WBC 7.9 3.5 - 11.3 k/uL    RBC 4.34 3.95 - 5.11 m/uL    Hemoglobin 12.4 11.9 - 15.1 g/dL    Hematocrit 41.8 36.3 - 47.1 %    MCV 96.3 82.6 - 102.9 fL    MCH 28.6 25.2 - 33.5 pg    MCHC 29.7 28.4 - 34.8 g/dL    RDW 13.3 11.8 - 14.4 %    Platelets 657 771 - 229 k/uL    MPV 8.7 8.1 - 13.5 fL    NRBC Automated 0.0 0.0 per 100 WBC    Differential Type NOT REPORTED     Seg Neutrophils 56 36 - 65 %    Lymphocytes 33 24 - 43 %    Monocytes 7 3 - 12 %    Eosinophils % 2 1 - 4 %    Basophils 1 0 - 2 %    Immature Granulocytes 1 (H) 0 %    Segs Absolute 4.46 1.50 - 8.10 k/uL    Absolute Lymph # 2.58 1.10 - 3.70 k/uL    Absolute Mono # 0.58 0.10 - 1.20 k/uL    Absolute Eos # 0.16 0.00 - 0.44 k/uL    Basophils Absolute 0.05 0.00 - 0.20 k/uL    Absolute Immature Granulocyte 0.08 0.00 - 0.30 k/uL    WBC Morphology NOT REPORTED     RBC Morphology NOT REPORTED     Platelet Estimate NOT REPORTED        No results found. Assessment:      1. Mild intermittent asthma without complication    2.  KEVIN on CPAP 3. History of DVT (deep vein thrombosis)    4. Status post gastric bypass for obesity          Plan:      1. Medications reviewed, continue as ordered. 2. Educated and clarified the medication use. 3. Recommend flu vaccination in the fall annually. Up-to-date  4. Patient is up-to-date with pneumococcal vaccinations from pulmonary perspective. 5. Patient has COVID-vaccine and booster discussed strategies to protect against Covid 19.   6. Maintain an active lifestyle. 7. Patient's questions were answered to her satisfaction. 8. Pulmonary function tests were reviewed   9. CT scan of the chest was reviewed  10. Compliance data not available for my review. Per patient report they use the machine faithfully every night, and report refreshing and restorative sleep without residual daytime fatigue. Recently had difficulty with not having a mask available at home due to a pet destroying it. Since receiving her new mask she reports that she has been compliant every single night.   11. We'll see the patient back in 6 months          Electronically signed by EMILY Chowdhury CNP on 1/17/2022 at 12:27 PM

## 2022-01-17 ENCOUNTER — TELEMEDICINE (OUTPATIENT)
Dept: PULMONOLOGY | Age: 62
End: 2022-01-17
Payer: MEDICARE

## 2022-01-17 DIAGNOSIS — Z98.84 STATUS POST GASTRIC BYPASS FOR OBESITY: ICD-10-CM

## 2022-01-17 DIAGNOSIS — G47.33 OSA ON CPAP: ICD-10-CM

## 2022-01-17 DIAGNOSIS — Z86.718 HISTORY OF DVT (DEEP VEIN THROMBOSIS): ICD-10-CM

## 2022-01-17 DIAGNOSIS — Z99.89 OSA ON CPAP: ICD-10-CM

## 2022-01-17 DIAGNOSIS — J45.20 MILD INTERMITTENT ASTHMA WITHOUT COMPLICATION: Primary | ICD-10-CM

## 2022-01-17 PROCEDURE — G8427 DOCREV CUR MEDS BY ELIG CLIN: HCPCS | Performed by: NURSE PRACTITIONER

## 2022-01-17 PROCEDURE — 3017F COLORECTAL CA SCREEN DOC REV: CPT | Performed by: NURSE PRACTITIONER

## 2022-01-17 PROCEDURE — 99213 OFFICE O/P EST LOW 20 MIN: CPT | Performed by: NURSE PRACTITIONER

## 2022-01-17 RX ORDER — CEFUROXIME AXETIL 250 MG/1
TABLET ORAL
COMMUNITY
Start: 2021-12-04 | End: 2022-09-27

## 2022-01-17 ASSESSMENT — ENCOUNTER SYMPTOMS
ALLERGIC/IMMUNOLOGIC NEGATIVE: 1
GASTROINTESTINAL NEGATIVE: 1
EYES NEGATIVE: 1

## 2022-01-17 NOTE — PATIENT INSTRUCTIONS
1/17/22-Left message for pt to call and schedule a follow up with Dr Minh Luna in 6 months and also to obtain a compliance report. Also informed her that I will fax the order for CPAP supplies to [facility]   1/24/22-Faxed DME order with dictation to [address] [place] [address] #[phone]. Interpolation Flap Text: A decision was made to reconstruct the defect utilizing an interpolation axial flap and a staged reconstruction.  A telfa template was made of the defect.  This telfa template was then used to outline the interpolation flap.  The donor area for the pedicle flap was then injected with anesthesia.  The flap was excised through the skin and subcutaneous tissue down to the layer of the underlying musculature.  The interpolation flap was carefully excised within this deep plane to maintain its blood supply.  The edges of the donor site were undermined.   The donor site was closed in a primary fashion.  The pedicle was then rotated into position and sutured.  Once the tube was sutured into place, adequate blood supply was confirmed with blanching and refill.  The pedicle was then wrapped with xeroform gauze and dressed appropriately with a telfa and gauze bandage to ensure continued blood supply and protect the attached pedicle.

## 2022-02-02 DIAGNOSIS — F51.5 NIGHTMARE: ICD-10-CM

## 2022-02-02 DIAGNOSIS — F41.9 ANXIETY: ICD-10-CM

## 2022-02-02 NOTE — TELEPHONE ENCOUNTER
Martha Corrales is calling to request a refill on the following medication(s):    Medication Request:  Requested Prescriptions     Pending Prescriptions Disp Refills    sertraline (ZOLOFT) 50 MG tablet 90 tablet 1     Sig: Take 1 tablet by mouth once daily       Last Visit Date (If Applicable):  5/5/2120    Next Visit Date:    Visit date not found

## 2022-02-08 ENCOUNTER — HOSPITAL ENCOUNTER (OUTPATIENT)
Dept: ULTRASOUND IMAGING | Age: 62
Discharge: HOME | End: 2022-02-10
Payer: MEDICARE

## 2022-02-08 DIAGNOSIS — N28.1 RENAL CYST: ICD-10-CM

## 2022-02-08 PROCEDURE — 76775 US EXAM ABDO BACK WALL LIM: CPT

## 2022-02-10 ENCOUNTER — HOSPITAL ENCOUNTER (OUTPATIENT)
Dept: WOMENS IMAGING | Age: 62
Discharge: HOME | End: 2022-02-12
Payer: MEDICARE

## 2022-02-10 DIAGNOSIS — Z12.39 SCREENING BREAST EXAMINATION: ICD-10-CM

## 2022-02-10 PROCEDURE — 77063 BREAST TOMOSYNTHESIS BI: CPT

## 2022-03-02 ENCOUNTER — OFFICE VISIT (OUTPATIENT)
Dept: FAMILY MEDICINE CLINIC | Age: 62
End: 2022-03-02
Payer: MEDICARE

## 2022-03-02 VITALS
WEIGHT: 239 LBS | HEIGHT: 67 IN | TEMPERATURE: 97 F | SYSTOLIC BLOOD PRESSURE: 124 MMHG | BODY MASS INDEX: 37.51 KG/M2 | DIASTOLIC BLOOD PRESSURE: 68 MMHG

## 2022-03-02 DIAGNOSIS — F43.22 ADJUSTMENT DISORDER WITH ANXIETY: Primary | ICD-10-CM

## 2022-03-02 DIAGNOSIS — Z00.00 ROUTINE HEALTH MAINTENANCE: ICD-10-CM

## 2022-03-02 DIAGNOSIS — E55.9 VITAMIN D DEFICIENCY: ICD-10-CM

## 2022-03-02 PROCEDURE — G8417 CALC BMI ABV UP PARAM F/U: HCPCS | Performed by: STUDENT IN AN ORGANIZED HEALTH CARE EDUCATION/TRAINING PROGRAM

## 2022-03-02 PROCEDURE — G8482 FLU IMMUNIZE ORDER/ADMIN: HCPCS | Performed by: STUDENT IN AN ORGANIZED HEALTH CARE EDUCATION/TRAINING PROGRAM

## 2022-03-02 PROCEDURE — G8427 DOCREV CUR MEDS BY ELIG CLIN: HCPCS | Performed by: STUDENT IN AN ORGANIZED HEALTH CARE EDUCATION/TRAINING PROGRAM

## 2022-03-02 PROCEDURE — 99214 OFFICE O/P EST MOD 30 MIN: CPT | Performed by: STUDENT IN AN ORGANIZED HEALTH CARE EDUCATION/TRAINING PROGRAM

## 2022-03-02 PROCEDURE — 1036F TOBACCO NON-USER: CPT | Performed by: STUDENT IN AN ORGANIZED HEALTH CARE EDUCATION/TRAINING PROGRAM

## 2022-03-02 PROCEDURE — 3017F COLORECTAL CA SCREEN DOC REV: CPT | Performed by: STUDENT IN AN ORGANIZED HEALTH CARE EDUCATION/TRAINING PROGRAM

## 2022-03-02 ASSESSMENT — ENCOUNTER SYMPTOMS
CHEST TIGHTNESS: 0
SORE THROAT: 0
NAUSEA: 0
COUGH: 0
SHORTNESS OF BREATH: 0
WHEEZING: 0
EYE DISCHARGE: 0
CONSTIPATION: 0
VOMITING: 0
ABDOMINAL PAIN: 0
DIARRHEA: 0

## 2022-03-02 NOTE — PROGRESS NOTES
[address] PRIMARY CARE  [address] [place] [address]  Dept: [phone]  Dept Fax: [phone]    Natalia Alcantara is a 65 y.o. female who is a Established patient, who presents today for her medical conditions/complaints as noted below:  Chief Complaint   Patient presents with   Eugene Montelongo New Doctor    Medication Check         HPI:     She is here today to follow-up on anxiety and to meet new provider. She has severe anxiety when she has to leave home. She is extremely anxious during the encounter. She says that she does well when she is at home and doing virtual visits. She says lately she has been having more migraine headaches with stress. She is no longer following with psychotherapy. She says she lost her aunt in [place] and has been very anxious about leaving home since then. She is due for her routine labs.          Hemoglobin A1C (%)   Date Value   01/15/2019 5.0   01/04/2018 5.1   09/06/2017 5.3             ( goal A1Cis < 7)   No results found for: LABMICR  LDL Cholesterol (mg/dL)   Date Value   07/12/2021 114   12/14/2020 131 (H)   01/15/2019 129       (goal LDL is <100)   AST (U/L)   Date Value   07/12/2021 14     ALT (U/L)   Date Value   07/12/2021 15     BUN (mg/dL)   Date Value   07/12/2021 14     BP Readings from Last 3 Encounters:   03/02/22 124/68   08/05/21 112/65   07/31/21 123/77          (goal 120/80)    Past Medical History:   Diagnosis Date    Acromioclavicular joint arthritis 4/18/2016    Acute gastroenteritis 10/11/2017    Acute gastroenteritis 10/11/2017    Anemia 12/11/2018    Arthritis     Asthma 1980    On Inhaler    Bursitis     left shoulder    Chronic right shoulder pain 7/24/2018    Closed fracture of left tibial plateau 1/13/4295    Closed fracture of left tibial plateau 3/65/6550    Closed fracture of proximal end of left tibia 11/22/2018    Closed fracture of proximal end of left tibia 11/22/2018    Closed fracture of shaft of left tibia 2018    Closed fracture of shaft of left tibia 2018    Depression     Dry eyes     Essential hypertension 2017    Former smoker     GERD (gastroesophageal reflux disease)     not since Shelton-en-Y and weight loss    Head ache     on Topiramate    Hearing loss     mild    Hemorrhoids     Hiatal hernia     History of bladder infections     History of DVT (deep vein thrombosis)     started in left leg, went to lungs    History of gastric bypass 2018    History of gastritis     History of morbid obesity     had Shelton-en y    History of pulmonary embolism     from foot  trauma, was treated for six months with anticoagulation. She has no inferior vena cava filter.  Hyperlipidemia     not since weight loss    Hypertension     Not anymore after Bariatric Surgery, no medication    Knee pain 3/6/2015    Knee pain 3/6/2015    Mild intermittent asthma     Morbid obesity ([address] 2015    Obesity (BMI 30-39. 9) 2016    KEVIN on CPAP     at night    Osteoarthritis     Osteopenia     S/P gastric bypass 12-29-15    Dr. Hilton Galo, [facility] wt = 280lb, initial wt = 328lb    S/p tibial fracture 2018    Status post gastric bypass for obesity     Tibial plateau fracture, left, closed, initial encounter 2018    Tibial plateau fracture, left, closed, initial encounter 2018    Vitamin D deficiency     Wears dentures     Wears glasses     Weight loss of 160 lbs since surgery  2017      Past Surgical History:   Procedure Laterality Date    ABDOMINOPLASTY N/A 2019    ABDOMINOPLASTY performed by Gavin Leon MD at [address] ARTHROSCOPY / ARTHROTOMY KNEE Left 2018    KNEE ARTHROSCOPY LEFT performed by Candace Wolfe MD at [place] [address]  1986,  1987     SECTION      CHOLECYSTECTOMY      CLOSED MANIPULATION SHOULDER Right 2018    SHOULDER MANIPULATION WITH INJECTION performed by Antonio Holt MD at [address] [facility]  16    EXCISION / BIOPSY SKIN LESION OF HEAD / NECK N/A 4/3/2017     EXCISION POSTERIOR NECK CYST performed by Sue Guerra IV, DO at [facility] Left 2018    left tibia plateau external fixator application    NECK SURGERY      cyst removed back of neck    ORIF PROXIMAL TIBIAL PLATEAU FRACTURE Left 2018    TIBIAL PLATEAU OPEN REDUCTION INTERNAL FIXATION LEFT -  SYNTHES     C-ARM [facility]  10/14/2016    excision back lipoma with drain placement    [facility] OFFICE/OUTPT VISIT,PROCEDURE ONLY Right 10/8/2018    RIGHT SHOULDER  MAGDY PROCEDURE performed by Antonio Holt MD at [facility] OFFICE/OUTPT [facility] Left 2018    LEFT TIBIA PLATEAU EXTERNAL FIXATOR APPLICATION performed by Zenaida Urias MD at [address] [facility]  12/29/15    liver bx, EGD    SHOULDER SURGERY Left 2016    Magdy procedure, bone spurs 2016    SHOULDER SURGERY Right 10/08/2018    magdy    TONSILLECTOMY  1979    UPPER GASTROINTESTINAL ENDOSCOPY  2015    found hiatal hernia       Family History   Problem Relation Age of Onset    Asthma Mother     Depression Mother     Anxiety Disorder Mother     Cancer Father         leukemia    Other Father         Myelodysplastic syndrome, which converted to leukemia    Migraines Sister     No Known Problems Brother     Ovarian Cancer Maternal Aunt     No Known Problems Maternal Grandmother     No Known Problems Maternal Grandfather     Breast Cancer Paternal Grandmother     Heart Attack Paternal Grandfather        Social History     Tobacco Use    Smoking status: Former Smoker     Packs/day: 0.50     Years: 9.00     Pack years: 4.50     Types: Cigarettes     Start date: 1978     Quit date: 1987     Years since quittin.1    Smokeless tobacco: Never (MULTIVITAMIN ADULTS 50+ PO) Take 1 tablet by mouth daily      KRILL OIL PO Take by mouth daily      vitamin D (CHOLECALCIFEROL) 1000 UNIT TABS tablet Take 1,000 Units by mouth daily      FIBER COMPLETE PO Take by mouth daily        No current facility-administered medications for this visit. Facility-Administered Medications Ordered in Other Visits   Medication Dose Route Frequency Provider Last Rate Last Admin    lactated ringers infusion 1,000 mL  1,000 mL IntraVENous Continuous Mikhail Adler MD   Stopped at 08/19/16 1249    HYDROmorphone (DILAUDID) injection 0.25 mg  0.25 mg IntraVENous Q5 Min PRN Zulema Lubin MD         Allergies   Allergen Reactions    Prozac [Fluoxetine] Other (See Comments)     Caused nightmares    Nsaids Other (See Comments)     Bariatric Surgeon told me not to take NSAIDS for good    Pcn [Penicillins] Other (See Comments)     Sores in mouth    Bactrim [Sulfamethoxazole-Trimethoprim] Itching and Rash    Codeine Itching and Rash       Health Maintenance   Topic Date Due    Lipid screen  07/12/2022    Depression Screen  08/06/2022    Breast cancer screen  02/10/2024    Colorectal Cancer Screen  04/03/2025    Cervical cancer screen  07/14/2025    Pneumococcal 0-64 years Vaccine (2 of 2 - PPSV23) 01/10/2026    DTaP/Tdap/Td vaccine (3 - Td or Tdap) 07/31/2031    Flu vaccine  Completed    Shingles Vaccine  Completed    COVID-19 Vaccine  Completed    Hepatitis C screen  Completed    HIV screen  Completed    Hepatitis A vaccine  Aged Out    Hepatitis B vaccine  Aged Out    Hib vaccine  Aged Out    Meningococcal (ACWY) vaccine  Aged Out       Subjective:     Review of Systems   Constitutional: Negative for appetite change, fatigue and fever. HENT: Negative for congestion, ear pain, hearing loss and sore throat. Eyes: Negative for discharge and visual disturbance. Respiratory: Negative for cough, chest tightness, shortness of breath and wheezing. Cardiovascular: Negative for chest pain, palpitations and leg swelling. Gastrointestinal: Negative for abdominal pain, constipation, diarrhea, nausea and vomiting. Genitourinary: Negative for flank pain, frequency, hematuria and urgency. Musculoskeletal: Negative for arthralgias, gait problem, joint swelling and myalgias. Skin: Negative. Neurological: Negative for dizziness, weakness, numbness and headaches. Psychiatric/Behavioral: Negative for dysphoric mood. The patient is nervous/anxious. Objective:     Physical Exam  Vitals reviewed. Constitutional:       Appearance: Normal appearance. She is normal weight. HENT:      Head: Normocephalic and atraumatic. Nose: Nose normal.      Mouth/Throat:      Mouth: Mucous membranes are moist.      Pharynx: Oropharynx is clear. Eyes:      Extraocular Movements: Extraocular movements intact. Conjunctiva/sclera: Conjunctivae normal.      Pupils: Pupils are equal, round, and reactive to light. Cardiovascular:      Rate and Rhythm: Normal rate and regular rhythm. Heart sounds: Normal heart sounds. No murmur heard. No gallop. Pulmonary:      Effort: Pulmonary effort is normal. No respiratory distress. Breath sounds: Normal breath sounds. No stridor. No wheezing. Abdominal:      General: Bowel sounds are normal. There is no distension. Palpations: Abdomen is soft. Tenderness: There is no abdominal tenderness. There is no guarding or rebound. Musculoskeletal:         General: No swelling or tenderness. Normal range of motion. Cervical back: Normal range of motion and neck supple. Skin:     General: Skin is warm and dry. Coloration: Skin is not jaundiced. Findings: No rash. Neurological:      General: No focal deficit present. Mental Status: She is alert and oriented to person, place, and time.    Psychiatric:         Mood and Affect: Mood normal.         Behavior: Behavior normal. Thought Content: Thought content normal.         Judgment: Judgment normal.       /68 (Site: Right Upper Arm, Position: Sitting, Cuff Size: Medium Adult)   Temp 97 °F (36.1 °C) (Temporal)   Ht 5' 6.5\" (1.689 m)   Wt 239 lb (108.4 kg)   LMP  (LMP Unknown)   BMI 38.00 kg/m²     Assessment/Plan:   1. Adjustment disorder with anxiety  -     TSH with Reflex; Future  2. Vitamin D deficiency  -     Vitamin D 25 Hydroxy; Future  3. Routine health maintenance  -     CBC with Auto Differential; Future  -     Comprehensive Metabolic Panel, Fasting; Future  -     Hemoglobin A1C; Future  -     Lipid, Fasting; Future    Anxiety-stable, on Zoloft 50 mg daily and BuSpar 10 mg twice daily. Has severe anxiety when she has to leave home. No longer following with psychotherapy. Return in about 6 months (around 9/2/2022) for anxiety and depression. Orders Placed This Encounter   Procedures    CBC with Auto Differential     Standing Status:   Future     Standing Expiration Date:   9/2/2022    Comprehensive Metabolic Panel, Fasting     Standing Status:   Future     Standing Expiration Date:   9/2/2022    Hemoglobin A1C     Standing Status:   Future     Standing Expiration Date:   9/2/2022    Lipid, Fasting     Standing Status:   Future     Standing Expiration Date:   9/2/2022    TSH with Reflex     Standing Status:   Future     Standing Expiration Date:   9/2/2022    Vitamin D 25 Hydroxy     Standing Status:   Future     Standing Expiration Date:   9/2/2022     No orders of the defined types were placed in this encounter. Patient given educational materials - see patient instructions. Discussed use, benefit, and side effects of prescribed medications. All patientquestions answered. Pt voiced understanding. Reviewed health maintenance. Instructedto continue current medications, diet and exercise. Patient agreed with treatmentplan. Follow up as directed.      Electronically signed by Vikki Ngo MD on 3/2/2022 at 8:24 PM

## 2022-03-08 ENCOUNTER — PATIENT MESSAGE (OUTPATIENT)
Dept: NEUROLOGY | Age: 62
End: 2022-03-08

## 2022-03-08 NOTE — TELEPHONE ENCOUNTER
From: Gallo Kennedy  To: Dr. Luis Silver: 3/8/2022 10:08 AM EST  Subject: Migraines     Today is my fifth day in a row having a migraine headache. I have taken my medicine as directed. Nothing has relieved it. What is my next step.

## 2022-03-09 RX ORDER — PREDNISONE 20 MG/1
TABLET ORAL
Qty: 18 TABLET | Refills: 0 | Status: SHIPPED | OUTPATIENT
Start: 2022-03-09 | End: 2022-04-25

## 2022-04-08 DIAGNOSIS — F41.9 ANXIETY: ICD-10-CM

## 2022-04-08 RX ORDER — BUSPIRONE HYDROCHLORIDE 10 MG/1
TABLET ORAL
Qty: 90 TABLET | Refills: 2 | Status: SHIPPED | OUTPATIENT
Start: 2022-04-08 | End: 2022-04-14 | Stop reason: SDUPTHER

## 2022-04-08 NOTE — TELEPHONE ENCOUNTER
Tavo Roche is calling to request a refill on the following medication(s):    Medication Request:  Requested Prescriptions     Pending Prescriptions Disp Refills    busPIRone (BUSPAR) 10 MG tablet 90 tablet 2     Sig: TAKE 1 TABLET BY MOUTH THREE TIMES DAILY       Last Visit Date (If Applicable):  2/6/0270    Next Visit Date:    9/1/2022

## 2022-04-14 ENCOUNTER — HOSPITAL ENCOUNTER (OUTPATIENT)
Dept: INFUSION THERAPY | Age: 62
Discharge: HOME | End: 2022-04-14
Payer: MEDICARE

## 2022-04-14 VITALS
DIASTOLIC BLOOD PRESSURE: 74 MMHG | SYSTOLIC BLOOD PRESSURE: 110 MMHG | TEMPERATURE: 98.4 F | RESPIRATION RATE: 16 BRPM | HEART RATE: 81 BPM

## 2022-04-14 DIAGNOSIS — F41.9 ANXIETY: ICD-10-CM

## 2022-04-14 DIAGNOSIS — M81.0 AGE-RELATED OSTEOPOROSIS WITHOUT CURRENT PATHOLOGICAL FRACTURE: Primary | ICD-10-CM

## 2022-04-14 PROCEDURE — 96372 THER/PROPH/DIAG INJ SC/IM: CPT

## 2022-04-14 PROCEDURE — 6360000002 HC RX W HCPCS: Performed by: FAMILY MEDICINE

## 2022-04-14 RX ORDER — BUSPIRONE HYDROCHLORIDE 10 MG/1
TABLET ORAL
Qty: 90 TABLET | Refills: 2 | Status: SHIPPED | OUTPATIENT
Start: 2022-04-14 | End: 2022-06-30

## 2022-04-14 RX ADMIN — DENOSUMAB 60 MG: 60 INJECTION SUBCUTANEOUS at 10:30

## 2022-04-14 NOTE — PROGRESS NOTES
Patient here for prolia injection. Patient arrives ambulatory. Injection complete without incident. Discharged in stable condition. Returns 10/14/2022 for prolia injection.

## 2022-04-14 NOTE — TELEPHONE ENCOUNTER
Requested Prescriptions     Pending Prescriptions Disp Refills    busPIRone (BUSPAR) 10 MG tablet 90 tablet 2     Sig: TAKE 1 TABLET BY MOUTH THREE TIMES DAILY

## 2022-04-19 ENCOUNTER — TELEMEDICINE (OUTPATIENT)
Dept: NEUROLOGY | Age: 62
End: 2022-04-19
Payer: MEDICARE

## 2022-04-19 DIAGNOSIS — G43.009 MIGRAINE WITHOUT AURA AND WITHOUT STATUS MIGRAINOSUS, NOT INTRACTABLE: Primary | ICD-10-CM

## 2022-04-19 PROCEDURE — G8428 CUR MEDS NOT DOCUMENT: HCPCS | Performed by: PSYCHIATRY & NEUROLOGY

## 2022-04-19 PROCEDURE — 1036F TOBACCO NON-USER: CPT | Performed by: PSYCHIATRY & NEUROLOGY

## 2022-04-19 PROCEDURE — G8417 CALC BMI ABV UP PARAM F/U: HCPCS | Performed by: PSYCHIATRY & NEUROLOGY

## 2022-04-19 PROCEDURE — 3017F COLORECTAL CA SCREEN DOC REV: CPT | Performed by: PSYCHIATRY & NEUROLOGY

## 2022-04-19 PROCEDURE — 99214 OFFICE O/P EST MOD 30 MIN: CPT | Performed by: PSYCHIATRY & NEUROLOGY

## 2022-04-19 RX ORDER — AMITRIPTYLINE HYDROCHLORIDE 25 MG/1
TABLET, FILM COATED ORAL
Qty: 60 TABLET | Refills: 5 | Status: SHIPPED | OUTPATIENT
Start: 2022-04-19 | End: 2022-04-25

## 2022-04-19 RX ORDER — SUMATRIPTAN 6 MG/.5ML
INJECTION, SOLUTION SUBCUTANEOUS
Qty: 6 EACH | Refills: 5 | Status: SHIPPED | OUTPATIENT
Start: 2022-04-19

## 2022-04-19 RX ORDER — SUMATRIPTAN 100 MG/1
TABLET, FILM COATED ORAL
Qty: 18 TABLET | Refills: 5 | Status: SHIPPED | OUTPATIENT
Start: 2022-04-19

## 2022-04-19 ASSESSMENT — ENCOUNTER SYMPTOMS
ALLERGIC/IMMUNOLOGIC NEGATIVE: 1
GASTROINTESTINAL NEGATIVE: 1
EYES NEGATIVE: 1
RESPIRATORY NEGATIVE: 1

## 2022-04-19 NOTE — PROGRESS NOTES
Subjective:      Patient ID: Fausto Bryan is a 65 y.o. female. Active problem common migraine headaches on topamax using imitrex tablet as initial abortive to use SQ as back up rescue drug if not effective. The condition is she reports that she was having headaches more frequently which had been daily attenuated with prednisone taper . Headaches at this time are occurring twice per week over vertex head area of pressure throbbing up to grade 8 over 10 partially attenuated with imitrex 100 mg tablet at times needing imitrex SQ  rescue drug. There maybe blurred vision before headache . Headaches are in part from stressors taking care of grandson . She is tolerating topamax 100 mg po bid along with imitrex well . There will be no tingling , weakness or bulbar complaints . She can not use NSAID with prior bariatric surgery. Weather fronts may be trigger headaches. Significant medications topamax 100 mg po bid , imitrex 100 mg PRN, imitrex SQ PRN  . Testing Head CT normal , May 2020.  MRI of Head normal      Past Medical History:   Diagnosis Date    Acromioclavicular joint arthritis 4/18/2016    Acute gastroenteritis 10/11/2017    Acute gastroenteritis 10/11/2017    Anemia 12/11/2018    Arthritis     Asthma 1980    On Inhaler    Bursitis     left shoulder    Chronic right shoulder pain 7/24/2018    Closed fracture of left tibial plateau 6/80/5239    Closed fracture of left tibial plateau 3/63/2079    Closed fracture of proximal end of left tibia 11/22/2018    Closed fracture of proximal end of left tibia 11/22/2018    Closed fracture of shaft of left tibia 11/22/2018    Closed fracture of shaft of left tibia 11/22/2018    Depression     Dry eyes     Essential hypertension 8/21/2017    Former smoker     GERD (gastroesophageal reflux disease)     not since Shelton-en-Y and weight loss    Head ache     on Topiramate    Hearing loss     mild    Hemorrhoids     Hiatal hernia     History of bladder infections     History of DVT (deep vein thrombosis)     started in left leg, went to lungs    History of gastric bypass 2018    History of gastritis     History of morbid obesity     had Shelton-en y    History of pulmonary embolism     from foot  trauma, was treated for six months with anticoagulation. She has no inferior vena cava filter.  Hyperlipidemia     not since weight loss    Hypertension     Not anymore after Bariatric Surgery, no medication    Knee pain 3/6/2015    Knee pain 3/6/2015    Mild intermittent asthma     Morbid obesity ([address] 2015    Obesity (BMI 30-39. 9) 2016    KEVIN on CPAP     at night    Osteoarthritis     Osteopenia     S/P gastric bypass 12-29-15    Dr. Jan Us, [facility], hosp wt = 280lb, initial wt = 328lb    S/p tibial fracture 2018    Status post gastric bypass for obesity     Tibial plateau fracture, left, closed, initial encounter 2018    Tibial plateau fracture, left, closed, initial encounter 2018    Vitamin D deficiency     Wears dentures     Wears glasses     Weight loss of 160 lbs since surgery  2017       Past Surgical History:   Procedure Laterality Date    ABDOMINOPLASTY N/A 2019    ABDOMINOPLASTY performed by Aleshia Crum MD at [address] [place] [address] Drive ARTHROSCOPY / ARTHROTOMY KNEE Left 2018    KNEE ARTHROSCOPY LEFT performed by Santo Collier MD at [facility]  1986,  1987     SECTION      CHOLECYSTECTOMY      CLOSED MANIPULATION SHOULDER Right 2018    SHOULDER MANIPULATION WITH INJECTION performed by Santo Collier MD at [address] [facility]  16    EXCISION / BIOPSY SKIN LESION OF HEAD / NECK N/A 4/3/2017     EXCISION POSTERIOR NECK CYST performed by Kunal Guerra IV, DO at [facility]  Left 2018    left tibia plateau external fixator application    NECK SURGERY      cyst removed back of neck  ORIF PROXIMAL TIBIAL PLATEAU FRACTURE Left 2018    TIBIAL PLATEAU OPEN REDUCTION INTERNAL FIXATION LEFT -  SYNTHES     C-ARM performed by Adan Hendricks MD at [address] [facility] [address]  10/14/2016    excision back lipoma with drain placement    SD OFFICE/OUTPT VISIT,PROCEDURE ONLY Right 10/8/2018    RIGHT SHOULDER  MAGDY PROCEDURE performed by Adan Hendricks MD at [address] OFFICE/OUTPT [address] [facility] [address] Left 2018    LEFT TIBIA PLATEAU EXTERNAL FIXATOR APPLICATION performed by Desire Maier MD at [address] [facility]  12/29/15    liver bx, EGD    SHOULDER SURGERY Left 2016    Magdy procedure, bone spurs 2016    SHOULDER SURGERY Right 10/08/2018    magdy    TONSILLECTOMY  1979    UPPER GASTROINTESTINAL ENDOSCOPY  2015    found hiatal hernia       Family History   Problem Relation Age of Onset    Asthma Mother     Depression Mother     Anxiety Disorder Mother     Cancer Father         leukemia    Other Father         Myelodysplastic syndrome, which converted to leukemia    Migraines Sister     No Known Problems Brother     Ovarian Cancer Maternal Aunt     No Known Problems Maternal Grandmother     No Known Problems Maternal Grandfather     Breast Cancer Paternal Grandmother     Heart Attack Paternal Grandfather        Social History     Socioeconomic History    Marital status:      Spouse name: Elsa Primus Number of children: 1    Years of education: Not on file    Highest education level: Not on file   Occupational History    Occupation: UNEMPLOYED    Tobacco Use    Smoking status: Former Smoker     Packs/day: 0.50     Years: 9.00     Pack years: 4.50     Types: Cigarettes     Start date: 1978     Quit date: 1987     Years since quittin.3    Smokeless tobacco: Never Used   Vaping Use    Vaping Use: Never used   Substance and Sexual Activity    Alcohol use: No     Alcohol/week: 0.0 standard drinks    Drug use: No    Sexual activity: Yes     Partners: Male     Comment: Has been together since 1982   Other Topics Concern    Not on file   Social History Narrative    Not on file     Social Determinants of Health     Financial Resource Strain: Low Risk     Difficulty of Paying Living Expenses: Not hard at all   Food Insecurity: No Food Insecurity    Worried About Running Out of Food in the Last Year: Never true    [address] in the Last Year: Never true   Transportation Needs: No Transportation Needs    Lack of Transportation (Medical): No    Lack of Transportation (Non-Medical): No   Physical Activity:     Days of Exercise per Week: Not on file    Minutes of Exercise per Session: Not on file   Stress:     Feeling of Stress : Not on file   Social Connections:     Frequency of Communication with Friends and Family: Not on file    Frequency of Social Gatherings with Friends and Family: Not on file    Attends [employer] Services: Not on file    Active Member of [address] [place] [address] or Organizations: Not on file    Attends Club or Organization Meetings: Not on file    Marital Status: Not on file   Intimate Partner Violence:     Fear of Current or Ex-Partner: Not on file    Emotionally Abused: Not on file    Physically Abused: Not on file    Sexually Abused: Not on file   Housing Stability:     Unable to Pay for Housing in the Last Year: Not on file    Number of [place] in the Last Year: Not on file    Unstable Housing in the Last Year: Not on file       Current Outpatient Medications   Medication Sig Dispense Refill    amitriptyline (ELAVIL) 25 MG tablet Take 1 po qhs po qhs x 1 week then 2 po qhs 60 tablet 5    SUMAtriptan (IMITREX) 100 MG tablet Take one at HA onset . May repeat in 1 hour . No more 2 per day 4 days out of the week 18 tablet 5    SUMAtriptan (IMITREX) 6 MG/0.5ML SOLN injection Take 1 SQ if table not effective after 1 hour .  No more 2 imitrex tablet or SQ per day 4 days per week 6 each 5    busPIRone (BUSPAR) 10 MG tablet TAKE 1 TABLET BY MOUTH THREE TIMES DAILY 90 tablet 2    predniSONE (DELTASONE) 20 MG tablet 3 tabs qd x 3 days, 2 tabs qd x 3 days, 1 tab qd x 3 days, then stop. Take with food. 18 tablet 0    sertraline (ZOLOFT) 50 MG tablet Take 1 tablet by mouth once daily 90 tablet 1    SUMAtriptan Succinate 6 MG/0.5ML SOAJ INJECT 6MG INJECTION IF TABLET IS NOT EFFECTIVE AFTER 1 HOUR. NO MORE THAN 2 TABLETS OR INJECTIONS PER DAY AND NO MORE THAN 4 DAYS PER WEEK      lidocaine (LIDODERM) 5 % Place 1 patch onto the skin daily 12 hours on, 12 hours off. 30 patch 0    atorvastatin (LIPITOR) 10 MG tablet Take 1 tablet by mouth once daily 30 tablet 11    denosumab (PROLIA) 60 MG/ML SOSY SC injection Inject 1 mL into the skin every 6 months 1 mL 1    DULERA 100-5 MCG/ACT inhaler Inhale 2 puffs by mouth twice daily 1 each 8    montelukast (SINGULAIR) 10 MG tablet Take 1 tablet by mouth nightly 90 tablet 2    topiramate (TOPAMAX) 100 MG tablet Take 1 tablet by mouth 2 times daily 60 tablet 11    azelastine (OPTIVAR) 0.05 % ophthalmic solution 1 drop 2 times daily      albuterol sulfate HFA (VENTOLIN HFA) 108 (90 Base) MCG/ACT inhaler Inhale 2 puffs into the lungs every 6 hours as needed for Wheezing or Shortness of Breath 3 Inhaler 2    albuterol (PROVENTIL) (2.5 MG/3ML) 0.083% nebulizer solution Take 3 mLs by nebulization every 6 hours as needed for Wheezing 120 each 11    acetaminophen (TYLENOL) 325 MG tablet Take 2 tablets by mouth every 6 hours as needed for Pain 30 tablet 0    CALCIUM CITRATE PO Take 750 mg by mouth 2 times daily      Multiple Vitamins-Minerals (MULTIVITAMIN ADULTS 50+ PO) Take 1 tablet by mouth daily      KRILL OIL PO Take by mouth daily      vitamin D (CHOLECALCIFEROL) 1000 UNIT TABS tablet Take 1,000 Units by mouth daily      FIBER COMPLETE PO Take by mouth daily        No current facility-administered medications for this visit.      Facility-Administered Medications Ordered in Other Visits   Medication Dose Route Frequency Provider Last Rate Last Admin    lactated ringers infusion 1,000 mL  1,000 mL IntraVENous Continuous Mikhail Melendez MD   Stopped at 08/19/16 1249    HYDROmorphone (DILAUDID) injection 0.25 mg  0.25 mg IntraVENous Q5 Min PRN Simeon Sosa MD           Allergies   Allergen Reactions    Prozac [Fluoxetine] Other (See Comments)     Caused nightmares    Nsaids Other (See Comments)     Bariatric Surgeon told me not to take NSAIDS for good    Pcn [Penicillins] Other (See Comments)     Sores in mouth    Bactrim [Sulfamethoxazole-Trimethoprim] Itching and Rash    Codeine Itching and Rash       Review of Systems   Constitutional: Positive for fatigue. HENT: Negative. Eyes: Negative. Respiratory: Negative. Cardiovascular: Negative. Gastrointestinal: Negative. Endocrine: Negative. Genitourinary: Negative. Musculoskeletal: Negative. Skin: Negative. Allergic/Immunologic: Negative. Neurological: Negative. Hematological: Negative. Psychiatric/Behavioral: Negative. Objective:   Physical Exam  There were no vitals filed for this visit. weight:      Neurological Examination  Constitutional .General exam well groomed   Head/Ears /Nose/Throat: external ear . Normal exam  Neck and thyroid . Normal size. No bruits  Respiratory . Breathsounds clear bilaterally  Cardiovascular:  Auscultation of heart with regular rate and rhythm  Musculoskeletal. Muscle bulk and tone normal                                                           Muscle strength 5/5 strength throughout                                                                                No dysmetria or dysdiadokinesis  No tremor   Normal fine motor  Gait normal   Orientation Alert and oriented x 3   Attention and concentration normal  Short term memory normal  Language process and speech normal . No aphasia   Cranial nerve 2 normal acuety and visual fields  Cranial nerve 3, 4 and 6 . Extraocular muscles are intact . Pupils are equal and reactive   Cranial nerve 5 . Normal strength of masseter and temporalis . Intact corneal reflex. Normal facial sensation  Cranial nerve 7 normal exam   Cranial nerve 8. Grossly intact hearing   Cranial nerve 9 and 10. Symmetric palate elevation   Cranial nerve 11 , 5 out of 5 strength   Cranial Nerve 12 midline tongue . No atrophy  Sensation . Normal proprioception . Intact Vibration . Normal pinprick and light touch   Deep Tendon Reflexes normal  Plantar response flexor bilaterally    Assessment:       Diagnosis Orders   1. Migraine without aura and without status migrainosus, not intractable     Will ad elavil to topamax as headache prophylactic continuing with imitrex abortive therapy        Plan:      As above       Eric Knows, was evaluated through a synchronous (real-time) audio-video encounter. The patient (or guardian if applicable) is aware that this is a billable service. Verbal consent to proceed has been obtained within the past 12 months. The visit was conducted pursuant to the emergency declaration under the [address] [place] [address] authority and the [employer] and [employer] Act. Patient identification was verified, and a caregiver was present when appropriate. The patient was located in a state where the provider was credentialed to provide care. --Simpson Lundborg, MD on 4/19/2022 at 11:25 AM    An electronic signature was used to authenticate this note.         Simpson Lundborg, MD

## 2022-04-21 DIAGNOSIS — G43.009 MIGRAINE WITHOUT AURA AND WITHOUT STATUS MIGRAINOSUS, NOT INTRACTABLE: Primary | ICD-10-CM

## 2022-04-21 RX ORDER — CEFUROXIME AXETIL 250 MG/1
TABLET ORAL
Qty: 15 ML | Refills: 1 | Status: SHIPPED | OUTPATIENT
Start: 2022-04-21 | End: 2022-09-27

## 2022-04-21 NOTE — TELEPHONE ENCOUNTER
Patient called in stating she needed the auto-injector pens instead of the vials for her Sumatriptan. Order added.  Needles added for her already purchased vials in case she is able to use them in the future per her request.

## 2022-04-25 ENCOUNTER — TELEPHONE (OUTPATIENT)
Dept: NEUROLOGY | Age: 62
End: 2022-04-25

## 2022-04-25 ENCOUNTER — OFFICE VISIT (OUTPATIENT)
Dept: FAMILY MEDICINE CLINIC | Age: 62
End: 2022-04-25
Payer: MEDICARE

## 2022-04-25 ENCOUNTER — HOSPITAL ENCOUNTER (OUTPATIENT)
Dept: VASCULAR LAB | Age: 62
Discharge: HOME | End: 2022-04-25
Payer: MEDICARE

## 2022-04-25 VITALS
TEMPERATURE: 97 F | DIASTOLIC BLOOD PRESSURE: 69 MMHG | SYSTOLIC BLOOD PRESSURE: 113 MMHG | HEART RATE: 98 BPM | OXYGEN SATURATION: 97 % | BODY MASS INDEX: 38 KG/M2 | HEIGHT: 67 IN

## 2022-04-25 DIAGNOSIS — M79.89 RIGHT LEG SWELLING: ICD-10-CM

## 2022-04-25 DIAGNOSIS — M79.89 RIGHT LEG SWELLING: Primary | ICD-10-CM

## 2022-04-25 DIAGNOSIS — F41.9 ANXIETY: ICD-10-CM

## 2022-04-25 PROCEDURE — G8417 CALC BMI ABV UP PARAM F/U: HCPCS

## 2022-04-25 PROCEDURE — G8427 DOCREV CUR MEDS BY ELIG CLIN: HCPCS

## 2022-04-25 PROCEDURE — 99213 OFFICE O/P EST LOW 20 MIN: CPT

## 2022-04-25 PROCEDURE — 93971 EXTREMITY STUDY: CPT

## 2022-04-25 PROCEDURE — 1036F TOBACCO NON-USER: CPT

## 2022-04-25 PROCEDURE — 3017F COLORECTAL CA SCREEN DOC REV: CPT

## 2022-04-25 RX ORDER — HYDROXYZINE PAMOATE 25 MG/1
CAPSULE ORAL
Status: ON HOLD | COMMUNITY
Start: 2022-04-14 | End: 2022-09-23 | Stop reason: HOSPADM

## 2022-04-25 RX ORDER — ALPRAZOLAM 0.25 MG/1
0.25 TABLET ORAL 3 TIMES DAILY PRN
Qty: 10 TABLET | Refills: 0 | Status: SHIPPED | OUTPATIENT
Start: 2022-04-25 | End: 2022-05-25

## 2022-04-25 ASSESSMENT — ENCOUNTER SYMPTOMS
CHEST TIGHTNESS: 0
TROUBLE SWALLOWING: 0
SHORTNESS OF BREATH: 0
COUGH: 0
ABDOMINAL PAIN: 0
WHEEZING: 0

## 2022-04-25 NOTE — TELEPHONE ENCOUNTER
Message to nurse . Please call pharmacy and see what is problem .  Elavil being used for migraines different mechanism that zoloft SSRI which she had been on for dpression  On occasion we may use together

## 2022-04-25 NOTE — TELEPHONE ENCOUNTER
Catalina Herring called the office this morning stating that her pharmacist won't fill the Amitriptyline. Pharmacist states that this will not mix well with her Zoloft. Please advise.

## 2022-04-25 NOTE — PROGRESS NOTES
[address] [facility],6Th [address]  [address] [facility]  [address]  4/25/2022    Hunter Limon is a 65 y.o. female who presents today for her medical conditions and/or complaints as noted below. Hunter Limon is scheduled today for Leg Swelling (right leg)  . HPI:     This is a 41-year-old female presenting to the office to discuss right leg swelling. She has a history of a DVT in her left leg. She states that this was many years ago. She is not currently on any blood thinners. The swelling was noticed by her daughter yesterday, the patient had not noticed the swelling prior to that. She denies any pain or tenderness. Physical exam shows DP and PT pulses that are +1. The right leg is noticeably larger than the left. There is no redness or warmth. Patient denies any tenderness on palpation of the leg itself. She does not have any difficulty walking since yesterday. Patient states that she has a very long history of high anxiety. This anxiety has created moments of agoraphobia. Currently in the office she seems to be in a state of high anxiety illustrated by her rocking in the chair and pressured speech. Patient requested medications to aid her in remaining calm or when she has to leave the house. It was agreed upon that a low-dose medication would be prescribed, however she must have a further discussion with her primary care provider for any future refills.       Vitals:    04/25/22 1128   BP: 113/69   Pulse: 98   Temp: 97 °F (36.1 °C)   SpO2: 97%   Height: 5' 6.5\" (1.689 m)      Past Medical History:   Diagnosis Date    Acromioclavicular joint arthritis 4/18/2016    Acute gastroenteritis 10/11/2017    Acute gastroenteritis 10/11/2017    Anemia 12/11/2018    Arthritis     Asthma 1980    On Inhaler    Bursitis     left shoulder    Chronic right shoulder pain 7/24/2018    Closed fracture of left tibial plateau 9/45/2770    Closed fracture of left tibial plateau 1/51/5882    Closed fracture of proximal end of left tibia 11/22/2018    Closed fracture of proximal end of left tibia 11/22/2018    Closed fracture of shaft of left tibia 11/22/2018    Closed fracture of shaft of left tibia 11/22/2018    Depression     Dry eyes     Essential hypertension 8/21/2017    Former smoker     GERD (gastroesophageal reflux disease)     not since Shelton-en-Y and weight loss    Head ache     on Topiramate    Hearing loss     mild    Hemorrhoids     Hiatal hernia     History of bladder infections     History of DVT (deep vein thrombosis) 2010    started in left leg, went to lungs    History of gastric bypass 12/6/2018    History of gastritis     History of morbid obesity     had Shelton-en y    History of pulmonary embolism 2010    from foot  trauma, was treated for six months with anticoagulation. She has no inferior vena cava filter.  Hyperlipidemia     not since weight loss    Hypertension 2015    Not anymore after Bariatric Surgery, no medication    Knee pain 3/6/2015    Knee pain 3/6/2015    Mild intermittent asthma     Morbid obesity ([address] 8/18/2015    Obesity (BMI 30-39. 9) 7/1/2016    KEVIN on CPAP 2015    at night    Osteoarthritis     Osteopenia     S/P gastric bypass 12-29-15    Dr. Leanna Woods, [employer], hosp wt = 280lb, initial wt = 328lb    S/p tibial fracture 11/21/2018    Status post gastric bypass for obesity     Tibial plateau fracture, left, closed, initial encounter 12/5/2018    Tibial plateau fracture, left, closed, initial encounter 12/5/2018    Vitamin D deficiency 2015    Wears dentures     Wears glasses     Weight loss of 160 lbs since surgery  8/21/2017      Past Surgical History:   Procedure Laterality Date    ABDOMINOPLASTY N/A 8/16/2019    ABDOMINOPLASTY performed by Pam March MD at [address] / ARTHROTOMY KNEE Left 12/5/2018    KNEE ARTHROSCOPY LEFT performed by Ashley Rodríguez MD at [address]  7/1184,  7/5530   SECTION      CHOLECYSTECTOMY  1987    CLOSED MANIPULATION SHOULDER Right 2018    SHOULDER MANIPULATION WITH INJECTION performed by Ashley Rodríguez MD at [address] [facility]  16    EXCISION / BIOPSY SKIN LESION OF HEAD / NECK N/A 4/3/2017     EXCISION POSTERIOR NECK CYST performed by Zoë Guerra IV, DO at [address] [facility] Left 2018    left tibia plateau external fixator application    NECK SURGERY      cyst removed back of neck    ORIF PROXIMAL TIBIAL PLATEAU FRACTURE Left 2018    TIBIAL PLATEAU OPEN REDUCTION INTERNAL FIXATION LEFT -  SYNTHES     C-ARM performed by Ashley Rodríguze MD at [facility]  10/14/2016    excision back lipoma with drain placement    AL OFFICE/OUTPT VISIT,PROCEDURE ONLY Right 10/8/2018    RIGHT SHOULDER  LOUIE PROCEDURE performed by Ashley Rodríguez MD at [address] [place] [address] OFFICE/OUTPT [facility] Left 2018    LEFT TIBIA PLATEAU EXTERNAL FIXATOR APPLICATION performed by Kallie Rincon MD at [address] [facility]  12/29/15    liver bx, EGD    SHOULDER SURGERY Left 2016    Louie procedure, bone spurs 2016    SHOULDER SURGERY Right 10/08/2018    louie    TONSILLECTOMY  1979    UPPER GASTROINTESTINAL ENDOSCOPY  2015    found hiatal hernia     Family History   Problem Relation Age of Onset    Asthma Mother     Depression Mother     Anxiety Disorder Mother     Cancer Father         leukemia    Other Father         Myelodysplastic syndrome, which converted to leukemia    Migraines Sister     No Known Problems Brother     Ovarian Cancer Maternal Aunt     No Known Problems Maternal Grandmother     No Known Problems Maternal Grandfather     Breast Cancer Paternal Grandmother     Heart Attack Paternal Grandfather      Social History     Tobacco Use    Smoking status: Former Smoker     Packs/day: 0.50     Years: 9.00     Pack years: 4.50     Types: Cigarettes     Start date: 1978     Quit date: 1987     Years since quittin.3    Smokeless tobacco: Never Used   Substance Use Topics    Alcohol use: No     Alcohol/week: 0.0 standard drinks      Current Outpatient Medications   Medication Sig Dispense Refill    hydrOXYzine (VISTARIL) 25 MG capsule TAKE 1 CAPSULE BY MOUTH THREE TIMES DAILY AS NEEDED FOR ANXIETY      ALPRAZolam (XANAX) 0.25 MG tablet Take 1 tablet by mouth 3 times daily as needed for Anxiety for up to 30 days. 10 tablet 0    SUMAtriptan Succinate 6 MG/0.5ML SOAJ Inject 1 pen subcutaneously under the skin at the onset of a headache as needed 15 mL 1    Insulin Syringe-Needle U-100 30G X 1/2\" 0.5 ML MISC 1 each by Does not apply route daily for 10 days 10 each 0    SUMAtriptan (IMITREX) 100 MG tablet Take one at HA onset . May repeat in 1 hour . No more 2 per day 4 days out of the week 18 tablet 5    SUMAtriptan (IMITREX) 6 MG/0.5ML SOLN injection Take 1 SQ if table not effective after 1 hour . No more 2 imitrex tablet or SQ per day 4 days per week 6 each 5    busPIRone (BUSPAR) 10 MG tablet TAKE 1 TABLET BY MOUTH THREE TIMES DAILY 90 tablet 2    sertraline (ZOLOFT) 50 MG tablet Take 1 tablet by mouth once daily 90 tablet 1    SUMAtriptan Succinate 6 MG/0.5ML SOAJ INJECT 6MG INJECTION IF TABLET IS NOT EFFECTIVE AFTER 1 HOUR.  NO MORE THAN 2 TABLETS OR INJECTIONS PER DAY AND NO MORE THAN 4 DAYS PER WEEK      atorvastatin (LIPITOR) 10 MG tablet Take 1 tablet by mouth once daily 30 tablet 11    denosumab (PROLIA) 60 MG/ML SOSY SC injection Inject 1 mL into the skin every 6 months 1 mL 1    DULERA 100-5 MCG/ACT inhaler Inhale 2 puffs by mouth twice daily 1 each 8    montelukast (SINGULAIR) 10 MG tablet Take 1 tablet by mouth nightly 90 tablet 2    topiramate (TOPAMAX) 100 MG tablet Take 1 tablet by mouth 2 times daily 60 tablet 11    albuterol sulfate HFA (VENTOLIN HFA) 108 (90 Base) MCG/ACT inhaler Inhale 2 puffs into the lungs every 6 hours as needed for Wheezing or Shortness of Breath 3 Inhaler 2    albuterol (PROVENTIL) (2.5 MG/3ML) 0.083% nebulizer solution Take 3 mLs by nebulization every 6 hours as needed for Wheezing 120 each 11    acetaminophen (TYLENOL) 325 MG tablet Take 2 tablets by mouth every 6 hours as needed for Pain 30 tablet 0    CALCIUM CITRATE PO Take 750 mg by mouth 2 times daily      Multiple Vitamins-Minerals (MULTIVITAMIN ADULTS 50+ PO) Take 1 tablet by mouth daily      KRILL OIL PO Take by mouth daily      vitamin D (CHOLECALCIFEROL) 1000 UNIT TABS tablet Take 1,000 Units by mouth daily      FIBER COMPLETE PO Take by mouth daily       predniSONE (DELTASONE) 20 MG tablet 3 tabs qd x 3 days, 2 tabs qd x 3 days, 1 tab qd x 3 days, then stop. Take with food. 18 tablet 0    lidocaine (LIDODERM) 5 % Place 1 patch onto the skin daily 12 hours on, 12 hours off. 30 patch 0    azelastine (OPTIVAR) 0.05 % ophthalmic solution 1 drop 2 times daily       No current facility-administered medications for this visit.      Facility-Administered Medications Ordered in Other Visits   Medication Dose Route Frequency Provider Last Rate Last Admin    lactated ringers infusion 1,000 mL  1,000 mL IntraVENous Continuous Mikhail Khan MD   Stopped at 08/19/16 1249    HYDROmorphone (DILAUDID) injection 0.25 mg  0.25 mg IntraVENous Q5 Min PRN Azalia Alvarado MD           Allergies   Allergen Reactions    Prozac [Fluoxetine] Other (See Comments)     Caused nightmares    Nsaids Other (See Comments)     Bariatric Surgeon told me not to take NSAIDS for good    Pcn [Penicillins] Other (See Comments)     Sores in mouth    Bactrim [Sulfamethoxazole-Trimethoprim] Itching and Rash    Codeine Itching and Rash       Health Maintenance   Topic Date Due    Lipids  07/12/2022    Depression Screen  08/06/2022    Breast cancer screen  02/10/2024    Colorectal Cancer Screen  04/03/2025    Cervical cancer screen  07/14/2025    Pneumococcal 0-64 years Vaccine (3 - PPSV23 or PCV20) 01/10/2026    DTaP/Tdap/Td vaccine (3 - Td or Tdap) 07/31/2031    Flu vaccine  Completed    Shingles Vaccine  Completed    COVID-19 Vaccine  Completed    Hepatitis C screen  Completed    HIV screen  Completed    Hepatitis A vaccine  Aged Out    Hepatitis B vaccine  Aged Out    Hib vaccine  Aged Out    Meningococcal (ACWY) vaccine  Aged Out       Subjective:      Review of Systems   Constitutional: Negative for chills, fatigue and fever. HENT: Negative for trouble swallowing. Respiratory: Negative for cough, chest tightness, shortness of breath and wheezing. Cardiovascular: Negative for chest pain and palpitations. Gastrointestinal: Negative for abdominal pain. Musculoskeletal: Negative for arthralgias and neck pain. Skin: Negative for rash. Neurological: Negative for dizziness, facial asymmetry, speech difficulty, weakness, light-headedness, numbness and headaches. Psychiatric/Behavioral: The patient is nervous/anxious. All other systems reviewed and are negative. Objective:     Physical Exam  Constitutional:       General: She is not in acute distress. Appearance: Normal appearance. She is obese. She is not ill-appearing. HENT:      Head: Normocephalic and atraumatic. Nose: Nose normal.   Eyes:      Extraocular Movements: Extraocular movements intact. Conjunctiva/sclera: Conjunctivae normal.      Pupils: Pupils are equal, round, and reactive to light. Cardiovascular:      Rate and Rhythm: Normal rate and regular rhythm. Pulses: Normal pulses. Heart sounds: Normal heart sounds. Pulmonary:      Effort: Pulmonary effort is normal. No respiratory distress. Breath sounds: Normal breath sounds. No wheezing, rhonchi or rales. Chest:      Chest wall: No tenderness. Abdominal:      General: Abdomen is flat. Palpations: Abdomen is soft.    Musculoskeletal:         General: Normal range of motion. Cervical back: Neck supple. Right lower leg: Swelling present. No tenderness. Left lower leg: Normal.        Legs:    Skin:     General: Skin is warm and dry. Capillary Refill: Capillary refill takes less than 2 seconds. Neurological:      General: No focal deficit present. Mental Status: She is alert and oriented to person, place, and time. Psychiatric:         Mood and Affect: Mood normal.          Assessment/Plan:      1. Right leg swelling  -     US DUP LOWER EXTREMITY RIGHT YAOY; Future  2. Anxiety  -     ALPRAZolam (XANAX) 0.25 MG tablet; Take 1 tablet by mouth 3 times daily as needed for Anxiety for up to 30 days. , Disp-10 tablet, R-0Normal     Right leg swelling-  An ultrasound has been ordered to rule out DVT. With her history of a clot in the left leg I would like this to be ruled out as soon as possible. Anxiety-  Patient will be provided a prescription for a limited amount of antianxiety medication. She verbally acknowledges understanding that a refill will not be provided till she has spoken with her PCP. Return if symptoms worsen or fail to improve. Orders Placed This Encounter   Procedures    US DUP LOWER EXTREMITY RIGHT YAYO     Standing Status:   Future     Standing Expiration Date:   5/25/2022     Order Specific Question:   Reason for exam:     Answer:   r/o DVT     Orders Placed This Encounter   Medications    ALPRAZolam (XANAX) 0.25 MG tablet     Sig: Take 1 tablet by mouth 3 times daily as needed for Anxiety for up to 30 days. Dispense:  10 tablet     Refill:  0       Reviewed health maintenance, prior labs and imaging. Patient given educational materials - see patient instructions. Discussed use, benefit, and side effects of prescribed medications. Barriers to medication compliance addressed. All patient questions answered. Pt voiced understanding to plan of care.    Instructed to continue medications as discussed, healthy diet and exercise. Patient agreed with treatment plan. Follow up as directed below. This note is created with the assistance of a speech-recognition program. While intending to generate a document that actually reflects the content of the visit, no guarantees can be provided that every mistake has been identified and corrected by editing.     Electronically signed by EMILY Santizo CNP, APRN-CNP on 4/25/2022 at 11:55 AM

## 2022-04-26 NOTE — TELEPHONE ENCOUNTER
I called Renee Bean at [facility] and advised okay to dispense. She voiced understanding. I also let Selina Farr know that Dr. Radhika Mayer was okay with this.

## 2022-04-28 DIAGNOSIS — I89.0 LYMPHEDEMA OF RIGHT LOWER EXTREMITY: Primary | ICD-10-CM

## 2022-05-31 ENCOUNTER — TELEPHONE (OUTPATIENT)
Dept: VASCULAR SURGERY | Age: 62
End: 2022-05-31

## 2022-06-05 DIAGNOSIS — F43.22 ADJUSTMENT DISORDER WITH ANXIETY: Primary | ICD-10-CM

## 2022-06-05 RX ORDER — ALPRAZOLAM 0.25 MG/1
0.25 TABLET ORAL DAILY PRN
Qty: 30 TABLET | Refills: 1 | Status: SHIPPED | OUTPATIENT
Start: 2022-06-05 | End: 2022-07-05

## 2022-06-30 DIAGNOSIS — F51.5 NIGHTMARE: ICD-10-CM

## 2022-06-30 DIAGNOSIS — J45.20 MILD INTERMITTENT ASTHMA WITHOUT COMPLICATION: ICD-10-CM

## 2022-06-30 DIAGNOSIS — F41.9 ANXIETY: ICD-10-CM

## 2022-06-30 RX ORDER — BUSPIRONE HYDROCHLORIDE 10 MG/1
TABLET ORAL
Qty: 90 TABLET | Refills: 0 | Status: SHIPPED | OUTPATIENT
Start: 2022-06-30 | End: 2022-08-02

## 2022-06-30 RX ORDER — MONTELUKAST SODIUM 10 MG/1
10 TABLET ORAL NIGHTLY
Qty: 90 TABLET | Refills: 1 | Status: SHIPPED | OUTPATIENT
Start: 2022-06-30

## 2022-06-30 NOTE — TELEPHONE ENCOUNTER
Jose Angel is calling to request a refill on the following medication(s):    Medication Request:  Requested Prescriptions     Pending Prescriptions Disp Refills    busPIRone (BUSPAR) 10 MG tablet [Pharmacy Med Name: busPIRone HCl 10 MG Oral Tablet] 90 tablet 0     Sig: TAKE 1 TABLET BY MOUTH THREE TIMES DAILY    sertraline (ZOLOFT) 50 MG tablet [Pharmacy Med Name: Sertraline HCl 50 MG Oral Tablet] 30 tablet 0     Sig: Take 1 tablet by mouth once daily       Last Visit Date (If Applicable):  5/2/9315    Next Visit Date:    9/1/2022

## 2022-06-30 NOTE — TELEPHONE ENCOUNTER
LAST VISIT: 1/17/22 with NP Anais Chahal VISIT: 7/25/22    Per NP Sandy Cuenca last dictation patient is on these medications. Please sign for refills if ok. Thank you.

## 2022-07-07 ENCOUNTER — INITIAL CONSULT (OUTPATIENT)
Dept: VASCULAR SURGERY | Age: 62
End: 2022-07-07
Payer: MEDICARE

## 2022-07-07 VITALS
BODY MASS INDEX: 39.7 KG/M2 | RESPIRATION RATE: 18 BRPM | OXYGEN SATURATION: 96 % | HEART RATE: 84 BPM | DIASTOLIC BLOOD PRESSURE: 75 MMHG | HEIGHT: 66 IN | TEMPERATURE: 98.4 F | SYSTOLIC BLOOD PRESSURE: 119 MMHG | WEIGHT: 247 LBS

## 2022-07-07 DIAGNOSIS — I87.2 VENOUS INSUFFICIENCY OF RIGHT LOWER EXTREMITY: Primary | ICD-10-CM

## 2022-07-07 PROCEDURE — G8427 DOCREV CUR MEDS BY ELIG CLIN: HCPCS | Performed by: SURGERY

## 2022-07-07 PROCEDURE — 99204 OFFICE O/P NEW MOD 45 MIN: CPT | Performed by: SURGERY

## 2022-07-07 PROCEDURE — 1036F TOBACCO NON-USER: CPT | Performed by: SURGERY

## 2022-07-07 PROCEDURE — G8417 CALC BMI ABV UP PARAM F/U: HCPCS | Performed by: SURGERY

## 2022-07-07 PROCEDURE — 3017F COLORECTAL CA SCREEN DOC REV: CPT | Performed by: SURGERY

## 2022-07-07 ASSESSMENT — ENCOUNTER SYMPTOMS
SHORTNESS OF BREATH: 0
ABDOMINAL PAIN: 0
CHEST TIGHTNESS: 0
VOMITING: 0
FACIAL SWELLING: 0
EYE PAIN: 0
NAUSEA: 0

## 2022-07-07 NOTE — PROGRESS NOTES
Division of Vascular Surgery        New Consult      Physician Requesting Consult:  Alton Lindo    Reason for Consult: Right sided lower extremity swelling    Chief Complaint:      Right sided leg swelling    History of Present Illness:      Robi Fraser is a 65 y.o. woman who presents with right leg swelling. She states that this was first noted around mothers day by her daughter. She states that she has no skin changes, no tenderness or pain with walking. She is still walking around her normal amount. Patient previously had a DVT in the left leg as well as a PE which was secondary to an injury to her left foot. She was treated with anticoagulation for 6 months after her PE but has not been on any anticoagulation since then. She denies any recent trauma to her right leg. Patient denies any previous operative procedure to her right leg. Denies any other previous vascular interventions. Denies fever, chills, chest pain or SOB.      Medical History:     Past Medical History:   Diagnosis Date    Acromioclavicular joint arthritis 4/18/2016    Acute gastroenteritis 10/11/2017    Acute gastroenteritis 10/11/2017    Anemia 12/11/2018    Arthritis     Asthma 1980    On Inhaler    Bursitis     left shoulder    Chronic right shoulder pain 7/24/2018    Closed fracture of left tibial plateau 4/63/5693    Closed fracture of left tibial plateau 0/90/4751    Closed fracture of proximal end of left tibia 11/22/2018    Closed fracture of proximal end of left tibia 11/22/2018    Closed fracture of shaft of left tibia 11/22/2018    Closed fracture of shaft of left tibia 11/22/2018    Depression     Dry eyes     Essential hypertension 8/21/2017    Former smoker     GERD (gastroesophageal reflux disease)     not since Shelton-en-Y and weight loss    Head ache     on Topiramate    Hearing loss     mild    Hemorrhoids     Hiatal hernia     History of bladder infections     History of DVT (deep vein thrombosis) 2010    started in left leg, went to lungs    History of gastric bypass 2018    History of gastritis     History of morbid obesity     had Shelton-en y    History of pulmonary embolism     from foot  trauma, was treated for six months with anticoagulation. She has no inferior vena cava filter.  Hyperlipidemia     not since weight loss    Hypertension     Not anymore after Bariatric Surgery, no medication    Knee pain 3/6/2015    Knee pain 3/6/2015    Mild intermittent asthma     Morbid obesity ([address] 2015    Obesity (BMI 30-39. 9) 2016    KEVIN on CPAP     at night    Osteoarthritis     Osteopenia     S/P gastric bypass 12-29-15    Dr. Ayo Armas, [facility], hosp wt = 280lb, initial wt = 328lb    S/p tibial fracture 2018    Status post gastric bypass for obesity     Tibial plateau fracture, left, closed, initial encounter 2018    Tibial plateau fracture, left, closed, initial encounter 2018    Vitamin D deficiency     Wears dentures     Wears glasses     Weight loss of 160 lbs since surgery  2017       Surgical History:     Past Surgical History:   Procedure Laterality Date    ABDOMINOPLASTY N/A 2019    ABDOMINOPLASTY performed by Jorge Alaniz MD at [facility] ARTHROSCOPY / ARTHROTOMY KNEE Left 2018    KNEE ARTHROSCOPY LEFT performed by Maddy Persaud MD at [facility]  1986,  1987     SECTION      CHOLECYSTECTOMY      CLOSED MANIPULATION SHOULDER Right 2018    SHOULDER MANIPULATION WITH INJECTION performed by Maddy Persaud MD at [facility]  16    EXCISION / BIOPSY SKIN LESION OF HEAD / NECK N/A 4/3/2017     EXCISION POSTERIOR NECK CYST performed by Maryanne Guerra IV, DO at [facility]  Left 2018    left tibia plateau external fixator application    NECK SURGERY      cyst removed back of neck    ORIF PROXIMAL TIBIAL PLATEAU FRACTURE Left 12/5/2018    TIBIAL PLATEAU OPEN REDUCTION INTERNAL FIXATION LEFT -  SYNTHES     C-ARM performed by Tyrese Mckinney MD at [facility]  10/14/2016    excision back lipoma with drain placement    IN OFFICE/OUTPT VISIT,PROCEDURE ONLY Right 10/8/2018    RIGHT SHOULDER  MAGDY PROCEDURE performed by Tyrese Mckinney MD at [address]  [place] OFFICE/OUTPT [address] [facility] [address] Left 11/22/2018    LEFT TIBIA PLATEAU EXTERNAL FIXATOR APPLICATION performed by Nick Olvera MD at [address] [facility]  12/29/15    liver bx, EGD    SHOULDER SURGERY Left 08/19/2016    Magdy procedure, bone spurs 8/19/2016    SHOULDER SURGERY Right 10/08/2018    magdy    TONSILLECTOMY  1979    UPPER GASTROINTESTINAL ENDOSCOPY  08/2015    found hiatal hernia       Family History:     Family History   Problem Relation Age of Onset    Asthma Mother     Depression Mother     Anxiety Disorder Mother     Cancer Father         leukemia    Other Father         Myelodysplastic syndrome, which converted to leukemia    Migraines Sister     No Known Problems Brother     Ovarian Cancer Maternal Aunt     No Known Problems Maternal Grandmother     No Known Problems Maternal Grandfather     Breast Cancer Paternal Grandmother     Heart Attack Paternal Grandfather        Allergies:       Prozac [fluoxetine], Nsaids, Pcn [penicillins], Bactrim [sulfamethoxazole-trimethoprim], and Codeine    Medications:      Current Outpatient Medications   Medication Sig Dispense Refill    DULERA 100-5 MCG/ACT inhaler Inhale 2 puffs by mouth twice daily 3 each 1    busPIRone (BUSPAR) 10 MG tablet TAKE 1 TABLET BY MOUTH THREE TIMES DAILY 90 tablet 0    montelukast (SINGULAIR) 10 MG tablet Take 1 tablet by mouth nightly 90 tablet 1    sertraline (ZOLOFT) 50 MG tablet Take 1 tablet by mouth once daily 30 tablet 0    hydrOXYzine (VISTARIL) 25 MG capsule TAKE 1 CAPSULE BY MOUTH THREE TIMES DAILY AS NEEDED FOR ANXIETY      SUMAtriptan Succinate 6 MG/0.5ML SOAJ Inject 1 pen subcutaneously under the skin at the onset of a headache as needed 15 mL 1    SUMAtriptan (IMITREX) 100 MG tablet Take one at HA onset . May repeat in 1 hour . No more 2 per day 4 days out of the week 18 tablet 5    SUMAtriptan (IMITREX) 6 MG/0.5ML SOLN injection Take 1 SQ if table not effective after 1 hour . No more 2 imitrex tablet or SQ per day 4 days per week 6 each 5    SUMAtriptan Succinate 6 MG/0.5ML SOAJ INJECT 6MG INJECTION IF TABLET IS NOT EFFECTIVE AFTER 1 HOUR. NO MORE THAN 2 TABLETS OR INJECTIONS PER DAY AND NO MORE THAN 4 DAYS PER WEEK      atorvastatin (LIPITOR) 10 MG tablet Take 1 tablet by mouth once daily 30 tablet 11    denosumab (PROLIA) 60 MG/ML SOSY SC injection Inject 1 mL into the skin every 6 months 1 mL 1    topiramate (TOPAMAX) 100 MG tablet Take 1 tablet by mouth 2 times daily 60 tablet 11    albuterol sulfate HFA (VENTOLIN HFA) 108 (90 Base) MCG/ACT inhaler Inhale 2 puffs into the lungs every 6 hours as needed for Wheezing or Shortness of Breath 3 Inhaler 2    albuterol (PROVENTIL) (2.5 MG/3ML) 0.083% nebulizer solution Take 3 mLs by nebulization every 6 hours as needed for Wheezing 120 each 11    acetaminophen (TYLENOL) 325 MG tablet Take 2 tablets by mouth every 6 hours as needed for Pain 30 tablet 0    CALCIUM CITRATE PO Take 750 mg by mouth 2 times daily      Multiple Vitamins-Minerals (MULTIVITAMIN ADULTS 50+ PO) Take 1 tablet by mouth daily      KRILL OIL PO Take by mouth daily      vitamin D (CHOLECALCIFEROL) 1000 UNIT TABS tablet Take 1,000 Units by mouth daily      FIBER COMPLETE PO Take by mouth daily       Insulin Syringe-Needle U-100 30G X 1/2\" 0.5 ML MISC 1 each by Does not apply route daily for 10 days 10 each 0     No current facility-administered medications for this visit.      Facility-Administered Medications Ordered in Other Visits   Medication Dose Route Frequency Provider Last Rate Last Admin    lactated ringers infusion 1,000 mL  1,000 mL IntraVENous Continuous Mikhail Andrews MD   Stopped at 08/19/16 1249    HYDROmorphone (DILAUDID) injection 0.25 mg  0.25 mg IntraVENous Q5 Min PRN Benjamin Trevizo MD         Social History:     Tobacco:    reports that she quit smoking about 35 years ago. Her smoking use included cigarettes. She started smoking about 43 years ago. She has a 4.50 pack-year smoking history. She has never used smokeless tobacco.  Alcohol:      reports no history of alcohol use. Drug Use:  reports no history of drug use. Occupation:     Review of Systems:     Review of Systems   Constitutional: Negative for activity change, appetite change, chills and fever. HENT: Negative for drooling, ear discharge and facial swelling. Eyes: Negative for pain and visual disturbance. Respiratory: Negative for chest tightness and shortness of breath. Cardiovascular: Negative for chest pain and leg swelling. Gastrointestinal: Negative for abdominal pain, nausea and vomiting. Genitourinary: Negative for difficulty urinating and flank pain. Musculoskeletal: Negative for gait problem and myalgias. Allergic/Immunologic: Negative for immunocompromised state. Neurological: Negative for facial asymmetry and speech difficulty. Hematological: Does not bruise/bleed easily. Psychiatric/Behavioral: Negative for agitation and behavioral problems. Physical Exam:     Vitals:  /75 (Site: Right Upper Arm, Position: Sitting, Cuff Size: Large Adult)   Pulse 84   Temp 98.4 °F (36.9 °C) (Temporal)   Resp 18   Ht 5' 6\" (1.676 m)   Wt 247 lb (112 kg)   LMP  (LMP Unknown)   SpO2 96%   BMI 39.87 kg/m²     Physical Exam  Constitutional:       General: She is not in acute distress. Appearance: She is not toxic-appearing. HENT:      Head: Normocephalic and atraumatic.       Right Ear: External ear normal.      Left Ear: External ear normal.      Nose: Nose normal. Mouth/Throat:      Mouth: Mucous membranes are moist.      Pharynx: Oropharynx is clear. Eyes:      Pupils: Pupils are equal, round, and reactive to light. Cardiovascular:      Rate and Rhythm: Normal rate and regular rhythm. Pulses: Normal pulses. Pulmonary:      Effort: Pulmonary effort is normal. No respiratory distress. Chest:      Chest wall: No tenderness. Abdominal:      General: There is no distension. Palpations: Abdomen is soft. Tenderness: There is no abdominal tenderness. Musculoskeletal:         General: Swelling present. Normal range of motion. Cervical back: Normal range of motion. Comments: Right leg with increased swelling compared to left, non-pitting   Skin:     General: Skin is warm and dry. Neurological:      General: No focal deficit present. Mental Status: She is alert and oriented to person, place, and time. Imaging/Labs:     VL lower extremity venous 4/25/2022  Right Impression:  The common femoral, femoral, popliteal and tibial veins  demonstrate normal compressibility. Normal compressibility of the great saphenous vein. Normal compressibility of the small saphenous vein. Left Impression:  The common femoral vein demonstrate normal compressibility. Assessment and Plan:     65 female with right leg swelling, possible venous insufficiency    -Recommend compression socks  -Wrapped leg with ace wrap while she was in the office, taught  how to preform the wrap  -Discussed possibility of lymphedema pumps if swelling does not improve with compression  -Follow up in 3 months      Electronically signed by Dayday Gomez MD on 7/11/2022 at 4:52 PM      [address]  Office: [phone]  Cell: [phone]  Email: [email or web address]

## 2022-07-11 PROBLEM — I87.2 VENOUS INSUFFICIENCY OF RIGHT LOWER EXTREMITY: Status: ACTIVE | Noted: 2022-07-11

## 2022-07-14 ENCOUNTER — TELEPHONE (OUTPATIENT)
Dept: VASCULAR SURGERY | Age: 62
End: 2022-07-14

## 2022-07-14 NOTE — TELEPHONE ENCOUNTER
----- Message from Juan Alberto Corbett MA sent at 7/14/2022  9:32 AM EDT -----  PT notified, she verbalized understanding.  ----- Message -----  From: Patricio Olivo MD  Sent: 7/13/2022   3:57 PM EDT  To: Derik Claire MA, #    Probably related to fluid moving up her leg    She can try a knee sleeve which may help    Over time though it should get better  ----- Message -----  From: Derik Claire MA  Sent: 7/13/2022   2:33 PM EDT  To: Patricio Olivo MD, #    Patient calls stating that she is wearing the compression stockings as you advised. But it is calling her to have knee pain now. She is wondering what to do from here because there was not knee pain before. Please advise.

## 2022-07-15 ENCOUNTER — TELEMEDICINE (OUTPATIENT)
Dept: NEUROLOGY | Age: 62
End: 2022-07-15
Payer: MEDICARE

## 2022-07-15 DIAGNOSIS — G43.009 MIGRAINE WITHOUT AURA AND WITHOUT STATUS MIGRAINOSUS, NOT INTRACTABLE: Primary | ICD-10-CM

## 2022-07-15 PROCEDURE — 3017F COLORECTAL CA SCREEN DOC REV: CPT | Performed by: PSYCHIATRY & NEUROLOGY

## 2022-07-15 PROCEDURE — 1036F TOBACCO NON-USER: CPT | Performed by: PSYCHIATRY & NEUROLOGY

## 2022-07-15 PROCEDURE — G8427 DOCREV CUR MEDS BY ELIG CLIN: HCPCS | Performed by: PSYCHIATRY & NEUROLOGY

## 2022-07-15 PROCEDURE — G8417 CALC BMI ABV UP PARAM F/U: HCPCS | Performed by: PSYCHIATRY & NEUROLOGY

## 2022-07-15 PROCEDURE — 99213 OFFICE O/P EST LOW 20 MIN: CPT | Performed by: PSYCHIATRY & NEUROLOGY

## 2022-07-15 ASSESSMENT — ENCOUNTER SYMPTOMS
RESPIRATORY NEGATIVE: 1
GASTROINTESTINAL NEGATIVE: 1
EYES NEGATIVE: 1
ALLERGIC/IMMUNOLOGIC NEGATIVE: 1

## 2022-07-15 NOTE — PROGRESS NOTES
Subjective:      Patient ID: Dorota Hansen is a 65 y.o. female. Active problem common migraine headaches adding elavil to topamax . The condition is elavil 50 mg qhs was added to topamax 100 mg po bid tolerating well having marked headache improvement with only three headaches since April . Headaches are over vertex head area of pressure throbbing up to grade 8 over 10 partially attenuated with imitrex 100 mg tablet at times needing imitrex SQ  rescue drug. There has been development of postural tremor of both hands interfering with crocheting with family history of tremor having had minor tremor in past . There maybe blurred vision before headache . Headaches are in part from stressors taking care of grandson . She is tolerating topamax 100 mg po bid along with imitrex well . There will be no tingling , weakness or bulbar complaints . She can not use NSAID with prior bariatric surgery. Weather fronts may be trigger headaches. Significant medications topamax 100 mg po bid , imitrex 100 mg PRN, imitrex SQ PRN  . Testing Head CT normal , May 2020.  MRI of Head normal      Past Medical History:   Diagnosis Date    Acromioclavicular joint arthritis 4/18/2016    Acute gastroenteritis 10/11/2017    Acute gastroenteritis 10/11/2017    Anemia 12/11/2018    Arthritis     Asthma 1980    On Inhaler    Bursitis     left shoulder    Chronic right shoulder pain 7/24/2018    Closed fracture of left tibial plateau 1/20/1905    Closed fracture of left tibial plateau 4/85/7640    Closed fracture of proximal end of left tibia 11/22/2018    Closed fracture of proximal end of left tibia 11/22/2018    Closed fracture of shaft of left tibia 11/22/2018    Closed fracture of shaft of left tibia 11/22/2018    Depression     Dry eyes     Essential hypertension 8/21/2017    Former smoker     GERD (gastroesophageal reflux disease)     not since Shelton-en-Y and weight loss    Head ache     on Topiramate    Hearing loss     mild Hemorrhoids     Hiatal hernia     History of bladder infections     History of DVT (deep vein thrombosis) 2010    started in left leg, went to lungs    History of gastric bypass 12/6/2018    History of gastritis     History of morbid obesity     had Shelton-en y    History of pulmonary embolism 2010    from foot  trauma, was treated for six months with anticoagulation. She has no inferior vena cava filter. Hyperlipidemia     not since weight loss    Hypertension 2015    Not anymore after Bariatric Surgery, no medication    Knee pain 3/6/2015    Knee pain 3/6/2015    Lymphedema of leg     Right leg    Mild intermittent asthma     Morbid obesity ([address] 8/18/2015    Obesity (BMI 30-39. 9) 7/1/2016    KEVIN on CPAP 2015    at night    Osteoarthritis     Osteopenia     S/P gastric bypass 12-29-15    Dr. Jr Pastor, hosp wt = 280lb, initial wt = 328lb    S/p tibial fracture 11/21/2018    Status post gastric bypass for obesity     Tibial plateau fracture, left, closed, initial encounter 12/5/2018    Tibial plateau fracture, left, closed, initial encounter 12/5/2018    Tremor     Vitamin D deficiency 2015    Wears dentures     Wears glasses     Weight loss of 160 lbs since surgery  8/21/2017       Past Surgical History:   Procedure Laterality Date    ABDOMINOPLASTY N/A 8/16/2019    ABDOMINOPLASTY performed by Jessica Randle MD at [facility] / ARTHROTOMY KNEE Left 12/5/2018    KNEE ARTHROSCOPY LEFT performed by Amie Jacobs MD at [address]  8/1986,  9/1987    [address] [facility] Right 12/5/2018    SHOULDER MANIPULATION WITH INJECTION performed by Amie Jacobs MD at [address]  6/17/16    EXCISION / BIOPSY SKIN LESION OF HEAD / NECK N/A 4/3/2017     EXCISION POSTERIOR NECK CYST performed by Josie Perkins IV, DO at [address]  Left 11/22/2018    left tibia plateau external fixator application    NECK SURGERY      cyst removed back of neck    ORIF PROXIMAL TIBIAL PLATEAU FRACTURE Left 2018    TIBIAL PLATEAU OPEN REDUCTION INTERNAL FIXATION LEFT -  SYNTHES     C-ARM performed by Amie Jacobs MD at [address]  10/14/2016    excision back lipoma with drain placement    [place] OFFICE/OUTPT VISIT,PROCEDURE ONLY Right 10/8/2018    RIGHT SHOULDER  MAGDY PROCEDURE performed by Amie Jacobs MD at [address] [facility] OFFICE/OUTPT [address] [facility] Left 2018    LEFT TIBIA PLATEAU EXTERNAL FIXATOR APPLICATION performed by Lanie Quintero MD at [facility]  12/29/15    liver bx, EGD    SHOULDER SURGERY Left 2016    Magdy procedure, bone spurs 2016    SHOULDER SURGERY Right 10/08/2018    magdy    TONSILLECTOMY  1979    UPPER GASTROINTESTINAL ENDOSCOPY  2015    found hiatal hernia       Family History   Problem Relation Age of Onset    Asthma Mother     Depression Mother     Anxiety Disorder Mother     Cancer Father         leukemia    Other Father         Myelodysplastic syndrome, which converted to leukemia    Migraines Sister     No Known Problems Brother     Ovarian Cancer Maternal Aunt     No Known Problems Maternal Grandmother     No Known Problems Maternal Grandfather     Breast Cancer Paternal Grandmother     Heart Attack Paternal Grandfather        Social History     Socioeconomic History    Marital status:      Spouse name: Abram Gomez    Number of children: 3    Years of education: None    Highest education level: None   Occupational History    Occupation: UNEMPLOYED    Tobacco Use    Smoking status: Former     Packs/day: 0.50     Years: 9.00     Pack years: 4.50     Types: Cigarettes     Start date: 1978     Quit date: 1987     Years since quittin.5    Smokeless tobacco: Never   Vaping Use    Vaping Use: Never used   Substance and Sexual Activity    Alcohol use: No     Alcohol/week: 0.0 standard drinks    Drug use: No    Sexual activity: Yes     Partners: Male     Comment: Has been together since 1982       Current Outpatient Medications   Medication Sig Dispense Refill    DULERA 100-5 MCG/ACT inhaler Inhale 2 puffs by mouth twice daily 3 each 1    busPIRone (BUSPAR) 10 MG tablet TAKE 1 TABLET BY MOUTH THREE TIMES DAILY 90 tablet 0    montelukast (SINGULAIR) 10 MG tablet Take 1 tablet by mouth nightly 90 tablet 1    sertraline (ZOLOFT) 50 MG tablet Take 1 tablet by mouth once daily 30 tablet 0    hydrOXYzine (VISTARIL) 25 MG capsule TAKE 1 CAPSULE BY MOUTH THREE TIMES DAILY AS NEEDED FOR ANXIETY      SUMAtriptan Succinate 6 MG/0.5ML SOAJ Inject 1 pen subcutaneously under the skin at the onset of a headache as needed 15 mL 1    Insulin Syringe-Needle U-100 30G X 1/2\" 0.5 ML MISC 1 each by Does not apply route daily for 10 days 10 each 0    SUMAtriptan (IMITREX) 100 MG tablet Take one at HA onset . May repeat in 1 hour . No more 2 per day 4 days out of the week 18 tablet 5    SUMAtriptan (IMITREX) 6 MG/0.5ML SOLN injection Take 1 SQ if table not effective after 1 hour . No more 2 imitrex tablet or SQ per day 4 days per week 6 each 5    SUMAtriptan Succinate 6 MG/0.5ML SOAJ INJECT 6MG INJECTION IF TABLET IS NOT EFFECTIVE AFTER 1 HOUR.  NO MORE THAN 2 TABLETS OR INJECTIONS PER DAY AND NO MORE THAN 4 DAYS PER WEEK      atorvastatin (LIPITOR) 10 MG tablet Take 1 tablet by mouth once daily 30 tablet 11    denosumab (PROLIA) 60 MG/ML SOSY SC injection Inject 1 mL into the skin every 6 months 1 mL 1    topiramate (TOPAMAX) 100 MG tablet Take 1 tablet by mouth 2 times daily 60 tablet 11    albuterol sulfate HFA (VENTOLIN HFA) 108 (90 Base) MCG/ACT inhaler Inhale 2 puffs into the lungs every 6 hours as needed for Wheezing or Shortness of Breath 3 Inhaler 2    albuterol (PROVENTIL) (2.5 MG/3ML) 0.083% nebulizer solution Take 3 mLs by nebulization every 6 hours as needed for Wheezing 120 each 11    acetaminophen (TYLENOL) 325 MG tablet Take 2 tablets by mouth every 6 hours as needed for Pain 30 tablet 0    CALCIUM CITRATE PO Take 750 mg by mouth 2 times daily      Multiple Vitamins-Minerals (MULTIVITAMIN ADULTS 50+ PO) Take 1 tablet by mouth daily      KRILL OIL PO Take by mouth daily      vitamin D (CHOLECALCIFEROL) 1000 UNIT TABS tablet Take 1,000 Units by mouth daily      FIBER COMPLETE PO Take by mouth daily        No current facility-administered medications for this visit. Facility-Administered Medications Ordered in Other Visits   Medication Dose Route Frequency Provider Last Rate Last Admin    lactated ringers infusion 1,000 mL  1,000 mL IntraVENous Continuous Malindamed Shirlean Schaumann, MD   Stopped at 08/19/16 1249    HYDROmorphone (DILAUDID) injection 0.25 mg  0.25 mg IntraVENous Q5 Min PRN Belén Murphy MD           Allergies   Allergen Reactions    Prozac [Fluoxetine] Other (See Comments)     Caused nightmares    Nsaids Other (See Comments)     Bariatric Surgeon told me not to take NSAIDS for good    Pcn [Penicillins] Other (See Comments)     Sores in mouth    Bactrim [Sulfamethoxazole-Trimethoprim] Itching and Rash    Codeine Itching and Rash       Review of Systems   Constitutional:  Positive for fatigue. HENT: Negative. Eyes: Negative. Respiratory: Negative. Cardiovascular: Negative. Gastrointestinal: Negative. Endocrine: Negative. Genitourinary: Negative. Musculoskeletal: Negative. Skin: Negative. Allergic/Immunologic: Negative. Neurological: Negative. Hematological: Negative. Psychiatric/Behavioral: Negative. Objective:   Physical Exam  There were no vitals filed for this visit. weight:      Neurological Examination  Constitutional .General exam well groomed   Head/Ears /Nose/Throat: external ear . Normal exam  Neck and thyroid . Normal size. No bruits  Respiratory . Breathsounds clear bilaterally  Cardiovascular:  Auscultation of heart with regular rate and rhythm  Musculoskeletal. Muscle bulk and tone normal                                                           Muscle strength 5/5 strength throughout                                                                                No dysmetria or dysdiadokinesis  No tremor   Normal fine motor  Gait normal   Orientation Alert and oriented x 3   Attention and concentration normal  Short term memory normal  Language process and speech normal . No aphasia   Cranial nerve 2 normal acuety and visual fields  Cranial nerve 3, 4 and 6 . Extraocular muscles are intact . Pupils are equal and reactive   Cranial nerve 5 . Normal strength of masseter and temporalis . Intact corneal reflex. Normal facial sensation  Cranial nerve 7 normal exam   Cranial nerve 8. Grossly intact hearing   Cranial nerve 9 and 10. Symmetric palate elevation   Cranial nerve 11 , 5 out of 5 strength   Cranial Nerve 12 midline tongue . No atrophy  Sensation . Normal proprioception . Intact Vibration . Normal pinprick and light touch   Deep Tendon Reflexes normal  Plantar response flexor bilaterally    Assessment:       Diagnosis Orders   1. Migraine without aura and without status migrainosus, not intractable        She is to continue current medication regimen       Plan:      As above       Nhi [place], was evaluated through a synchronous (real-time) audio-video encounter. The patient (or guardian if applicable) is aware that this is a billable service. Verbal consent to proceed has been obtained within the past 12 months. The visit was conducted pursuant to the emergency declaration under the [facility], [address] [place] [address] authority and the [employer] and [employer] General Act. Patient identification was verified, and a caregiver was present when appropriate. The patient was located in a state where the provider was credentialed to provide care.         --Victor M Obando MD on 7/15/2022 at 5:12 PM    An electronic signature was used to authenticate this note.         Cale Cazares MD

## 2022-07-25 ENCOUNTER — TELEMEDICINE (OUTPATIENT)
Dept: PULMONOLOGY | Age: 62
End: 2022-07-25
Payer: MEDICARE

## 2022-07-25 DIAGNOSIS — Z99.89 OSA ON CPAP: ICD-10-CM

## 2022-07-25 DIAGNOSIS — G47.33 OSA ON CPAP: ICD-10-CM

## 2022-07-25 DIAGNOSIS — J45.20 MILD INTERMITTENT ASTHMA WITHOUT COMPLICATION: Primary | ICD-10-CM

## 2022-07-25 DIAGNOSIS — Z86.718 HISTORY OF DVT (DEEP VEIN THROMBOSIS): ICD-10-CM

## 2022-07-25 DIAGNOSIS — Z98.84 STATUS POST GASTRIC BYPASS FOR OBESITY: ICD-10-CM

## 2022-07-25 PROCEDURE — G8427 DOCREV CUR MEDS BY ELIG CLIN: HCPCS | Performed by: INTERNAL MEDICINE

## 2022-07-25 PROCEDURE — 3017F COLORECTAL CA SCREEN DOC REV: CPT | Performed by: INTERNAL MEDICINE

## 2022-07-25 PROCEDURE — 99213 OFFICE O/P EST LOW 20 MIN: CPT | Performed by: INTERNAL MEDICINE

## 2022-07-25 ASSESSMENT — ENCOUNTER SYMPTOMS
RESPIRATORY NEGATIVE: 1
EYES NEGATIVE: 1
GASTROINTESTINAL NEGATIVE: 1

## 2022-07-25 NOTE — PROGRESS NOTES
2022    TELEHEALTH EVALUATION -- Audio/Visual ([facility])    Patient and physician are located in their individual locations. This is visit is completed via [employer] application / [email or web address] / Telephone     Chief Complaint   Patient presents with    Asthma        HPI:    Rubin Ormond (:  1960) has requested an audio/video evaluation for the following concern(s):    Asthma stable without exacerbation. Compliant with Dulera and has albuterol which he uses rarely. No recent hospitalization or prednisone use. Patient is compliant with her CPAP therapy without excessive daytime sleepiness or fatigue. Mask is comfortable. Compliance reviewed. Review of Systems   Constitutional: Negative. HENT: Negative. Eyes: Negative. Respiratory: Negative. Cardiovascular: Negative. Gastrointestinal: Negative.      LUNG CANCER SCREENING     CRITERIA MET    []     CT ORDERED  []      CRITERIA NOT MET   [x]      REFUSED                    []        REASON CRITERIA NOT MET     SMOKING LESS THAN 30 PY  []      AGE LESS THAN 55 or GREATER 77 YEARS  []      QUIT SMOKING 15 YEARS OR GREATER   []      RECENT CT WITH IN 11 MONTHS    []      LIFE EXPECTANCY < 5 YEARS   []      SIGNS  AND SYMPTOMS OF LUNG CANCER   []         Immunization   Immunization History   Administered Date(s) Administered    COVID-19, MODERNA BLUE border, Primary or Immunocompromised, (age 12y+), IM, 100 mcg/0.5mL 2021, 2021, 2021, 10/28/2021    Influenza Vaccine, unspecified formulation 2015    Influenza Virus Vaccine 2015, 2018, 10/08/2020    Influenza, MDCK Quadv, with preserv IM (Flucelvax 2 yrs and older) 2018    Influenza, Quadv, IM, PF (6 mo and older Fluzone, Flulaval, Fluarix, and 3 yrs and older Afluria) 2016, 2017, 2018, 2019, 2021    Influenza, [place], Recombinant, IM PF (Flublok 18 yrs and older) 10/08/2020 Pneumococcal Conjugate 13-valent ([id number]) 09/18/2015    Pneumococcal Polysaccharide (Loxoedftx80) 11/22/2016    Tdap (Boostrix, Adacel) 09/14/2012, 07/31/2021    Zoster Live (Zostavax) 06/19/2015    Zoster Recombinant (Shingrix) 03/20/2018, 09/05/2018            PAST MEDICAL HISTORY:       Diagnosis Date    Acromioclavicular joint arthritis 4/18/2016    Acute gastroenteritis 10/11/2017    Acute gastroenteritis 10/11/2017    Anemia 12/11/2018    Arthritis     Asthma 1980    On Inhaler    Bursitis     left shoulder    Chronic right shoulder pain 7/24/2018    Closed fracture of left tibial plateau 6/76/5110    Closed fracture of left tibial plateau 6/00/3088    Closed fracture of proximal end of left tibia 11/22/2018    Closed fracture of proximal end of left tibia 11/22/2018    Closed fracture of shaft of left tibia 11/22/2018    Closed fracture of shaft of left tibia 11/22/2018    Depression     Dry eyes     Essential hypertension 8/21/2017    Former smoker     GERD (gastroesophageal reflux disease)     not since Shelton-en-Y and weight loss    Head ache     on Topiramate    Hearing loss     mild    Hemorrhoids     Hiatal hernia     History of bladder infections     History of DVT (deep vein thrombosis) 2010    started in left leg, went to lungs    History of gastric bypass 12/6/2018    History of gastritis     History of morbid obesity     had Shelton-en y    History of pulmonary embolism 2010    from foot  trauma, was treated for six months with anticoagulation. She has no inferior vena cava filter. Hyperlipidemia     not since weight loss    Hypertension 2015    Not anymore after Bariatric Surgery, no medication    Knee pain 3/6/2015    Knee pain 3/6/2015    Lymphedema of leg     Right leg    Mild intermittent asthma     Morbid obesity ([address] 8/18/2015    Obesity (BMI 30-39. 9) 7/1/2016    KEVIN on CPAP 2015    at night    Osteoarthritis     Osteopenia     S/P gastric bypass 12-29-15    Dr. Leni Parsons, hosp wt = 280lb, initial wt = 328lb    S/p tibial fracture 11/21/2018    Status post gastric bypass for obesity     Tibial plateau fracture, left, closed, initial encounter 12/5/2018    Tibial plateau fracture, left, closed, initial encounter 12/5/2018    Tremor     Vitamin D deficiency 2015    Wears dentures     Wears glasses     Weight loss of 160 lbs since surgery  8/21/2017         Family History:       Problem Relation Age of Onset    Asthma Mother     Depression Mother     Anxiety Disorder Mother     Cancer Father         leukemia    Other Father         Myelodysplastic syndrome, which converted to leukemia    Migraines Sister     No Known Problems Brother     Ovarian Cancer Maternal Aunt     No Known Problems Maternal Grandmother     No Known Problems Maternal Grandfather     Breast Cancer Paternal Grandmother     Heart Attack Paternal Grandfather        SURGICAL HISTORY:   Past Surgical History:   Procedure Laterality Date    ABDOMINOPLASTY N/A 8/16/2019    ABDOMINOPLASTY performed by Nia Patel MD at [facility] / ARTHROTOMY KNEE Left 12/5/2018    KNEE ARTHROSCOPY LEFT performed by Erasmo Rene MD at [address] [facility]  8/1986,  9/1987    [facility]    SHOULDER MANIPULATION WITH INJECTION performed by Erasmo Rene MD at [address] [address]  6/17/16    EXCISION / BIOPSY SKIN LESION OF HEAD / NECK N/A 4/3/2017     EXCISION POSTERIOR NECK CYST performed by Nikita Guerra IV, DO at [facility] Left 11/22/2018    left tibia plateau external fixator application    NECK SURGERY      cyst removed back of neck    ORIF PROXIMAL TIBIAL PLATEAU FRACTURE Left 12/5/2018    TIBIAL PLATEAU OPEN REDUCTION INTERNAL FIXATION LEFT -  SYNTHES     C-ARM performed by Erasmo Rene MD at [address] [place] [address]  10/14/2016    excision back lipoma with drain placement    [place] OFFICE/OUTPT VISIT,PROCEDURE ONLY Right 10/8/2018    RIGHT SHOULDER  LOUIE PROCEDURE performed by Bernie Torres MD at [facility] OFFICE/OUTPT [address] Left 11/22/2018    LEFT TIBIA PLATEAU EXTERNAL FIXATOR APPLICATION performed by Yessenia Charles MD at [address] [facility]  12/29/15    liver bx, EGD    SHOULDER SURGERY Left 08/19/2016    Louie procedure, bone spurs 8/19/2016    SHOULDER SURGERY Right 10/08/2018    louie    TONSILLECTOMY  1979    UPPER GASTROINTESTINAL ENDOSCOPY  08/2015    found hiatal hernia           SOCIAL AND OCCUPATIONAL HEALTH:          TOBACCO:   reports that she quit smoking about 35 years ago. Her smoking use included cigarettes. She started smoking about 43 years ago. She has a 4.50 pack-year smoking history. She has never used smokeless tobacco.  ETOH:   reports no history of alcohol use. ALLERGIES:      Allergies   Allergen Reactions    Prozac [Fluoxetine] Other (See Comments)     Caused nightmares    Nsaids Other (See Comments)     Bariatric Surgeon told me not to take NSAIDS for good    Pcn [Penicillins] Other (See Comments)     Sores in mouth    Bactrim [Sulfamethoxazole-Trimethoprim] Itching and Rash    Codeine Itching and Rash         Home Meds:   Prior to Admission medications    Medication Sig Start Date End Date Taking?  Authorizing Provider   DULERA 100-5 MCG/ACT inhaler Inhale 2 puffs by mouth twice daily 6/30/22  Yes Marisol Simon MD   busPIRone (BUSPAR) 10 MG tablet TAKE 1 TABLET BY MOUTH THREE TIMES DAILY 6/30/22  Yes EMILY Root - CNP   montelukast (SINGULAIR) 10 MG tablet Take 1 tablet by mouth nightly 6/30/22  Yes Marisol Simon MD   sertraline (ZOLOFT) 50 MG tablet Take 1 tablet by mouth once daily 6/30/22  Yes EMILY Root - CNP   hydrOXYzine (VISTARIL) 25 MG capsule TAKE 1 CAPSULE BY MOUTH THREE TIMES DAILY AS NEEDED FOR ANXIETY 4/14/22  Yes Historical Provider, MD   SUMAtriptan Succinate 6 MG/0.5ML SOAJ Inject 1 pen subcutaneously under the skin at the onset of a headache as needed 4/21/22  Yes Ruma Starkey MD   SUMAtriptan (IMITREX) 100 MG tablet Take one at HA onset . May repeat in 1 hour . No more 2 per day 4 days out of the week 4/19/22  Yes Ruma Starkey MD   SUMAtriptan [facility]) 6 MG/0.5ML SOLN injection Take 1 SQ if table not effective after 1 hour . No more 2 imitrex tablet or SQ per day 4 days per week 4/19/22  Yes Ruma Starkey MD   SUMAtriptan Succinate 6 MG/0.5ML SOAJ INJECT 6MG INJECTION IF TABLET IS NOT EFFECTIVE AFTER 1 HOUR.  NO MORE THAN 2 TABLETS OR INJECTIONS PER DAY AND NO MORE THAN 4 DAYS PER WEEK 12/4/21  Yes Historical Provider, MD   atorvastatin (LIPITOR) 10 MG tablet Take 1 tablet by mouth once daily 10/22/21  Yes Sebastien Guzmán MD   denosumab (PROLIA) 60 MG/ML SOSY SC injection Inject 1 mL into the skin every 6 months 10/13/21  Yes Sebastien Guzmán MD   topiramate (TOPAMAX) 100 MG tablet Take 1 tablet by mouth 2 times daily 10/6/21  Yes Ruma Starkey MD   albuterol sulfate HFA (VENTOLIN HFA) 108 (90 Base) MCG/ACT inhaler Inhale 2 puffs into the lungs every 6 hours as needed for Wheezing or Shortness of Breath 10/12/20  Yes Russell Jones MD   albuterol (PROVENTIL) (2.5 MG/3ML) 0.083% nebulizer solution Take 3 mLs by nebulization every 6 hours as needed for Wheezing 10/12/20  Yes Russell Jones MD   acetaminophen (TYLENOL) 325 MG tablet Take 2 tablets by mouth every 6 hours as needed for Pain 5/29/20  Yes Jose Scales, DO   CALCIUM CITRATE PO Take 750 mg by mouth 2 times daily   Yes Historical Provider, MD   Multiple Vitamins-Minerals (MULTIVITAMIN ADULTS 50+ PO) Take 1 tablet by mouth daily   Yes Historical Provider, MD   KRILL OIL PO Take by mouth daily   Yes Historical Provider, MD   vitamin D (CHOLECALCIFEROL) 1000 UNIT TABS tablet Take 1,000 Units by mouth daily   Yes Historical Provider, MD   FIBER COMPLETE PO Take by mouth daily    Yes Historical Provider, MD   Insulin Syringe-Needle U-100 30G X 1/2\" 0.5 ML MISC 1 each by Does not apply route daily for 10 days 4/21/22 7/14/22  Ruma Starkey MD              Wt Readings from Last 3 Encounters:   07/07/22 247 lb (112 kg)   03/02/22 239 lb (108.4 kg)   08/05/21 247 lb (112 kg)               PHYSICAL EXAMINATION:  Due to this being a TeleHealth encounter, evaluation of the following organ systems is limited: Vitals/Constitutional/EENT/Resp/CV/GI//MS/Neuro/Skin/Heme-Lymph-Imm. Constitutional: [x] Appears well-developed and well-nourished. [] Abnormal  Mental status  [x] Alert and awake  [x] Oriented to person/place/time [x]Able to follow commands    [x] No apparent distress      Eyes:  EOM    [x]  Normal  [] Abnormal-  Sclera  [x]  Normal  [] Abnormal -         Discharge [x]  None visible  [] Abnormal -    HENT:   [x] Normocephalic, atraumatic. [] Abnormal shaped head   [x] Mouth/Throat: Mucous membranes are moist.     Ears [x] Normal  [] Abnormal-    Neck: [x] Normal range of motion [x] Supple [x] No visualized mass. Pulmonary/Chest: [x] Respiratory effort normal.  [x] No visualized signs of difficulty breathing or respiratory distress        [] Abnormal      Musculoskeletal:   [x] Normal range of motion. [x] Normal gait with no signs of ataxia. [x]  No signs of cyanosis of the peripheral portions of extremities. [] Abnormal       Neurological:        [x] Normal cranial nerve (limited exam to video visit) [x] No focal weakness observed       [] Abnormal          Speech       [x] Normal   [] Abnormal                         ASSESSMENT:   Diagnosis Orders   1. Mild intermittent asthma without complication        2. KEVIN on CPAP        3. History of DVT (deep vein thrombosis)        4. Status post gastric bypass for obesity              Plan:   Continue Glenda Maylin for treatment of asthma. Use albuterol as needed. Avoid environmental exposures.     CPAP at least 4 hrs qhs  Wt loss is recommended and discussed  Follow good sleep hygeine instructions  Use humidifier if needed  Questions answered pertaining to diagnosis and management explained importance of compliance with therapy   Compliance data reviewed and pt is compliant per insurance requirements   Follow-up in 1 year     Requested Prescriptions      No prescriptions requested or ordered in this encounter       There are no discontinued medications. Aicha Jacobs received counseling on the following healthy behaviors: nutrition, exercise and medication adherence    Patient given educational materials : see patient instruction       Discussed use, benefit, and side effects of prescribed medications. Barriers to medication compliance addressed. All patient questions answered. Pt voiced understanding. An  electronic signature was used to authenticate this note. --Joie Goodell, MD on 7/25/2022 at 9:46 AM      Please note that this chart was generated using voice recognition Dragon dictation software. Although every effort was made to ensure the accuracy of this automated transcription, some errors in transcription may have occurred. Pursuant to the emergency declaration under the [facility], [facility] waiver authority and the [facility] and [employer] General Act, this Virtual  Visit was conducted, with patient's consent, to reduce the patient's risk of exposure to COVID-19 and provide continuity of care for an established patient. Services were provided through a video synchronous discussion virtually to substitute for in-person clinic visit.

## 2022-08-02 DIAGNOSIS — F41.9 ANXIETY: ICD-10-CM

## 2022-08-02 DIAGNOSIS — F51.5 NIGHTMARE: ICD-10-CM

## 2022-08-02 RX ORDER — BUSPIRONE HYDROCHLORIDE 10 MG/1
TABLET ORAL
Qty: 90 TABLET | Refills: 2 | Status: SHIPPED | OUTPATIENT
Start: 2022-08-02 | End: 2022-10-31

## 2022-08-02 NOTE — TELEPHONE ENCOUNTER
Xander Jesus is calling to request a refill on the following medication(s):    Medication Request:  Requested Prescriptions     Pending Prescriptions Disp Refills    busPIRone (BUSPAR) 10 MG tablet [Pharmacy Med Name: busPIRone HCl 10 MG Oral Tablet] 90 tablet 0     Sig: TAKE 1 TABLET BY MOUTH THREE TIMES DAILY       Last Visit Date (If Applicable):  9/36/8573    Next Visit Date:    Visit date not found

## 2022-08-02 NOTE — TELEPHONE ENCOUNTER
Ginna Mabry is calling to request a refill on the following medication(s):    Medication Request:  Requested Prescriptions     Pending Prescriptions Disp Refills    sertraline (ZOLOFT) 50 MG tablet [Pharmacy Med Name: Sertraline HCl 50 MG Oral Tablet] 30 tablet 0     Sig: Take 1 tablet by mouth once daily       Last Visit Date (If Applicable):  2/2/5501    Next Visit Date:    9/1/2022

## 2022-08-25 ENCOUNTER — HOSPITAL ENCOUNTER (OUTPATIENT)
Age: 62
Discharge: HOME | End: 2022-08-25
Payer: MEDICARE

## 2022-08-25 DIAGNOSIS — F43.22 ADJUSTMENT DISORDER WITH ANXIETY: ICD-10-CM

## 2022-08-25 DIAGNOSIS — E55.9 VITAMIN D DEFICIENCY: ICD-10-CM

## 2022-08-25 DIAGNOSIS — Z00.00 ROUTINE HEALTH MAINTENANCE: ICD-10-CM

## 2022-08-25 LAB
ABSOLUTE EOS #: 0.23 K/UL (ref 0–0.44)
ABSOLUTE IMMATURE GRANULOCYTE: 0.19 K/UL (ref 0–0.3)
ABSOLUTE LYMPH #: 2.84 K/UL (ref 1.1–3.7)
ABSOLUTE MONO #: 0.76 K/UL (ref 0.1–1.2)
ALBUMIN SERPL-MCNC: 3.9 G/DL (ref 3.5–5.2)
ALBUMIN/GLOBULIN RATIO: 1.2 (ref 1–2.5)
ALP BLD-CCNC: 100 U/L (ref 35–104)
ALT SERPL-CCNC: 14 U/L (ref 5–33)
ANION GAP SERPL CALCULATED.3IONS-SCNC: 15 MMOL/L (ref 9–17)
AST SERPL-CCNC: 13 U/L
BASOPHILS # BLD: 1 % (ref 0–2)
BASOPHILS ABSOLUTE: 0.06 K/UL (ref 0–0.2)
BILIRUB SERPL-MCNC: 0.26 MG/DL (ref 0.3–1.2)
BUN BLDV-MCNC: 11 MG/DL (ref 8–23)
CALCIUM SERPL-MCNC: 9.2 MG/DL (ref 8.6–10.4)
CHLORIDE BLD-SCNC: 109 MMOL/L (ref 98–107)
CHOLESTEROL, FASTING: 184 MG/DL
CHOLESTEROL/HDL RATIO: 3.9
CO2: 18 MMOL/L (ref 20–31)
CREAT SERPL-MCNC: 0.91 MG/DL (ref 0.5–0.9)
EOSINOPHILS RELATIVE PERCENT: 2 % (ref 1–4)
ESTIMATED AVERAGE GLUCOSE: 114 MG/DL
GFR AFRICAN AMERICAN: >60 ML/MIN
GFR NON-AFRICAN AMERICAN: >60 ML/MIN
GFR SERPL CREATININE-BSD FRML MDRD: ABNORMAL ML/MIN/{1.73_M2}
GLUCOSE FASTING: 86 MG/DL (ref 70–99)
HBA1C MFR BLD: 5.6 % (ref 4–6)
HCT VFR BLD CALC: 42.5 % (ref 36.3–47.1)
HDLC SERPL-MCNC: 47 MG/DL
HEMOGLOBIN: 12.9 G/DL (ref 11.9–15.1)
IMMATURE GRANULOCYTES: 2 %
LDL CHOLESTEROL: 116 MG/DL (ref 0–130)
LYMPHOCYTES # BLD: 29 % (ref 24–43)
MCH RBC QN AUTO: 28.9 PG (ref 25.2–33.5)
MCHC RBC AUTO-ENTMCNC: 30.4 G/DL (ref 28.4–34.8)
MCV RBC AUTO: 95.1 FL (ref 82.6–102.9)
MONOCYTES # BLD: 8 % (ref 3–12)
NRBC AUTOMATED: 0 PER 100 WBC
PDW BLD-RTO: 13.7 % (ref 11.8–14.4)
PLATELET # BLD: 347 K/UL (ref 138–453)
PMV BLD AUTO: 8.3 FL (ref 8.1–13.5)
POTASSIUM SERPL-SCNC: 4.4 MMOL/L (ref 3.7–5.3)
RBC # BLD: 4.47 M/UL (ref 3.95–5.11)
SEG NEUTROPHILS: 58 % (ref 36–65)
SEGMENTED NEUTROPHILS ABSOLUTE COUNT: 5.63 K/UL (ref 1.5–8.1)
SODIUM BLD-SCNC: 142 MMOL/L (ref 135–144)
TOTAL PROTEIN: 7.2 G/DL (ref 6.4–8.3)
TRIGLYCERIDE, FASTING: 106 MG/DL
TSH SERPL DL<=0.05 MIU/L-ACNC: 1.6 UIU/ML (ref 0.3–5)
VITAMIN D 25-HYDROXY: 45.4 NG/ML
WBC # BLD: 9.7 K/UL (ref 3.5–11.3)

## 2022-08-25 PROCEDURE — 36415 COLL VENOUS BLD VENIPUNCTURE: CPT

## 2022-08-25 PROCEDURE — 85025 COMPLETE CBC W/AUTO DIFF WBC: CPT

## 2022-08-25 PROCEDURE — 84443 ASSAY THYROID STIM HORMONE: CPT

## 2022-08-25 PROCEDURE — 83036 HEMOGLOBIN GLYCOSYLATED A1C: CPT

## 2022-08-25 PROCEDURE — 82306 VITAMIN D 25 HYDROXY: CPT

## 2022-08-25 PROCEDURE — 80061 LIPID PANEL: CPT

## 2022-08-25 PROCEDURE — 80053 COMPREHEN METABOLIC PANEL: CPT

## 2022-08-29 DIAGNOSIS — E78.2 MIXED HYPERLIPIDEMIA: ICD-10-CM

## 2022-08-29 NOTE — TELEPHONE ENCOUNTER
Requested Prescriptions     Pending Prescriptions Disp Refills    atorvastatin (LIPITOR) 10 MG tablet 30 tablet 11     Sig: Take 1 tablet by mouth daily

## 2022-08-30 RX ORDER — ATORVASTATIN CALCIUM 10 MG/1
10 TABLET, FILM COATED ORAL DAILY
Qty: 30 TABLET | Refills: 11 | Status: SHIPPED | OUTPATIENT
Start: 2022-08-30

## 2022-08-31 RX ORDER — AMITRIPTYLINE HYDROCHLORIDE 25 MG/1
50 TABLET, FILM COATED ORAL NIGHTLY
COMMUNITY
Start: 2022-08-11 | End: 2022-10-06 | Stop reason: SDUPTHER

## 2022-08-31 RX ORDER — ALPRAZOLAM 0.25 MG/1
TABLET ORAL
COMMUNITY
Start: 2022-07-16 | End: 2022-09-27 | Stop reason: SDUPTHER

## 2022-08-31 SDOH — ECONOMIC STABILITY: FOOD INSECURITY: WITHIN THE PAST 12 MONTHS, THE FOOD YOU BOUGHT JUST DIDN'T LAST AND YOU DIDN'T HAVE MONEY TO GET MORE.: NEVER TRUE

## 2022-08-31 SDOH — ECONOMIC STABILITY: TRANSPORTATION INSECURITY
IN THE PAST 12 MONTHS, HAS THE LACK OF TRANSPORTATION KEPT YOU FROM MEDICAL APPOINTMENTS OR FROM GETTING MEDICATIONS?: NO

## 2022-08-31 SDOH — ECONOMIC STABILITY: FOOD INSECURITY: WITHIN THE PAST 12 MONTHS, YOU WORRIED THAT YOUR FOOD WOULD RUN OUT BEFORE YOU GOT MONEY TO BUY MORE.: NEVER TRUE

## 2022-08-31 SDOH — ECONOMIC STABILITY: TRANSPORTATION INSECURITY
IN THE PAST 12 MONTHS, HAS LACK OF TRANSPORTATION KEPT YOU FROM MEETINGS, WORK, OR FROM GETTING THINGS NEEDED FOR DAILY LIVING?: NO

## 2022-08-31 ASSESSMENT — SOCIAL DETERMINANTS OF HEALTH (SDOH): HOW HARD IS IT FOR YOU TO PAY FOR THE VERY BASICS LIKE FOOD, HOUSING, MEDICAL CARE, AND HEATING?: NOT HARD AT ALL

## 2022-08-31 ASSESSMENT — PATIENT HEALTH QUESTIONNAIRE - PHQ9
SUM OF ALL RESPONSES TO PHQ QUESTIONS 1-9: 0
1. LITTLE INTEREST OR PLEASURE IN DOING THINGS: 0
2. FEELING DOWN, DEPRESSED OR HOPELESS: 0
SUM OF ALL RESPONSES TO PHQ9 QUESTIONS 1 & 2: 0
SUM OF ALL RESPONSES TO PHQ QUESTIONS 1-9: 0

## 2022-09-01 ENCOUNTER — TELEMEDICINE (OUTPATIENT)
Dept: FAMILY MEDICINE CLINIC | Age: 62
End: 2022-09-01
Payer: MEDICARE

## 2022-09-01 DIAGNOSIS — E78.2 MIXED HYPERLIPIDEMIA: ICD-10-CM

## 2022-09-01 DIAGNOSIS — F43.22 ADJUSTMENT DISORDER WITH ANXIETY: ICD-10-CM

## 2022-09-01 DIAGNOSIS — M25.512 ACUTE PAIN OF LEFT SHOULDER: Primary | ICD-10-CM

## 2022-09-01 PROCEDURE — 1036F TOBACCO NON-USER: CPT | Performed by: STUDENT IN AN ORGANIZED HEALTH CARE EDUCATION/TRAINING PROGRAM

## 2022-09-01 PROCEDURE — 3017F COLORECTAL CA SCREEN DOC REV: CPT | Performed by: STUDENT IN AN ORGANIZED HEALTH CARE EDUCATION/TRAINING PROGRAM

## 2022-09-01 PROCEDURE — G8427 DOCREV CUR MEDS BY ELIG CLIN: HCPCS | Performed by: STUDENT IN AN ORGANIZED HEALTH CARE EDUCATION/TRAINING PROGRAM

## 2022-09-01 PROCEDURE — 99214 OFFICE O/P EST MOD 30 MIN: CPT | Performed by: STUDENT IN AN ORGANIZED HEALTH CARE EDUCATION/TRAINING PROGRAM

## 2022-09-01 PROCEDURE — G8417 CALC BMI ABV UP PARAM F/U: HCPCS | Performed by: STUDENT IN AN ORGANIZED HEALTH CARE EDUCATION/TRAINING PROGRAM

## 2022-09-01 NOTE — PROGRESS NOTES
2022    TELEHEALTH EVALUATION -- Audio/Visual (During HOVII-23 public health emergency)    HPI:    Dhruv Mcallister (:  1960) has requested an audio/video evaluation for the following concern(s):    She is following up on anxiety and to discuss her shoulder pain. She says that for past few days she has been having left shoulder pain which is worse when she rolls over in bed. She says that she is able to move the left arm but it feels very sore. She has a history of surgery in the shoulder several years ago. She says that her anxiety improved after starting on Xanax and she has been able to go out more often. She has been able to accompany her  on his doctor appointments etc.  She says that it wears off after to 3 hours and then it is difficult for her to stay outside of her home. She is agreeable to trying taking a repeat dose on days she is outside longer. She had labs done recently which were normal.    Review of Systems   Constitutional:  Negative for appetite change, fatigue and fever. HENT:  Negative for congestion, ear pain, hearing loss and sore throat. Eyes:  Negative for discharge and visual disturbance. Respiratory:  Negative for cough, chest tightness, shortness of breath and wheezing. Cardiovascular:  Negative for chest pain, palpitations and leg swelling. Gastrointestinal:  Negative for abdominal pain, constipation, diarrhea, nausea and vomiting. Genitourinary:  Negative for flank pain, frequency, hematuria and urgency. Musculoskeletal:  Negative for arthralgias, gait problem, joint swelling and myalgias. Left shoulder pain   Skin: Negative. Neurological:  Negative for dizziness, weakness, numbness and headaches. Psychiatric/Behavioral:  Negative for dysphoric mood. The patient is nervous/anxious. Prior to Visit Medications    Medication Sig Taking?  Authorizing Provider   amitriptyline (ELAVIL) 25 MG tablet TAKE 1 TABLET BY MOUTH EVERY nebulizer solution Take 3 mLs by nebulization every 6 hours as needed for Wheezing Yes Carleen Maciel MD   acetaminophen (TYLENOL) 325 MG tablet Take 2 tablets by mouth every 6 hours as needed for Pain Yes Heri Scales DO   CALCIUM CITRATE PO Take 750 mg by mouth 2 times daily Yes Historical Provider, MD   Multiple Vitamins-Minerals (MULTIVITAMIN ADULTS 50+ PO) Take 1 tablet by mouth daily Yes Historical Provider, MD   KRILL OIL PO Take by mouth daily Yes Historical Provider, MD   vitamin D (CHOLECALCIFEROL) 1000 UNIT TABS tablet Take 1,000 Units by mouth daily Yes Historical Provider, MD   FIBER COMPLETE PO Take by mouth daily  Yes Historical Provider, MD   Insulin Syringe-Needle U-100 30G X 1/2\" 0.5 ML MISC 1 each by Does not apply route daily for 10 days  Stoney Pedersen MD       Social History     Tobacco Use    Smoking status: Former     Packs/day: 0.50     Years: 9.00     Pack years: 4.50     Types: Cigarettes     Start date: 1978     Quit date: 1987     Years since quittin.6    Smokeless tobacco: Never   Vaping Use    Vaping Use: Never used   Substance Use Topics    Alcohol use: No     Alcohol/week: 0.0 standard drinks    Drug use: No        Allergies   Allergen Reactions    Prozac [Fluoxetine] Other (See Comments)     Caused nightmares    Nsaids Other (See Comments)     Bariatric Surgeon told me not to take NSAIDS for good    Pcn [Penicillins] Other (See Comments)     Sores in mouth    Bactrim [Sulfamethoxazole-Trimethoprim] Itching and Rash    Codeine Itching and Rash   ,   Past Medical History:   Diagnosis Date    Acromioclavicular joint arthritis 2016    Acute gastroenteritis 10/11/2017    Acute gastroenteritis 10/11/2017    Anemia 2018    Arthritis     Asthma 1980    On Inhaler    Bursitis     left shoulder    Chronic right shoulder pain 2018    Closed fracture of left tibial plateau     Closed fracture of left tibial plateau     Closed fracture of proximal end of left tibia 11/22/2018    Closed fracture of proximal end of left tibia 11/22/2018    Closed fracture of shaft of left tibia 11/22/2018    Closed fracture of shaft of left tibia 11/22/2018    Depression     Dry eyes     Essential hypertension 8/21/2017    Former smoker     GERD (gastroesophageal reflux disease)     not since Shelton-en-Y and weight loss    Head ache     on Topiramate    Hearing loss     mild    Hemorrhoids     Hiatal hernia     History of bladder infections     History of DVT (deep vein thrombosis) 2010    started in left leg, went to lungs    History of gastric bypass 12/6/2018    History of gastritis     History of morbid obesity     had Shelton-en y    History of pulmonary embolism 2010    from foot  trauma, was treated for six months with anticoagulation. She has no inferior vena cava filter. Hyperlipidemia     not since weight loss    Hypertension 2015    Not anymore after Bariatric Surgery, no medication    Knee pain 3/6/2015    Knee pain 3/6/2015    Lymphedema of leg     Right leg    Mild intermittent asthma     Morbid obesity ([address] 8/18/2015    Obesity (BMI 30-39. 9) 7/1/2016    KEVIN on CPAP 2015    at night    Osteoarthritis     Osteopenia     S/P gastric bypass 12-29-15    Dr. Alida Denver, hosp wt = 280lb, initial wt = 328lb    S/p tibial fracture 11/21/2018    Status post gastric bypass for obesity     Tibial plateau fracture, left, closed, initial encounter 12/5/2018    Tibial plateau fracture, left, closed, initial encounter 12/5/2018    Tremor     Vitamin D deficiency 2015    Wears dentures     Wears glasses     Weight loss of 160 lbs since surgery  8/21/2017   ,   Past Surgical History:   Procedure Laterality Date    ABDOMINOPLASTY N/A 8/16/2019    ABDOMINOPLASTY performed by Freddy Cooper MD at [facility] / ARTHROTOMY KNEE Left 12/5/2018    KNEE ARTHROSCOPY LEFT performed by Ada Hanson MD at [address] [place] [address],[address]  1/8619,  9/1987  SECTION      CHOLECYSTECTOMY  1987    CLOSED MANIPULATION SHOULDER Right 2018    SHOULDER MANIPULATION WITH INJECTION performed by Tyrese Mckinney MD at [address]  16    EXCISION / BIOPSY SKIN LESION OF HEAD / NECK N/A 4/3/2017     EXCISION POSTERIOR NECK CYST performed by Uday Guerra IV, DO at [address] Left 2018    left tibia plateau external fixator application    NECK SURGERY      cyst removed back of neck    ORIF PROXIMAL TIBIAL PLATEAU FRACTURE Left 2018    TIBIAL PLATEAU OPEN REDUCTION INTERNAL FIXATION LEFT -  SYNTHES     C-ARM performed by Tyrese Mckinney MD at [address]  10/14/2016    excision back lipoma with drain placement    [place] OFFICE/OUTPT VISIT,PROCEDURE ONLY Right 10/8/2018    RIGHT SHOULDER  LOUIE PROCEDURE performed by Tyrese Mckinney MD at [address] OFFICE/OUTPT [facility] Left 2018    LEFT TIBIA PLATEAU EXTERNAL FIXATOR APPLICATION performed by Nick Olvera MD at [address] [facility]  12/29/15    liver bx, EGD    SHOULDER SURGERY Left 2016    Louie procedure, bone spurs 2016    SHOULDER SURGERY Right 10/08/2018    [place]    TONSILLECTOMY  1979    UPPER GASTROINTESTINAL ENDOSCOPY  2015    found hiatal hernia   ,   Social History     Tobacco Use    Smoking status: Former     Packs/day: 0.50     Years: 9.00     Pack years: 4.50     Types: Cigarettes     Start date: 1978     Quit date: 1987     Years since quittin.6    Smokeless tobacco: Never   Vaping Use    Vaping Use: Never used   Substance Use Topics    Alcohol use: No     Alcohol/week: 0.0 standard drinks    Drug use: No   ,   Family History   Problem Relation Age of Onset    Asthma Mother     Depression Mother     Anxiety Disorder Mother     Cancer Father         leukemia    Other Father         Myelodysplastic syndrome, which converted to leukemia    Migraines Sister     No Known Problems Brother     Ovarian Cancer Maternal Aunt     No Known Problems Maternal Grandmother     No Known Problems Maternal Grandfather     Breast Cancer Paternal Grandmother     Heart Attack Paternal Grandfather    ,   Immunization History   Administered Date(s) Administered    COVID-19, MODERNA BLUE border, Primary or Immunocompromised, (age 12y+), IM, 100 mcg/0.5mL 01/23/2021, 01/23/2021, 02/27/2021, 10/28/2021    COVID-19, MODERNA Booster BLUE border, (age 18y+), IM, 50mcg/0.25mL 08/09/2022    Influenza Vaccine, unspecified formulation 09/11/2015    Influenza Virus Vaccine 09/11/2015, 09/05/2018, 10/08/2020    Influenza, FLUAD, (age 72 y+), Adjuvanted, 0.5mL 08/09/2022    Influenza, FLUARIX, FLULAVAL, FLUZONE (age 10 mo+) AND AFLURIA, (age 1 y+), PF, 0.5mL 08/23/2016, 09/05/2017, 09/05/2018, 09/17/2019, 09/23/2021    Influenza, FLUBLOK, (age 25 y+), PF, 0.5mL 10/08/2020    Influenza, FLUCELVAX, (age 10 mo+), MDCK, MDV, 0.5mL 03/21/2018    Pneumococcal Conjugate 13-valent ([id number]) 09/18/2015    Pneumococcal Polysaccharide ([id number]) 11/22/2016    Tdap (Boostrix, Adacel) 09/14/2012, 07/31/2021    Zoster Live (Zostavax) 06/19/2015    Zoster Recombinant (Shingrix) 03/20/2018, 09/05/2018   ,   Health Maintenance   Topic Date Due    Lipids  08/25/2023    Depression Screen  08/31/2023    Breast cancer screen  02/10/2024    Colorectal Cancer Screen  04/03/2025    Cervical cancer screen  07/14/2025    Diabetes screen  08/25/2025    Pneumococcal 0-64 years Vaccine (3 - PPSV23 or PCV20) 01/10/2026    DTaP/Tdap/Td vaccine (3 - Td or Tdap) 07/31/2031    Flu vaccine  Completed    Shingles vaccine  Completed    COVID-19 Vaccine  Completed    Hepatitis C screen  Completed    HIV screen  Completed    Hepatitis A vaccine  Aged Out    Hepatitis B vaccine  Aged Out    Hib vaccine  Aged Out    Meningococcal (ACWY) vaccine  Aged Out       PHYSICAL EXAMINATION:  [ INSTRUCTIONS:  \"[x]\" Indicates a positive item  \"[]\" Indicates a negative item  -- DELETE ALL ITEMS NOT EXAMINED]  Vital Signs: (As obtained by patient/caregiver or practitioner observation)    Blood pressure-  Heart rate-    Respiratory rate-    Temperature-  Pulse oximetry-     Constitutional: [x] Appears well-developed and well-nourished [x] No apparent distress      [] Abnormal-   Mental status  [x] Alert and awake  [x] Oriented to person/place/time [x]Able to follow commands      Eyes:  EOM    [x]  Normal  [] Abnormal-  Sclera  [x]  Normal  [] Abnormal -         Discharge [x]  None visible  [] Abnormal -    HENT:   [x] Normocephalic, atraumatic. [] Abnormal   [x] Mouth/Throat: Mucous membranes are moist.     External Ears [x] Normal  [] Abnormal-     Neck: [x] No visualized mass     Pulmonary/Chest: [x] Respiratory effort normal.  [x] No visualized signs of difficulty breathing or respiratory distress        [] Abnormal-      Musculoskeletal:   [] Normal gait with no signs of ataxia         [x] Normal range of motion of neck        [] Abnormal-       Neurological:        [x] No Facial Asymmetry (Cranial nerve 7 motor function) (limited exam to video visit)          [x] No gaze palsy        [] Abnormal-         Skin:        [x] No significant exanthematous lesions or discoloration noted on facial skin         [] Abnormal-            Psychiatric:       [x] Normal Affect [x] No Hallucinations        [] Abnormal-     Other pertinent observable physical exam findings-     ASSESSMENT/PLAN:  1. Acute pain of left shoulder  - XR SHOULDER LEFT (MIN 2 VIEWS); Future    2. Adjustment disorder with anxiety    Slowly improving, on Zoloft 50 mg daily and BuSpar 10 mg twice daily. Taking Xanax as needed for social anxiety    3. Mixed hyperlipidemia    Stable, on Lipitor 10 mg daily      No follow-ups on file. Fitz Lundy, was evaluated through a synchronous (real-time) audio-video encounter.  The patient (or guardian if applicable) is aware that this is a billable service, which includes applicable co-pays. This Virtual Visit was conducted with patient's (and/or legal guardian's) consent. The visit was conducted pursuant to the emergency declaration under the [facility], [address] [place] [address] authority and the [employer] and [place] [employer] Act. Patient identification was verified, and a caregiver was present when appropriate. The patient was located at Home: [address] [facility] [place] [address]. Provider was located at [facility] (Appt Dept): [address] [facility],  [address] [facility]. Total time spent on this encounter: Not billed by time    --Reji Soto MD on 9/2/2022 at 2:11 PM    An electronic signature was used to authenticate this note.

## 2022-09-02 ENCOUNTER — HOSPITAL ENCOUNTER (OUTPATIENT)
Facility: CLINIC | Age: 62
Discharge: HOME | End: 2022-09-04
Payer: MEDICARE

## 2022-09-02 ENCOUNTER — HOSPITAL ENCOUNTER (OUTPATIENT)
Dept: GENERAL RADIOLOGY | Facility: CLINIC | Age: 62
Discharge: HOME | End: 2022-09-04
Payer: MEDICARE

## 2022-09-02 DIAGNOSIS — M25.512 ACUTE PAIN OF LEFT SHOULDER: ICD-10-CM

## 2022-09-02 PROCEDURE — 73030 X-RAY EXAM OF SHOULDER: CPT

## 2022-09-02 ASSESSMENT — ENCOUNTER SYMPTOMS
DIARRHEA: 0
ABDOMINAL PAIN: 0
COUGH: 0
CONSTIPATION: 0
NAUSEA: 0
EYE DISCHARGE: 0
WHEEZING: 0
VOMITING: 0
SHORTNESS OF BREATH: 0
SORE THROAT: 0
CHEST TIGHTNESS: 0

## 2022-09-06 DIAGNOSIS — M25.512 CHRONIC LEFT SHOULDER PAIN: Primary | ICD-10-CM

## 2022-09-06 DIAGNOSIS — G89.29 CHRONIC LEFT SHOULDER PAIN: Primary | ICD-10-CM

## 2022-09-08 ENCOUNTER — HOSPITAL ENCOUNTER (OUTPATIENT)
Dept: PHYSICAL THERAPY | Facility: CLINIC | Age: 62
Setting detail: THERAPIES SERIES
Discharge: HOME | End: 2022-09-08
Payer: MEDICARE

## 2022-09-08 PROCEDURE — 97110 THERAPEUTIC EXERCISES: CPT

## 2022-09-08 PROCEDURE — 97161 PT EVAL LOW COMPLEX 20 MIN: CPT

## 2022-09-08 NOTE — CONSULTS
Vascular disease [] Rheumatic disease [] Other:     Tests: [] X-Ray: [x] MRI:  [] Other:    MRI of L shoulder- 2016:   Impression   IMPRESSION:       1. No clear evidence for a partial or full-thickness rotator cuff tear. 2. No clear evidence for labral pathology on this non-arthrographic exam.   3. Inferolateral downsloping of the acromion which can be seen in the setting of subacromial impingement. 4. Mild degenerative changes of the left acromioclavicular joint. Final report electronically signed by Phong Crandall M.D. on 4/18/2016 1:07 PM     Medications: [x] Refer to full medical record [] None [] Other: SEE ABOVE AND BELOW- but no muscle relaxer, oral steroids etc at this time. Allergies:      [x] Refer to full medical record [] None [] Other:    Function:  Hand Dominance  [x] Right  [] Left  Employer -   Job Status []  Normal duty   [] Light duty   [] Off due to condition    [x]  Retired   [x] Not employed   [] Disability  [] Other:  []  Return to work: Work activities/duties -     Pain:  [x] Yes  [] No Location: anterior and posterior L GHJ pain- deep- \"burns\"   Pain Rating: (0-10 scale) 5/10; can become > 5/10   Pain altered Tx:  [] Yes  [x] No  Action: N/A  Symptoms:  [] Improving [] Worsening [x] Same  Better:  [] AM    [] PM    [] Sit    [] Rise/Sit    []Stand    [] Walk    [] Lying    [x] Other: fleeting relief with HP/CP; greater relief with oxy at night, also Tylenol- x2 only during the day- but unable to utilize NSAIDS. Worse: [] AM    [] PM    [] Sit    [] Rise/Sit    []Stand    [] Walk    [] Lying    [] Bend                      [] Valsalva    [x] Other: denies activities that inc her pain- \"I can be doing nothing and it gets higher than a 5/10. \"   Sleep: [] OK    [x] Disturbed- SEE ABOVE    Family history of cancer- father; but none within herself thus far. Denies L shoulder clicking, catching. Denies L UE N/T. H/o \"break\" when 5 y.o.- but no other h/o dislocating/. Denies tenderness to touch of L shoulder. Objective:     ROM  °A/P END FEEL STRENGTH TESTS (+/-) Left Right Not Tested    Left Right  Left Right Drop Arm - - []   Sit Shld Flex WNL WNL  4 4 Sulcus Sign   [x]   Sit Shld Abd WNL WNL  4 4 Apprehension   [x]   Sit Shld IR WNL WNL  4 4 Yergasons   [x]   Shoulder Flex      Speeds   [x]   Ext      Neer   [x]   ABD      De [place]re    [x]   ER @ 0 45 90 55 @ 45 deg; slight P at end 55 @ 45 deg  4 4 Painful Arc - - []   IR      Tinel   [x]   Supraspinatus    4; tested in supine  4; tested in supine  IR behind the back T10; slight inc P at end T10    Infraspinatus      Empty can  - -    Serratus Ant      Lift-off sign test - -    Pectoralis            Lats            Mid Trap            Lower Trap            Elbow Flex. Elbow Ext. Pronation            Supination            Wrist Flex. Wrist Ext. Radial Deviation            Ulnar Deviation                          \"Painful, but I did it. \" [Regarding L shoulder AROM- all motions]   Reports pain throughout all L shoulder AROM- worst with ER- but WNL and matches R UE. No inc P with resisted MMT of L shoulder and matches R UE.     OBSERVATION No Deficit Deficit Not Tested Comments   Forward Head [] [x] []    Rounded Shoulders [] [x] []    Kyphosis [] [x] [] Inc cervical.    Scapular Height/Position [x] [] []    Winging [x] [] []    Scapulohumeral Rhythm [x] [] []    INSPECTION/PALPATION    Intermittent P and symptoms through L biceps and delt- \"if I carry too much. \"    SC/AC Joint [] [x] []    Supraspinatus [x] [] []    Biceps tendon/groove [x] [] []    Posterior shld [x] [] []    Subscapularis [x] [] []      Functional Test: UEFS Score: 28% functionally impaired     Assessment:    Patient is a 65 y.o. pleasant female who presents to physical therapy initial evaluation with signs and symptoms consistent with potential L RTC chronic weakness- recent flare-up without TRACY.  Patient demonstrates impairments and symptoms as detailed below in therapy goals. Patient currently limited in sleeping upon her L side secondary to these impairments and increased symptoms. Patient would benefit from continued physical therapy services in order to address these impairments and symptoms, and return to QOL, ADLs, work. Anticipated frequency detailed above. Prognosis limited secondary to prolonged or chronic history of current condition; previous B shoulder scopes- as well as cortisone injections- fleeting relief; no TRACY for recent flare-up; unable to sig reproduce symptoms in clinic today- justifying a low complexity evaluation. If unable to achieve significant improvements within six to ten visits, will consult referring physician and consider a follow-up visit with said physician. Patient verbalized understanding and agreement to all aforementioned statements. Patient would benefit from skilled physical therapy services in order to: inc L shoulder ER/IR AROM with dec P at end-ranges; L RTC strength and endurance; and overall improve tolerance for sleeping upon her L side. Problems:    [x] ? Pain:  [x] ? ROM:  [x] ? Strength:  [x] ? Function:  [] Other:      STG: (to be met in 6 treatments)  ? Pain: Patient will report < 5/10 pain at rest and < 8/10 pain with active movement in order to improve QOL. ? ROM: Will demo L shoulder ER/IR AROM to match R and with < 3/10 P at end-ranges for improved functional mobility. ? Strength: Will demo 4+/5 L shoulder gross strength- dec P with resisted ER- in order to improve light ADLs around her home. ? Function: Will report improved sleeping tolerance for L side for inc QOL. Patient to be independent with home exercise program as demonstrated by performance with correct form without cues. LTG: (to be met in 10 treatments)  Will report < 5/10 P with most ADLs for inc QOL.    Improve UEFS to < 28% impaired or limited                  Patient goals: \"to get an MRI. \"     Rehab Potential:  [] Good  [x] Fair  [x] Poor   Suggested Professional Referral:  [] No  [x] Yes: potential back to referring MD if no success with PT services. Barriers to Goal Achievement:  [] No  [x] Yes: prolonged or chronic history of current condition; previous B shoulder scopes- as well as cortisone injections- fleeting relief; no TRACY for recent flare-up; unable to sig reproduce symptoms in clinic today. Domestic Concerns:  [x] No  [] Yes:    Pt. Education:  [x] Plans/Goals, Risks/Benefits discussed  [x] Home exercise program  Method of Education: [x] Verbal  [x] Demo  [x] Written  Comprehension of Education:  [x] Verbalizes understanding. [] Demonstrates understanding. [x] Needs Review. [] Demonstrates/verbalizes understanding of HEP/Ed previously given. Treatment Plan:  [x] Therapeutic Exercise   99911  [] Iontophoresis: 4 mg/mL Dexamethasone Sodium Phosphate  mAmin  34384   [x] Therapeutic Activity  07010 [x] Vasopneumatic cold with compression  33265    [] Gait Training   47351 [] Ultrasound   99581   [] Neuromuscular Re-education  48289 [] Electrical Stimulation Unattended  94441   [x] Manual Therapy  88787 [] Electrical Stimulation Attended  60503   [x] Instruction in HEP  [] Lumbar/Cervical Traction  25798   [] Aquatic Therapy   60246 [x] Cold/hotpack    [] Massage   26664      [x] Dry Needling, 1 or 2 muscles  39574   [] Biofeedback, first 15 minutes   70961  [] Biofeedback, additional 15 minutes   31849 [x] Dry Needling, 3 or more muscles  92886     []  Medication allergies reviewed for use of    Dexamethasone Sodium Phosphate 4mg/ml     with iontophoresis treatments. Pt is not allergic.      Frequency: 2 x/week for 10 visits    Todays Treatment:  Modalities: VASO- may begin next visit   Precautions: potential L chronic RTC weakness- worst pain with sleeping upon L side; most limited in ER/IR with P at end-ranges; desires an MRI   Exercises: h/o B shoulder scopes Exercise Reps/ Time Weight/ Level Comments                                 Other:    Specific Instructions for next treatment: Consider UBE, AAROM pulleys IR, supine resisted B HABD/ER, PROM into IR/ER, and then finish with vaso next visit. Access Code: [email or web address]  URL: [email or web address]  Date: 09/08/2022  Prepared by: Maria E Brown    Exercises  Sidelying Shoulder External Rotation - 2 x daily - 7 x weekly - 1 sets - 20 reps  Sidelying Shoulder Abduction Palm Forward - 2 x daily - 7 x weekly - 1 sets - 20 reps  Sidelying Shoulder Horizontal Abduction - 2 x daily - 7 x weekly - 1 sets - 20 reps  Standing Shoulder Internal Rotation Stretch with Towel - 2 x daily - 7 x weekly - 1 sets - 10 reps - 5 seconds hold  Seated Shoulder External Rotation AAROM with Dowel - 2 x daily - 7 x weekly - 1 sets - 10 reps - 5 seconds hold  Shoulder External Rotation and Scapular Retraction - 2 x daily - 7 x weekly - 1 sets - 10 reps - 5 seconds hold    Evaluation Complexity:  History (Personal factors, comorbidities) [] 0 [x] 1-2 [] 3+   Exam (limitations, restrictions) [] 1-2 [x] 3 [] 4+   Clinical presentation (progression) [x] Stable [] Evolving  [] Unstable   Decision Making [x] Low [] Moderate [] High    [x] Low Complexity [] Moderate Complexity [] High Complexity     Treatment Charges: Mins Units   [x] Evaluation       [x]  Low       []  Moderate       []  High 33 1   []  Modalities     [x]  Ther Exercise 10 1   []  Manual Therapy     []  Ther Activities     []  Aquatics     []  Vasocompression     []  Other       TOTAL TREATMENT TIME: 43    Time in: 11:02AM    Time Out: 11:45AM    Electronically signed by: Maria E Brown, PT

## 2022-09-09 ENCOUNTER — HOSPITAL ENCOUNTER (OUTPATIENT)
Dept: PHYSICAL THERAPY | Facility: CLINIC | Age: 62
Setting detail: THERAPIES SERIES
Discharge: HOME | End: 2022-09-09
Payer: MEDICARE

## 2022-09-09 PROCEDURE — 97110 THERAPEUTIC EXERCISES: CPT

## 2022-09-09 PROCEDURE — 97016 VASOPNEUMATIC DEVICE THERAPY: CPT

## 2022-09-09 NOTE — FLOWSHEET NOTE
[] [facility]  [address]  [phone]  F: [phone] [] [address]  [address]   [address]  P: [phone]  F: [phone] [] [place]  [facility]  [address]  P: [phone]  F: [phone] [x] [address]  P: [phone]  F: [phone] [] [address]  [facility]   [address]   [place]: [phone]  F: [phone]      Physical Therapy Daily Treatment Note    Date:  2022  Patient Name:  Carmen Escudero    :  1960  MRN: [id number]  Physician: Connie Rothman MD                                   Insurance: [place] advantage- auth after 30 visits   Medical Diagnosis: M25.512, G89.29 (ICD-10-CM) - Chronic left shoulder pain                 Rehab Codes: M25.512, G89.29 (ICD-10-CM) - Chronic left shoulder pain  Onset Date: 22; see below                   Next 's appt: TBD- pending success with PT services   Visit# / total visits: 310    Cancels/No Shows: 0/0    Subjective:    Pain:  [x] Yes  [] No Location: L shoulder Pain Rating: (0-10 scale) 3/10  Pain altered Tx:  [x] No  [] Yes  Action: N/A  Comments: Pt reported pain in L shoulder up to 3/10.      Objective:  Modalities: VASO to L shoulder: 34 deg, LOW in sitting for x15' at finish of treatment session   Precautions: potential L chronic RTC weakness- worst pain with sleeping upon L side; most limited in ER/IR with P at end-ranges; desires an MRI   Exercises: h/o B shoulder scopes   Exercise Reps/ Time Weight/ Level Comments   UBE 2/2           Pulleys IR  2 min AAROM  standing         Supine      Resisted HABD/ER 10x 1 lb bilat   PROM x  IR/ER         Gym      Band Ext 10x orange    Band Rows 10x orange    Band IR/ER 10x orange                                                                        Other:     Specific Instructions for next treatment: Consider snow angels; supine over towel roll pec stretching; and S/L sleeper stretch next visit. Treatment Charges: Mins Units   []  Modalities     []  Ther Exercise 31 2   []  Manual Therapy     []  Ther Activities     []  Aquatics     []  Vasocompression 15 1   []  Other     Total Treatment time 46 3     Assessment: [x] Progressing toward goals. Pt initiated treatment on UBE to promote ROM with pt noting soreness post. Added standing and supine activities to promote UE strength and ROM with good tolerance. Pt concluded session with vasocompression. Pt through out session discussed incident at another facility. [] No change. [] Other:  [x] Patient would continue to benefit from skilled physical therapy services in order to: inc L shoulder ER/IR AROM with dec P at end-ranges; L RTC strength and endurance; and overall improve tolerance for sleeping upon her L side. STG: (to be met in 6 treatments)  ? Pain: Patient will report < 5/10 pain at rest and < 8/10 pain with active movement in order to improve QOL. ? ROM: Will demo L shoulder ER/IR AROM to match R and with < 3/10 P at end-ranges for improved functional mobility. ? Strength: Will demo 4+/5 L shoulder gross strength- dec P with resisted ER- in order to improve light ADLs around her home. ? Function: Will report improved sleeping tolerance for L side for inc QOL. Patient to be independent with home exercise program as demonstrated by performance with correct form without cues. LTG: (to be met in 10 treatments)  Will report < 5/10 P with most ADLs for inc QOL. Improve UEFS to < 28% impaired or limited                  Patient goals: \"to get an MRI. \"     Pt.  Education:  [x] Yes  [] No  [x] Reviewed Prior HEP/Ed  Method of Education: [] Verbal  [] Demo  [] Written  Comprehension of Education:  [] Stoney understanding. [] Demonstrates understanding. [] Needs review. [x] Demonstrates/verbalizes HEP/Ed previously given. Access Code: [email or web address]  URL: [email or web address]  Date: 09/08/2022  Prepared by: Molina Chang     Exercises  Sidelying Shoulder External Rotation - 2 x daily - 7 x weekly - 1 sets - 20 reps  Sidelying Shoulder Abduction Palm Forward - 2 x daily - 7 x weekly - 1 sets - 20 reps  Sidelying Shoulder Horizontal Abduction - 2 x daily - 7 x weekly - 1 sets - 20 reps  Standing Shoulder Internal Rotation Stretch with Towel - 2 x daily - 7 x weekly - 1 sets - 10 reps - 5 seconds hold  Seated Shoulder External Rotation AAROM with Dowel - 2 x daily - 7 x weekly - 1 sets - 10 reps - 5 seconds hold  Shoulder External Rotation and Scapular Retraction - 2 x daily - 7 x weekly - 1 sets - 10 reps - 5 seconds hold     Plan: [x] Continue current frequency toward long and short term goals. [x] Specific Instructions for subsequent treatments: Consider snow angels; supine over towel roll pec stretching; and S/L sleeper stretch next visit.        Time In: 0900            Time Out: 950    Electronically signed by:  Amy Edmonds PTA

## 2022-09-13 ENCOUNTER — HOSPITAL ENCOUNTER (OUTPATIENT)
Dept: PHYSICAL THERAPY | Facility: CLINIC | Age: 62
Setting detail: THERAPIES SERIES
Discharge: HOME | End: 2022-09-13
Payer: MEDICARE

## 2022-09-13 PROCEDURE — 97110 THERAPEUTIC EXERCISES: CPT

## 2022-09-13 PROCEDURE — 97016 VASOPNEUMATIC DEVICE THERAPY: CPT

## 2022-09-13 NOTE — FLOWSHEET NOTE
[] [facility]  [address]  [phone]  F: [phone] [] [address]  [facility]   [address]  P: [phone]  F: [phone] [] [place] [facility]  [facility]  [address]  P: [phone]  F: [phone] [x] [address]  P: [phone]  F: [phone] [] [address]  [facility]   [address]   [place]: [phone]  F: [phone]      Physical Therapy Daily Treatment Note    Date:  2022  Patient Name:  Jimmye Boeck    :  1960  MRN: [id number]  Physician: Edyta Serrano MD                                   Insurance: [place] advantage- [employer] after 30 visits   Medical Diagnosis: M25.512, G89.29 (ICD-10-CM) - Chronic left shoulder pain                 Rehab Codes: M25.512, G89.29 (ICD-10-CM) - Chronic left shoulder pain  Onset Date: 22; see below                   Next 's appt: TBD- pending success with PT services   Visit# / total visits: 3/10    Cancels/No Shows: 0/0    Subjective:    Pain:  [x] Yes  [] No Location: L shoulder Pain Rating: (0-10 scale) 2/10  Pain altered Tx:  [x] No  [] Yes  Action: N/A  Comments: Pt states pain is 2/10 upon arrival. Also states she has most difficulty with pain in am after sleeping.      Objective:  Modalities: VASO to L shoulder: 34 deg, LOW in sitting for x15' at finish of treatment session   Precautions: potential L chronic RTC weakness- worst pain with sleeping upon L side; most limited in ER/IR with P at end-ranges; desires an MRI   Exercises: h/o B shoulder scopes   Exercise Reps/ Time Weight/ Level Comments    UBE 2/2   x   1/2 foam roller pec str  2x1'   x   Sleeper's stretch 2x30\"   x          Pulleys IR  2 min AAROM  standing Supine       Resisted HAB/ER 10x 1 lb bilat x   PROM x  IR/ER -          Gym       Band Ext 10x orange  -   Band Rows 2x10 Lime  x   Band IR/ER 10x  orange  x                 Side lying RTC         ER  2x15     x    ABD   2x10    slight discomfort x    Flex   2x10     x    HAB  2x10     x              Other:     Specific Instructions for next treatment: Consider snow angels next visit. Update HEP    Treatment Charges: Mins Units   []  Modalities     [x]  Ther Exercise 35 2   []  Manual Therapy     []  Ther Activities     []  Aquatics     [x]  Vasocompression 15 1   []  Other     Total Treatment time 50 3     Assessment: [x] Progressing toward goals. PTA educated pt on positioning L UE on 2-3 pillows while sleeping on her R side. Added pec stretch on foam roller on large plinth with good jim. Pt also able to progress with side lying RTC strengthening as well as increased resistance with band exercises with fair to goo dol, weakness noted with ER. Ended with vaso for pain management. [] No change. [] Other:  [x] Patient would continue to benefit from skilled physical therapy services in order to: inc L shoulder ER/IR AROM with dec P at end-ranges; L RTC strength and endurance; and overall improve tolerance for sleeping upon her L side. STG: (to be met in 6 treatments)  ? Pain: Patient will report < 5/10 pain at rest and < 8/10 pain with active movement in order to improve QOL. ? ROM: Will demo L shoulder ER/IR AROM to match R and with < 3/10 P at end-ranges for improved functional mobility. ? Strength: Will demo 4+/5 L shoulder gross strength- dec P with resisted ER- in order to improve light ADLs around her home. ? Function: Will report improved sleeping tolerance for L side for inc QOL. Patient to be independent with home exercise program as demonstrated by performance with correct form without cues. LTG: (to be met in 10 treatments)  Will report < 5/10 P with most ADLs for inc QOL.    Improve UEFS to < 28% impaired or limited                  Patient goals: \"to get an MRI. \"     Pt. Education:  [x] Yes  [] No  [x] Reviewed Prior HEP/Ed  Method of Education: [] Verbal  [] Demo  [] Written  Comprehension of Education:  [] Verbalizes understanding. [] Demonstrates understanding. [] Needs review. [x] Demonstrates/verbalizes HEP/Ed previously given. Access Code: [email or web address]  URL: [email or web address]  Date: 09/08/2022  Prepared by: Esperanza Valdivia     Exercises  Sidelying Shoulder External Rotation - 2 x daily - 7 x weekly - 1 sets - 20 reps  Sidelying Shoulder Abduction Palm Forward - 2 x daily - 7 x weekly - 1 sets - 20 reps  Sidelying Shoulder Horizontal Abduction - 2 x daily - 7 x weekly - 1 sets - 20 reps  Standing Shoulder Internal Rotation Stretch with Towel - 2 x daily - 7 x weekly - 1 sets - 10 reps - 5 seconds hold  Seated Shoulder External Rotation AAROM with Dowel - 2 x daily - 7 x weekly - 1 sets - 10 reps - 5 seconds hold  Shoulder External Rotation and Scapular Retraction - 2 x daily - 7 x weekly - 1 sets - 10 reps - 5 seconds hold     Plan: [x] Continue current frequency toward long and short term goals. [x] Specific Instructions for subsequent treatments: Consider snow angels; supine over towel roll pec stretching; and S/L sleeper stretch next visit.        Time In: 2:00pm           Time Out: 2:50pm    Electronically signed by:  Jonah Chase PTA

## 2022-09-16 ENCOUNTER — HOSPITAL ENCOUNTER (OUTPATIENT)
Dept: PHYSICAL THERAPY | Facility: CLINIC | Age: 62
Setting detail: THERAPIES SERIES
Discharge: HOME | End: 2022-09-16
Payer: MEDICARE

## 2022-09-16 PROCEDURE — 97016 VASOPNEUMATIC DEVICE THERAPY: CPT

## 2022-09-16 PROCEDURE — 97110 THERAPEUTIC EXERCISES: CPT

## 2022-09-16 NOTE — FLOWSHEET NOTE
[] [facility]  [address]  [phone]  F: [phone] [] [address]  [place] [address]   [address]  P: [phone]  F: [phone] [] [place] [facility]  [facility]  [address] [facility] [address] [facility]  P: [phone]  F: [phone] [x] [address]  P: [phone]  F: [phone] [] [address]  [facility]   [address]   [place]: [phone]  F: [phone]      Physical Therapy Daily Treatment Note    Date:  2022  Patient Name:  Karishma Montalvo    :  1960  MRN: [id number]  Physician: Robson Collier MD                                   Insurance: [place] advantage- [employer] after 30 visits   Medical Diagnosis: M25.512, G89.29 (ICD-10-CM) - Chronic left shoulder pain                 Rehab Codes: M25.512, G89.29 (ICD-10-CM) - Chronic left shoulder pain  Onset Date: 22; see below                   Next 's appt: TBD- pending success with PT services   Visit# / total visits: 4/10    Cancels/No Shows: 0/0    Subjective:    Pain:  [x] Yes  [] No Location: L shoulder Pain Rating: (0-10 scale) 3/10  Pain altered Tx:  [x] No  [] Yes  Action: N/A  Comments: Pt reported that she is sore from sleeping. Pt noted slight increase in pain level.  Pt did note that when reaching across body pt felt popping in the L shoulder    Objective:  Modalities: VASO to L shoulder: 34 deg, LOW in sitting for x15' at finish of treatment session   Precautions: potential L chronic RTC weakness- worst pain with sleeping upon L side; most limited in ER/IR with P at end-ranges; desires an MRI   Exercises: h/o B shoulder scopes   Exercise Reps/ Time Weight/ Level Comments    UBE 2/2   x   1/2 foam roller pec str  2x1'   x   Snow angles on foam roll 2x10 x   Sleeper's stretch 2x30\"   x          Pulleys IR  2 min AAROM  standing             Supine       Resisted HAB/ER 10x 2 lb bilat x   PROM x  IR/ER -          Gym       Band Ext 10x orange  -   Band Rows 2x10 Lime  x   Band IR/ER 15x ea orange  x                 Side lying RTC         ER  2x15  1 lbs   x    ABD   2x10  1 lbs  slight discomfort x    Flex   2x10  1 lbs   x    HAB  2x10  1 lbs   x   Other:     Specific Instructions for next treatment: Consider snow angels next visit. Update HEP    Treatment Charges: Mins Units   []  Modalities     [x]  Ther Exercise 31 2   []  Manual Therapy     []  Ther Activities     []  Aquatics     [x]  Vasocompression 15 1   []  Other     Total Treatment time 46 3     Assessment: [x] Progressing toward goals. Pt initiated treatment on UBE with good tolerance. Pt then completed mat activities adding snow angles and resistance to supine and sidelying activities to improve shoulder strength. Pt then continued with standing resistance activities increasing reps per pt tolerance. Pt was fatigued post session. Pt concluded session with vasocompression. [] No change. [] Other:  [x] Patient would continue to benefit from skilled physical therapy services in order to: inc L shoulder ER/IR AROM with dec P at end-ranges; L RTC strength and endurance; and overall improve tolerance for sleeping upon her L side. STG: (to be met in 6 treatments)  ? Pain: Patient will report < 5/10 pain at rest and < 8/10 pain with active movement in order to improve QOL. ? ROM: Will demo L shoulder ER/IR AROM to match R and with < 3/10 P at end-ranges for improved functional mobility. ? Strength: Will demo 4+/5 L shoulder gross strength- dec P with resisted ER- in order to improve light ADLs around her home. ? Function: Will report improved sleeping tolerance for L side for inc QOL.    Patient to be independent with home exercise program as demonstrated by performance with correct form without cues. LTG: (to be met in 10 treatments)  Will report < 5/10 P with most ADLs for inc QOL. Improve UEFS to < 28% impaired or limited                  Patient goals: \"to get an MRI. \"     Pt. Education:  [x] Yes  [] No  [x] Reviewed Prior HEP/Ed  Method of Education: [] Verbal  [] Demo  [] Written  Comprehension of Education:  [] Verbalizes understanding. [] Demonstrates understanding. [] Needs review. [x] Demonstrates/verbalizes HEP/Ed previously given. Access Code: [email or web address]  URL: [email or web address]/  Date: 09/08/2022  Prepared by: Sophie Mendoza     Exercises  Sidelying Shoulder External Rotation - 2 x daily - 7 x weekly - 1 sets - 20 reps  Sidelying Shoulder Abduction Palm Forward - 2 x daily - 7 x weekly - 1 sets - 20 reps  Sidelying Shoulder Horizontal Abduction - 2 x daily - 7 x weekly - 1 sets - 20 reps  Standing Shoulder Internal Rotation Stretch with Towel - 2 x daily - 7 x weekly - 1 sets - 10 reps - 5 seconds hold  Seated Shoulder External Rotation AAROM with Dowel - 2 x daily - 7 x weekly - 1 sets - 10 reps - 5 seconds hold  Shoulder External Rotation and Scapular Retraction - 2 x daily - 7 x weekly - 1 sets - 10 reps - 5 seconds hold     Plan: [x] Continue current frequency toward long and short term goals.     [x] Specific Instructions for subsequent treatments:       Time In: 1030           Time Out: 1120    Electronically signed by:  Faraz Romero PTA

## 2022-09-20 ENCOUNTER — HOSPITAL ENCOUNTER (OUTPATIENT)
Dept: PHYSICAL THERAPY | Facility: CLINIC | Age: 62
Setting detail: THERAPIES SERIES
Discharge: HOME | End: 2022-09-20
Payer: MEDICARE

## 2022-09-20 PROCEDURE — 97016 VASOPNEUMATIC DEVICE THERAPY: CPT

## 2022-09-20 PROCEDURE — 97110 THERAPEUTIC EXERCISES: CPT

## 2022-09-20 NOTE — FLOWSHEET NOTE
[] [facility] [facility]  [address]  [phone]  F: [phone] [] [address]  [facility]   [address]  P: [phone]  F: [phone] [] [place]  [facility]  [address]  P: [phone]  F: [phone] [x] [address]  P: [phone]  F: [phone] [] [address]  [facility]   [address]   [place]: [phone]  F: [phone]      Physical Therapy Daily Treatment Note    Date:  2022  Patient Name:  Jimmye Boeck    :  1960  MRN: [id number]  Physician: Edyta Serrano MD                                   Insurance: [place] advantage- [employer] after 30 visits   Medical Diagnosis: M25.512, G89.29 (ICD-10-CM) - Chronic left shoulder pain                 Rehab Codes: M25.512, G89.29 (ICD-10-CM) - Chronic left shoulder pain  Onset Date: 22; see below                   Next 's appt: TBD- pending success with PT services   Visit# / total visits: 5/10    Cancels/No Shows: 0/0    Subjective:    Pain:  [x] Yes  [] No Location: L shoulder Pain Rating: (0-10 scale) 0/10  Pain altered Tx:  [x] No  [] Yes  Action: N/A  Comments: Pt states L shoulder is \"fine today\".      Objective:  Modalities: VASO to L shoulder: 34 deg, LOW in sitting for x15' at finish of treatment session   Precautions: potential L chronic RTC weakness- worst pain with sleeping upon L side; most limited in ER/IR with P at end-ranges; desires an MRI   Exercises: h/o B shoulder scopes   Exercise Reps/ Time Weight/ Level Comments    UBE 2/2 L2.0  x   1/2 foam roller pec str  2x1'   -   Doorway pec stretch  3x30\"  Mid  x   Snow angles 10x  At wall  x   Sleeper's stretch 2x30\"   -          Pulleys IR  2 min AAROM  standing   - Supine       Resisted HAB/ER 10x 2 lb bilat -   PROM x  IR/ER -          Gym       Band Ext 2x10 orange  x   Band Rows 2x10 Lime  x   Band IR/ER 2x10 ea Lime   x   B shld ER 2x10 Bryan  x          Side lying RTC         ER  2x15  1 lbs   x    ABD   2x10  1 lbs  slight discomfort x    Flex   2x10  1 lbs   x    HAB  2x10  1 lbs   x   Other:  Specific Instructions for next treatment: Consider snow angels next visit. Update HEP    Treatment Charges: Mins Units   []  Modalities     [x]  Ther Exercise 30 2   []  Manual Therapy     []  Ther Activities     []  Aquatics     [x]  Vasocompression 15 1   []  Other     Total Treatment time 45 3     Assessment: [x] Progressing toward goals. Pt dmeos improving strength and motion with L shoulder. Pt able to complete exercises as listed above with good tolerance and appropriate fatigue reported after. Ended with vaso for pain/soreness management. [] No change. [] Other:  [x] Patient would continue to benefit from skilled physical therapy services in order to: inc L shoulder ER/IR AROM with dec P at end-ranges; L RTC strength and endurance; and overall improve tolerance for sleeping upon her L side. STG: (to be met in 6 treatments)  ? Pain: Patient will report < 5/10 pain at rest and < 8/10 pain with active movement in order to improve QOL. ? ROM: Will demo L shoulder ER/IR AROM to match R and with < 3/10 P at end-ranges for improved functional mobility. ? Strength: Will demo 4+/5 L shoulder gross strength- dec P with resisted ER- in order to improve light ADLs around her home. ? Function: Will report improved sleeping tolerance for L side for inc QOL. Patient to be independent with home exercise program as demonstrated by performance with correct form without cues. LTG: (to be met in 10 treatments)  Will report < 5/10 P with most ADLs for inc QOL. Improve UEFS to < 28% impaired or limited                  Patient goals: \"to get an MRI. \"     Pt.  Education: [x] Yes  [] No  [x] Reviewed Prior HEP/Ed  Method of Education: [] Verbal  [] Demo  [] Written  Comprehension of Education:  [] Verbalizes understanding. [] Demonstrates understanding. [] Needs review. [x] Demonstrates/verbalizes HEP/Ed previously given. Access Code: [email or web address]  URL: [email or web address] com/  Date: 09/08/2022  Prepared by: Evens Ye     Exercises  Sidelying Shoulder External Rotation - 2 x daily - 7 x weekly - 1 sets - 20 reps  Sidelying Shoulder Abduction Palm Forward - 2 x daily - 7 x weekly - 1 sets - 20 reps  Sidelying Shoulder Horizontal Abduction - 2 x daily - 7 x weekly - 1 sets - 20 reps  Standing Shoulder Internal Rotation Stretch with Towel - 2 x daily - 7 x weekly - 1 sets - 10 reps - 5 seconds hold  Seated Shoulder External Rotation AAROM with Dowel - 2 x daily - 7 x weekly - 1 sets - 10 reps - 5 seconds hold  Shoulder External Rotation and Scapular Retraction - 2 x daily - 7 x weekly - 1 sets - 10 reps - 5 seconds hold     Plan: [x] Continue current frequency toward long and short term goals.     [x] Specific Instructions for subsequent treatments:       Time In: 11:00am           Time Out: 11:45am    Electronically signed by:  Tom Hamilton PTA

## 2022-09-22 ENCOUNTER — HOSPITAL ENCOUNTER (OUTPATIENT)
Age: 62
Setting detail: OBSERVATION
Discharge: HOME | End: 2022-09-23
Attending: EMERGENCY MEDICINE | Admitting: STUDENT IN AN ORGANIZED HEALTH CARE EDUCATION/TRAINING PROGRAM
Payer: MEDICARE

## 2022-09-22 ENCOUNTER — APPOINTMENT (OUTPATIENT)
Dept: CT IMAGING | Age: 62
End: 2022-09-22
Payer: MEDICARE

## 2022-09-22 DIAGNOSIS — R07.9 CHEST PAIN, UNSPECIFIED TYPE: Primary | ICD-10-CM

## 2022-09-22 PROBLEM — I20.0 UNSTABLE ANGINA (HCC): Status: ACTIVE | Noted: 2022-09-22

## 2022-09-22 PROBLEM — F41.9 ANXIETY: Status: ACTIVE | Noted: 2022-09-22

## 2022-09-22 LAB
ABSOLUTE EOS #: 0.1 K/UL (ref 0–0.4)
ABSOLUTE LYMPH #: 1.7 K/UL (ref 1–4.8)
ABSOLUTE MONO #: 0.9 K/UL (ref 0.1–1.2)
ANION GAP SERPL CALCULATED.3IONS-SCNC: 10 MMOL/L (ref 9–17)
BASOPHILS # BLD: 0 % (ref 0–2)
BASOPHILS ABSOLUTE: 0 K/UL (ref 0–0.2)
BUN BLDV-MCNC: 13 MG/DL (ref 8–23)
CALCIUM SERPL-MCNC: 8.8 MG/DL (ref 8.6–10.4)
CHLORIDE BLD-SCNC: 106 MMOL/L (ref 98–107)
CO2: 23 MMOL/L (ref 20–31)
CREAT SERPL-MCNC: 0.79 MG/DL (ref 0.5–0.9)
EOSINOPHILS RELATIVE PERCENT: 1 % (ref 1–4)
GFR AFRICAN AMERICAN: >60 ML/MIN
GFR NON-AFRICAN AMERICAN: >60 ML/MIN
GFR SERPL CREATININE-BSD FRML MDRD: ABNORMAL ML/MIN/{1.73_M2}
GLUCOSE BLD-MCNC: 130 MG/DL (ref 70–99)
HCT VFR BLD CALC: 38.2 % (ref 36–46)
HEMOGLOBIN: 12.5 G/DL (ref 12–16)
LIPASE: 22 U/L (ref 13–60)
LYMPHOCYTES # BLD: 14 % (ref 24–44)
MCH RBC QN AUTO: 29.4 PG (ref 26–34)
MCHC RBC AUTO-ENTMCNC: 32.8 G/DL (ref 31–37)
MCV RBC AUTO: 89.7 FL (ref 80–100)
MONOCYTES # BLD: 7 % (ref 2–11)
PDW BLD-RTO: 13.6 % (ref 12.5–15.4)
PLATELET # BLD: 294 K/UL (ref 140–450)
PMV BLD AUTO: 5.9 FL (ref 6–12)
POTASSIUM SERPL-SCNC: 4.3 MMOL/L (ref 3.7–5.3)
RBC # BLD: 4.26 M/UL (ref 4–5.2)
SEG NEUTROPHILS: 78 % (ref 36–66)
SEGMENTED NEUTROPHILS ABSOLUTE COUNT: 9.5 K/UL (ref 1.8–7.7)
SODIUM BLD-SCNC: 139 MMOL/L (ref 135–144)
TROPONIN, HIGH SENSITIVITY: 7 NG/L (ref 0–14)
WBC # BLD: 12.2 K/UL (ref 3.5–11)

## 2022-09-22 PROCEDURE — 96372 THER/PROPH/DIAG INJ SC/IM: CPT

## 2022-09-22 PROCEDURE — 71260 CT THORAX DX C+: CPT | Performed by: EMERGENCY MEDICINE

## 2022-09-22 PROCEDURE — 6370000000 HC RX 637 (ALT 250 FOR IP): Performed by: EMERGENCY MEDICINE

## 2022-09-22 PROCEDURE — 6370000000 HC RX 637 (ALT 250 FOR IP): Performed by: STUDENT IN AN ORGANIZED HEALTH CARE EDUCATION/TRAINING PROGRAM

## 2022-09-22 PROCEDURE — 6360000004 HC RX CONTRAST MEDICATION: Performed by: EMERGENCY MEDICINE

## 2022-09-22 PROCEDURE — 83690 ASSAY OF LIPASE: CPT

## 2022-09-22 PROCEDURE — 6360000002 HC RX W HCPCS: Performed by: STUDENT IN AN ORGANIZED HEALTH CARE EDUCATION/TRAINING PROGRAM

## 2022-09-22 PROCEDURE — 84484 ASSAY OF TROPONIN QUANT: CPT

## 2022-09-22 PROCEDURE — 2580000003 HC RX 258: Performed by: EMERGENCY MEDICINE

## 2022-09-22 PROCEDURE — G0378 HOSPITAL OBSERVATION PER HR: HCPCS

## 2022-09-22 PROCEDURE — 99219 PR INITIAL OBSERVATION CARE/DAY 50 MINUTES: CPT | Performed by: STUDENT IN AN ORGANIZED HEALTH CARE EDUCATION/TRAINING PROGRAM

## 2022-09-22 PROCEDURE — 2580000003 HC RX 258: Performed by: STUDENT IN AN ORGANIZED HEALTH CARE EDUCATION/TRAINING PROGRAM

## 2022-09-22 PROCEDURE — 80048 BASIC METABOLIC PNL TOTAL CA: CPT

## 2022-09-22 PROCEDURE — 85025 COMPLETE CBC W/AUTO DIFF WBC: CPT

## 2022-09-22 PROCEDURE — 93005 ELECTROCARDIOGRAM TRACING: CPT | Performed by: EMERGENCY MEDICINE

## 2022-09-22 PROCEDURE — 36415 COLL VENOUS BLD VENIPUNCTURE: CPT

## 2022-09-22 PROCEDURE — 99285 EMERGENCY DEPT VISIT HI MDM: CPT

## 2022-09-22 RX ORDER — SODIUM CHLORIDE 0.9 % (FLUSH) 0.9 %
5-40 SYRINGE (ML) INJECTION PRN
Status: DISCONTINUED | OUTPATIENT
Start: 2022-09-22 | End: 2022-09-23 | Stop reason: SDUPTHER

## 2022-09-22 RX ORDER — ANTIARTHRITIC COMBINATION NO.2 900 MG
15000 TABLET ORAL 2 TIMES DAILY
COMMUNITY
End: 2022-09-29

## 2022-09-22 RX ORDER — ONDANSETRON 4 MG/1
4 TABLET, ORALLY DISINTEGRATING ORAL EVERY 8 HOURS PRN
Status: DISCONTINUED | OUTPATIENT
Start: 2022-09-22 | End: 2022-09-23 | Stop reason: HOSPADM

## 2022-09-22 RX ORDER — MORPHINE SULFATE 2 MG/ML
1 INJECTION, SOLUTION INTRAMUSCULAR; INTRAVENOUS EVERY 4 HOURS PRN
Status: DISCONTINUED | OUTPATIENT
Start: 2022-09-22 | End: 2022-09-23 | Stop reason: HOSPADM

## 2022-09-22 RX ORDER — ONDANSETRON 2 MG/ML
4 INJECTION INTRAMUSCULAR; INTRAVENOUS EVERY 6 HOURS PRN
Status: DISCONTINUED | OUTPATIENT
Start: 2022-09-22 | End: 2022-09-23 | Stop reason: HOSPADM

## 2022-09-22 RX ORDER — TOPIRAMATE 100 MG/1
100 TABLET, FILM COATED ORAL 2 TIMES DAILY
Status: DISCONTINUED | OUTPATIENT
Start: 2022-09-22 | End: 2022-09-23 | Stop reason: HOSPADM

## 2022-09-22 RX ORDER — AMITRIPTYLINE HYDROCHLORIDE 50 MG/1
50 TABLET, FILM COATED ORAL NIGHTLY
Status: DISCONTINUED | OUTPATIENT
Start: 2022-09-22 | End: 2022-09-23 | Stop reason: HOSPADM

## 2022-09-22 RX ORDER — SODIUM CHLORIDE 0.9 % (FLUSH) 0.9 %
10 SYRINGE (ML) INJECTION PRN
Status: DISCONTINUED | OUTPATIENT
Start: 2022-09-22 | End: 2022-09-23 | Stop reason: SDUPTHER

## 2022-09-22 RX ORDER — ACETAMINOPHEN 325 MG/1
650 TABLET ORAL EVERY 6 HOURS PRN
Status: DISCONTINUED | OUTPATIENT
Start: 2022-09-22 | End: 2022-09-23 | Stop reason: HOSPADM

## 2022-09-22 RX ORDER — SODIUM CHLORIDE 0.9 % (FLUSH) 0.9 %
5-40 SYRINGE (ML) INJECTION EVERY 12 HOURS SCHEDULED
Status: DISCONTINUED | OUTPATIENT
Start: 2022-09-22 | End: 2022-09-23 | Stop reason: HOSPADM

## 2022-09-22 RX ORDER — 0.9 % SODIUM CHLORIDE 0.9 %
80 INTRAVENOUS SOLUTION INTRAVENOUS ONCE
Status: DISCONTINUED | OUTPATIENT
Start: 2022-09-22 | End: 2022-09-23 | Stop reason: HOSPADM

## 2022-09-22 RX ORDER — ATORVASTATIN CALCIUM 10 MG/1
10 TABLET, FILM COATED ORAL DAILY
Status: DISCONTINUED | OUTPATIENT
Start: 2022-09-22 | End: 2022-09-23 | Stop reason: HOSPADM

## 2022-09-22 RX ORDER — OXYCODONE HYDROCHLORIDE AND ACETAMINOPHEN 5; 325 MG/1; MG/1
1 TABLET ORAL ONCE
Status: COMPLETED | OUTPATIENT
Start: 2022-09-22 | End: 2022-09-22

## 2022-09-22 RX ORDER — SODIUM CHLORIDE 9 MG/ML
INJECTION, SOLUTION INTRAVENOUS PRN
Status: DISCONTINUED | OUTPATIENT
Start: 2022-09-22 | End: 2022-09-23 | Stop reason: HOSPADM

## 2022-09-22 RX ORDER — OXYCODONE HYDROCHLORIDE AND ACETAMINOPHEN 5; 325 MG/1; MG/1
1 TABLET ORAL EVERY 4 HOURS PRN
Status: DISCONTINUED | OUTPATIENT
Start: 2022-09-22 | End: 2022-09-23 | Stop reason: HOSPADM

## 2022-09-22 RX ORDER — ASPIRIN 81 MG/1
324 TABLET, CHEWABLE ORAL ONCE
Status: COMPLETED | OUTPATIENT
Start: 2022-09-22 | End: 2022-09-22

## 2022-09-22 RX ORDER — OXYCODONE HYDROCHLORIDE AND ACETAMINOPHEN 5; 325 MG/1; MG/1
2 TABLET ORAL EVERY 4 HOURS PRN
Status: DISCONTINUED | OUTPATIENT
Start: 2022-09-22 | End: 2022-09-23 | Stop reason: HOSPADM

## 2022-09-22 RX ORDER — ALPRAZOLAM 0.25 MG/1
0.25 TABLET ORAL 2 TIMES DAILY PRN
Status: DISCONTINUED | OUTPATIENT
Start: 2022-09-22 | End: 2022-09-23 | Stop reason: HOSPADM

## 2022-09-22 RX ORDER — ALBUTEROL SULFATE 2.5 MG/3ML
2.5 SOLUTION RESPIRATORY (INHALATION) EVERY 6 HOURS PRN
Status: DISCONTINUED | OUTPATIENT
Start: 2022-09-22 | End: 2022-09-23 | Stop reason: HOSPADM

## 2022-09-22 RX ORDER — ACETAMINOPHEN 650 MG/1
650 SUPPOSITORY RECTAL EVERY 6 HOURS PRN
Status: DISCONTINUED | OUTPATIENT
Start: 2022-09-22 | End: 2022-09-23 | Stop reason: HOSPADM

## 2022-09-22 RX ORDER — BUSPIRONE HYDROCHLORIDE 5 MG/1
10 TABLET ORAL 3 TIMES DAILY
Status: DISCONTINUED | OUTPATIENT
Start: 2022-09-22 | End: 2022-09-23 | Stop reason: HOSPADM

## 2022-09-22 RX ORDER — ENOXAPARIN SODIUM 100 MG/ML
30 INJECTION SUBCUTANEOUS 2 TIMES DAILY
Status: DISCONTINUED | OUTPATIENT
Start: 2022-09-22 | End: 2022-09-23 | Stop reason: HOSPADM

## 2022-09-22 RX ORDER — POLYETHYLENE GLYCOL 3350 17 G/17G
17 POWDER, FOR SOLUTION ORAL DAILY PRN
Status: DISCONTINUED | OUTPATIENT
Start: 2022-09-22 | End: 2022-09-23 | Stop reason: HOSPADM

## 2022-09-22 RX ADMIN — BUSPIRONE HYDROCHLORIDE 10 MG: 5 TABLET ORAL at 22:52

## 2022-09-22 RX ADMIN — Medication 80 ML: at 16:39

## 2022-09-22 RX ADMIN — OXYCODONE HYDROCHLORIDE AND ACETAMINOPHEN 1 TABLET: 5; 325 TABLET ORAL at 19:36

## 2022-09-22 RX ADMIN — ASPIRIN 81 MG CHEWABLE TABLET 324 MG: 81 TABLET CHEWABLE at 15:34

## 2022-09-22 RX ADMIN — SODIUM CHLORIDE, PRESERVATIVE FREE 10 ML: 5 INJECTION INTRAVENOUS at 16:39

## 2022-09-22 RX ADMIN — TOPIRAMATE 100 MG: 100 TABLET, FILM COATED ORAL at 22:54

## 2022-09-22 RX ADMIN — IOPAMIDOL 75 ML: 755 INJECTION, SOLUTION INTRAVENOUS at 16:39

## 2022-09-22 RX ADMIN — SODIUM CHLORIDE, PRESERVATIVE FREE 10 ML: 5 INJECTION INTRAVENOUS at 22:25

## 2022-09-22 RX ADMIN — ENOXAPARIN SODIUM 30 MG: 100 INJECTION SUBCUTANEOUS at 22:55

## 2022-09-22 RX ADMIN — AMITRIPTYLINE HYDROCHLORIDE 50 MG: 50 TABLET, FILM COATED ORAL at 22:52

## 2022-09-22 ASSESSMENT — PAIN DESCRIPTION - LOCATION
LOCATION: SHOULDER;CHEST
LOCATION: CHEST
LOCATION: CHEST;SHOULDER
LOCATION: SHOULDER;CHEST

## 2022-09-22 ASSESSMENT — PAIN SCALES - GENERAL
PAINLEVEL_OUTOF10: 8
PAINLEVEL_OUTOF10: 4
PAINLEVEL_OUTOF10: 4
PAINLEVEL_OUTOF10: 10
PAINLEVEL_OUTOF10: 8

## 2022-09-22 ASSESSMENT — LIFESTYLE VARIABLES: HOW OFTEN DO YOU HAVE A DRINK CONTAINING ALCOHOL: NEVER

## 2022-09-22 ASSESSMENT — PAIN DESCRIPTION - DESCRIPTORS
DESCRIPTORS: SHARP
DESCRIPTORS: ACHING
DESCRIPTORS: SHARP

## 2022-09-22 ASSESSMENT — PAIN - FUNCTIONAL ASSESSMENT: PAIN_FUNCTIONAL_ASSESSMENT: 0-10

## 2022-09-22 ASSESSMENT — ENCOUNTER SYMPTOMS: BACK PAIN: 1

## 2022-09-22 NOTE — ED PROVIDER NOTES
[address] [facility]  [address] [facility] [address]  Phone: [phone]  Fax: [phone]        Pt Name: Romeo Ormond  MRN: [id number]  [place] 1960  Date of evaluation: 9/22/22      CHIEF COMPLAINT     Chief Complaint   Patient presents with    Chest Pain    Back Pain         HISTORY OF PRESENT ILLNESS  (Location/Symptom, Timing/Onset, Context/Setting, Quality, Duration, Modifying Factors, Severity.)    Romeo Ormond is a 65 y.o. female who presents with chest pain and back pain. The patient dates that she does suffer from some chronic upper back pain however at 4:00 this morning she awoke with sharp stabbing chest pain upper back pain worse with deep inspiration no fever no chills no cough no abdominal pain no unusual swelling in the arms or legs no vomiting no diarrhea nothing she does makes her symptoms better deep inspiration makes her symptoms worse      REVIEW OF SYSTEMS    (2-9 systems for level 4, 10 or more for level 5)     Review of Systems   Cardiovascular:  Positive for chest pain. Musculoskeletal:  Positive for back pain. All other systems reviewed and are negative.     PAST MEDICAL HISTORY    has a past medical history of Acromioclavicular joint arthritis, Acute gastroenteritis, Acute gastroenteritis, Anemia, Arthritis, Asthma, Bursitis, Chronic right shoulder pain, Closed fracture of left tibial plateau, Closed fracture of left tibial plateau, Closed fracture of proximal end of left tibia, Closed fracture of proximal end of left tibia, Closed fracture of shaft of left tibia, Closed fracture of shaft of left tibia, Depression, Dry eyes, Essential hypertension, Former smoker, GERD (gastroesophageal reflux disease), Head ache, Hearing loss, Hemorrhoids, Hiatal hernia, History of bladder infections, History of DVT (deep vein thrombosis), History of gastric bypass, History of gastritis, History of morbid obesity, History of pulmonary embolism, Hyperlipidemia, Hypertension, Knee pain, Knee pain, Lymphedema of leg, Mild intermittent asthma, Morbid obesity ([address] Obesity (BMI 30-39.9), KEVIN on CPAP, Osteoarthritis, Osteopenia, S/P gastric bypass, S/p tibial fracture, Status post gastric bypass for obesity, Tibial plateau fracture, left, closed, initial encounter, Tibial plateau fracture, left, closed, initial encounter, Tremor, Vitamin D deficiency, Wears dentures, Wears glasses, and Weight loss of 160 lbs since surgery . SURGICAL HISTORY      has a past surgical history that includes Tonsillectomy ();  section (1986,  1987); Cholecystectomy (); Upper gastrointestinal endoscopy (2015); Shel[place]-en-Y Gastric Bypass (12/29/15); Cystoscopy (16); shoulder surgery (Left, 2016); other surgical history (10/14/2016); EXCISION / BIOPSY SKIN LESION OF HEAD / NECK (N/A, 4/3/2017); shoulder surgery (Right, 10/08/2018); pr office/outpt visit,procedure only (Right, 10/8/2018);  section; knee surgery (Left, 2018); pr office/outpt visit,procedure only (Left, 2018); ORIF PROXIMAL TIBIAL PLATEAU FRACTURE (Left, 2018); ARTHROSCOPY / ARTHROTOMY KNEE (Left, 2018); CLOSED MANIPULATION SHOULDER (Right, 2018); Colonoscopy (); Neck surgery; and Abdominoplasty (N/A, 2019).     CURRENTMEDICATIONS       Previous Medications    ACETAMINOPHEN (TYLENOL) 325 MG TABLET    Take 2 tablets by mouth every 6 hours as needed for Pain    ALBUTEROL (PROVENTIL) (2.5 MG/3ML) 0.083% NEBULIZER SOLUTION    Take 3 mLs by nebulization every 6 hours as needed for Wheezing    ALBUTEROL SULFATE HFA (VENTOLIN HFA) 108 (90 BASE) MCG/ACT INHALER    Inhale 2 puffs into the lungs every 6 hours as needed for Wheezing or Shortness of Breath    ALPRAZOLAM (XANAX) 0.25 MG TABLET    TAKE 1 TABLET BY MOUTH ONCE DAILY AS NEEDED FOR ANXIETY FOR UP TO 30 DAYS    AMITRIPTYLINE (ELAVIL) 25 MG TABLET    TAKE 1 TABLET BY MOUTH EVERY DAY AT BEDTIME FOR 7 DAYS THEN 2 EVERY DAY AT BEDTIME    ATORVASTATIN (LIPITOR) 10 MG TABLET    Take 1 tablet by mouth daily    BUSPIRONE (BUSPAR) 10 MG TABLET    TAKE 1 TABLET BY MOUTH THREE TIMES DAILY    CALCIUM CITRATE PO    Take 750 mg by mouth 2 times daily    DENOSUMAB (PROLIA) 60 MG/ML SOSY SC INJECTION    Inject 1 mL into the skin every 6 months    DULERA 100-5 MCG/ACT INHALER    Inhale 2 puffs by mouth twice daily    FIBER COMPLETE PO    Take by mouth daily     HYDROXYZINE (VISTARIL) 25 MG CAPSULE    TAKE 1 CAPSULE BY MOUTH THREE TIMES DAILY AS NEEDED FOR ANXIETY    INSULIN SYRINGE-NEEDLE U-100 30G X 1/2\" 0.5 ML MISC    1 each by Does not apply route daily for 10 days    KRILL OIL PO    Take by mouth daily    MONTELUKAST (SINGULAIR) 10 MG TABLET    Take 1 tablet by mouth nightly    MULTIPLE VITAMINS-MINERALS (MULTIVITAMIN ADULTS 50+ PO)    Take 1 tablet by mouth daily    SERTRALINE (ZOLOFT) 50 MG TABLET    Take 1 tablet by mouth once daily    SUMATRIPTAN (IMITREX) 100 MG TABLET    Take one at HA onset . May repeat in 1 hour . No more 2 per day 4 days out of the week    SUMATRIPTAN (IMITREX) 6 MG/0.5ML SOLN INJECTION    Take 1 SQ if table not effective after 1 hour . No more 2 imitrex tablet or SQ per day 4 days per week    SUMATRIPTAN SUCCINATE 6 MG/0.5ML SOAJ    INJECT 6MG INJECTION IF TABLET IS NOT EFFECTIVE AFTER 1 HOUR. NO MORE THAN 2 TABLETS OR INJECTIONS PER DAY AND NO MORE THAN 4 DAYS PER WEEK    SUMATRIPTAN SUCCINATE 6 MG/0.5ML SOAJ    Inject 1 pen subcutaneously under the skin at the onset of a headache as needed    TOPIRAMATE (TOPAMAX) 100 MG TABLET    Take 1 tablet by mouth 2 times daily    VITAMIN D (CHOLECALCIFEROL) 1000 UNIT TABS TABLET    Take 1,000 Units by mouth daily       ALLERGIES     is allergic to prozac [fluoxetine], nsaids, pcn [penicillins], bactrim [sulfamethoxazole-trimethoprim], and codeine. FAMILY HISTORY     She indicated that her mother is .  She indicated that her father is . She indicated that her sister is alive. She indicated that her brother is alive. She indicated that her maternal grandmother is . She indicated that her maternal grandfather is . She indicated that her paternal grandmother is . She indicated that her paternal grandfather is . She indicated that her maternal aunt is . family history includes Anxiety Disorder in her mother; Asthma in her mother; Breast Cancer in her paternal grandmother; Cancer in her father; Depression in her mother; Heart Attack in her paternal grandfather; Migraines in her sister; No Known Problems in her brother, maternal grandfather, and maternal grandmother; Other in her father; Ovarian Cancer in her maternal aunt. SOCIAL HISTORY      reports that she quit smoking about 35 years ago. Her smoking use included cigarettes. She started smoking about 43 years ago. She has a 4.50 pack-year smoking history. She has never used smokeless tobacco. She reports that she does not drink alcohol and does not use drugs. PHYSICAL EXAM    (up to 7 for level 4, 8 or more for level 5)   INITIAL VITALS:  height is 5' 6\" (1.676 m) and weight is 106.6 kg (235 lb). Her oral temperature is 98.8 °F (37.1 °C). Her blood pressure is 121/68 and her pulse is 99. Her respiration is 18 and oxygen saturation is 96%. Physical Exam  Vitals and nursing note reviewed. Constitutional:       Appearance: Normal appearance. HENT:      Head: Normocephalic and atraumatic. Eyes:      Conjunctiva/sclera: Conjunctivae normal.   Cardiovascular:      Rate and Rhythm: Normal rate and regular rhythm. Pulses: Normal pulses. Heart sounds: Normal heart sounds. Pulmonary:      Effort: Pulmonary effort is normal. No respiratory distress. Breath sounds: Normal breath sounds. No stridor. No wheezing, rhonchi or rales. Abdominal:      General: There is no distension. Palpations: Abdomen is soft.  There is no mass.      Tenderness: There is no abdominal tenderness. There is no right CVA tenderness, left CVA tenderness, guarding or rebound. Musculoskeletal:         General: No swelling or tenderness. Normal range of motion. Cervical back: Normal range of motion and neck supple. Comments: No calf swelling or tenderness appreciated   Skin:     General: Skin is warm and dry. Findings: No rash. Neurological:      General: No focal deficit present. Mental Status: She is alert. DIFFERENTIAL DIAGNOSIS/ MDM:     We will establish an IV give the patient aspirin as she is not allergic to nonsteroidals she was told not to take nonsteroidals because her gastric bypass surgery so there is no allergy to nonsteroidals with this I will get an EKG labs and a CT of the chest as the patient does present with a history of PE    DIAGNOSTIC RESULTS     EKG: All EKG's are interpreted by the Emergency Department Physician who either signs or Co-signs this chart in the absence of a cardiologist.      Interpreted by Torsten Belle MD     Rhythm: Sinus tachycardia  Rate: 102  Axis: 27  Ectopy: First-degree AV block  Conduction: normal  ST Segments: no acute change  T Waves: Inverted T waves in the anterior to lateral leads  Q Waves: none    Clinical Impression: Sinus tachycardia with inverted T waves in the anterior to lateral leads that are new from an EKG obtained August 2, 2019      RADIOLOGY:        Interpretation per the Radiologist below, if available at the time of this note:    CT CHEST PULMONARY EMBOLISM W CONTRAST    Result Date: 9/22/2022  EXAMINATION: CTA OF THE CHEST 9/22/2022 4:05 pm TECHNIQUE: CTA of the chest was performed after the administration of intravenous contrast.  Multiplanar reformatted images are provided for review. MIP images are provided for review.  Automated exposure control, iterative reconstruction, and/or weight based adjustment of the mA/kV was utilized to reduce the radiation dose to as low as reasonably achievable. COMPARISON: Chest x-ray 11/21/2018 HISTORY: ORDERING SYSTEM PROVIDED HISTORY: chest pain TECHNOLOGIST PROVIDED HISTORY: chest pain Decision Support Exception - unselect if not a suspected or confirmed emergency medical condition->Emergency Medical Condition (MA) Reason for Exam: chest pain FINDINGS: Pulmonary Arteries: Pulmonary arteries are adequately opacified for evaluation. No evidence of intraluminal filling defect to suggest pulmonary embolism. Main pulmonary artery is normal in caliber. Mediastinum: No evidence of mediastinal lymphadenopathy. The heart and pericardium demonstrate no acute abnormality. There is no acute abnormality of the thoracic aorta. Lungs/pleura: There is unchanged elevation of the right hemidiaphragm. There are bands of right lower lobe and to a lesser extent middle lobe atelectasis. Minimal left basilar atelectasis. The lungs are otherwise clear. There is no pneumothorax or pleural effusion. Upper Abdomen: The gallbladder is surgically absent. Soft Tissues/Bones: No acute bone or soft tissue abnormality. No evidence of an acute pulmonary embolus. Right greater than left basilar atelectasis. Lungs are otherwise clear.        LABS:  Results for orders placed or performed during the hospital encounter of 09/22/22   CBC with Auto Differential   Result Value Ref Range    WBC 12.2 (H) 3.5 - 11.0 k/uL    RBC 4.26 4.0 - 5.2 m/uL    Hemoglobin 12.5 12.0 - 16.0 g/dL    Hematocrit 38.2 36 - 46 %    MCV 89.7 80 - 100 fL    MCH 29.4 26 - 34 pg    MCHC 32.8 31 - 37 g/dL    RDW 13.6 12.5 - 15.4 %    Platelets 066 020 - 946 k/uL    MPV 5.9 (L) 6.0 - 12.0 fL    Seg Neutrophils 78 (H) 36 - 66 %    Lymphocytes 14 (L) 24 - 44 %    Monocytes 7 2 - 11 %    Eosinophils % 1 1 - 4 %    Basophils 0 0 - 2 %    Segs Absolute 9.50 (H) 1.8 - 7.7 k/uL    Absolute Lymph # 1.70 1.0 - 4.8 k/uL    Absolute Mono # 0.90 0.1 - 1.2 k/uL    Absolute Eos # 0.10 0.0 - 0.4 k/uL Basophils Absolute 0.00 0.0 - 0.2 k/uL   Basic Metabolic Panel   Result Value Ref Range    Glucose 130 (H) 70 - 99 mg/dL    BUN 13 8 - 23 mg/dL    Creatinine 0.79 0.50 - 0.90 mg/dL    Calcium 8.8 8.6 - 10.4 mg/dL    Sodium 139 135 - 144 mmol/L    Potassium 4.3 3.7 - 5.3 mmol/L    Chloride 106 98 - 107 mmol/L    CO2 23 20 - 31 mmol/L    Anion Gap 10 9 - 17 mmol/L    GFR Non-African American >60 >60 mL/min    GFR African American >60 >60 mL/min    GFR Comment         Troponin   Result Value Ref Range    Troponin, High Sensitivity 7 0 - 14 ng/L   Lipase   Result Value Ref Range    Lipase 22 13 - 60 U/L           EMERGENCY DEPARTMENT COURSE:   Vitals:    Vitals:    09/22/22 1518 09/22/22 1622   BP: (!) 136/90 121/68   Pulse: (!) 105 99   Resp: 20 18   Temp: 98.8 °F (37.1 °C)    TempSrc: Oral    SpO2: 98% 96%   Weight: 106.6 kg (235 lb)    Height: 5' 6\" (1.676 m)      -------------------------  BP: 121/68, Temp: 98.8 °F (37.1 °C), Heart Rate: 99, Resp: 18      RE-EVALUATION:  Patient presents with chest pain is noted to have some EKG changes with some inverted T waves from her most recent EKG obtained in August 2019 given this I do feel that she warrants admission to the hospital setting the patient is agreeable to this I will contact my hospitalist for admission    CONSULTS:  My hospitalist is kind enough to admit the patient to his service    PROCEDURES:  None    FINAL IMPRESSION      1. Chest pain, unspecified type          DISPOSITION/PLAN   DISPOSITION Decision To Admit 09/22/2022 05:24:47 PM      CONDITION ON DISPOSITION:   Stable    PATIENT REFERRED TO:  No follow-up provider specified. DISCHARGE MEDICATIONS:  New Prescriptions    No medications on file       (Please note that portions of this note were completed with a voicerecognition program.  Efforts were made to edit the dictations but occasionally words are mis-transcribed.)    Cherry Dillard MD,, MDANAYA   Attending Emergency Medicine Physician       Geraldo House MD  09/22/22 [id number]

## 2022-09-22 NOTE — H&P
[facility]  Office: [address], DO, Conimasoraya Janey, DO, Taniya Ambriz, DO, Percy Lam Blood, DO, Kurtis Esparza MD, Barbara Bosch MD, Vera Plata MD, Jonah Kyle MD,  Oralia Johnston MD, Nidia Pascual MD, Constantin Murillo, DO, Isis Alves MD,  Celena Lawrence MD, Surekha Harkins MD, Drew Leon, DO, Otoniel Garza MD, Ilene Hopper MD, Desmond Gore MD, Fransico Correa MD, Lizeth Mace MD, Anne Moses MD, Winifred Powell DO, Glory Gomez MD, Alyssa Ramirez MD, [place] Henry, [facility],  Sawyer Whitten, [facility], Nydia Driscoll, [facility], Veniceyanniirlanda Olivares, CNP,  Johan Alas, [facility], Nan Mcarthur, CNP, Maddison Mora, CNP, Claudeen Quarry, CNP, Alex Quinonez, CNP, Ammon Shah, CNP, Rowan Oppenheim, PA-C, Arlene Carty, CNS, Tremaine Pinzon, [facility], Batool Romero, CNP, Erick Khan, CNP, Tito Brody, [address]    HISTORY AND PHYSICAL EXAMINATION            Date:   9/22/2022  Patient name:  Ally Gaytan  Date of admission:  9/22/2022  3:14 PM  MRN:   [id number]  Account:  [de-identified]  YOB: 1960  PCP:    Wally Ramírez MD  Room:   ER06/ER06  Code Status:    Prior    Chief Complaint:     Chief Complaint   Patient presents with    Chest Pain    Back Pain     History Obtained From:     patient    History of Present Illness:     Ally Gaytan is a 65 y.o. female with past medical history morbid obesity (s/p gastric bypass), pulmonary embolism (completed course of DOAC), hyperlipidemia and anxiety who presented to the emergency department on 9/22/2022 complaining of substernal chest pain. The patient describes the pain as a 10/10 substernal pressure that radiates to her back. She states that the pain began early this morning and has remained constant. There are no aggravating or alleviating factors. In the ED, the patient was afebrile and nontoxic appearing.  CTPA was negative for pulmonary embolism. Initial troponin was negative. EKG was remarkable for T-wave inversions in the anterior and lateral leads. The patient is admitted to internal medicine for further management of unstable angina. Past Medical History:     Past Medical History:   Diagnosis Date    Acromioclavicular joint arthritis 4/18/2016    Acute gastroenteritis 10/11/2017    Acute gastroenteritis 10/11/2017    Anemia 12/11/2018    Arthritis     Asthma 1980    On Inhaler    Bursitis     left shoulder    Chronic right shoulder pain 7/24/2018    Closed fracture of left tibial plateau 8/95/1446    Closed fracture of left tibial plateau 4/17/3379    Closed fracture of proximal end of left tibia 11/22/2018    Closed fracture of proximal end of left tibia 11/22/2018    Closed fracture of shaft of left tibia 11/22/2018    Closed fracture of shaft of left tibia 11/22/2018    Depression     Dry eyes     Essential hypertension 8/21/2017    Former smoker     GERD (gastroesophageal reflux disease)     not since Shelton-en-Y and weight loss    Head ache     on Topiramate    Hearing loss     mild    Hemorrhoids     Hiatal hernia     History of bladder infections     History of DVT (deep vein thrombosis) 2010    started in left leg, went to lungs    History of gastric bypass 12/6/2018    History of gastritis     History of morbid obesity     had Shelton-en y    History of pulmonary embolism 2010    from foot  trauma, was treated for six months with anticoagulation. She has no inferior vena cava filter. Hyperlipidemia     not since weight loss    Hypertension 2015    Not anymore after Bariatric Surgery, no medication    Knee pain 3/6/2015    Knee pain 3/6/2015    Lymphedema of leg     Right leg    Mild intermittent asthma     Morbid obesity ([address] 8/18/2015    Obesity (BMI 30-39. 9) 7/1/2016    KEVIN on CPAP 2015    at night    Osteoarthritis     Osteopenia     S/P gastric bypass 12-29-15    Dr. Pandya Chol, hosp wt = 280lb, GASTROINTESTINAL ENDOSCOPY  08/2015    found hiatal hernia        Medications Prior to Admission:     Prior to Admission medications    Medication Sig Start Date End Date Taking? Authorizing Provider   amitriptyline (ELAVIL) 25 MG tablet TAKE 1 TABLET BY MOUTH EVERY DAY AT BEDTIME FOR 7 DAYS THEN 2 EVERY DAY AT BEDTIME 8/11/22   Historical Provider, MD   ALPRAZolam (XANAX) 0.25 MG tablet TAKE 1 TABLET BY MOUTH ONCE DAILY AS NEEDED FOR ANXIETY FOR UP TO 30 DAYS 7/16/22   Historical Provider, MD   atorvastatin (LIPITOR) 10 MG tablet Take 1 tablet by mouth daily 8/30/22   Jimmy Ayoub MD   busPIRone (BUSPAR) 10 MG tablet TAKE 1 TABLET BY MOUTH THREE TIMES DAILY 8/2/22   Jimmy Ayoub MD   sertraline (ZOLOFT) 50 MG tablet Take 1 tablet by mouth once daily 8/2/22   Jimmy Ayoub MD   DULERA 100-5 MCG/ACT inhaler Inhale 2 puffs by mouth twice daily 6/30/22   Batool Lindo MD   montelukast (SINGULAIR) 10 MG tablet Take 1 tablet by mouth nightly 6/30/22   Batool Lindo MD   hydrOXYzine (VISTARIL) 25 MG capsule TAKE 1 CAPSULE BY MOUTH THREE TIMES DAILY AS NEEDED FOR ANXIETY 4/14/22   Historical Provider, MD   SUMAtriptan Succinate 6 MG/0.5ML SOAJ Inject 1 pen subcutaneously under the skin at the onset of a headache as needed 4/21/22   Hallie Ray MD   Insulin Syringe-Needle U-100 30G X 1/2\" 0.5 ML MISC 1 each by Does not apply route daily for 10 days 4/21/22 7/14/22  Hallie Ray MD   SUMAtriptan (IMITREX) 100 MG tablet Take one at HA onset . May repeat in 1 hour . No more 2 per day 4 days out of the week 4/19/22   Hallie Ray MD   SUMAtriptan [facility]) 6 MG/0.5ML SOLN injection Take 1 SQ if table not effective after 1 hour . No more 2 imitrex tablet or SQ per day 4 days per week 4/19/22   Hallie Ray MD   SUMAtriptan Succinate 6 MG/0.5ML SOAJ INJECT 6MG INJECTION IF TABLET IS NOT EFFECTIVE AFTER 1 HOUR.  NO MORE THAN 2 TABLETS OR INJECTIONS PER DAY AND NO MORE THAN 4 DAYS PER WEEK 12/4/21 Historical Provider, MD   denosumab (PROLIA) 60 MG/ML SOSY SC injection Inject 1 mL into the skin every 6 months 10/13/21   Becky Luciano MD   topiramate (TOPAMAX) 100 MG tablet Take 1 tablet by mouth 2 times daily 10/6/21   Ray Light MD   albuterol sulfate HFA (VENTOLIN HFA) 108 (90 Base) MCG/ACT inhaler Inhale 2 puffs into the lungs every 6 hours as needed for Wheezing or Shortness of Breath 10/12/20   Mynor Guillory MD   albuterol (PROVENTIL) (2.5 MG/3ML) 0.083% nebulizer solution Take 3 mLs by nebulization every 6 hours as needed for Wheezing 10/12/20   Mynor Guillory MD   acetaminophen (TYLENOL) 325 MG tablet Take 2 tablets by mouth every 6 hours as needed for Pain 5/29/20   Tia Reasons, DO   CALCIUM CITRATE PO Take 750 mg by mouth 2 times daily    Historical Provider, MD   Multiple Vitamins-Minerals (MULTIVITAMIN ADULTS 50+ PO) Take 1 tablet by mouth daily    Historical Provider, MD   KRILL OIL PO Take by mouth daily    Historical Provider, MD   vitamin D (CHOLECALCIFEROL) 1000 UNIT TABS tablet Take 1,000 Units by mouth daily    Historical Provider, MD   FIBER COMPLETE PO Take by mouth daily     Historical Provider, MD        Allergies:     Prozac [fluoxetine], Nsaids, Pcn [penicillins], Bactrim [sulfamethoxazole-trimethoprim], and Codeine    Social History:     Tobacco:    reports that she quit smoking about 35 years ago. Her smoking use included cigarettes. She started smoking about 43 years ago. She has a 4.50 pack-year smoking history. She has never used smokeless tobacco.  Alcohol:      reports no history of alcohol use. Drug Use:  reports no history of drug use.     Family History:     Family History   Problem Relation Age of Onset    Asthma Mother     Depression Mother     Anxiety Disorder Mother     Cancer Father         leukemia    Other Father         Myelodysplastic syndrome, which converted to leukemia    Migraines Sister     No Known Problems Brother     Ovarian Cancer Maternal Aunt No Known Problems Maternal Grandmother     No Known Problems Maternal Grandfather     Breast Cancer Paternal Grandmother     Heart Attack Paternal Grandfather        Review of Systems:     Positive and Negative as described in HPI. CONSTITUTIONAL:  negative for fevers, chills, sweats, fatigue, weight loss  HEENT:  negative for vision, hearing changes, runny nose, throat pain  RESPIRATORY:  negative for shortness of breath, cough, congestion, wheezing  CARDIOVASCULAR:  Positive for chest pain. GASTROINTESTINAL:  negative for nausea, vomiting, diarrhea, constipation, change in bowel habits, abdominal pain   GENITOURINARY:  negative for difficulty of urination, burning with urination, frequency   INTEGUMENT:  negative for rash, skin lesions, easy bruising   HEMATOLOGIC/LYMPHATIC:  negative for swelling/edema   ALLERGIC/IMMUNOLOGIC:  negative for urticaria , itching  ENDOCRINE:  negative increase in drinking, increase in urination, hot or cold intolerance  MUSCULOSKELETAL:  negative joint pains, muscle aches, swelling of joints  NEUROLOGICAL:  negative for headaches, dizziness, lightheadedness, numbness, pain, tingling extremities  BEHAVIOR/PSYCH:  negative for depression, anxiety    Physical Exam:   BP (!) 141/71   Pulse 94   Temp 98.8 °F (37.1 °C) (Oral)   Resp 16   Ht 5' 6\" (1.676 m)   Wt 235 lb (106.6 kg)   LMP  (LMP Unknown)   SpO2 96%   BMI 37.93 kg/m²   Temp (24hrs), Av.8 °F (37.1 °C), Min:98.8 °F (37.1 °C), Max:98.8 °F (37.1 °C)    No results for input(s): POCGLU in the last 72 hours. No intake or output data in the 24 hours ending 22    General Appearance:  Obese.  alert, well appearing, and in no acute distress  Mental status: oriented to person, place, and time  Head:  normocephalic, atraumatic  Eye: no icterus, redness, pupils equal and reactive, extraocular eye movements intact, conjunctiva clear  Ear: normal external ear, no discharge, hearing intact  Nose:  no drainage noted  Mouth: mucous membranes moist  Neck: supple, no carotid bruits, thyroid not palpable  Lungs: Bilateral equal air entry, clear to ausculation, no wheezing, rales or rhonchi, normal effort  Cardiovascular: normal rate, regular rhythm, no murmur, gallop, rub  Abdomen: Soft, nontender, nondistended, normal bowel sounds, no hepatomegaly or splenomegaly  Neurologic: There are no new focal motor or sensory deficits, normal muscle tone and bulk, no abnormal sensation, normal speech, cranial nerves II through XII grossly intact  Skin: No gross lesions, rashes, bruising or bleeding on exposed skin area  Extremities:  peripheral pulses palpable, no pedal edema or calf pain with palpation  Psych: normal affect     Investigations:      Laboratory Testing:  Recent Results (from the past 24 hour(s))   CBC with Auto Differential    Collection Time: 09/22/22  3:25 PM   Result Value Ref Range    WBC 12.2 (H) 3.5 - 11.0 k/uL    RBC 4.26 4.0 - 5.2 m/uL    Hemoglobin 12.5 12.0 - 16.0 g/dL    Hematocrit 38.2 36 - 46 %    MCV 89.7 80 - 100 fL    MCH 29.4 26 - 34 pg    MCHC 32.8 31 - 37 g/dL    RDW 13.6 12.5 - 15.4 %    Platelets 380 785 - 110 k/uL    MPV 5.9 (L) 6.0 - 12.0 fL    Seg Neutrophils 78 (H) 36 - 66 %    Lymphocytes 14 (L) 24 - 44 %    Monocytes 7 2 - 11 %    Eosinophils % 1 1 - 4 %    Basophils 0 0 - 2 %    Segs Absolute 9.50 (H) 1.8 - 7.7 k/uL    Absolute Lymph # 1.70 1.0 - 4.8 k/uL    Absolute Mono # 0.90 0.1 - 1.2 k/uL    Absolute Eos # 0.10 0.0 - 0.4 k/uL    Basophils Absolute 0.00 0.0 - 0.2 k/uL   Basic Metabolic Panel    Collection Time: 09/22/22  3:25 PM   Result Value Ref Range    Glucose 130 (H) 70 - 99 mg/dL    BUN 13 8 - 23 mg/dL    Creatinine 0.79 0.50 - 0.90 mg/dL    Calcium 8.8 8.6 - 10.4 mg/dL    Sodium 139 135 - 144 mmol/L    Potassium 4.3 3.7 - 5.3 mmol/L    Chloride 106 98 - 107 mmol/L    CO2 23 20 - 31 mmol/L    Anion Gap 10 9 - 17 mmol/L    GFR Non-African American >60 >60 mL/min    GFR African American >60 >60 mL/min    GFR Comment         Troponin    Collection Time: 09/22/22  3:25 PM   Result Value Ref Range    Troponin, High Sensitivity 7 0 - 14 ng/L   Lipase    Collection Time: 09/22/22  3:25 PM   Result Value Ref Range    Lipase 22 13 - 60 U/L       Imaging/Diagnostics:  CT CHEST PULMONARY EMBOLISM W CONTRAST    Result Date: 9/22/2022  No evidence of an acute pulmonary embolus. Right greater than left basilar atelectasis. Lungs are otherwise clear.        Assessment :      Hospital Problems             Last Modified POA    * (Principal) Unstable angina ([address] 9/22/2022 Yes    Anxiety 9/22/2022 Yes    Asthma 9/22/2022 Yes    Mixed hyperlipidemia 9/22/2022 Yes    Obesity (BMI 30-39.9) 9/22/2022 Yes       Plan:     Patient status observation in the Med/Surge    Unstable angina   -EKG showing T-wave inversions in the anterior and lateral   -Troponin negative; continue to trend   -Echocardiogram pending   -NM stress test pending   -Consult cardiology; appreciate recommendations   -Morphine pain panel   -Daily BMP     Asthma   -Continue home Dulera   -Continue home albuterol     Hyperlipidemia   -Continue home atorvastatin 10 mg daily     Anxiety   -Continue home Elavil   -Continue home sertraline and Buspar   -PRN alprazolam     History of pulmonary embolism   -CTPA negative for PE   -Patient states PE was 12-years-ago and states that she completed course of DOAC       Consultations:   Shelly Phillips MD  9/22/2022  6:12 PM    Copy sent to Dr. Dirk Butt MD

## 2022-09-22 NOTE — ED TRIAGE NOTES
Pt brought to ED with  and daughter with c/o waking up at 4am with back/shoulder pain, chest pain when taking DB. Pt states pain is reciprocated with movement. Pt is currently in PT for chronic shoulder pain/issues. Pt is A/O, c/o pain \"8\", EKG done upon arrival, monitors applied. Dr Hoang Chiu at bedside currently.

## 2022-09-23 ENCOUNTER — APPOINTMENT (OUTPATIENT)
Dept: NON INVASIVE DIAGNOSTICS | Age: 62
End: 2022-09-23
Payer: MEDICARE

## 2022-09-23 ENCOUNTER — APPOINTMENT (OUTPATIENT)
Dept: NUCLEAR MEDICINE | Age: 62
End: 2022-09-23
Payer: MEDICARE

## 2022-09-23 ENCOUNTER — HOSPITAL ENCOUNTER (OUTPATIENT)
Dept: PHYSICAL THERAPY | Facility: CLINIC | Age: 62
Setting detail: THERAPIES SERIES
Discharge: HOME | End: 2022-09-23
Payer: MEDICARE

## 2022-09-23 VITALS
RESPIRATION RATE: 16 BRPM | HEIGHT: 66 IN | TEMPERATURE: 98.2 F | SYSTOLIC BLOOD PRESSURE: 133 MMHG | BODY MASS INDEX: 40.67 KG/M2 | DIASTOLIC BLOOD PRESSURE: 86 MMHG | HEART RATE: 81 BPM | OXYGEN SATURATION: 95 % | WEIGHT: 253.09 LBS

## 2022-09-23 LAB
ANION GAP SERPL CALCULATED.3IONS-SCNC: 9 MMOL/L (ref 9–17)
BUN BLDV-MCNC: 13 MG/DL (ref 8–23)
CALCIUM SERPL-MCNC: 8 MG/DL (ref 8.6–10.4)
CHLORIDE BLD-SCNC: 109 MMOL/L (ref 98–107)
CO2: 22 MMOL/L (ref 20–31)
CREAT SERPL-MCNC: 0.79 MG/DL (ref 0.5–0.9)
EKG ATRIAL RATE: 102 BPM
EKG P AXIS: 42 DEGREES
EKG P-R INTERVAL: 216 MS
EKG Q-T INTERVAL: 324 MS
EKG QRS DURATION: 86 MS
EKG QTC CALCULATION (BAZETT): 422 MS
EKG R AXIS: 27 DEGREES
EKG T AXIS: 35 DEGREES
EKG VENTRICULAR RATE: 102 BPM
GFR AFRICAN AMERICAN: >60 ML/MIN
GFR NON-AFRICAN AMERICAN: >60 ML/MIN
GFR SERPL CREATININE-BSD FRML MDRD: ABNORMAL ML/MIN/{1.73_M2}
GLUCOSE BLD-MCNC: 103 MG/DL (ref 70–99)
LV EF: 68 %
LV EF: 75 %
LVEF MODALITY: NORMAL
LVEF MODALITY: NORMAL
MAGNESIUM: 1.8 MG/DL (ref 1.6–2.6)
POTASSIUM SERPL-SCNC: 3.8 MMOL/L (ref 3.7–5.3)
SODIUM BLD-SCNC: 140 MMOL/L (ref 135–144)
TROPONIN, HIGH SENSITIVITY: 8 NG/L (ref 0–14)
TROPONIN, HIGH SENSITIVITY: 9 NG/L (ref 0–14)

## 2022-09-23 PROCEDURE — 94760 N-INVAS EAR/PLS OXIMETRY 1: CPT

## 2022-09-23 PROCEDURE — 36415 COLL VENOUS BLD VENIPUNCTURE: CPT

## 2022-09-23 PROCEDURE — 6370000000 HC RX 637 (ALT 250 FOR IP): Performed by: NURSE PRACTITIONER

## 2022-09-23 PROCEDURE — 6370000000 HC RX 637 (ALT 250 FOR IP): Performed by: STUDENT IN AN ORGANIZED HEALTH CARE EDUCATION/TRAINING PROGRAM

## 2022-09-23 PROCEDURE — 96372 THER/PROPH/DIAG INJ SC/IM: CPT

## 2022-09-23 PROCEDURE — 6360000002 HC RX W HCPCS: Performed by: STUDENT IN AN ORGANIZED HEALTH CARE EDUCATION/TRAINING PROGRAM

## 2022-09-23 PROCEDURE — 3430000000 HC RX DIAGNOSTIC RADIOPHARMACEUTICAL: Performed by: STUDENT IN AN ORGANIZED HEALTH CARE EDUCATION/TRAINING PROGRAM

## 2022-09-23 PROCEDURE — 80048 BASIC METABOLIC PNL TOTAL CA: CPT

## 2022-09-23 PROCEDURE — 78452 HT MUSCLE IMAGE SPECT MULT: CPT

## 2022-09-23 PROCEDURE — 94664 DEMO&/EVAL PT USE INHALER: CPT

## 2022-09-23 PROCEDURE — A9500 TC99M SESTAMIBI: HCPCS | Performed by: STUDENT IN AN ORGANIZED HEALTH CARE EDUCATION/TRAINING PROGRAM

## 2022-09-23 PROCEDURE — 6370000000 HC RX 637 (ALT 250 FOR IP): Performed by: SURGERY

## 2022-09-23 PROCEDURE — 2580000003 HC RX 258: Performed by: STUDENT IN AN ORGANIZED HEALTH CARE EDUCATION/TRAINING PROGRAM

## 2022-09-23 PROCEDURE — 94640 AIRWAY INHALATION TREATMENT: CPT

## 2022-09-23 PROCEDURE — 84484 ASSAY OF TROPONIN QUANT: CPT

## 2022-09-23 PROCEDURE — 93017 CV STRESS TEST TRACING ONLY: CPT

## 2022-09-23 PROCEDURE — G0378 HOSPITAL OBSERVATION PER HR: HCPCS

## 2022-09-23 PROCEDURE — 99217 PR OBSERVATION CARE DISCHARGE MANAGEMENT: CPT | Performed by: STUDENT IN AN ORGANIZED HEALTH CARE EDUCATION/TRAINING PROGRAM

## 2022-09-23 PROCEDURE — 93306 TTE W/DOPPLER COMPLETE: CPT

## 2022-09-23 PROCEDURE — 83735 ASSAY OF MAGNESIUM: CPT

## 2022-09-23 RX ORDER — NITROGLYCERIN 0.4 MG/1
0.4 TABLET SUBLINGUAL EVERY 5 MIN PRN
Status: DISCONTINUED | OUTPATIENT
Start: 2022-09-23 | End: 2022-09-23 | Stop reason: HOSPADM

## 2022-09-23 RX ORDER — METOPROLOL TARTRATE 5 MG/5ML
5 INJECTION INTRAVENOUS EVERY 5 MIN PRN
Status: DISCONTINUED | OUTPATIENT
Start: 2022-09-23 | End: 2022-09-23 | Stop reason: ALTCHOICE

## 2022-09-23 RX ORDER — AMINOPHYLLINE DIHYDRATE 25 MG/ML
50 INJECTION, SOLUTION INTRAVENOUS PRN
Status: DISCONTINUED | OUTPATIENT
Start: 2022-09-23 | End: 2022-09-23 | Stop reason: ALTCHOICE

## 2022-09-23 RX ORDER — SODIUM CHLORIDE 0.9 % (FLUSH) 0.9 %
10 SYRINGE (ML) INJECTION PRN
Status: DISCONTINUED | OUTPATIENT
Start: 2022-09-23 | End: 2022-09-23 | Stop reason: HOSPADM

## 2022-09-23 RX ORDER — BUDESONIDE AND FORMOTEROL FUMARATE DIHYDRATE 80; 4.5 UG/1; UG/1
2 AEROSOL RESPIRATORY (INHALATION) 2 TIMES DAILY
Status: DISCONTINUED | OUTPATIENT
Start: 2022-09-23 | End: 2022-09-23 | Stop reason: HOSPADM

## 2022-09-23 RX ORDER — SODIUM CHLORIDE 0.9 % (FLUSH) 0.9 %
5-40 SYRINGE (ML) INJECTION PRN
Status: DISCONTINUED | OUTPATIENT
Start: 2022-09-23 | End: 2022-09-23 | Stop reason: HOSPADM

## 2022-09-23 RX ORDER — NITROGLYCERIN 0.4 MG/1
0.4 TABLET SUBLINGUAL EVERY 5 MIN PRN
Status: DISCONTINUED | OUTPATIENT
Start: 2022-09-23 | End: 2022-09-23 | Stop reason: ALTCHOICE

## 2022-09-23 RX ORDER — ALBUTEROL SULFATE 90 UG/1
2 AEROSOL, METERED RESPIRATORY (INHALATION) PRN
Status: DISCONTINUED | OUTPATIENT
Start: 2022-09-23 | End: 2022-09-23 | Stop reason: ALTCHOICE

## 2022-09-23 RX ORDER — SODIUM CHLORIDE 9 MG/ML
500 INJECTION, SOLUTION INTRAVENOUS CONTINUOUS PRN
Status: DISCONTINUED | OUTPATIENT
Start: 2022-09-23 | End: 2022-09-23 | Stop reason: ALTCHOICE

## 2022-09-23 RX ORDER — ATROPINE SULFATE 0.1 MG/ML
0.5 INJECTION INTRAVENOUS EVERY 5 MIN PRN
Status: DISCONTINUED | OUTPATIENT
Start: 2022-09-23 | End: 2022-09-23 | Stop reason: ALTCHOICE

## 2022-09-23 RX ADMIN — TETRAKIS(2-METHOXYISOBUTYLISOCYANIDE)COPPER(I) TETRAFLUOROBORATE 42.8 MILLICURIE: 1 INJECTION, POWDER, LYOPHILIZED, FOR SOLUTION INTRAVENOUS at 12:52

## 2022-09-23 RX ADMIN — ENOXAPARIN SODIUM 30 MG: 100 INJECTION SUBCUTANEOUS at 08:15

## 2022-09-23 RX ADMIN — OXYCODONE HYDROCHLORIDE AND ACETAMINOPHEN 1 TABLET: 5; 325 TABLET ORAL at 11:55

## 2022-09-23 RX ADMIN — TETRAKIS(2-METHOXYISOBUTYLISOCYANIDE)COPPER(I) TETRAFLUOROBORATE 14.38 MILLICURIE: 1 INJECTION, POWDER, LYOPHILIZED, FOR SOLUTION INTRAVENOUS at 10:27

## 2022-09-23 RX ADMIN — OXYCODONE HYDROCHLORIDE AND ACETAMINOPHEN 1 TABLET: 5; 325 TABLET ORAL at 01:44

## 2022-09-23 RX ADMIN — REGADENOSON 0.4 MG: 0.08 INJECTION, SOLUTION INTRAVENOUS at 10:32

## 2022-09-23 RX ADMIN — SODIUM CHLORIDE, PRESERVATIVE FREE 10 ML: 5 INJECTION INTRAVENOUS at 10:27

## 2022-09-23 RX ADMIN — METOPROLOL TARTRATE 12.5 MG: 25 TABLET, FILM COATED ORAL at 14:15

## 2022-09-23 RX ADMIN — SODIUM CHLORIDE, PRESERVATIVE FREE 10 ML: 5 INJECTION INTRAVENOUS at 08:09

## 2022-09-23 RX ADMIN — BUSPIRONE HYDROCHLORIDE 10 MG: 5 TABLET ORAL at 14:19

## 2022-09-23 RX ADMIN — SODIUM CHLORIDE, PRESERVATIVE FREE 10 ML: 5 INJECTION INTRAVENOUS at 12:52

## 2022-09-23 RX ADMIN — BUDESONIDE AND FORMOTEROL FUMARATE DIHYDRATE 2 PUFF: 80; 4.5 AEROSOL RESPIRATORY (INHALATION) at 08:36

## 2022-09-23 ASSESSMENT — PAIN - FUNCTIONAL ASSESSMENT: PAIN_FUNCTIONAL_ASSESSMENT: ACTIVITIES ARE NOT PREVENTED

## 2022-09-23 ASSESSMENT — PAIN DESCRIPTION - LOCATION
LOCATION: BACK;SHOULDER
LOCATION: BACK;SHOULDER
LOCATION: SHOULDER

## 2022-09-23 ASSESSMENT — PAIN DESCRIPTION - DESCRIPTORS
DESCRIPTORS: ACHING
DESCRIPTORS: SHARP
DESCRIPTORS: SHARP

## 2022-09-23 ASSESSMENT — PAIN SCALES - GENERAL
PAINLEVEL_OUTOF10: 4
PAINLEVEL_OUTOF10: 4
PAINLEVEL_OUTOF10: 3

## 2022-09-23 ASSESSMENT — PAIN DESCRIPTION - ORIENTATION
ORIENTATION: RIGHT;LEFT;MID
ORIENTATION: MID;RIGHT;LEFT
ORIENTATION: RIGHT;LEFT

## 2022-09-23 NOTE — CARE COORDINATION
09/23/22 1216   Service Assessment   Patient Orientation Alert and Oriented   Cognition Alert   History Provided By Patient   Primary Caregiver Self   Support Systems Spouse/Significant Other   Prior Functional Level Independent in ADLs/IADLs   Current Functional Level Independent in ADLs/IADLs   Can patient return to prior living arrangement Yes   Ability to make needs known: Good   Social/Functional History   ADL Assistance Independent   [place]   Ambulation Assistance Independent   Transfer Assistance Independent   Discharge Planning   Type of Residence House   Living Arrangements Spouse/Significant Other;Family Members   Current Services Prior To Admission C-pap   Potential Assistance Needed N/A   Patient expects to be discharged to: [facility] Discharge   Mode of Transport at Discharge Self   Confirm Follow Up Transport Family   Condition of Participation: Discharge Planning   The Patient and/or Patient Representative was provided with a Choice of Provider? Patient   The Patient and/Or Patient Representative agree with the Discharge Plan?  Yes     D/c home independent

## 2022-09-23 NOTE — PROGRESS NOTES
Patient present for exercise stress test. Educated on procedure. All questions/concerns addressed. Allergies and medication reviewed. Patient prepped for procedure. Stress test will be supervised by Mabel LAL.

## 2022-09-23 NOTE — PROGRESS NOTES
Pt discharged via wheelchair with all belongings, scripts and discharge paperwork. Follow up appointments and discharge instructions reviewed with pt and family. All questions answered to satisfaction.

## 2022-09-23 NOTE — PROGRESS NOTES
[facility]  Office: [address], DO, Dane Sutherland, DO, Jules Zurita, DO, [place] Brian Blood, DO, Jaziel Dunham MD, Carina Escobedo MD, Ingrid Pinon MD, Mary Palomares MD,  Brayden King MD, Oscar Gilbert MD, Isabella Nelson DO, Nicole Dennis MD,  Keven Romero MD, Amanda Deshpande MD, Anisha Bender DO, Ana Knight MD, Scout Dao MD, Domonique Prasad MD, Gene Dugan MD, Danielle Butcher MD, Chichi Kumar MD, Maurizio Chavez DO, Mary Desai MD, Lisa Hester MD, Lindsay Reardon, CNP,  Helen Reyes, CNP, Devan Delgado, CNP, Alycia Gracia, CNP,  Grace Holt, DNP, Tammy Lora, CNP, Silke Rowe, CNP, Luna Sapp, CNP, Aram Ashford, CNP, Shaw Irvin, CNP, Christian Taylor PA-C, Carl Elizabeth, CNS, Aminata Moctezuma, DNP, Jose Martin Lopes, CNP, Ashley Pascal, CNP, [id number]    Progress Note    9/23/2022    8:12 AM    Name:   Theo Tripp  MRN:     [id number]     Acct:      [de-identified]   Room:   [address] [place] [address] Day:  0  Admit Date:  9/22/2022  3:14 PM    PCP:   Virginia Brooks MD  Code Status:  Full Code    Subjective:     C/C:   Chief Complaint   Patient presents with    Chest Pain    Back Pain     Interval History Status: not changed. Patient was seen and examined at bedside this AM. She continues to complain of substernal chest pain and states that it is not improving. Plan for echocardiogram and stress test today. Cardiology has been consulted; appreciate recommendations. Brief History:     Theo Tripp is a 65 y.o. female with past medical history morbid obesity (s/p gastric bypass), pulmonary embolism (completed course of DOAC), hyperlipidemia and anxiety who presented to the emergency department on 9/22/2022 complaining of substernal chest pain. The patient describes the pain as a 10/10 substernal pressure that radiates to her back. She states that the pain began early this morning and has remained constant. There are no aggravating or alleviating factors. In the ED, the patient was afebrile and nontoxic appearing. CTPA was negative for pulmonary embolism. Initial troponin was negative. EKG was remarkable for T-wave inversions in the anterior and lateral leads. The patient is admitted to internal medicine for further management of unstable angina. Review of Systems:     Constitutional:  negative for chills, fevers, sweats  Respiratory:  negative for cough, dyspnea on exertion, shortness of breath, wheezing  Cardiovascular: Positive for chest pain. Gastrointestinal:  negative for abdominal pain, constipation, diarrhea, nausea, vomiting  Neurological:  negative for dizziness, headache    Medications: Allergies:     Allergies   Allergen Reactions    Prozac [Fluoxetine] Other (See Comments)     Caused nightmares    Nsaids Other (See Comments)     Bariatric Surgeon told me not to take NSAIDS for good    Pcn [Penicillins] Other (See Comments)     Sores in mouth    Bactrim [Sulfamethoxazole-Trimethoprim] Itching and Rash    Codeine Itching and Rash       Current Meds:   Scheduled Meds:    sodium chloride  80 mL IntraVENous Once    amitriptyline  50 mg Oral Nightly    atorvastatin  10 mg Oral Daily    busPIRone  10 mg Oral TID    mometasone-formoterol  1 puff Inhalation BID    sertraline  50 mg Oral Daily    topiramate  100 mg Oral BID    sodium chloride flush  5-40 mL IntraVENous 2 times per day    enoxaparin  30 mg SubCUTAneous BID     Continuous Infusions:    sodium chloride       PRN Meds: sodium chloride flush, albuterol, ALPRAZolam, sodium chloride flush, sodium chloride, ondansetron **OR** ondansetron, polyethylene glycol, acetaminophen **OR** acetaminophen, morphine, oxyCODONE-acetaminophen **OR** oxyCODONE-acetaminophen    Data:     Past Medical History:   has a past medical history of Acromioclavicular joint arthritis, Acute gastroenteritis, Acute gastroenteritis, Anemia, Arthritis, Asthma, Bursitis, Chronic right shoulder pain, Closed fracture of left tibial plateau, Closed fracture of left tibial plateau, Closed fracture of proximal end of left tibia, Closed fracture of proximal end of left tibia, Closed fracture of shaft of left tibia, Closed fracture of shaft of left tibia, Depression, Dry eyes, Essential hypertension, Former smoker, GERD (gastroesophageal reflux disease), Head ache, Hearing loss, Hemorrhoids, Hiatal hernia, History of bladder infections, History of DVT (deep vein thrombosis), History of gastric bypass, History of gastritis, History of morbid obesity, History of pulmonary embolism, Hyperlipidemia, Hypertension, Knee pain, Knee pain, Lymphedema of leg, Mild intermittent asthma, Morbid obesity ([address] Obesity (BMI 30-39.9), KEVIN on CPAP, Osteoarthritis, Osteopenia, S/P gastric bypass, S/p tibial fracture, Status post gastric bypass for obesity, Tibial plateau fracture, left, closed, initial encounter, Tibial plateau fracture, left, closed, initial encounter, Tremor, Vitamin D deficiency, Wears dentures, Wears glasses, and Weight loss of 160 lbs since surgery . Social History:   reports that she quit smoking about 35 years ago. Her smoking use included cigarettes. She started smoking about 43 years ago. She has a 4.50 pack-year smoking history. She has never used smokeless tobacco. She reports that she does not drink alcohol and does not use drugs.      Family History:   Family History   Problem Relation Age of Onset    Asthma Mother     Depression Mother     Anxiety Disorder Mother     Cancer Father         leukemia    Other Father         Myelodysplastic syndrome, which converted to leukemia    Migraines Sister     No Known Problems Brother     Ovarian Cancer Maternal Aunt     No Known Problems Maternal Grandmother     No Known Problems Maternal Grandfather     Breast Cancer Paternal Grandmother     Heart Attack Paternal Grandfather        Vitals:  /71   Pulse 81   Temp 98.4 °F (36.9 °C)   Resp 14   Ht 5' 6\" (1.676 m)   Wt 253 lb 1.4 oz (114.8 kg)   LMP  (LMP Unknown)   SpO2 95%   BMI 40.85 kg/m²   Temp (24hrs), Av °F (36.7 °C), Min:97.3 °F (36.3 °C), Max:98.8 °F (37.1 °C)    No results for input(s): POCGLU in the last 72 hours. I/O (24Hr): Intake/Output Summary (Last 24 hours) at 2022 0812  Last data filed at 2022 2225  Gross per 24 hour   Intake 10 ml   Output --   Net 10 ml       Labs:  Hematology:  Recent Labs     22  1525   WBC 12.2*   RBC 4.26   HGB 12.5   HCT 38.2   MCV 89.7   MCH 29.4   MCHC 32.8   RDW 13.6      MPV 5.9*     Chemistry:  Recent Labs     22  1525 22  2352 22  0551     --  140   K 4.3  --  3.8     --  109*   CO2 23  --  22   GLUCOSE 130*  --  103*   BUN 13  --  13   CREATININE 0.79  --  0.79   ANIONGAP 10  --  9   LABGLOM >60  --  >60   GFRAA >60  --  >60   CALCIUM 8.8  --  8.0*   TROPHS 7 9 8     Recent Labs     22  1525   LIPASE 22     ABG:No results found for: POCPH, PHART, PH, POCPCO2, IJV5QGR, PCO2, POCPO2, PO2ART, PO2, POCHCO3, LEC5XWV, HCO3, NBEA, PBEA, BEART, BE, THGBART, THB, SFU7TFW, LRTZ3ROX, D5IIEKPW, O2SAT, FIO2  Lab Results   Component Value Date/Time    SPECIAL NOT REPORTED 2016 11:02 AM     Lab Results   Component Value Date/Time    CULTURE NO GROWTH 2016 11:02 AM    CULTURE  2016 11:02 AM     Performed at [address] [place] [address] [phone]       Radiology:  CT CHEST PULMONARY EMBOLISM W CONTRAST    Result Date: 2022  No evidence of an acute pulmonary embolus. Right greater than left basilar atelectasis. Lungs are otherwise clear.        Physical Examination:     General appearance:  alert, cooperative and no distress  Mental Status:  oriented to person, place and time and normal affect  Lungs:  clear to auscultation bilaterally, normal effort  Heart: regular rate and rhythm, no murmur  Abdomen:  soft, nontender, nondistended, normal bowel sounds, no masses, hepatomegaly, splenomegaly  Extremities:  no edema, redness, tenderness in the calves  Skin:  no gross lesions, rashes, induration    Assessment:     Hospital Problems             Last Modified POA    * (Principal) Unstable angina [address] [address] 9/22/2022 Yes    Anxiety 9/22/2022 Yes    Asthma 9/22/2022 Yes    Mixed hyperlipidemia 9/22/2022 Yes    Obesity (BMI 30-39.9) 9/22/2022 Yes       Plan:     Unstable angina   -EKG showing T-wave inversions in the anterior and lateral leads  -Serial troponin negative   -Echocardiogram pending   -NM stress test pending   -Consult cardiology; appreciate recommendations   -Morphine pain panel   -Daily BMP      Asthma   -Continue home Dulera   -Continue home albuterol      Hyperlipidemia   -Continue home atorvastatin 10 mg daily      Anxiety   -Continue home Elavil   -Continue home sertraline and Buspar   -PRN alprazolam      History of pulmonary embolism   -CTPA negative for PE   -Patient states PE was 12-years-ago and states that she completed course of Tammy Wahl MD  9/23/2022  8:13 AM

## 2022-09-23 NOTE — DISCHARGE SUMMARY
[facility]  Office: [address], DO, Lidiaus Bell, DO, Oc Anthony, DO, Omero Jama, DO, Sam Cochran MD, Dhruv Gray MD, Jacky Joseph MD, Domonique Lund MD,  Joni Duval MD, López Burdick MD, Anca Woods, DO, Stephanie Vega MD,  Nikita Yoder MD, Jenelle Núñez MD, Jennifer Newby DO, Gurmeet Pa MD, Bhupendra Rajput MD, Francisco Spencer MD, Kev Nieto MD, Marleen Harvye MD, Marty Navarro MD, Galindo Vallejo DO, Luciano Ro MD, Robert Bustillos MD, Sarah Wyman, [facility],  Yuval Bach, CNP, William Terry, CNP, Christopher Garcia, CNP,  Rossana Rivers, [facility], Mio Baker, CNP, Makenzie Dodson, CNP, Jairo Marte, CNP, Lucie Gifford, CNP, Jayson Barreto, CNP, Sujatha Perez PA-C, Denice Clark, CNS, Maurilio Barnett, [facility], Tom Aguila, CNP, Vonda Montalvo, CNP, Greg Miller [id number]    Discharge Summary     Patient ID: Bryan Santos  :  1960   MRN: [id number]     ACCOUNT:  [de-identified]   Patient's PCP: Zachery Hector MD  Admit Date: 2022   Discharge Date: 2022   Length of Stay: 0  Code Status:  Full Code  Admitting Physician: Jenelle Núñez MD  Discharge Physician: Jenelle Núñez MD     Active Discharge Diagnoses:     Hospital Problem Lists:  Principal Problem:    Unstable angina [facility])  Active Problems:    Anxiety    Asthma    Mixed hyperlipidemia    Obesity (BMI 30-39. 9)  Resolved Problems:    * No resolved hospital problems. *      Admission Condition:  good     Discharged Condition: good    Hospital Stay:     Hospital Course:  Bryan Santos is a 65 y.o. female with past medical history morbid obesity (s/p gastric bypass), pulmonary embolism (completed course of DOAC), hyperlipidemia and anxiety who presented to the emergency department on 2022 complaining of substernal chest pain.  The patient describes the pain as a 10/10 substernal pressure that radiates to her back. She states that the pain began early this morning and has remained constant. There are no aggravating or alleviating factors. In the ED, the patient was afebrile and nontoxic appearing. CTPA was negative for pulmonary embolism. Initial troponin was negative. EKG was remarkable for T-wave inversions in the anterior and lateral leads. The patient was admitted to internal medicine for further management of unstable angina. Stress testing was done during admission and was negative for evidence of ischemia. The patient's chest pain resolved prior to discharge. She is discharged home today (9/23) in stable condition. The etiology of her chest pain is felt to be anxiety versus costochondritis. The patient is instructed to follow-up with her primary care physician as scheduled. Significant therapeutic interventions: Stress testing     Significant Diagnostic Studies:   Labs / Micro:  BMP:    Lab Results   Component Value Date/Time    GLUCOSE 103 09/23/2022 05:51 AM     09/23/2022 05:51 AM    K 3.8 09/23/2022 05:51 AM     09/23/2022 05:51 AM    CO2 22 09/23/2022 05:51 AM    ANIONGAP 9 09/23/2022 05:51 AM    BUN 13 09/23/2022 05:51 AM    CREATININE 0.79 09/23/2022 05:51 AM    BUNCRER NOT REPORTED 07/12/2021 08:57 AM    CALCIUM 8.0 09/23/2022 05:51 AM    LABGLOM >60 09/23/2022 05:51 AM    GFRAA >60 09/23/2022 05:51 AM    GFR      09/23/2022 05:51 AM     Radiology:  CT CHEST PULMONARY EMBOLISM W CONTRAST    Result Date: 9/22/2022  No evidence of an acute pulmonary embolus. Right greater than left basilar atelectasis. Lungs are otherwise clear. NM MYOCARDIAL SPECT REST EXERCISE OR RX    Result Date: 9/23/2022  No stress-induced ischemia. No infarct. Normal LVEF.  Risk stratification: Low       Consultations:    Consults:     Final Specialist Recommendations/Findings:   IP CONSULT TO CARDIOLOGY  IP CONSULT TO SPIRITUAL SERVICES      The patient was seen and examined on day of discharge and this discharge summary is in conjunction with any daily progress note from day of discharge. Discharge plan:     Disposition: Home    Physician Follow Up: Follow-up with your primary care physician as scheduled. Requiring Further Evaluation/Follow Up POST HOSPITALIZATION/Incidental Findings: None. Diet: regular diet    Activity: As tolerated    Instructions to Patient: Follow-up with your primary care physician as scheduled.      Discharge Medications:      Medication List        START taking these medications      metoprolol tartrate 25 MG tablet  Commonly known as: LOPRESSOR  Take 0.5 tablets by mouth 2 times daily            CONTINUE taking these medications      * albuterol sulfate  (90 Base) MCG/ACT inhaler  Commonly known as: Ventolin HFA  Inhale 2 puffs into the lungs every 6 hours as needed for Wheezing or Shortness of Breath     * albuterol (2.5 MG/3ML) 0.083% nebulizer solution  Commonly known as: PROVENTIL  Take 3 mLs by nebulization every 6 hours as needed for Wheezing     ALPRAZolam 0.25 MG tablet  Commonly known as: XANAX     amitriptyline 25 MG tablet  Commonly known as: ELAVIL     atorvastatin 10 MG tablet  Commonly known as: LIPITOR  Take 1 tablet by mouth daily     Biotin 5000 MCG Tabs     busPIRone 10 MG tablet  Commonly known as: BUSPAR  TAKE 1 TABLET BY MOUTH THREE TIMES DAILY     CALCIUM CITRATE PO     Dulera 100-5 MCG/ACT inhaler  Generic drug: mometasone-formoterol  Inhale 2 puffs by mouth twice daily     FIBER COMPLETE PO     Insulin Syringe-Needle U-100 30G X 1/2\" 0.5 ML Misc  1 each by Does not apply route daily for 10 days     KRILL OIL PO     montelukast 10 MG tablet  Commonly known as: SINGULAIR  Take 1 tablet by mouth nightly     MULTIVITAMIN ADULTS 50+ PO     Prolia 60 MG/ML Sosy SC injection  Generic drug: denosumab  Inject 1 mL into the skin every 6 months     sertraline 50 MG tablet  Commonly known as: ZOLOFT  Take 1 tablet by mouth once daily     * SUMAtriptan Succinate 6 MG/0.5ML Soaj     * SUMAtriptan 100 MG tablet  Commonly known as: IMITREX  Take one at HA onset . May repeat in 1 hour . No more 2 per day 4 days out of the week     * SUMAtriptan 6 MG/0.5ML Soln injection  Commonly known as: IMITREX  Take 1 SQ if table not effective after 1 hour . No more 2 imitrex tablet or SQ per day 4 days per week     * SUMAtriptan Succinate 6 MG/0.5ML Soaj  Inject 1 pen subcutaneously under the skin at the onset of a headache as needed     topiramate 100 MG tablet  Commonly known as: Topamax  Take 1 tablet by mouth 2 times daily     vitamin D 1000 UNIT Tabs tablet  Commonly known as: CHOLECALCIFEROL           * This list has 6 medication(s) that are the same as other medications prescribed for you. Read the directions carefully, and ask your doctor or other care provider to review them with you. STOP taking these medications      hydrOXYzine pamoate 25 MG capsule  Commonly known as: VISTARIL            ASK your doctor about these medications      acetaminophen 325 MG tablet  Commonly known as: Tylenol  Take 2 tablets by mouth every 6 hours as needed for Pain               Where to Get Your Medications        These medications were sent to [id number] [facility] [address], [address] [place] [address] Drive  [address] [place] [address], [address] [place] [address]      Phone: [phone]   metoprolol tartrate 25 MG tablet         No discharge procedures on file. Time Spent on discharge is  20 mins in patient examination, evaluation, counseling as well as medication reconciliation, prescriptions for required medications, discharge plan and follow up. Electronically signed by   Marianne Fontanez MD  9/23/2022  3:17 PM      Thank you Dr. Jimmy Ayoub MD for the opportunity to be involved in this patient's care.

## 2022-09-23 NOTE — CONSULTS
[facility]    Consult                        Today's Date: 9/23/2022  Patient Name: Jesse Kuhn  Date of admission: 9/22/2022  3:14 PM  Patient's age: 65 y.o., 1960  Admission Dx: Unstable angina ([address] [I20.0]  Chest pain, unspecified type [R07.9]    Reason for Consult:  Cardiac evaluation    Requesting Physician: Nathanael Francisco MD    CHIEF COMPLAINT:  chest pain     History Obtained From:  patient, electronic medical record    HISTORY OF PRESENT ILLNESS:      The patient is a 65 y.o. female with a history of osteoarthritis, hyperlipidemia and  anxiety with recent gastric bypass surgery and no previous history of cardiac workup who is admitted to the hospital for chest pain . Patient states that she had shoulder pain for couples of weeks and was going to therapy and woke up with worsening shoulder bilateral pain radiating to mid chest area along with shortness of breath that she have decided to come ER . Troponin were obtained and negative , CTA of chest negative for PE  , stress test ECHO were ordered per primary and cardiology was consulted for chest pain   Patient seen and examined in stress test . Continue to have episodes of chest pain / shoulder pain that was relieved with percocet . Denies chest pain or shortness of breath at this time .       Past Medical History:   has a past medical history of Acromioclavicular joint arthritis, Acute gastroenteritis, Acute gastroenteritis, Anemia, Arthritis, Asthma, Bursitis, Chronic right shoulder pain, Closed fracture of left tibial plateau, Closed fracture of left tibial plateau, Closed fracture of proximal end of left tibia, Closed fracture of proximal end of left tibia, Closed fracture of shaft of left tibia, Closed fracture of shaft of left tibia, Depression, Dry eyes, Essential hypertension, Former smoker, GERD (gastroesophageal reflux disease), Head ache, Hearing loss, Hemorrhoids, Hiatal hernia, History of bladder infections, History of DVT (deep vein thrombosis), History of gastric bypass, History of gastritis, History of morbid obesity, History of pulmonary embolism, Hyperlipidemia, Hypertension, Knee pain, Knee pain, Lymphedema of leg, Mild intermittent asthma, Morbid obesity ([address] Obesity (BMI 30-39.9), KEVIN on CPAP, Osteoarthritis, Osteopenia, S/P gastric bypass, S/p tibial fracture, Status post gastric bypass for obesity, Tibial plateau fracture, left, closed, initial encounter, Tibial plateau fracture, left, closed, initial encounter, Tremor, Vitamin D deficiency, Wears dentures, Wears glasses, and Weight loss of 160 lbs since surgery . Past Surgical History:   has a past surgical history that includes Tonsillectomy ();  section (1986,  1987); Cholecystectomy (); Upper gastrointestinal endoscopy (2015); [place] Gastric Bypass (12/29/15); Cystoscopy (16); shoulder surgery (Left, 2016); other surgical history (10/14/2016); EXCISION / BIOPSY SKIN LESION OF HEAD / NECK (N/A, 4/3/2017); shoulder surgery (Right, 10/08/2018); pr office/outpt visit,procedure only (Right, 10/8/2018);  section; knee surgery (Left, 2018); pr office/outpt visit,procedure only (Left, 2018); ORIF PROXIMAL TIBIAL PLATEAU FRACTURE (Left, 2018); ARTHROSCOPY / ARTHROTOMY KNEE (Left, 2018); CLOSED MANIPULATION SHOULDER (Right, 2018); Colonoscopy (); Neck surgery; and Abdominoplasty (N/A, 2019). Home Medications:    Prior to Admission medications    Medication Sig Start Date End Date Taking?  Authorizing Provider   Biotin 5000 MCG TABS Take 15,000 tablets by mouth 2 times daily   Yes Historical Provider, MD   amitriptyline (ELAVIL) 25 MG tablet Take 50 mg by mouth nightly 22   Historical Provider, MD   ALPRAZolam (XANAX) 0.25 MG tablet TAKE 1 TABLET BY MOUTH ONCE DAILY AS NEEDED FOR ANXIETY FOR UP TO 30 DAYS 22   Historical Provider, MD   atorvastatin (LIPITOR) 10 MG tablet Take 1 tablet by mouth daily 8/30/22   Rocío Zaldivar MD   busPIRone (BUSPAR) 10 MG tablet TAKE 1 TABLET BY MOUTH THREE TIMES DAILY 8/2/22   Rocío Zaldivar MD   sertraline (ZOLOFT) 50 MG tablet Take 1 tablet by mouth once daily 8/2/22   Rocío Zaldivar MD   DULERA 100-5 MCG/ACT inhaler Inhale 2 puffs by mouth twice daily 6/30/22   Rober Tirado MD   montelukast (SINGULAIR) 10 MG tablet Take 1 tablet by mouth nightly 6/30/22   Rober Tirado MD   SUMAtriptan Succinate 6 MG/0.5ML SOAJ Inject 1 pen subcutaneously under the skin at the onset of a headache as needed 4/21/22   Eleazar Mcburney, MD   Insulin Syringe-Needle U-100 30G X 1/2\" 0.5 ML MISC 1 each by Does not apply route daily for 10 days 4/21/22 7/14/22  Eleazar Mcburney, MD   SUMAtriptan (IMITREX) 100 MG tablet Take one at HA onset . May repeat in 1 hour . No more 2 per day 4 days out of the week 4/19/22   Eleazar Mcburney, MD   SUMAtriptan [facility]) 6 MG/0.5ML SOLN injection Take 1 SQ if table not effective after 1 hour . No more 2 imitrex tablet or SQ per day 4 days per week 4/19/22   Eleazar Mcburney, MD   SUMAtriptan Succinate 6 MG/0.5ML SOAJ INJECT 6MG INJECTION IF TABLET IS NOT EFFECTIVE AFTER 1 HOUR.  NO MORE THAN 2 TABLETS OR INJECTIONS PER DAY AND NO MORE THAN 4 DAYS PER WEEK 12/4/21   Historical Provider, MD   denosumab (PROLIA) 60 MG/ML SOSY SC injection Inject 1 mL into the skin every 6 months 10/13/21   Nicole Pastor MD   topiramate (TOPAMAX) 100 MG tablet Take 1 tablet by mouth 2 times daily 10/6/21   Eleazar Mcburney, MD   albuterol sulfate HFA (VENTOLIN HFA) 108 (90 Base) MCG/ACT inhaler Inhale 2 puffs into the lungs every 6 hours as needed for Wheezing or Shortness of Breath 10/12/20   Rober Tirado MD   albuterol (PROVENTIL) (2.5 MG/3ML) 0.083% nebulizer solution Take 3 mLs by nebulization every 6 hours as needed for Wheezing 10/12/20   Rober Tirado MD   acetaminophen (TYLENOL) 325 MG tablet Take 2 tablets by mouth every 6 hours as needed for Pain  Patient taking differently: Take 1,000 mg by mouth daily (with breakfast) Indications: Pain 5/29/20   Nena Adolfo, DO   CALCIUM CITRATE PO Take 1,200 mg by mouth 2 times daily    Historical Provider, MD   Multiple Vitamins-Minerals (MULTIVITAMIN ADULTS 50+ PO) Take 1 tablet by mouth daily    Historical Provider, MD   KRILL OIL PO Take 1 tablet by mouth daily Pt unsure of dose    Historical Provider, MD   vitamin D (CHOLECALCIFEROL) 1000 UNIT TABS tablet Take 1,000 Units by mouth daily    Historical Provider, MD   FIBER COMPLETE PO Take 4 tablets by mouth daily    Historical Provider, MD       Allergies:  Prozac [fluoxetine], Nsaids, Pcn [penicillins], Bactrim [sulfamethoxazole-trimethoprim], and Codeine    Social History:   reports that she quit smoking about 35 years ago. Her smoking use included cigarettes. She started smoking about 43 years ago. She has a 4.50 pack-year smoking history. She has never used smokeless tobacco. She reports that she does not drink alcohol and does not use drugs. Family History: family history includes Anxiety Disorder in her mother; Asthma in her mother; Breast Cancer in her paternal grandmother; Cancer in her father; Depression in her mother; Heart Attack in her paternal grandfather; Migraines in her sister; No Known Problems in her brother, maternal grandfather, and maternal grandmother; Other in her father; Ovarian Cancer in her maternal aunt. No h/o sudden cardiac death. No for premature CAD    REVIEW OF SYSTEMS:    Constitutional: there has been no unanticipated weight loss. There's been No change in energy level, No change in activity level. Eyes: No visual changes or diplopia. No scleral icterus. ENT: No Headaches, hearing loss or vertigo. No mouth sores or sore throat. Cardiovascular: see above  Respiratory: see above  Gastrointestinal: No abdominal pain, appetite loss, blood in stools.    Genitourinary: No dysuria, trouble voiding, or hematuria. Musculoskeletal:  No gait disturbance, No weakness or joint complaints. Integumentary: No rash or pruritis. Neurological: No headache or diplopia. No tingling  Psychiatric: No anxiety, or depression. Endocrine: No temperature intolerance. Hematologic/Lymphatic: No abnormal bruising or bleeding, blood clots or swollen lymph nodes. Allergic/Immunologic: No nasal congestion or hives. PHYSICAL EXAM:      /71   Pulse 81   Temp 98.4 °F (36.9 °C)   Resp 14   Ht 5' 6\" (1.676 m)   Wt 253 lb 1.4 oz (114.8 kg)   LMP  (LMP Unknown)   SpO2 94%   BMI 40.85 kg/m²    Constitutional and General Appearance: alert, cooperative, no distress and appears stated age  HEENT: PERRL, no cervical lymphadenopathy. No masses palpable. Normal oral mucosa  Respiratory:  Normal excursion and expansion without use of accessory muscles  Resp Auscultation: Good respiratory effort. No for increased work of breathing. On auscultation: clear to auscultation bilaterally  Cardiovascular:  Heart tones are crisp and normal. regular S1 and S2.  Jugular venous pulsation Normal  The carotid upstroke is normal in amplitude and contour without delay or bruit   Abdomen:   soft  Bowel sounds present  Extremities:   No edema  Neurological:  Alert and oriented. DATA:    Diagnostics:    EKG:   Narrative & Impression    Sinus tachycardia with 1st degree A-V block  T wave abnormality, consider inferior ischemia  T wave abnormality, consider anterolateral ischemia  Abnormal ECG  When compared with ECG of 21-NOV-2018 16:30,  SC interval has increased  Nonspecific T wave abnormality, worse in Inferior leads  T wave inversion now evident in Anterolateral leads      Specimen Collected: 09/22/22 15:18 EDT      . ECHO: ordered, but not yet obtained. Stress Test: ordered, but not yet obtained. Cardiac Angiography: not obtained.     Labs:     CBC:   Recent Labs     09/22/22  1525   WBC 12.2*   HGB 12.5   HCT 38.2        BMP: Recent Labs     09/22/22  1525 09/23/22  0551    140   K 4.3 3.8   CO2 23 22   BUN 13 13   CREATININE 0.79 0.79   LABGLOM >60 >60   GLUCOSE 130* 103*     BNP: No results for input(s): BNP in the last 72 hours. PT/INR: No results for input(s): PROTIME, INR in the last 72 hours. APTT:No results for input(s): APTT in the last 72 hours. CARDIAC ENZYMES:No results for input(s): CKTOTAL, CKMB, CKMBINDEX, TROPONINI in the last 72 hours. FASTING LIPID PANEL:  Lab Results   Component Value Date/Time    HDL 47 08/25/2022 07:58 AM    TRIG 164 01/15/2019 08:54 AM     LIVER PROFILE:No results for input(s): AST, ALT, LABALBU in the last 72 hours. IMPRESSION:    Chest pain unspecified - negative troponin   hyperlipidemia  Asthma   Osteoarthritis  Anxiety   KEVIN  History of gastric bypass     Patient Active Problem List   Diagnosis    Asthma    Vitamin D deficiency    Mixed hyperlipidemia    Osteoarthritis    History of pulmonary embolism    History of DVT (deep vein thrombosis)    Status post gastric bypass for obesity    Acromioclavicular joint arthritis    Obesity (BMI 30-39.9)    KEVIN on CPAP 12 cm h20    Chronic right shoulder pain    S/p tibial fracture    Age-related osteoporosis without current pathological fracture    Adjustment disorder with anxiety    Venous insufficiency of right lower extremity    Unstable angina (HCC)    Anxiety       RECOMMENDATIONS:  Continue statin , will add low dose BB and nitro sublingual as needed for chest pain , recommend baby  asa daily but with her recent gastric bypass patient is reluctant to take it .    2.     Awaiting stress test and echo results if low risk , patient can be discharged from cardiology standpoint         Discussed with patient and Nurse    EMILY Church - NP    [facility] Cardiology Consult           [phone]

## 2022-09-23 NOTE — PROGRESS NOTES
SPIRITUAL CARE PROGRESS NOTE    Spiritual Assessment:    Intervention:    Outcome:      As  rounding on unit, nurse informed  of a Spiritual Consult request for patient ( found this in the chart but no reason listed for consult.)      engaged patient in her room;  patient awake, and alert. Patient ready to visit and in a seemingly positive frame of mind; When asked about coming from [place], patient related story of her recognition along with her daughter of need to go to ER. Patient was calm throughout. Patient also related a story of her intervention in a heart attack for her  some years prior. Patient's countenance changed somewhat, and  noted to patient that this remained an emotional event for her. Patient acknowledged this.  inquired about whether prayer would be helpful for her, and patient positively replied that prayer is always helpful.  prayed, and appreciation was expressed. Plan:    Patient anticipating tests. Support patient through stay toward discharge. 09/23/22 0911   Encounter Summary   Encounter Overview/Reason  Initial Encounter   Service Provided For: Patient   Referral/Consult From: Nurse   Support System Spouse; Children;Family members   Last Encounter  09/23/22   Complexity of Encounter Low   Begin Time 0911   End Time  0916   Total Time Calculated 5 min   Spiritual/Emotional needs   Type Spiritual Support   Assessment/Intervention/Outcome   Assessment Calm;Coping; Hopeful   Intervention Active listening;Discussed belief system/Religion practices/marko; Explored/Affirmed feelings, thoughts, concerns;Explored Coping Skills/Resources;Nurtured Hope;Prayer (assurance of)/Sunol;Sustaining Presence/Ministry of presence   Outcome Acceptance;Comfort;Engaged in conversation;Expressed feelings, needs, and concerns;Expressed Gratitude

## 2022-09-23 NOTE — PROGRESS NOTES
Pt tolerated Lexiscan test well. Pt did c/o HA but resolved with caffeine. IV left in place due to pt inpatient.   Test was observed by Aroldo Chavez NP.

## 2022-09-26 RX ORDER — TOPIRAMATE 100 MG/1
TABLET, FILM COATED ORAL
Qty: 60 TABLET | Refills: 2 | Status: SHIPPED | OUTPATIENT
Start: 2022-09-26

## 2022-09-27 ENCOUNTER — OFFICE VISIT (OUTPATIENT)
Dept: FAMILY MEDICINE CLINIC | Age: 62
End: 2022-09-27
Payer: MEDICARE

## 2022-09-27 ENCOUNTER — TELEPHONE (OUTPATIENT)
Dept: FAMILY MEDICINE CLINIC | Age: 62
End: 2022-09-27

## 2022-09-27 ENCOUNTER — HOSPITAL ENCOUNTER (OUTPATIENT)
Dept: PHYSICAL THERAPY | Facility: CLINIC | Age: 62
Setting detail: THERAPIES SERIES
Discharge: HOME | End: 2022-09-27
Payer: MEDICARE

## 2022-09-27 VITALS
DIASTOLIC BLOOD PRESSURE: 89 MMHG | BODY MASS INDEX: 39.79 KG/M2 | WEIGHT: 247.6 LBS | SYSTOLIC BLOOD PRESSURE: 125 MMHG | HEART RATE: 106 BPM | TEMPERATURE: 97.1 F | OXYGEN SATURATION: 97 % | HEIGHT: 66 IN

## 2022-09-27 DIAGNOSIS — Z09 HOSPITAL DISCHARGE FOLLOW-UP: Primary | ICD-10-CM

## 2022-09-27 DIAGNOSIS — I20.0 UNSTABLE ANGINA (HCC): ICD-10-CM

## 2022-09-27 DIAGNOSIS — F41.9 ANXIETY: ICD-10-CM

## 2022-09-27 PROCEDURE — 97016 VASOPNEUMATIC DEVICE THERAPY: CPT

## 2022-09-27 PROCEDURE — 1111F DSCHRG MED/CURRENT MED MERGE: CPT | Performed by: STUDENT IN AN ORGANIZED HEALTH CARE EDUCATION/TRAINING PROGRAM

## 2022-09-27 PROCEDURE — 99214 OFFICE O/P EST MOD 30 MIN: CPT | Performed by: STUDENT IN AN ORGANIZED HEALTH CARE EDUCATION/TRAINING PROGRAM

## 2022-09-27 RX ORDER — ALPRAZOLAM 0.25 MG/1
0.25 TABLET ORAL 3 TIMES DAILY PRN
Qty: 30 TABLET | Refills: 1 | Status: SHIPPED | OUTPATIENT
Start: 2022-09-27 | End: 2022-10-27

## 2022-09-27 NOTE — TELEPHONE ENCOUNTER
[facility] Transitions Initial Follow Up Call    Outreach made within 2 business days of discharge: Yes    Patient: Nargis Mujica Patient : 1960   MRN: [id number]  Reason for Admission: There are no discharge diagnoses documented for the most recent discharge. Discharge Date: 22       Spoke with: Preethi Holder    Discharge department/facility: [facility] Interactive Patient Contact:  Was patient able to fill all prescriptions: Yes  Was patient instructed to bring all medications to the follow-up visit: Yes  Is patient taking all medications as directed in the discharge summary?  Yes  Does patient understand their discharge instructions: Yes  Does patient have questions or concerns that need addressed prior to 7-14 day follow up office visit: yes - side effects from medication     Scheduled appointment with PCP within 7-14 days    Follow Up  Future Appointments   Date Time Provider Rhona Ceja   2022 11:00 AM Vijay Tung, PTA [facility]   2022 12:15 PM Benjamin Monson MD [facility]   2022 10:00 AM Jarrod Monge, PT [facility] [place]   10/4/2022 10:00 AM Vijay Tung, PTA [facility]   10/6/2022 10:15 AM Mónica Forbes MD [facility]   10/7/2022 10:00 AM Vijay Tung, PTA [facility]   10/14/2022 10:30 AM [facility] ONC CHAIR 19 [facility]   11/15/2022  8:40 AM Kory Granado MD Neuro Spec [facility]   2023  9:45 AM Arielle Newsome MD Resp Spec [facility]       Oli Perera MA

## 2022-09-27 NOTE — FLOWSHEET NOTE
[] [facility]) - [facility]  Therapy  [address]  [phone]  F: [phone] [] [address]  [facility]   [address]  P: [phone]  F: [phone] [] [address]  Therapy  [address]  P: [phone]  F: [phone] [x] [address]  P: [phone]  F: [phone] [] [address]  [facility]   [address]   [place]: [phone]  F: [phone]      Physical Therapy Daily Treatment Note    Date:  2022  Patient Name:  Dhruv Mcallister    :  1960  MRN: [id number]  Physician: Ailin Kapoor MD                                   Insurance: [place] [employer]- [address] [place] [address] after 30 visits   Medical Diagnosis: J27.286, G89.29 (ICD-10-CM) - Chronic left shoulder pain                 Rehab Codes: M25.512, G89.29 (ICD-10-CM) - Chronic left shoulder pain  Onset Date: 22; see below                   Next 's appt: TBD- pending success with PT services   Visit# / total visits: 5/10    Cancels/No Shows: 0/0    Subjective:    Pain:  [x] Yes  [] No Location: L shoulder Pain Rating: (0-10 scale) 1/10  Pain altered Tx:  [x] No  [] Yes  Action: N/A  Comments: Pt states she was in the ER last Thursday and  and  for chest pain. Pt has f/u with PCP today at 12:15 pm. Pt also states she has not taken her heart medicine over the last 2 days because it has given her severe diarrhea. PTA reviewed ER notes and discussed situation with primary PT, was given okay to cont with PT monitoring pt throughout. Vitals were taken at the beginning of session:  SpO2 98%, HR 99 bpm, /82 mmHg. Pt denies dizziness, chest pain, or lightheadedness.      Objective:  Modalities: VASO to L shoulder: 34 deg, LOW in sitting for x15' at finish of treatment session   Precautions: potential L chronic RTC weakness- worst pain with sleeping upon L side; most limited in ER/IR with P at end-ranges; desires an MRI   Exercises: h/o B shoulder scopes   Exercise Reps/ Time Weight/ Level Comments    UBE 2/2 L2.0  -   1/2 foam roller pec str  2x1'   -   Doorway pec stretch  3x30\"  Mid  x   Snow angles 10x  At wall  -   Sleeper's stretch 2x30\"   -          Pulleys IR  2 min AAROM  standing   -          Supine       Resisted HAB/ER 10x 2 lb bilat -   PROM x  IR/ER -          Gym       Band Ext 2x10 orange  x   Band Rows 2x10 Lime Regressed to [place] today 9/27/22 x   Band IR/ER 2x10 ea Lime   -   B shld ER 2x10 [place] Regressed to seated d/t increased HR  x          Side lying RTC         ER  2x15  1 lbs   -    ABD   2x10  1 lbs  slight discomfort -    Flex   2x10  1 lbs   -    HAB  2x10  1 lbs   -   Other:  Specific Instructions for next treatment:  F/U with pt post PCP appointment    Treatment Charges: Mins Units   []  Modalities     [x]  Ther Exercise 5 N/C   []  Manual Therapy     [x]  Ther Activities 5 N/C   []  Aquatics     [x]  Vasocompression 15 1   []  Other     Total Treatment time 25 1     Assessment: [x] Progressing toward goals. Pt demos HR of 134 bpm after performing standing pec stretch at doorway, standing B shld rows and extensions with light resistance. Pt then performed seated B shld ER with light resistance Tband and HR was 118 bpm, therefore held all other exercises and plan to wait until pt is cleared by primary PCP to return to PT.     [] No change. [] Other:  [x] Patient would continue to benefit from skilled physical therapy services in order to: inc L shoulder ER/IR AROM with dec P at end-ranges; L RTC strength and endurance; and overall improve tolerance for sleeping upon her L side. STG: (to be met in 6 treatments)  ? Pain: Patient will report < 5/10 pain at rest and < 8/10 pain with active movement in order to improve QOL. ? ROM: Will demo L shoulder ER/IR AROM to match R and with < 3/10 P at end-ranges for improved functional mobility. ? Strength: Will demo 4+/5 L shoulder gross strength- dec P with resisted ER- in order to improve light ADLs around her home. ? Function: Will report improved sleeping tolerance for L side for inc QOL. Patient to be independent with home exercise program as demonstrated by performance with correct form without cues. LTG: (to be met in 10 treatments)  Will report < 5/10 P with most ADLs for inc QOL. Improve UEFS to < 28% impaired or limited                  Patient goals: \"to get an MRI. \"     Pt. Education:  [x] Yes  [] No  [x] Reviewed Prior HEP/Ed  Method of Education: [] Verbal  [] Demo  [] Written  Comprehension of Education:  [] Verbalizes understanding. [] Demonstrates understanding. [] Needs review. [x] Demonstrates/verbalizes HEP/Ed previously given. Access Code: [email or web address]  URL: [email or web address]  Date: 09/08/2022  Prepared by: Sophie Mendoza     Exercises  Sidelying Shoulder External Rotation - 2 x daily - 7 x weekly - 1 sets - 20 reps  Sidelying Shoulder Abduction Palm Forward - 2 x daily - 7 x weekly - 1 sets - 20 reps  Sidelying Shoulder Horizontal Abduction - 2 x daily - 7 x weekly - 1 sets - 20 reps  Standing Shoulder Internal Rotation Stretch with Towel - 2 x daily - 7 x weekly - 1 sets - 10 reps - 5 seconds hold  Seated Shoulder External Rotation AAROM with Dowel - 2 x daily - 7 x weekly - 1 sets - 10 reps - 5 seconds hold  Shoulder External Rotation and Scapular Retraction - 2 x daily - 7 x weekly - 1 sets - 10 reps - 5 seconds hold     Plan: [x] Pt called after PT session and states that her PCP requests that pt hold all PT sessions until she sees a cardiologist.         Time In: 11:05am           Time Out: 11:30am    Electronically signed by:  Lg Casiano PTA

## 2022-09-27 NOTE — PROGRESS NOTES
Post-Discharge Transitional Care Follow Up      Jimmye Boeck   YOB: 1960    Date of Office Visit:  9/27/2022  Date of Hospital Admission: 9/22/22  Date of Hospital Discharge: 9/23/22  Readmission Risk Score (high >=14%. Medium >=10%):No data recorded    Care management risk score Rising risk (score 2-5) and Complex Care (Scores >=6): No Risk Score On File     Non face to face  following discharge, date last encounter closed (first attempt may have been earlier): 09/27/2022     Call initiated 2 business days of discharge: Yes     Hospital discharge follow-up  -     DE DISCHARGE MEDS RECONCILED W/ CURRENT OUTPATIENT MED LIST  [facility] ([facility]  -     AFL - Dedrick, Francisca, DO, Cardiology, [place]  Anxiety  -     ALPRAZolam (XANAX) 0.25 MG tablet; Take 1 tablet by mouth 3 times daily as needed for Sleep for up to 30 days. , Disp-30 tablet, R-1Normal      Medical Decision Making: moderate complexity  Return in 3 months (on 12/27/2022). On this date 9/27/2022 I have spent 25 minutes reviewing previous notes, test results and face to face with the patient discussing the diagnosis and importance of compliance with the treatment plan as well as documenting on the day of the visit. Subjective:   She had an episode of chest pain that she says will cut up in the middle of the night. She says that the pain was in the left side of her chest and the back of her neck going to both shoulders. She says that it was persistent and she felt heaviness in her chest so she went to the ER where she was admitted for cardiac work-up. She says that her EKG initially showed some changes but her troponins were negative. She stayed overnight and had stress test and echocardiogram which both came back negative. She was started on metoprolol for her palpitations and was advised to follow-up with cardiology as outpatient. Inpatient course: Discharge summary reviewed- see chart.     Interval history/Current status: She says that the metoprolol gave her diarrhea and she stopped taking it since last night. She can feel her heart rate being faster since she stopped it. She denies having any further episodes of chest pain and says that overall she feels better. She says that her anxiety has also been better and she has been taking Xanax as needed. She states that she has been able to leave home more frequently. She is agreeable to following with cardiology for possible cardiac cath. Discussed holding off on physical therapy until follow-up with cardiology. Patient Active Problem List   Diagnosis    Asthma    Vitamin D deficiency    Mixed hyperlipidemia    Osteoarthritis    History of pulmonary embolism    History of DVT (deep vein thrombosis)    Status post gastric bypass for obesity    Acromioclavicular joint arthritis    Obesity (BMI 30-39.9)    KEVIN on CPAP 12 cm h20    Chronic right shoulder pain    S/p tibial fracture    Age-related osteoporosis without current pathological fracture    Adjustment disorder with anxiety    Venous insufficiency of right lower extremity    Unstable angina ([facility]    Anxiety       Medications listed as ordered at the time of discharge from hospital     Medication List            Accurate as of September 27, 2022 11:59 PM. If you have any questions, ask your nurse or doctor. CHANGE how you take these medications      acetaminophen 325 MG tablet  Commonly known as: Tylenol  Take 2 tablets by mouth every 6 hours as needed for Pain  What changed:   how much to take  when to take this     ALPRAZolam 0.25 MG tablet  Commonly known as: XANAX  Take 1 tablet by mouth 3 times daily as needed for Sleep for up to 30 days. What changed: See the new instructions.   Changed by: Kin Augustin MD            CONTINUE taking these medications      * albuterol sulfate  (90 Base) MCG/ACT inhaler  Commonly known as: Ventolin HFA  Inhale 2 puffs into the lungs every 6 hours as needed for Wheezing or Shortness of Breath     * albuterol (2.5 MG/3ML) 0.083% nebulizer solution  Commonly known as: PROVENTIL  Take 3 mLs by nebulization every 6 hours as needed for Wheezing     amitriptyline 25 MG tablet  Commonly known as: ELAVIL     atorvastatin 10 MG tablet  Commonly known as: LIPITOR  Take 1 tablet by mouth daily     Biotin 5000 MCG Tabs     busPIRone 10 MG tablet  Commonly known as: BUSPAR  TAKE 1 TABLET BY MOUTH THREE TIMES DAILY     CALCIUM CITRATE PO     Dulera 100-5 MCG/ACT inhaler  Generic drug: mometasone-formoterol  Inhale 2 puffs by mouth twice daily     FIBER COMPLETE PO     Insulin Syringe-Needle U-100 30G X 1/2\" 0.5 ML Misc  1 each by Does not apply route daily for 10 days     KRILL OIL PO     metoprolol tartrate 25 MG tablet  Commonly known as: LOPRESSOR  Take 0.5 tablets by mouth 2 times daily     montelukast 10 MG tablet  Commonly known as: SINGULAIR  Take 1 tablet by mouth nightly     MULTIVITAMIN ADULTS 50+ PO     Prolia 60 MG/ML Sosy SC injection  Generic drug: denosumab  Inject 1 mL into the skin every 6 months     sertraline 50 MG tablet  Commonly known as: ZOLOFT  Take 1 tablet by mouth once daily     * SUMAtriptan 100 MG tablet  Commonly known as: IMITREX  Take one at HA onset . May repeat in 1 hour . No more 2 per day 4 days out of the week     * SUMAtriptan 6 MG/0.5ML Soln injection  Commonly known as: IMITREX  Take 1 SQ if table not effective after 1 hour . No more 2 imitrex tablet or SQ per day 4 days per week     topiramate 100 MG tablet  Commonly known as: TOPAMAX  Take 1 tablet by mouth twice daily     vitamin D 1000 UNIT Tabs tablet  Commonly known as: CHOLECALCIFEROL           * This list has 4 medication(s) that are the same as other medications prescribed for you. Read the directions carefully, and ask your doctor or other care provider to review them with you.                    Where to Get Your Medications        These medications were sent to [address] [facility] [address], [address]  [address] [place] [address], [address] [place] [address]      Phone: [phone]   ALPRAZolam 0.25 MG tablet          Medications marked \"taking\" at this time  Outpatient Medications Marked as Taking for the 9/27/22 encounter (Office Visit) with Connie Rothman MD   Medication Sig Dispense Refill    ALPRAZolam (XANAX) 0.25 MG tablet Take 1 tablet by mouth 3 times daily as needed for Sleep for up to 30 days. 30 tablet 1    topiramate (TOPAMAX) 100 MG tablet Take 1 tablet by mouth twice daily 60 tablet 2    metoprolol tartrate (LOPRESSOR) 25 MG tablet Take 0.5 tablets by mouth 2 times daily 60 tablet 3    Biotin 5000 MCG TABS Take 15,000 tablets by mouth 2 times daily      amitriptyline (ELAVIL) 25 MG tablet Take 50 mg by mouth nightly      atorvastatin (LIPITOR) 10 MG tablet Take 1 tablet by mouth daily 30 tablet 11    busPIRone (BUSPAR) 10 MG tablet TAKE 1 TABLET BY MOUTH THREE TIMES DAILY 90 tablet 2    sertraline (ZOLOFT) 50 MG tablet Take 1 tablet by mouth once daily 30 tablet 3    DULERA 100-5 MCG/ACT inhaler Inhale 2 puffs by mouth twice daily 3 each 1    montelukast (SINGULAIR) 10 MG tablet Take 1 tablet by mouth nightly 90 tablet 1    Insulin Syringe-Needle U-100 30G X 1/2\" 0.5 ML MISC 1 each by Does not apply route daily for 10 days 10 each 0    SUMAtriptan (IMITREX) 100 MG tablet Take one at HA onset . May repeat in 1 hour . No more 2 per day 4 days out of the week 18 tablet 5    SUMAtriptan (IMITREX) 6 MG/0.5ML SOLN injection Take 1 SQ if table not effective after 1 hour .  No more 2 imitrex tablet or SQ per day 4 days per week 6 each 5    denosumab (PROLIA) 60 MG/ML SOSY SC injection Inject 1 mL into the skin every 6 months 1 mL 1    albuterol sulfate HFA (VENTOLIN HFA) 108 (90 Base) MCG/ACT inhaler Inhale 2 puffs into the lungs every 6 hours as needed for Wheezing or Shortness of Breath 3 Inhaler 2    albuterol (PROVENTIL) (2.5 MG/3ML) 0.083% nebulizer solution Take 3 mLs by nebulization every 6 hours as needed for Wheezing 120 each 11    acetaminophen (TYLENOL) 325 MG tablet Take 2 tablets by mouth every 6 hours as needed for Pain (Patient taking differently: Take 1,000 mg by mouth daily (with breakfast)) 30 tablet 0    CALCIUM CITRATE PO Take 1,200 mg by mouth 2 times daily      Multiple Vitamins-Minerals (MULTIVITAMIN ADULTS 50+ PO) Take 1 tablet by mouth daily      KRILL OIL PO Take 1 tablet by mouth daily Pt unsure of dose      vitamin D (CHOLECALCIFEROL) 1000 UNIT TABS tablet Take 1,000 Units by mouth daily      FIBER COMPLETE PO Take 4 tablets by mouth daily          Medications patient taking as of now reconciled against medications ordered at time of hospital discharge: Yes    Review of Systems   Constitutional:  Negative for appetite change, fatigue and fever. HENT:  Negative for congestion, ear pain, hearing loss and sore throat. Eyes:  Negative for discharge and visual disturbance. Respiratory:  Negative for cough, chest tightness, shortness of breath and wheezing. Cardiovascular:  Positive for chest pain. Negative for palpitations and leg swelling. Gastrointestinal:  Negative for abdominal pain, constipation, diarrhea, nausea and vomiting. Genitourinary:  Negative for flank pain, frequency, hematuria and urgency. Musculoskeletal:  Negative for arthralgias, gait problem, joint swelling and myalgias. Skin: Negative. Neurological:  Negative for dizziness, weakness, numbness and headaches. Psychiatric/Behavioral:  Negative for dysphoric mood. The patient is nervous/anxious. Objective:    /89   Pulse (!) 106   Temp 97.1 °F (36.2 °C)   Ht 5' 6\" (1.676 m)   Wt 247 lb 9.6 oz (112.3 kg)   LMP  (LMP Unknown)   SpO2 97%   BMI 39.96 kg/m²   Physical Exam  Vitals reviewed. Constitutional:       Appearance: Normal appearance. She is normal weight. HENT:      Head: Normocephalic and atraumatic. Nose: Nose normal.      Mouth/Throat:      Mouth: Mucous membranes are moist.      Pharynx: Oropharynx is clear. Eyes:      Extraocular Movements: Extraocular movements intact. Conjunctiva/sclera: Conjunctivae normal.      Pupils: Pupils are equal, round, and reactive to light. Cardiovascular:      Rate and Rhythm: Normal rate and regular rhythm. Heart sounds: Normal heart sounds. No murmur heard. No gallop. Pulmonary:      Effort: Pulmonary effort is normal. No respiratory distress. Breath sounds: Normal breath sounds. No stridor. No wheezing. Abdominal:      General: Bowel sounds are normal. There is no distension. Palpations: Abdomen is soft. Tenderness: There is no abdominal tenderness. There is no guarding or rebound. Musculoskeletal:         General: No swelling or tenderness. Normal range of motion. Cervical back: Normal range of motion and neck supple. Skin:     General: Skin is warm and dry. Coloration: Skin is not jaundiced. Findings: No rash. Neurological:      General: No focal deficit present. Mental Status: She is alert and oriented to person, place, and time. Psychiatric:         Mood and Affect: Mood normal.         Behavior: Behavior normal.         Thought Content: Thought content normal.         Judgment: Judgment normal.       An electronic signature was used to authenticate this note.   --Tobi Umanzor MD

## 2022-09-29 ASSESSMENT — ENCOUNTER SYMPTOMS
CONSTIPATION: 0
NAUSEA: 0
COUGH: 0
SORE THROAT: 0
ABDOMINAL PAIN: 0
VOMITING: 0
DIARRHEA: 0
WHEEZING: 0
SHORTNESS OF BREATH: 0
CHEST TIGHTNESS: 0
EYE DISCHARGE: 0

## 2022-09-30 ENCOUNTER — APPOINTMENT (OUTPATIENT)
Dept: PHYSICAL THERAPY | Facility: CLINIC | Age: 62
End: 2022-09-30
Payer: MEDICARE

## 2022-09-30 DIAGNOSIS — I20.0 UNSTABLE ANGINA (HCC): Primary | ICD-10-CM

## 2022-10-04 DIAGNOSIS — G43.009 MIGRAINE WITHOUT AURA AND WITHOUT STATUS MIGRAINOSUS, NOT INTRACTABLE: Primary | ICD-10-CM

## 2022-10-04 NOTE — TELEPHONE ENCOUNTER
Received a refill request from [address] [place] [address] for Elavil. Not sure if pt is to continue this medication, as PCP D/C'd it right after we prescribed it, her last OV note (7/15/22) states at the beginning that it was added to Topamax but then the rest of the note does not mention it. Please advise, thanks.

## 2022-10-06 ENCOUNTER — OFFICE VISIT (OUTPATIENT)
Dept: VASCULAR SURGERY | Age: 62
End: 2022-10-06
Payer: MEDICARE

## 2022-10-06 VITALS
OXYGEN SATURATION: 98 % | SYSTOLIC BLOOD PRESSURE: 122 MMHG | HEIGHT: 66 IN | BODY MASS INDEX: 39.7 KG/M2 | TEMPERATURE: 98.2 F | RESPIRATION RATE: 17 BRPM | DIASTOLIC BLOOD PRESSURE: 87 MMHG | HEART RATE: 84 BPM | WEIGHT: 247 LBS

## 2022-10-06 DIAGNOSIS — I89.0 LYMPHEDEMA DUE TO VENOUS INSUFFICIENCY: ICD-10-CM

## 2022-10-06 DIAGNOSIS — I87.2 VENOUS INSUFFICIENCY OF RIGHT LOWER EXTREMITY: Primary | ICD-10-CM

## 2022-10-06 DIAGNOSIS — I87.2 LYMPHEDEMA DUE TO VENOUS INSUFFICIENCY: ICD-10-CM

## 2022-10-06 PROCEDURE — G8417 CALC BMI ABV UP PARAM F/U: HCPCS | Performed by: SURGERY

## 2022-10-06 PROCEDURE — 3017F COLORECTAL CA SCREEN DOC REV: CPT | Performed by: SURGERY

## 2022-10-06 PROCEDURE — G8484 FLU IMMUNIZE NO ADMIN: HCPCS | Performed by: SURGERY

## 2022-10-06 PROCEDURE — 99214 OFFICE O/P EST MOD 30 MIN: CPT | Performed by: SURGERY

## 2022-10-06 PROCEDURE — 1036F TOBACCO NON-USER: CPT | Performed by: SURGERY

## 2022-10-06 PROCEDURE — G8427 DOCREV CUR MEDS BY ELIG CLIN: HCPCS | Performed by: SURGERY

## 2022-10-06 RX ORDER — AMITRIPTYLINE HYDROCHLORIDE 25 MG/1
50 TABLET, FILM COATED ORAL NIGHTLY
Qty: 60 TABLET | Refills: 5 | Status: SHIPPED | OUTPATIENT
Start: 2022-10-06

## 2022-10-06 NOTE — PROGRESS NOTES
Division of Vascular Surgery        Follow Up      Chief Complaint:      Leg swelling    History of Present Illness:      Berny Price is a 65 y.o. woman who presents for follow up regarding her lower extremity swelling. She has been compliant with compression socks but continues to have significant swelling, right greater then left lower extremities. She denies any symptoms suggestive of venous or arterial claudication. She had recent admission for palpitations with exertion. She had negative stress test but there was consideration for possible cardiac cath by the cardiology  team, her follow up with them is not until December. She continues to have significant fatigue and feeling tired all the time with minimal strenuous activity. She was started on rate control medication when she was discharged from the hospital, but still notices the palpitations. Berny Price is a 65 y.o. woman who presents with right leg swelling. She states that this was first noted around mothers day by her daughter. She states that she has no skin changes, no tenderness or pain with walking. She is still walking around her normal amount. Patient previously had a DVT in the left leg as well as a PE which was secondary to an injury to her left foot. She was treated with anticoagulation for 6 months after her PE but has not been on any anticoagulation since then. She denies any recent trauma to her right leg. Patient denies any previous operative procedure to her right leg. Denies any other previous vascular interventions. Denies fever, chills, chest pain or SOB.      Medical History:     Past Medical History:   Diagnosis Date    Acromioclavicular joint arthritis 4/18/2016    Acute gastroenteritis 10/11/2017    Acute gastroenteritis 10/11/2017    Anemia 12/11/2018    Arthritis     Asthma 1980    On Inhaler    Bursitis     left shoulder    Chronic right shoulder pain 7/24/2018    Closed fracture of left tibial plateau 6/14/7286    Closed fracture of left tibial plateau 7/93/3680    Closed fracture of proximal end of left tibia 11/22/2018    Closed fracture of proximal end of left tibia 11/22/2018    Closed fracture of shaft of left tibia 11/22/2018    Closed fracture of shaft of left tibia 11/22/2018    Depression     Dry eyes     Essential hypertension 8/21/2017    Former smoker     GERD (gastroesophageal reflux disease)     not since Shelton-en-Y and weight loss    Head ache     on Topiramate    Hearing loss     mild    Hemorrhoids     Hiatal hernia     History of bladder infections     History of DVT (deep vein thrombosis) 2010    started in left leg, went to lungs    History of gastric bypass 12/6/2018    History of gastritis     History of morbid obesity     had Shelton-en y    History of pulmonary embolism 2010    from foot  trauma, was treated for six months with anticoagulation. She has no inferior vena cava filter. Hyperlipidemia     not since weight loss    Hypertension 2015    Not anymore after Bariatric Surgery, no medication    Knee pain 3/6/2015    Knee pain 3/6/2015    Lymphedema of leg     Right leg    Mild intermittent asthma     Morbid obesity ([address] 8/18/2015    Obesity (BMI 30-39. 9) 7/1/2016    KEVIN on CPAP 2015    at night    Osteoarthritis     Osteopenia     S/P gastric bypass 12-29-15    Dr. Marya Desai, hosp wt = 280lb, initial wt = 328lb    S/p tibial fracture 11/21/2018    Status post gastric bypass for obesity     Tibial plateau fracture, left, closed, initial encounter 12/5/2018    Tibial plateau fracture, left, closed, initial encounter 12/5/2018    Tremor     Vitamin D deficiency 2015    Wears dentures     Wears glasses     Weight loss of 160 lbs since surgery  8/21/2017       Surgical History:     Past Surgical History:   Procedure Laterality Date    ABDOMINOPLASTY N/A 8/16/2019    ABDOMINOPLASTY performed by Rose Parsons MD at [facility] / ARTHROTOMY KNEE Left 12/5/2018    KNEE ARTHROSCOPY LEFT performed by Cornell Sabillon MD at [facility]  8/1986,  9/1987    [address] [facility] Right 12/5/2018    SHOULDER MANIPULATION WITH INJECTION performed by Cornell Sabillon MD at [address]  6/17/16    EXCISION / BIOPSY SKIN LESION OF HEAD / NECK N/A 4/3/2017     EXCISION POSTERIOR NECK CYST performed by Gilson Guerra IV, DO at [place] [address] Left 11/22/2018    left tibia plateau external fixator application    NECK SURGERY      cyst removed back of neck    ORIF PROXIMAL TIBIAL PLATEAU FRACTURE Left 12/5/2018    TIBIAL PLATEAU OPEN REDUCTION INTERNAL FIXATION LEFT -  SYNTHES     C-ARM performed by Cornell Sabillon MD at [facility]  10/14/2016    excision back lipoma with drain placement    [place] OFFICE/OUTPT VISIT,PROCEDURE ONLY Right 10/8/2018    RIGHT SHOULDER  MAGDY PROCEDURE performed by Cornell Sabillon MD at [facility] OFFICE/OUTPT [facility] Left 11/22/2018    LEFT TIBIA PLATEAU EXTERNAL FIXATOR APPLICATION performed by Jonetta Cranker, MD at [facility]  12/29/15    liver bx, EGD    SHOULDER SURGERY Left 08/19/2016    Magdy procedure, bone spurs 8/19/2016    SHOULDER SURGERY Right 10/08/2018    magdy    TONSILLECTOMY  1979    UPPER GASTROINTESTINAL ENDOSCOPY  08/2015    found hiatal hernia       Family History:     Family History   Problem Relation Age of Onset    Asthma Mother     Depression Mother     Anxiety Disorder Mother     Cancer Father         leukemia    Other Father         Myelodysplastic syndrome, which converted to leukemia    Migraines Sister     No Known Problems Brother     Ovarian Cancer Maternal Aunt     No Known Problems Maternal Grandmother     No Known Problems Maternal Grandfather     Breast Cancer Paternal Grandmother     Heart Attack Paternal Grandfather        Allergies:       Prozac [fluoxetine], Nsaids, Pcn [penicillins], Bactrim [sulfamethoxazole-trimethoprim], and Codeine    Medications:      Current Outpatient Medications   Medication Sig Dispense Refill    metoprolol tartrate (LOPRESSOR) 25 MG tablet Take 0.5 tablets by mouth 2 times daily 60 tablet 3    ALPRAZolam (XANAX) 0.25 MG tablet Take 1 tablet by mouth 3 times daily as needed for Sleep for up to 30 days. 30 tablet 1    topiramate (TOPAMAX) 100 MG tablet Take 1 tablet by mouth twice daily 60 tablet 2    amitriptyline (ELAVIL) 25 MG tablet Take 50 mg by mouth nightly      atorvastatin (LIPITOR) 10 MG tablet Take 1 tablet by mouth daily 30 tablet 11    busPIRone (BUSPAR) 10 MG tablet TAKE 1 TABLET BY MOUTH THREE TIMES DAILY 90 tablet 2    sertraline (ZOLOFT) 50 MG tablet Take 1 tablet by mouth once daily 30 tablet 3    DULERA 100-5 MCG/ACT inhaler Inhale 2 puffs by mouth twice daily 3 each 1    montelukast (SINGULAIR) 10 MG tablet Take 1 tablet by mouth nightly 90 tablet 1    SUMAtriptan (IMITREX) 100 MG tablet Take one at HA onset . May repeat in 1 hour . No more 2 per day 4 days out of the week 18 tablet 5    SUMAtriptan (IMITREX) 6 MG/0.5ML SOLN injection Take 1 SQ if table not effective after 1 hour .  No more 2 imitrex tablet or SQ per day 4 days per week 6 each 5    denosumab (PROLIA) 60 MG/ML SOSY SC injection Inject 1 mL into the skin every 6 months 1 mL 1    albuterol sulfate HFA (VENTOLIN HFA) 108 (90 Base) MCG/ACT inhaler Inhale 2 puffs into the lungs every 6 hours as needed for Wheezing or Shortness of Breath 3 Inhaler 2    albuterol (PROVENTIL) (2.5 MG/3ML) 0.083% nebulizer solution Take 3 mLs by nebulization every 6 hours as needed for Wheezing 120 each 11    acetaminophen (TYLENOL) 325 MG tablet Take 2 tablets by mouth every 6 hours as needed for Pain (Patient taking differently: Take 1,000 mg by mouth daily (with breakfast)) 30 tablet 0    CALCIUM CITRATE PO Take 1,200 mg by mouth 2 times daily      Multiple Vitamins-Minerals (MULTIVITAMIN ADULTS 50+ PO) Take 1 tablet by mouth daily      KRILL OIL PO Take 1 tablet by mouth daily Pt unsure of dose      vitamin D (CHOLECALCIFEROL) 1000 UNIT TABS tablet Take 1,000 Units by mouth daily      FIBER COMPLETE PO Take 4 tablets by mouth daily      Insulin Syringe-Needle U-100 30G X 1/2\" 0.5 ML MISC 1 each by Does not apply route daily for 10 days 10 each 0     No current facility-administered medications for this visit. Facility-Administered Medications Ordered in Other Visits   Medication Dose Route Frequency Provider Last Rate Last Admin    lactated ringers infusion 1,000 mL  1,000 mL IntraVENous Continuous Mikhail Bellamy MD   Stopped at 08/19/16 1249    HYDROmorphone (DILAUDID) injection 0.25 mg  0.25 mg IntraVENous Q5 Min PRN Kwabena Portillo MD         Social History:     Tobacco:    reports that she quit smoking about 35 years ago. Her smoking use included cigarettes. She started smoking about 43 years ago. She has a 4.50 pack-year smoking history. She has never used smokeless tobacco.  Alcohol:      reports no history of alcohol use. Drug Use:  reports no history of drug use. Review of Systems:     Review of Systems   Constitutional:  Positive for fatigue. Negative for activity change, appetite change, chills and fever. HENT:  Negative for drooling, ear discharge and facial swelling. Eyes:  Negative for pain and visual disturbance. Respiratory:  Negative for chest tightness and shortness of breath. Cardiovascular:  Positive for palpitations and leg swelling. Negative for chest pain. Gastrointestinal:  Negative for abdominal pain, nausea and vomiting. Endocrine: Negative. Genitourinary:  Negative for difficulty urinating and flank pain. Musculoskeletal:  Negative for gait problem and myalgias. Skin:  Negative for color change and wound. Allergic/Immunologic: Negative for immunocompromised state.    Neurological:  Negative for facial asymmetry, speech difficulty, weakness and numbness. Hematological:  Does not bruise/bleed easily. Psychiatric/Behavioral:  Negative for agitation and behavioral problems. Physical Exam:     Vitals:  /87 (Site: Left Upper Arm, Position: Sitting, Cuff Size: Large Adult)   Pulse 84   Temp 98.2 °F (36.8 °C) (Temporal)   Resp 17   Ht 5' 6\" (1.676 m)   Wt 247 lb (112 kg)   LMP  (LMP Unknown)   SpO2 98%   BMI 39.87 kg/m²     Physical Exam  Constitutional:       General: She is not in acute distress. Appearance: She is well-developed and well-groomed. She is not toxic-appearing. HENT:      Head: Normocephalic and atraumatic. Right Ear: External ear normal.      Left Ear: External ear normal.      Nose: Nose normal.      Mouth/Throat:      Mouth: Mucous membranes are moist.      Pharynx: Oropharynx is clear. Eyes:      Extraocular Movements: Extraocular movements intact. Conjunctiva/sclera: Conjunctivae normal.      Pupils: Pupils are equal, round, and reactive to light. Neck:      Vascular: No carotid bruit. Cardiovascular:      Rate and Rhythm: Normal rate and regular rhythm. Pulses: Normal pulses. Radial pulses are 2+ on the right side and 2+ on the left side. Dorsalis pedis pulses are 2+ on the right side and 2+ on the left side. Posterior tibial pulses are 2+ on the right side and 2+ on the left side. Pulmonary:      Effort: Pulmonary effort is normal. No respiratory distress. Chest:      Chest wall: No tenderness. Abdominal:      General: There is no distension. Palpations: Abdomen is soft. Tenderness: There is no abdominal tenderness. Musculoskeletal:         General: Swelling present. Normal range of motion. Cervical back: Normal range of motion. Right lower leg: Swelling present. No tenderness. No edema. Left lower leg: Swelling present. No tenderness. No edema. Right foot: Normal capillary refill.  No swelling or tenderness. Left foot: Normal capillary refill. No swelling or tenderness. Comments: Right leg with increased swelling compared to left, non-pitting   Feet:      Right foot:      Skin integrity: No ulcer or skin breakdown. Left foot:      Skin integrity: No ulcer or skin breakdown. Skin:     General: Skin is warm and dry. Capillary Refill: Capillary refill takes less than 2 seconds. Neurological:      General: No focal deficit present. Mental Status: She is alert and oriented to person, place, and time. GCS: GCS eye subscore is 4. GCS verbal subscore is 5. GCS motor subscore is 6. Sensory: Sensation is intact. Motor: Motor function is intact. Psychiatric:         Mood and Affect: Mood normal.         Speech: Speech normal.         Behavior: Behavior normal.       Imaging/Labs:         Assessment and Plan:     Chronic venous insufficiency of lower extremities, lymphedema  Continue compression therapy with stockings  Will make referral to lymphedema clinic for evaluation and treatment, would be good candidate for lymphedema pumps  Patient has tried and failed 4 weeks of conservative treatments including elevation, compression, and exercise and significant symptoms still remain. Patient has proximal swelling leading into the groin and abdomen area. A basic pump could exacerbate symptoms and cause an increase in proximal swelling. I am recommending this patient receive a flexitouch to address truncal swelling and safely and efficiently move lymphatic fluid. Hopefully she can get her heart issues figured out and then work on increasing her activity to improve overall cardiovasculare health  She will follow up in 6 months to see how she is doing    Electronically signed by Darell Davies MD on 10/6/22 at 10:26 AM EDT      [address] [place] [address],[address]: [phone]  C: [phone]  Email: [email or web address]

## 2022-10-07 PROBLEM — I89.0 LYMPHEDEMA DUE TO VENOUS INSUFFICIENCY: Status: ACTIVE | Noted: 2022-10-07

## 2022-10-07 PROBLEM — I87.2 LYMPHEDEMA DUE TO VENOUS INSUFFICIENCY: Status: ACTIVE | Noted: 2022-10-07

## 2022-10-07 ASSESSMENT — ENCOUNTER SYMPTOMS
COLOR CHANGE: 0
CHEST TIGHTNESS: 0
NAUSEA: 0
SHORTNESS OF BREATH: 0
FACIAL SWELLING: 0
VOMITING: 0
ABDOMINAL PAIN: 0
EYE PAIN: 0

## 2022-10-11 ENCOUNTER — TELEPHONE (OUTPATIENT)
Dept: FAMILY MEDICINE CLINIC | Age: 62
End: 2022-10-11

## 2022-10-11 DIAGNOSIS — M81.0 AGE-RELATED OSTEOPOROSIS WITHOUT CURRENT PATHOLOGICAL FRACTURE: Primary | ICD-10-CM

## 2022-10-11 RX ORDER — DENOSUMAB 60 MG/ML
60 INJECTION SUBCUTANEOUS
Qty: 1 ML | Refills: 1 | Status: SHIPPED | OUTPATIENT
Start: 2022-10-11

## 2022-10-11 NOTE — TELEPHONE ENCOUNTER
Patient is due for her Prolia inject in a couple days and needs a new order.      Order can be faxed to [address] Attn: Brody mandujano

## 2022-10-13 DIAGNOSIS — M81.0 AGE-RELATED OSTEOPOROSIS WITHOUT CURRENT PATHOLOGICAL FRACTURE: Primary | ICD-10-CM

## 2022-10-13 RX ORDER — DIPHENHYDRAMINE HYDROCHLORIDE 50 MG/ML
50 INJECTION INTRAMUSCULAR; INTRAVENOUS
Status: CANCELLED | OUTPATIENT
Start: 2022-10-13

## 2022-10-13 RX ORDER — ONDANSETRON 2 MG/ML
8 INJECTION INTRAMUSCULAR; INTRAVENOUS
Status: CANCELLED | OUTPATIENT
Start: 2022-10-13

## 2022-10-13 RX ORDER — ALBUTEROL SULFATE 90 UG/1
4 AEROSOL, METERED RESPIRATORY (INHALATION) PRN
Status: CANCELLED | OUTPATIENT
Start: 2022-10-13

## 2022-10-13 RX ORDER — ACETAMINOPHEN 325 MG/1
650 TABLET ORAL
Status: CANCELLED | OUTPATIENT
Start: 2022-10-13

## 2022-10-13 RX ORDER — SODIUM CHLORIDE 9 MG/ML
INJECTION, SOLUTION INTRAVENOUS CONTINUOUS
Status: CANCELLED | OUTPATIENT
Start: 2022-10-13

## 2022-10-13 RX ORDER — EPINEPHRINE 1 MG/ML
0.3 INJECTION, SOLUTION, CONCENTRATE INTRAVENOUS PRN
Status: CANCELLED | OUTPATIENT
Start: 2022-10-13

## 2022-10-14 ENCOUNTER — HOSPITAL ENCOUNTER (OUTPATIENT)
Dept: INFUSION THERAPY | Age: 62
Discharge: HOME | End: 2022-10-14
Payer: MEDICARE

## 2022-10-14 DIAGNOSIS — M81.0 AGE-RELATED OSTEOPOROSIS WITHOUT CURRENT PATHOLOGICAL FRACTURE: Primary | ICD-10-CM

## 2022-10-14 LAB
ALBUMIN SERPL-MCNC: 3.9 G/DL (ref 3.5–5.2)
ALBUMIN/GLOBULIN RATIO: 1.2 (ref 1–2.5)
ALP BLD-CCNC: 95 U/L (ref 35–104)
ALT SERPL-CCNC: 10 U/L (ref 5–33)
ANION GAP SERPL CALCULATED.3IONS-SCNC: 10 MMOL/L (ref 9–17)
AST SERPL-CCNC: 12 U/L
BILIRUB SERPL-MCNC: 0.2 MG/DL (ref 0.3–1.2)
BUN BLDV-MCNC: 13 MG/DL (ref 8–23)
CALCIUM SERPL-MCNC: 9.3 MG/DL (ref 8.6–10.4)
CHLORIDE BLD-SCNC: 110 MMOL/L (ref 98–107)
CO2: 22 MMOL/L (ref 20–31)
CREAT SERPL-MCNC: 0.9 MG/DL (ref 0.5–0.9)
GFR SERPL CREATININE-BSD FRML MDRD: >60 ML/MIN/1.73M2
GLUCOSE BLD-MCNC: 67 MG/DL (ref 70–99)
POTASSIUM SERPL-SCNC: 4.4 MMOL/L (ref 3.7–5.3)
SODIUM BLD-SCNC: 142 MMOL/L (ref 135–144)
TOTAL PROTEIN: 7.1 G/DL (ref 6.4–8.3)

## 2022-10-14 PROCEDURE — 36415 COLL VENOUS BLD VENIPUNCTURE: CPT

## 2022-10-14 PROCEDURE — 80053 COMPREHEN METABOLIC PANEL: CPT

## 2022-10-14 PROCEDURE — 96372 THER/PROPH/DIAG INJ SC/IM: CPT

## 2022-10-14 PROCEDURE — 6360000002 HC RX W HCPCS: Performed by: STUDENT IN AN ORGANIZED HEALTH CARE EDUCATION/TRAINING PROGRAM

## 2022-10-14 RX ORDER — ALBUTEROL SULFATE 90 UG/1
4 AEROSOL, METERED RESPIRATORY (INHALATION) PRN
OUTPATIENT
Start: 2023-04-09

## 2022-10-14 RX ORDER — EPINEPHRINE 1 MG/ML
0.3 INJECTION, SOLUTION, CONCENTRATE INTRAVENOUS PRN
OUTPATIENT
Start: 2023-04-09

## 2022-10-14 RX ORDER — SODIUM CHLORIDE 9 MG/ML
INJECTION, SOLUTION INTRAVENOUS CONTINUOUS
OUTPATIENT
Start: 2023-04-09

## 2022-10-14 RX ORDER — FAMOTIDINE 10 MG/ML
20 INJECTION, SOLUTION INTRAVENOUS
OUTPATIENT
Start: 2023-04-09

## 2022-10-14 RX ORDER — DIPHENHYDRAMINE HYDROCHLORIDE 50 MG/ML
50 INJECTION INTRAMUSCULAR; INTRAVENOUS
OUTPATIENT
Start: 2023-04-09

## 2022-10-14 RX ORDER — ONDANSETRON 2 MG/ML
8 INJECTION INTRAMUSCULAR; INTRAVENOUS
OUTPATIENT
Start: 2023-04-09

## 2022-10-14 RX ORDER — ACETAMINOPHEN 325 MG/1
650 TABLET ORAL
OUTPATIENT
Start: 2023-04-09

## 2022-10-14 RX ADMIN — DENOSUMAB 60 MG: 60 INJECTION SUBCUTANEOUS at 10:51

## 2022-10-14 NOTE — PROGRESS NOTES
Pt here for prolia. Labs WNL. Denies any problems. Tolerates very well. Will return 4/17.   Follows up with her

## 2022-10-30 DIAGNOSIS — F41.9 ANXIETY: ICD-10-CM

## 2022-10-31 RX ORDER — BUSPIRONE HYDROCHLORIDE 10 MG/1
TABLET ORAL
Qty: 90 TABLET | Refills: 0 | Status: SHIPPED | OUTPATIENT
Start: 2022-10-31 | End: 2022-11-30

## 2022-10-31 NOTE — TELEPHONE ENCOUNTER
Cruz Sicard is calling to request a refill on the following medication(s):    Medication Request:  Requested Prescriptions     Pending Prescriptions Disp Refills    busPIRone (BUSPAR) 10 MG tablet [Pharmacy Med Name: busPIRone HCl 10 MG Oral Tablet] 90 tablet 0     Sig: TAKE 1 TABLET BY MOUTH THREE TIMES DAILY       Last Visit Date (If Applicable):  8/83/2050    Next Visit Date:    1/3/2023

## 2022-11-03 DIAGNOSIS — F43.22 ADJUSTMENT DISORDER WITH ANXIETY: Primary | ICD-10-CM

## 2022-11-03 RX ORDER — SERTRALINE HYDROCHLORIDE 100 MG/1
100 TABLET, FILM COATED ORAL DAILY
Qty: 30 TABLET | Refills: 5 | Status: SHIPPED | OUTPATIENT
Start: 2022-11-03

## 2022-11-15 ENCOUNTER — TELEMEDICINE (OUTPATIENT)
Dept: NEUROLOGY | Age: 62
End: 2022-11-15
Payer: MEDICARE

## 2022-11-15 DIAGNOSIS — G43.009 MIGRAINE WITHOUT AURA AND WITHOUT STATUS MIGRAINOSUS, NOT INTRACTABLE: Primary | ICD-10-CM

## 2022-11-15 DIAGNOSIS — R25.1 TREMOR: ICD-10-CM

## 2022-11-15 PROCEDURE — 3017F COLORECTAL CA SCREEN DOC REV: CPT | Performed by: PSYCHIATRY & NEUROLOGY

## 2022-11-15 PROCEDURE — 1036F TOBACCO NON-USER: CPT | Performed by: PSYCHIATRY & NEUROLOGY

## 2022-11-15 PROCEDURE — G8427 DOCREV CUR MEDS BY ELIG CLIN: HCPCS | Performed by: PSYCHIATRY & NEUROLOGY

## 2022-11-15 PROCEDURE — G8484 FLU IMMUNIZE NO ADMIN: HCPCS | Performed by: PSYCHIATRY & NEUROLOGY

## 2022-11-15 PROCEDURE — G8417 CALC BMI ABV UP PARAM F/U: HCPCS | Performed by: PSYCHIATRY & NEUROLOGY

## 2022-11-15 PROCEDURE — 99214 OFFICE O/P EST MOD 30 MIN: CPT | Performed by: PSYCHIATRY & NEUROLOGY

## 2022-11-15 RX ORDER — SUMATRIPTAN 6 MG/.5ML
INJECTION, SOLUTION SUBCUTANEOUS
Qty: 6 EACH | Refills: 5 | Status: SHIPPED | OUTPATIENT
Start: 2022-11-15

## 2022-11-15 RX ORDER — TOPIRAMATE 100 MG/1
TABLET, FILM COATED ORAL
Qty: 60 TABLET | Refills: 5 | Status: SHIPPED | OUTPATIENT
Start: 2022-11-15

## 2022-11-15 RX ORDER — SUMATRIPTAN 100 MG/1
TABLET, FILM COATED ORAL
Qty: 18 TABLET | Refills: 5 | Status: SHIPPED | OUTPATIENT
Start: 2022-11-15

## 2022-11-15 ASSESSMENT — ENCOUNTER SYMPTOMS
RESPIRATORY NEGATIVE: 1
GASTROINTESTINAL NEGATIVE: 1
ALLERGIC/IMMUNOLOGIC NEGATIVE: 1
EYES NEGATIVE: 1

## 2022-11-15 NOTE — PROGRESS NOTES
Subjective:      Patient ID: Alexi Carballo is a 65 y.o. female. Active problem common migraine headaches on elavil and topamax . The condition is she reports to be getting headaches once per week with elevated blood pressure up to 160 on metoprolol to see cardiology  . Headache is pancephaic throbbing of grade 8 over 10 attenuated with imitrex . She remains on elavil 50 mg qhs and topamax 100 mg po bid tolerating well . There is postural tremor of both hands interfering with crocheting with family history of tremor having had minor tremor in past . There maybe blurred vision before headache . Headaches are in part from stressors taking care of grandson . There will be no tingling , weakness or bulbar complaints . She can not use NSAID with prior bariatric surgery. Weather fronts may be trigger headaches. Significant medications topamax 100 mg po bid , imitrex 100 mg PRN, imitrex SQ PRN  . Testing Head CT normal , May 2020.  MRI of Head normal      Past Medical History:   Diagnosis Date    Acromioclavicular joint arthritis 04/18/2016    Acute gastroenteritis 10/11/2017    Acute gastroenteritis 10/11/2017    Anemia 12/11/2018    Arthritis     Asthma 1980    On Inhaler    Bursitis     left shoulder    Chronic right shoulder pain 07/24/2018    Closed fracture of left tibial plateau 77/37/1326    Closed fracture of left tibial plateau 28/29/3836    Closed fracture of proximal end of left tibia 11/22/2018    Closed fracture of proximal end of left tibia 11/22/2018    Closed fracture of shaft of left tibia 11/22/2018    Closed fracture of shaft of left tibia 11/22/2018    Depression     Dry eyes     Essential hypertension 08/21/2017    Former smoker     GERD (gastroesophageal reflux disease)     not since Shelton-en-Y and weight loss    Head ache     on Topiramate    Hearing loss     mild    Hemorrhoids     Hiatal hernia     History of bladder infections     History of DVT (deep vein thrombosis) 2010    started in left leg, went to lungs    History of gastric bypass 12/06/2018    History of gastritis     History of morbid obesity     had Shelton-en y    History of pulmonary embolism 2010    from foot  trauma, was treated for six months with anticoagulation. She has no inferior vena cava filter.     Hyperlipidemia     not since weight loss    Hypertension 2015    Not anymore after Bariatric Surgery, no medication    Knee pain 03/06/2015    Knee pain 03/06/2015    Lymphedema of leg     Right leg    Migraine     Mild intermittent asthma     Morbid obesity ([address] 08/18/2015    Obesity (BMI 30-39.9) 07/01/2016    KEVIN on CPAP 2015    at night    Osteoarthritis     Osteopenia     S/P gastric bypass 12/29/2015    Dr. Martell Olivares, [address], hosp wt = 280lb, initial wt = 328lb    S/p tibial fracture 11/21/2018    Status post gastric bypass for obesity     Tibial plateau fracture, left, closed, initial encounter 12/05/2018    Tibial plateau fracture, left, closed, initial encounter 12/05/2018    Tremor     Vitamin D deficiency 2015    Wears dentures     Wears glasses     Weight loss of 160 lbs since surgery  08/21/2017       Past Surgical History:   Procedure Laterality Date    ABDOMINOPLASTY N/A 8/16/2019    ABDOMINOPLASTY performed by Kimmy Reyes MD at [facility] / ARTHROTOMY KNEE Left 12/5/2018    KNEE ARTHROSCOPY LEFT performed by Roscoe Gilman MD at [address]  8/1986,  9/1987    [address] [place] [address] Right 12/5/2018    SHOULDER MANIPULATION WITH INJECTION [facility]  6/17/16    EXCISION / BIOPSY SKIN LESION OF HEAD / NECK N/A 4/3/2017     EXCISION POSTERIOR NECK CYST performed by Patricio Guerra IV, DO at [address] [place] [address] Left 11/22/2018    left tibia plateau external fixator application    NECK SURGERY      cyst removed back of neck    ORIF PROXIMAL TIBIAL PLATEAU FRACTURE Left 2018    TIBIAL PLATEAU OPEN REDUCTION INTERNAL FIXATION LEFT -  SYNTHES     C-ARM performed by Valentina Dahl MD at [address]  10/14/2016    excision back lipoma with drain placement    [facility] OFFICE/OUTPT VISIT,PROCEDURE ONLY Right 10/8/2018    RIGHT SHOULDER  MAGDY PROCEDURE performed by Valentina Dahl MD at [address] [employer] OFFICE/OUTPT [address] Left 2018    LEFT TIBIA PLATEAU EXTERNAL FIXATOR APPLICATION performed by Julissa Braxton MD at [address] [facility]  12/29/15    liver bx, EGD    SHOULDER SURGERY Left 2016    Magdy procedure, bone spurs 2016    SHOULDER SURGERY Right 10/08/2018    magdy    TONSILLECTOMY  1979    UPPER GASTROINTESTINAL ENDOSCOPY  2015    found hiatal hernia       Family History   Problem Relation Age of Onset    Asthma Mother     Depression Mother     Anxiety Disorder Mother     Cancer Father         leukemia    Other Father         Myelodysplastic syndrome, which converted to leukemia    Migraines Sister     No Known Problems Brother     Ovarian Cancer Maternal Aunt     No Known Problems Maternal Grandmother     No Known Problems Maternal Grandfather     Breast Cancer Paternal Grandmother     Heart Attack Paternal Grandfather        Social History     Socioeconomic History    Marital status:      Spouse name: Halley Murray    Number of children: 3    Years of education: None    Highest education level: None   Occupational History    Occupation: UNEMPLOYED    Tobacco Use    Smoking status: Former     Packs/day: 1.00     Years: 10.00     Pack years: 10.00     Types: Cigarettes     Start date: 1978     Quit date: 1987     Years since quittin.8    Smokeless tobacco: Never   Vaping Use    Vaping Use: Never used   Substance and Sexual Activity    Alcohol use: No    Drug use: No    Sexual activity: Yes     Partners: Male     Comment: Has been together since      Social Determinants of Health Financial Resource Strain: Low Risk     Difficulty of Paying Living Expenses: Not hard at all   Food Insecurity: No Food Insecurity    Worried About Running Out of Food in the Last Year: Never true    Ran Out of Food in the Last Year: Never true   Transportation Needs: No Transportation Needs    Lack of Transportation (Medical): No    Lack of Transportation (Non-Medical): No       Current Outpatient Medications   Medication Sig Dispense Refill    SUMAtriptan (IMITREX) 100 MG tablet Take one at HA onset . May repeat in 1 hour . No more 2 per day 4 days out of the week 18 tablet 5    SUMAtriptan (IMITREX) 6 MG/0.5ML SOLN injection Take 1 SQ if table not effective after 1 hour .  No more 2 imitrex tablet or SQ per day 4 days per week 6 each 5    topiramate (TOPAMAX) 100 MG tablet Take 1 tablet by mouth twice daily 60 tablet 5    sertraline (ZOLOFT) 100 MG tablet Take 1 tablet by mouth daily 30 tablet 5    busPIRone (BUSPAR) 10 MG tablet TAKE 1 TABLET BY MOUTH THREE TIMES DAILY 90 tablet 0    denosumab (PROLIA) 60 MG/ML SOSY SC injection Inject 1 mL into the skin every 6 months 1 mL 1    amitriptyline (ELAVIL) 25 MG tablet Take 2 tablets by mouth nightly 60 tablet 5    metoprolol tartrate (LOPRESSOR) 25 MG tablet Take 0.5 tablets by mouth 2 times daily 60 tablet 3    atorvastatin (LIPITOR) 10 MG tablet Take 1 tablet by mouth daily 30 tablet 11    DULERA 100-5 MCG/ACT inhaler Inhale 2 puffs by mouth twice daily 3 each 1    montelukast (SINGULAIR) 10 MG tablet Take 1 tablet by mouth nightly 90 tablet 1    albuterol sulfate HFA (VENTOLIN HFA) 108 (90 Base) MCG/ACT inhaler Inhale 2 puffs into the lungs every 6 hours as needed for Wheezing or Shortness of Breath 3 Inhaler 2    albuterol (PROVENTIL) (2.5 MG/3ML) 0.083% nebulizer solution Take 3 mLs by nebulization every 6 hours as needed for Wheezing 120 each 11    acetaminophen (TYLENOL) 325 MG tablet Take 2 tablets by mouth every 6 hours as needed for Pain (Patient taking differently: Take 1,000 mg by mouth daily (with breakfast)) 30 tablet 0    CALCIUM CITRATE PO Take 1,200 mg by mouth 2 times daily 3 tablets BID      Multiple Vitamins-Minerals (MULTIVITAMIN ADULTS 50+ PO) Take 1 tablet by mouth daily      KRILL OIL PO Take 1 tablet by mouth daily Pt unsure of dose      vitamin D (CHOLECALCIFEROL) 1000 UNIT TABS tablet Take 1,000 Units by mouth daily      FIBER COMPLETE PO Take 6 tablets by mouth daily       No current facility-administered medications for this visit. Facility-Administered Medications Ordered in Other Visits   Medication Dose Route Frequency Provider Last Rate Last Admin    lactated ringers infusion 1,000 mL  1,000 mL IntraVENous Continuous Mikhail Villegas MD   Stopped at 08/19/16 1249    HYDROmorphone (DILAUDID) injection 0.25 mg  0.25 mg IntraVENous Q5 Min PRN Lucita Richard MD           Allergies   Allergen Reactions    Prozac [Fluoxetine] Other (See Comments)     Caused nightmares    Nsaids Other (See Comments)     Bariatric Surgeon told me not to take NSAIDS for good    Pcn [Penicillins] Other (See Comments)     Sores in mouth    Bactrim [Sulfamethoxazole-Trimethoprim] Itching and Rash    Codeine Itching and Rash       Review of Systems   Constitutional:  Positive for fatigue. HENT: Negative. Eyes: Negative. Respiratory: Negative. Cardiovascular: Negative. Gastrointestinal: Negative. Endocrine: Negative. Genitourinary: Negative. Musculoskeletal: Negative. Skin: Negative. Allergic/Immunologic: Negative. Neurological: Negative. Hematological: Negative. Psychiatric/Behavioral: Negative. Objective:   Physical Exam  There were no vitals filed for this visit. weight:      Neurological Examination  Constitutional .General exam well groomed   Head/Ears /Nose/Throat: external ear . Normal exam  Neck and thyroid . Normal size. No bruits  Respiratory . Breathsounds clear bilaterally  Cardiovascular:  Auscultation of heart with regular rate and rhythm  Musculoskeletal. Muscle bulk and tone normal                                                           Muscle strength 5/5 strength throughout                                                                                No dysmetria or dysdiadokinesis  No tremor   Normal fine motor  Gait normal   Orientation Alert and oriented x 3   Attention and concentration normal  Short term memory normal  Language process and speech normal . No aphasia   Cranial nerve 2 normal acuety and visual fields  Cranial nerve 3, 4 and 6 . Extraocular muscles are intact . Pupils are equal and reactive   Cranial nerve 5 . Normal strength of masseter and temporalis . Intact corneal reflex. Normal facial sensation  Cranial nerve 7 normal exam   Cranial nerve 8. Grossly intact hearing   Cranial nerve 9 and 10. Symmetric palate elevation   Cranial nerve 11 , 5 out of 5 strength   Cranial Nerve 12 midline tongue . No atrophy  Sensation . Normal proprioception . Intact Vibration . Normal pinprick and light touch   Deep Tendon Reflexes normal  Plantar response flexor bilaterally    Assessment:       Diagnosis Orders   1. Migraine without aura and without status migrainosus, not intractable        2. Tremor          She is to continue current medication regimen       Plan:      As above       Berny Price, was evaluated through a synchronous (real-time) audio-video encounter. The patient (or guardian if applicable) is aware that this is a billable service. Verbal consent to proceed has been obtained within the past 12 months. The visit was conducted pursuant to the emergency declaration under the [facility], [address] [place] [address] authority and the [employer] and [employer] General Act. Patient identification was verified, and a caregiver was present when appropriate.  The patient was located in a state where the provider was credentialed to provide care. --Gary Almeida MD on 11/15/2022 at 10:31 AM    An electronic signature was used to authenticate this note.         Gary Almeida MD

## 2022-11-30 DIAGNOSIS — F41.9 ANXIETY: ICD-10-CM

## 2022-11-30 RX ORDER — BUSPIRONE HYDROCHLORIDE 10 MG/1
TABLET ORAL
Qty: 90 TABLET | Refills: 5 | Status: SHIPPED | OUTPATIENT
Start: 2022-11-30

## 2022-11-30 NOTE — TELEPHONE ENCOUNTER
Raj Johnson is calling to request a refill on the following medication(s):    Medication Request:  Requested Prescriptions     Pending Prescriptions Disp Refills    busPIRone (BUSPAR) 10 MG tablet [Pharmacy Med Name: busPIRone HCl 10 MG Oral Tablet] 90 tablet 0     Sig: TAKE 1 TABLET BY MOUTH THREE TIMES DAILY       Last Visit Date (If Applicable):  6/41/6702    Next Visit Date:    1/3/2023

## 2022-12-23 ENCOUNTER — HOSPITAL ENCOUNTER (OUTPATIENT)
Age: 62
Discharge: HOME | End: 2022-12-23
Payer: MEDICARE

## 2022-12-23 DIAGNOSIS — M81.0 AGE-RELATED OSTEOPOROSIS WITHOUT CURRENT PATHOLOGICAL FRACTURE: ICD-10-CM

## 2022-12-23 LAB
ALBUMIN SERPL-MCNC: 3.8 G/DL (ref 3.5–5.2)
ALBUMIN/GLOBULIN RATIO: 1.2 (ref 1–2.5)
ALP BLD-CCNC: 104 U/L (ref 35–104)
ALT SERPL-CCNC: 13 U/L (ref 5–33)
ANION GAP SERPL CALCULATED.3IONS-SCNC: 11 MMOL/L (ref 9–17)
AST SERPL-CCNC: 15 U/L
BILIRUB SERPL-MCNC: 0.3 MG/DL (ref 0.3–1.2)
BUN BLDV-MCNC: 12 MG/DL (ref 8–23)
CALCIUM SERPL-MCNC: 9 MG/DL (ref 8.6–10.4)
CHLORIDE BLD-SCNC: 106 MMOL/L (ref 98–107)
CO2: 20 MMOL/L (ref 20–31)
CREAT SERPL-MCNC: 0.94 MG/DL (ref 0.5–0.9)
GFR SERPL CREATININE-BSD FRML MDRD: >60 ML/MIN/1.73M2
GLUCOSE BLD-MCNC: 96 MG/DL (ref 70–99)
POTASSIUM SERPL-SCNC: 4.3 MMOL/L (ref 3.7–5.3)
SODIUM BLD-SCNC: 137 MMOL/L (ref 135–144)
TOTAL PROTEIN: 7.1 G/DL (ref 6.4–8.3)

## 2022-12-23 PROCEDURE — 36415 COLL VENOUS BLD VENIPUNCTURE: CPT

## 2022-12-23 PROCEDURE — 80053 COMPREHEN METABOLIC PANEL: CPT

## 2023-01-01 DIAGNOSIS — J45.20 MILD INTERMITTENT ASTHMA WITHOUT COMPLICATION: ICD-10-CM

## 2023-01-01 RX ORDER — MONTELUKAST SODIUM 10 MG/1
10 TABLET ORAL NIGHTLY
Qty: 90 TABLET | Refills: 1 | Status: CANCELLED | OUTPATIENT
Start: 2023-01-01

## 2023-01-03 ENCOUNTER — OFFICE VISIT (OUTPATIENT)
Dept: FAMILY MEDICINE CLINIC | Age: 63
End: 2023-01-03
Payer: MEDICARE

## 2023-01-03 VITALS
TEMPERATURE: 97 F | OXYGEN SATURATION: 97 % | SYSTOLIC BLOOD PRESSURE: 128 MMHG | BODY MASS INDEX: 39.39 KG/M2 | HEART RATE: 99 BPM | HEIGHT: 67 IN | WEIGHT: 251 LBS | DIASTOLIC BLOOD PRESSURE: 89 MMHG

## 2023-01-03 DIAGNOSIS — I10 ESSENTIAL HYPERTENSION: Primary | ICD-10-CM

## 2023-01-03 DIAGNOSIS — Z12.31 ENCOUNTER FOR SCREENING MAMMOGRAM FOR MALIGNANT NEOPLASM OF BREAST: ICD-10-CM

## 2023-01-03 DIAGNOSIS — F43.22 ADJUSTMENT DISORDER WITH ANXIETY: ICD-10-CM

## 2023-01-03 DIAGNOSIS — J45.20 MILD INTERMITTENT ASTHMA WITHOUT COMPLICATION: ICD-10-CM

## 2023-01-03 PROCEDURE — 99214 OFFICE O/P EST MOD 30 MIN: CPT | Performed by: STUDENT IN AN ORGANIZED HEALTH CARE EDUCATION/TRAINING PROGRAM

## 2023-01-03 PROCEDURE — 3017F COLORECTAL CA SCREEN DOC REV: CPT | Performed by: STUDENT IN AN ORGANIZED HEALTH CARE EDUCATION/TRAINING PROGRAM

## 2023-01-03 PROCEDURE — G8484 FLU IMMUNIZE NO ADMIN: HCPCS | Performed by: STUDENT IN AN ORGANIZED HEALTH CARE EDUCATION/TRAINING PROGRAM

## 2023-01-03 PROCEDURE — 1036F TOBACCO NON-USER: CPT | Performed by: STUDENT IN AN ORGANIZED HEALTH CARE EDUCATION/TRAINING PROGRAM

## 2023-01-03 PROCEDURE — 3074F SYST BP LT 130 MM HG: CPT | Performed by: STUDENT IN AN ORGANIZED HEALTH CARE EDUCATION/TRAINING PROGRAM

## 2023-01-03 PROCEDURE — G8427 DOCREV CUR MEDS BY ELIG CLIN: HCPCS | Performed by: STUDENT IN AN ORGANIZED HEALTH CARE EDUCATION/TRAINING PROGRAM

## 2023-01-03 PROCEDURE — 3079F DIAST BP 80-89 MM HG: CPT | Performed by: STUDENT IN AN ORGANIZED HEALTH CARE EDUCATION/TRAINING PROGRAM

## 2023-01-03 PROCEDURE — G8417 CALC BMI ABV UP PARAM F/U: HCPCS | Performed by: STUDENT IN AN ORGANIZED HEALTH CARE EDUCATION/TRAINING PROGRAM

## 2023-01-03 RX ORDER — METOPROLOL SUCCINATE 25 MG/1
TABLET, EXTENDED RELEASE ORAL
COMMUNITY
Start: 2022-12-19

## 2023-01-03 RX ORDER — MONTELUKAST SODIUM 10 MG/1
10 TABLET ORAL NIGHTLY
Qty: 90 TABLET | Refills: 1 | Status: CANCELLED | OUTPATIENT
Start: 2023-01-03

## 2023-01-03 RX ORDER — MONTELUKAST SODIUM 10 MG/1
10 TABLET ORAL NIGHTLY
Qty: 90 TABLET | Refills: 1 | Status: SHIPPED | OUTPATIENT
Start: 2023-01-03

## 2023-01-03 RX ORDER — MONTELUKAST SODIUM 10 MG/1
10 TABLET ORAL NIGHTLY
Qty: 90 TABLET | Refills: 2 | Status: CANCELLED | OUTPATIENT
Start: 2023-01-03

## 2023-01-03 ASSESSMENT — PATIENT HEALTH QUESTIONNAIRE - PHQ9
SUM OF ALL RESPONSES TO PHQ QUESTIONS 1-9: 0
2. FEELING DOWN, DEPRESSED OR HOPELESS: 0
SUM OF ALL RESPONSES TO PHQ QUESTIONS 1-9: 0
SUM OF ALL RESPONSES TO PHQ9 QUESTIONS 1 & 2: 0
1. LITTLE INTEREST OR PLEASURE IN DOING THINGS: 0
SUM OF ALL RESPONSES TO PHQ QUESTIONS 1-9: 0
SUM OF ALL RESPONSES TO PHQ QUESTIONS 1-9: 0

## 2023-01-03 ASSESSMENT — ENCOUNTER SYMPTOMS
NAUSEA: 0
SHORTNESS OF BREATH: 0
SORE THROAT: 0
CHEST TIGHTNESS: 0
COUGH: 0
VOMITING: 0
CONSTIPATION: 0
DIARRHEA: 0
WHEEZING: 0
ABDOMINAL PAIN: 0
EYE DISCHARGE: 0

## 2023-01-03 NOTE — TELEPHONE ENCOUNTER
LAST VISIT: 7/25/22  NEXT VISIT: 7/24/23    Per last dictation patient to continue these medications. Please sign for refill if ok. Thank you.

## 2023-01-03 NOTE — PROGRESS NOTES
[address] PRIMARY CARE  [address] [place] [address] [address] [facility]  Dept: [phone]  Dept Fax: [phone]    Yeni Fall is a 65 y.o. female who is a Established patient, who presents today for her medical conditions/complaints as noted below:  Chief Complaint   Patient presents with    Hypertension         HPI:     She is here today to follow-up on hypertension, asthma and anxiety. She says that she followed with cardiology and they switched her metoprolol from twice a day to once a day and she has been tolerating it well. She says that her anxiety has also improved with BuSpar and higher dose of Zoloft. She still takes Xanax as needed specially whenever she has to leave home. She had metabolic panel repeated recently which showed slightly elevated creatinine with normal GFR. She needs refills on her inhalers, states that her asthma has been well controlled.   She is due for her mammogram      Hemoglobin A1C (%)   Date Value   08/25/2022 5.6   01/15/2019 5.0   01/04/2018 5.1             ( goal A1Cis < 7)   No results found for: LABMICR  LDL Cholesterol (mg/dL)   Date Value   08/25/2022 116   07/12/2021 114   12/14/2020 131 (H)       (goal LDL is <100)   AST (U/L)   Date Value   12/23/2022 15     ALT (U/L)   Date Value   12/23/2022 13     BUN (mg/dL)   Date Value   12/23/2022 12     BP Readings from Last 3 Encounters:   01/03/23 128/89   10/06/22 122/87   09/27/22 125/89          (goal 120/80)    Past Medical History:   Diagnosis Date    Acromioclavicular joint arthritis 04/18/2016    Acute gastroenteritis 10/11/2017    Acute gastroenteritis 10/11/2017    Anemia 12/11/2018    Arthritis     Asthma 1980    On Inhaler    Bursitis     left shoulder    Chronic right shoulder pain 07/24/2018    Closed fracture of left tibial plateau 28/27/8152    Closed fracture of left tibial plateau 67/78/9018    Closed fracture of proximal end of left tibia 11/22/2018    Closed fracture of proximal end of left tibia 2018    Closed fracture of shaft of left tibia 2018    Closed fracture of shaft of left tibia 2018    Depression     Dry eyes     Essential hypertension 2017    Former smoker     GERD (gastroesophageal reflux disease)     not since Shelton-en-Y and weight loss    Head ache     on Topiramate    Hearing loss     mild    Hemorrhoids     Hiatal hernia     History of bladder infections     History of DVT (deep vein thrombosis)     started in left leg, went to lungs    History of gastric bypass 2018    History of gastritis     History of morbid obesity     had Shelton-en y    History of pulmonary embolism     from foot  trauma, was treated for six months with anticoagulation. She has no inferior vena cava filter.     Hyperlipidemia     not since weight loss    Hypertension     Not anymore after Bariatric Surgery, no medication    Knee pain 2015    Knee pain 2015    Lymphedema of leg     Right leg    Migraine     Mild intermittent asthma     Morbid obesity ([address] 2015    Obesity (BMI 30-39.9) 2016    KEVIN on CPAP     at night    Osteoarthritis     Osteopenia     S/P gastric bypass 2015    Dr. Meliton Hastings, [facility], hosp wt = 280lb, initial wt = 328lb    S/p tibial fracture 2018    Status post gastric bypass for obesity     Tibial plateau fracture, left, closed, initial encounter 2018    Tibial plateau fracture, left, closed, initial encounter 2018    Tremor     Vitamin D deficiency     Wears dentures     Wears glasses     Weight loss of 160 lbs since surgery  2017      Past Surgical History:   Procedure Laterality Date    ABDOMINOPLASTY N/A 2019    ABDOMINOPLASTY performed by Freddy Cooper MD at [facility] / ARTHROTOMY KNEE Left 2018    KNEE ARTHROSCOPY LEFT performed by Ada Hanson MD at [address]  1986,  1987     SECTION CHOLECYSTECTOMY  1987    CLOSED MANIPULATION SHOULDER Right 12/5/2018    SHOULDER MANIPULATION WITH INJECTION performed by Kaley Bolaños MD at [address]  6/17/16    EXCISION / BIOPSY SKIN LESION OF HEAD / NECK N/A 4/3/2017     EXCISION POSTERIOR NECK CYST performed by Vanessa Guerra IV, DO at [place] [address] Left 11/22/2018    left tibia plateau external fixator application    NECK SURGERY      cyst removed back of neck    ORIF PROXIMAL TIBIAL PLATEAU FRACTURE Left 12/5/2018    TIBIAL PLATEAU OPEN REDUCTION INTERNAL FIXATION LEFT -  SYNTHES     C-ARM performed by Kaley Bolaños MD at [address]  10/14/2016    excision back lipoma with drain placement    FL OFFICE/OUTPT VISIT,PROCEDURE ONLY Right 10/8/2018    RIGHT SHOULDER  MAGDY PROCEDURE [facility] Left 11/22/2018    LEFT TIBIA PLATEAU EXTERNAL FIXATOR APPLICATION performed by Dayana Parisi MD at [address] [facility]  12/29/15    liver bx, EGD    SHOULDER SURGERY Left 08/19/2016    Magdy procedure, bone spurs 8/19/2016    SHOULDER SURGERY Right 10/08/2018    magdy    TONSILLECTOMY  1979    UPPER GASTROINTESTINAL ENDOSCOPY  08/2015    found hiatal hernia       Family History   Problem Relation Age of Onset    Asthma Mother     Depression Mother     Anxiety Disorder Mother     Cancer Father         leukemia    Other Father         Myelodysplastic syndrome, which converted to leukemia    Migraines Sister     No Known Problems Brother     Ovarian Cancer Maternal Aunt     No Known Problems Maternal Grandmother     No Known Problems Maternal Grandfather     Breast Cancer Paternal Grandmother     Heart Attack Paternal Grandfather        Social History     Tobacco Use    Smoking status: Former     Packs/day: 1.00     Years: 10.00     Pack years: 10.00     Types: Cigarettes     Start date: 12/16/1978     Quit date: 1987     Years since quittin.0    Smokeless tobacco: Never   Substance Use Topics    Alcohol use: No      Current Outpatient Medications   Medication Sig Dispense Refill    Glucosamine-Chondroit-Vit C-Mn (GLUCOSAMINE 1500 COMPLEX PO)       metoprolol succinate (TOPROL XL) 25 MG extended release tablet TAKE 1/2 (ONE-HALF) TABLET BY MOUTH ONCE DAILY      mometasone-formoterol (DULERA) 100-5 MCG/ACT inhaler Inhale 2 puffs by mouth twice daily 3 each 1    montelukast (SINGULAIR) 10 MG tablet Take 1 tablet by mouth nightly 90 tablet 1    busPIRone (BUSPAR) 10 MG tablet TAKE 1 TABLET BY MOUTH THREE TIMES DAILY 90 tablet 5    SUMAtriptan (IMITREX) 100 MG tablet Take one at HA onset . May repeat in 1 hour . No more 2 per day 4 days out of the week 18 tablet 5    SUMAtriptan (IMITREX) 6 MG/0.5ML SOLN injection Take 1 SQ if table not effective after 1 hour .  No more 2 imitrex tablet or SQ per day 4 days per week 6 each 5    topiramate (TOPAMAX) 100 MG tablet Take 1 tablet by mouth twice daily 60 tablet 5    sertraline (ZOLOFT) 100 MG tablet Take 1 tablet by mouth daily 30 tablet 5    denosumab (PROLIA) 60 MG/ML SOSY SC injection Inject 1 mL into the skin every 6 months 1 mL 1    amitriptyline (ELAVIL) 25 MG tablet Take 2 tablets by mouth nightly 60 tablet 5    atorvastatin (LIPITOR) 10 MG tablet Take 1 tablet by mouth daily 30 tablet 11    albuterol sulfate HFA (VENTOLIN HFA) 108 (90 Base) MCG/ACT inhaler Inhale 2 puffs into the lungs every 6 hours as needed for Wheezing or Shortness of Breath 3 Inhaler 2    albuterol (PROVENTIL) (2.5 MG/3ML) 0.083% nebulizer solution Take 3 mLs by nebulization every 6 hours as needed for Wheezing 120 each 11    acetaminophen (TYLENOL) 325 MG tablet Take 2 tablets by mouth every 6 hours as needed for Pain (Patient taking differently: Take 1,000 mg by mouth daily (with breakfast)) 30 tablet 0    CALCIUM CITRATE PO Take 1,200 mg by mouth 2 times daily 3 tablets BID      Multiple Vitamins-Minerals (MULTIVITAMIN ADULTS 50+ PO) Take 1 tablet by mouth daily      KRILL OIL PO Take 1 tablet by mouth daily Pt unsure of dose      vitamin D (CHOLECALCIFEROL) 1000 UNIT TABS tablet Take 1,000 Units by mouth daily      FIBER COMPLETE PO Take 6 tablets by mouth daily       No current facility-administered medications for this visit. Facility-Administered Medications Ordered in Other Visits   Medication Dose Route Frequency Provider Last Rate Last Admin    lactated ringers infusion 1,000 mL  1,000 mL IntraVENous Continuous Mikhail Joyce MD   Stopped at 08/19/16 1249    HYDROmorphone (DILAUDID) injection 0.25 mg  0.25 mg IntraVENous Q5 Min PRN Kaveh Cyr MD         Allergies   Allergen Reactions    Prozac [Fluoxetine] Other (See Comments)     Caused nightmares    Nsaids Other (See Comments)     Bariatric Surgeon told me not to take NSAIDS for good    Pcn [Penicillins] Other (See Comments)     Sores in mouth    Bactrim [Sulfamethoxazole-Trimethoprim] Itching and Rash    Codeine Itching and Rash       Health Maintenance   Topic Date Due    COVID-19 Vaccine (5 - Booster for Moderna series) 10/04/2022    Lipids  08/25/2023    Depression Screen  08/31/2023    Breast cancer screen  02/10/2024    Colorectal Cancer Screen  04/03/2025    Cervical cancer screen  07/14/2025    Pneumococcal 0-64 years Vaccine (3 - PPSV23 if available, else PCV20) 01/10/2026    DTaP/Tdap/Td vaccine (3 - Td or Tdap) 07/31/2031    Flu vaccine  Completed    Shingles vaccine  Completed    Hepatitis C screen  Completed    HIV screen  Completed    Hepatitis A vaccine  Aged Out    Hib vaccine  Aged Out    Meningococcal (ACWY) vaccine  Aged Out       Subjective:     Review of Systems   Constitutional:  Negative for appetite change, fatigue and fever. HENT:  Negative for congestion, ear pain, hearing loss and sore throat. Eyes:  Negative for discharge and visual disturbance.    Respiratory:  Negative for cough, chest tightness, shortness of breath and wheezing. Cardiovascular:  Negative for chest pain, palpitations and leg swelling. Gastrointestinal:  Negative for abdominal pain, constipation, diarrhea, nausea and vomiting. Genitourinary:  Negative for flank pain, frequency, hematuria and urgency. Musculoskeletal:  Negative for arthralgias, gait problem, joint swelling and myalgias. Skin: Negative. Neurological:  Negative for dizziness, weakness, numbness and headaches. Psychiatric/Behavioral: Negative. Negative for dysphoric mood. The patient is not nervous/anxious. Objective:     Physical Exam  Vitals reviewed. Constitutional:       Appearance: Normal appearance. She is normal weight. HENT:      Head: Normocephalic and atraumatic. Nose: Nose normal.      Mouth/Throat:      Mouth: Mucous membranes are moist.      Pharynx: Oropharynx is clear. Eyes:      Extraocular Movements: Extraocular movements intact. Conjunctiva/sclera: Conjunctivae normal.      Pupils: Pupils are equal, round, and reactive to light. Cardiovascular:      Rate and Rhythm: Normal rate and regular rhythm. Heart sounds: Normal heart sounds. No murmur heard. No gallop. Pulmonary:      Effort: Pulmonary effort is normal. No respiratory distress. Breath sounds: Normal breath sounds. No stridor. No wheezing. Abdominal:      General: Bowel sounds are normal. There is no distension. Palpations: Abdomen is soft. Tenderness: There is no abdominal tenderness. There is no guarding or rebound. Musculoskeletal:         General: No swelling or tenderness. Normal range of motion. Cervical back: Normal range of motion and neck supple. Skin:     General: Skin is warm and dry. Coloration: Skin is not jaundiced. Findings: No rash. Neurological:      General: No focal deficit present. Mental Status: She is alert and oriented to person, place, and time.    Psychiatric:         Mood and Affect: Mood normal.         Behavior: Behavior normal.         Thought Content: Thought content normal.         Judgment: Judgment normal.     /89   Pulse 99   Temp 97 °F (36.1 °C)   Ht 5' 6.5\" (1.689 m)   Wt 251 lb (113.9 kg)   LMP  (LMP Unknown)   SpO2 97%   BMI 39.91 kg/m²     Assessment/Plan:   1. Essential hypertension  2. Mild intermittent asthma without complication  -     mometasone-formoterol (DULERA) 100-5 MCG/ACT inhaler; Inhale 2 puffs by mouth twice daily, Disp-3 each, R-1Normal  -     montelukast (SINGULAIR) 10 MG tablet; Take 1 tablet by mouth nightly, Disp-90 tablet, R-1Normal  3. Adjustment disorder with anxiety  4. Encounter for screening mammogram for malignant neoplasm of breast  -     RAHUL DIGITAL SCREEN W OR WO CAD BILATERAL; Future      Hypertension-controlled, continue metoprolol succinate 12.5 mg daily. Follows with cardiology due to history of unstable angina. Asthma-controlled, on daily Dulera and Singulair    Anxiety-improving, on BuSpar 10 mg 3 times a day and Zoloft 100 mg daily. Takes Xanax as needed      Return in about 6 months (around 7/3/2023) for anxiety and depression, Follow up HTN. Orders Placed This Encounter   Procedures    RAHUL DIGITAL SCREEN W OR WO CAD BILATERAL     Standing Status:   Future     Standing Expiration Date:   7/3/2023     Order Specific Question:   Reason for exam:     Answer:   screening Z12.31       Orders Placed This Encounter   Medications    mometasone-formoterol (DULERA) 100-5 MCG/ACT inhaler     Sig: Inhale 2 puffs by mouth twice daily     Dispense:  3 each     Refill:  1    montelukast (SINGULAIR) 10 MG tablet     Sig: Take 1 tablet by mouth nightly     Dispense:  90 tablet     Refill:  1         Patient given educational materials - see patient instructions. Discussed use, benefit, and side effects of prescribed medications. All patientquestions answered. Pt voiced understanding. Reviewed health maintenance.   Instructedto continue current medications, diet and exercise. Patient agreed with treatmentplan. Follow up as directed.      Electronically signed by Chato Magana MD on 1/3/2023 at 9:56 AM

## 2023-02-13 ENCOUNTER — HOSPITAL ENCOUNTER (OUTPATIENT)
Dept: WOMENS IMAGING | Age: 63
Discharge: HOME | End: 2023-02-15
Payer: MEDICAID

## 2023-02-13 DIAGNOSIS — R92.8 ABNORMAL MAMMOGRAM OF LEFT BREAST: Primary | ICD-10-CM

## 2023-02-13 DIAGNOSIS — Z12.31 ENCOUNTER FOR SCREENING MAMMOGRAM FOR MALIGNANT NEOPLASM OF BREAST: ICD-10-CM

## 2023-02-13 PROCEDURE — 77067 SCR MAMMO BI INCL CAD: CPT

## 2023-02-24 DIAGNOSIS — G43.009 MIGRAINE WITHOUT AURA AND WITHOUT STATUS MIGRAINOSUS, NOT INTRACTABLE: ICD-10-CM

## 2023-02-24 DIAGNOSIS — F41.9 ANXIETY: ICD-10-CM

## 2023-02-27 RX ORDER — ALPRAZOLAM 0.25 MG/1
TABLET ORAL
Qty: 30 TABLET | Refills: 3 | Status: SHIPPED | OUTPATIENT
Start: 2023-02-27 | End: 2023-03-29

## 2023-02-27 RX ORDER — AMITRIPTYLINE HYDROCHLORIDE 25 MG/1
50 TABLET, FILM COATED ORAL NIGHTLY
Qty: 60 TABLET | Refills: 0 | OUTPATIENT
Start: 2023-02-27

## 2023-02-27 NOTE — TELEPHONE ENCOUNTER
Ana Rodneyer is calling to request a refill on the following medication(s):    Medication Request:  Requested Prescriptions     Pending Prescriptions Disp Refills    ALPRAZolam (XANAX) 0.25 MG tablet [Pharmacy Med Name: ALPRAZolam 0.25 MG Oral Tablet] 30 tablet 0     Sig: TAKE 1 TABLET BY MOUTH THREE TIMES DAILY AS NEEDED FOR SLEEP       Last Visit Date (If Applicable):  3/5/2205    Next Visit Date:    7/3/2023

## 2023-03-02 ENCOUNTER — HOSPITAL ENCOUNTER (OUTPATIENT)
Dept: WOMENS IMAGING | Age: 63
End: 2023-03-02
Payer: MEDICAID

## 2023-03-02 ENCOUNTER — HOSPITAL ENCOUNTER (OUTPATIENT)
Dept: WOMENS IMAGING | Age: 63
Discharge: HOME | End: 2023-03-04
Payer: MEDICAID

## 2023-03-02 DIAGNOSIS — R92.8 ABNORMAL MAMMOGRAM OF LEFT BREAST: ICD-10-CM

## 2023-03-02 PROCEDURE — G0279 TOMOSYNTHESIS, MAMMO: HCPCS

## 2023-03-22 DIAGNOSIS — G43.009 MIGRAINE WITHOUT AURA AND WITHOUT STATUS MIGRAINOSUS, NOT INTRACTABLE: ICD-10-CM

## 2023-03-22 RX ORDER — AMITRIPTYLINE HYDROCHLORIDE 25 MG/1
50 TABLET, FILM COATED ORAL NIGHTLY
Qty: 60 TABLET | Refills: 2 | Status: SHIPPED | OUTPATIENT
Start: 2023-03-22

## 2023-03-22 NOTE — TELEPHONE ENCOUNTER
Patient calling for refill of Amitriptyline.       Medication active on med list: yes      Date of last fill: 10/6/22 for #60 and 5 refills  verified on 3/22/2023    verified by Barbie Escobar LPN      Date of last appointment: 11/15/2022    Next Visit Date:  5/16/2023

## 2023-04-17 ENCOUNTER — HOSPITAL ENCOUNTER (OUTPATIENT)
Dept: INFUSION THERAPY | Age: 63
Discharge: HOME | End: 2023-04-17
Payer: MEDICAID

## 2023-04-17 DIAGNOSIS — M81.0 AGE-RELATED OSTEOPOROSIS WITHOUT CURRENT PATHOLOGICAL FRACTURE: Primary | ICD-10-CM

## 2023-04-17 PROCEDURE — 96372 THER/PROPH/DIAG INJ SC/IM: CPT

## 2023-04-17 PROCEDURE — 6360000002 HC RX W HCPCS: Performed by: STUDENT IN AN ORGANIZED HEALTH CARE EDUCATION/TRAINING PROGRAM

## 2023-04-17 RX ORDER — ONDANSETRON 2 MG/ML
8 INJECTION INTRAMUSCULAR; INTRAVENOUS
OUTPATIENT
Start: 2023-06-23

## 2023-04-17 RX ORDER — FAMOTIDINE 10 MG/ML
20 INJECTION, SOLUTION INTRAVENOUS
OUTPATIENT
Start: 2023-06-23

## 2023-04-17 RX ORDER — EPINEPHRINE 1 MG/ML
0.3 INJECTION, SOLUTION, CONCENTRATE INTRAVENOUS PRN
OUTPATIENT
Start: 2023-06-23

## 2023-04-17 RX ORDER — ACETAMINOPHEN 325 MG/1
650 TABLET ORAL
OUTPATIENT
Start: 2023-06-23

## 2023-04-17 RX ORDER — SODIUM CHLORIDE 9 MG/ML
INJECTION, SOLUTION INTRAVENOUS CONTINUOUS
OUTPATIENT
Start: 2023-06-23

## 2023-04-17 RX ORDER — DIPHENHYDRAMINE HYDROCHLORIDE 50 MG/ML
50 INJECTION INTRAMUSCULAR; INTRAVENOUS
OUTPATIENT
Start: 2023-06-23

## 2023-04-17 RX ORDER — ALBUTEROL SULFATE 90 UG/1
4 AEROSOL, METERED RESPIRATORY (INHALATION) PRN
OUTPATIENT
Start: 2023-06-23

## 2023-04-17 RX ADMIN — DENOSUMAB 60 MG: 60 INJECTION SUBCUTANEOUS at 10:35

## 2023-04-17 NOTE — PROGRESS NOTES
Pt here for Prolia injection. Labs reviewed. Injection given without incident. Pt discharged in stable condition. Returns 10/16/23 for next injection.

## 2023-04-20 ENCOUNTER — OFFICE VISIT (OUTPATIENT)
Dept: VASCULAR SURGERY | Age: 63
End: 2023-04-20
Payer: MEDICAID

## 2023-04-20 VITALS
SYSTOLIC BLOOD PRESSURE: 115 MMHG | TEMPERATURE: 97.2 F | OXYGEN SATURATION: 95 % | BODY MASS INDEX: 41.62 KG/M2 | HEIGHT: 66 IN | RESPIRATION RATE: 17 BRPM | WEIGHT: 259 LBS | DIASTOLIC BLOOD PRESSURE: 79 MMHG | HEART RATE: 83 BPM

## 2023-04-20 DIAGNOSIS — I87.2 VENOUS INSUFFICIENCY OF RIGHT LOWER EXTREMITY: ICD-10-CM

## 2023-04-20 DIAGNOSIS — I89.0 LYMPHEDEMA: Primary | ICD-10-CM

## 2023-04-20 DIAGNOSIS — I87.2 LYMPHEDEMA DUE TO VENOUS INSUFFICIENCY: ICD-10-CM

## 2023-04-20 DIAGNOSIS — I89.0 LYMPHEDEMA DUE TO VENOUS INSUFFICIENCY: ICD-10-CM

## 2023-04-20 DIAGNOSIS — M79.604 PAIN AND SWELLING OF RIGHT LOWER EXTREMITY: ICD-10-CM

## 2023-04-20 DIAGNOSIS — M79.89 PAIN AND SWELLING OF RIGHT LOWER EXTREMITY: ICD-10-CM

## 2023-04-20 PROCEDURE — 99214 OFFICE O/P EST MOD 30 MIN: CPT | Performed by: SURGERY

## 2023-04-20 PROCEDURE — 3078F DIAST BP <80 MM HG: CPT | Performed by: SURGERY

## 2023-04-20 PROCEDURE — 3074F SYST BP LT 130 MM HG: CPT | Performed by: SURGERY

## 2023-04-30 DIAGNOSIS — F43.22 ADJUSTMENT DISORDER WITH ANXIETY: ICD-10-CM

## 2023-05-01 DIAGNOSIS — I87.2 LYMPHEDEMA DUE TO VENOUS INSUFFICIENCY: Primary | ICD-10-CM

## 2023-05-01 DIAGNOSIS — I89.0 LYMPHEDEMA DUE TO VENOUS INSUFFICIENCY: Primary | ICD-10-CM

## 2023-05-01 RX ORDER — SERTRALINE HYDROCHLORIDE 100 MG/1
TABLET, FILM COATED ORAL
Qty: 30 TABLET | Refills: 3 | Status: SHIPPED | OUTPATIENT
Start: 2023-05-01

## 2023-05-02 PROBLEM — M79.604 PAIN AND SWELLING OF RIGHT LOWER EXTREMITY: Status: ACTIVE | Noted: 2023-05-02

## 2023-05-02 PROBLEM — M79.89 PAIN AND SWELLING OF RIGHT LOWER EXTREMITY: Status: ACTIVE | Noted: 2023-05-02

## 2023-05-02 PROBLEM — I89.0 LYMPHEDEMA: Status: ACTIVE | Noted: 2023-05-02

## 2023-05-02 ASSESSMENT — ENCOUNTER SYMPTOMS
EYE PAIN: 0
VOMITING: 0
SHORTNESS OF BREATH: 0
CHEST TIGHTNESS: 0
FACIAL SWELLING: 0
COLOR CHANGE: 0
NAUSEA: 0
ABDOMINAL PAIN: 0

## 2023-05-03 ENCOUNTER — TELEPHONE (OUTPATIENT)
Dept: VASCULAR SURGERY | Age: 63
End: 2023-05-03

## 2023-05-03 NOTE — TELEPHONE ENCOUNTER
----- Message from Hallie Botello MA sent at 5/3/2023  2:32 PM EDT -----  Done  ----- Message -----  From: Dwaine Peng MD  Sent: 5/2/2023   3:34 PM EDT  To: [facility] Su[facility]rez [facility] Clinical Staff    Note is done   Can set up referral for lymphedema and pumps

## 2023-05-16 ENCOUNTER — OFFICE VISIT (OUTPATIENT)
Dept: NEUROLOGY | Age: 63
End: 2023-05-16

## 2023-05-16 VITALS
WEIGHT: 262 LBS | HEIGHT: 66 IN | HEART RATE: 87 BPM | BODY MASS INDEX: 42.11 KG/M2 | DIASTOLIC BLOOD PRESSURE: 76 MMHG | SYSTOLIC BLOOD PRESSURE: 112 MMHG

## 2023-05-16 DIAGNOSIS — G25.0 ESSENTIAL TREMOR: ICD-10-CM

## 2023-05-16 DIAGNOSIS — G43.009 MIGRAINE WITHOUT AURA AND WITHOUT STATUS MIGRAINOSUS, NOT INTRACTABLE: Primary | ICD-10-CM

## 2023-05-16 RX ORDER — SUMATRIPTAN 100 MG/1
TABLET, FILM COATED ORAL
Qty: 18 TABLET | Refills: 11 | Status: SHIPPED | OUTPATIENT
Start: 2023-05-16

## 2023-05-16 RX ORDER — TOPIRAMATE 100 MG/1
TABLET, FILM COATED ORAL
Qty: 60 TABLET | Refills: 11 | Status: SHIPPED | OUTPATIENT
Start: 2023-05-16

## 2023-05-16 RX ORDER — AMITRIPTYLINE HYDROCHLORIDE 25 MG/1
50 TABLET, FILM COATED ORAL NIGHTLY
Qty: 60 TABLET | Refills: 11 | Status: SHIPPED | OUTPATIENT
Start: 2023-05-16

## 2023-05-16 RX ORDER — SUMATRIPTAN 6 MG/.5ML
INJECTION, SOLUTION SUBCUTANEOUS
Qty: 6 EACH | Refills: 11 | Status: SHIPPED | OUTPATIENT
Start: 2023-05-16

## 2023-05-16 ASSESSMENT — ENCOUNTER SYMPTOMS
RESPIRATORY NEGATIVE: 1
EYES NEGATIVE: 1
GASTROINTESTINAL NEGATIVE: 1
ALLERGIC/IMMUNOLOGIC NEGATIVE: 1

## 2023-05-16 NOTE — PROGRESS NOTES
PLATEAU FRACTURE Left 2018    TIBIAL PLATEAU OPEN REDUCTION INTERNAL FIXATION LEFT -  SYNTHES     C-ARM performed by Paige Mcgarry MD at [address]  10/14/2016    excision back lipoma with drain placement    MT OFFICE/OUTPT VISIT,PROCEDURE ONLY Right 10/8/2018    RIGHT SHOULDER  MAGDY PROCEDURE performed by Paige Mcgarry MD at [facility] OFFICE/OUTPT [address] Left 2018    LEFT TIBIA PLATEAU EXTERNAL FIXATOR APPLICATION performed by Chintan Glaser MD at [address] [facility]  12/29/15    liver bx, EGD    SHOULDER SURGERY Left 2016    Magdy procedure, bone spurs 2016    SHOULDER SURGERY Right 10/08/2018    magdy    TONSILLECTOMY  1979    UPPER GASTROINTESTINAL ENDOSCOPY  2015    found hiatal hernia       Family History   Problem Relation Age of Onset    Asthma Mother     Depression Mother     Anxiety Disorder Mother     Cancer Father         leukemia    Other Father         Myelodysplastic syndrome, which converted to leukemia    Migraines Sister     No Known Problems Brother     Ovarian Cancer Maternal Aunt     No Known Problems Maternal Grandmother     No Known Problems Maternal Grandfather     Breast Cancer Paternal Grandmother     Heart Attack Paternal Grandfather        Social History     Socioeconomic History    Marital status:      Spouse name: Dereje Leary    Number of children: 3    Years of education: None    Highest education level: None   Occupational History    Occupation: UNEMPLOYED    Tobacco Use    Smoking status: Former     Packs/day: 1.00     Years: 10.00     Pack years: 10.00     Types: Cigarettes     Start date: 1978     Quit date: 1987     Years since quittin.3    Smokeless tobacco: Never   Vaping Use    Vaping Use: Never used   Substance and Sexual Activity    Alcohol use: No    Drug use: No    Sexual activity: Yes     Partners: Male     Comment: Has been together since 19     Social Determinants of

## 2023-05-23 ENCOUNTER — HOSPITAL ENCOUNTER (OUTPATIENT)
Dept: OCCUPATIONAL THERAPY | Age: 63
Setting detail: THERAPIES SERIES
Discharge: HOME | End: 2023-05-23
Payer: MEDICAID

## 2023-05-23 PROCEDURE — 97535 SELF CARE MNGMENT TRAINING: CPT

## 2023-05-23 PROCEDURE — 97166 OT EVAL MOD COMPLEX 45 MIN: CPT

## 2023-05-23 NOTE — CONSULTS
$97.64/ $76.35            Low   (12157)            Moderate   (60487) 25 1          High   (54022)     Manual Therapy (73673):                                      $26.27 / $20.83     Therapeutic activities (04142):                             $35.63/ $25.68     Therapeutic Exercise (89646)                              $28.50/$ 22.29     Self care/home mgmt (68347)                              $31.61/ $23.84 32 2   Vasopneumatic Device (79318)                           $8.99     Total Treatment Time    60 3         Time In:  0910  Time Out: 1007      Electronically signed by Phil Payton OT on 5/23/2023 at 9:03 AM      Physician Signature: _________________________        Date: _______________  By signing above or cosigning this note, I have reviewed this plan of care and certify a need to continue medically necessary rehabilitation services.       *PLEASE SIGN ABOVE AND FAX BACK [phone] WITH ALL PAGES FOR CONSENT TO CONTINUE LYMPHEDEMA TREATMENT*

## 2023-06-01 ENCOUNTER — HOSPITAL ENCOUNTER (OUTPATIENT)
Dept: OCCUPATIONAL THERAPY | Age: 63
Setting detail: THERAPIES SERIES
Discharge: HOME | End: 2023-06-01
Payer: MEDICAID

## 2023-06-01 PROCEDURE — 97140 MANUAL THERAPY 1/> REGIONS: CPT

## 2023-06-01 PROCEDURE — 97535 SELF CARE MNGMENT TRAINING: CPT

## 2023-06-01 NOTE — CARE COORDINATION
[facility] CENTER and Therapy  [facility] [address], [address]  Phone: [phone]  Fax:     [phone]    [facility]  Lymphedema Therapy  Script for Compression Garments  2023    Domenico Jacob     : 1960     MRN: [id number]  Referring Provider: Melany Sawant*       Phone: [phone]  Fax: [phone]  Date of Evaluation: 23    ICD 10 Code:  I89.0    Compression Garment Recommendation for Medical Management:    Garments for script: Gradient compression wrap, below knee, 30-50 mmHg for bilateral lower extremities    Quantity: Unlimited      Physician Name (Please Print): _______________________________________    Physician Signature: ________________________________________    Date: ____________         Please print, sign and Fax paper copy to : [phone]

## 2023-06-01 NOTE — FLOWSHEET NOTE
TREATMENT LOCATION:   [] [address] [place] [address]. [address] [place]: [phone]  F: [phone] [x] [place] [address]: [phone]  F: [phone]        Lymphedema Services - Treatment Note of the Lower Extremity    Date:  2023  Patient: Kendall Mccoy  : 1960             MRN: [id number]  Referring Provider: Jillian Sales       Phone: [phone]  Fax: [phone]  Insurance: [facility] Medicaid (SX:[id number] ) - no patient liability, auth after 30 visits (pt reports switching to [facility] as of )  Medical Diagnosis: I89.0  Rehab Codes: I89.0  Onset Date: 23  Visit# / total visits:  scheduled; Progress note due at visit 10 per POC. ( Certification Dates: 2023 - 23)       Contraindications/Precautions:   [x] Age 60+       Subjective: No new reports. Pain: [] YES    [x] NO     Location: n/a      Pain Rating: ( 0-10 scale) : 0/10  Comments:     Plan for next session:  follow up bandaging , measurement, order garments as able       Objective:   [] Measurement [x] Bandaging [] MLD [] Skin care   [x] Education [] Self-bandaging [] Self-MLD [] Wound Care   [] Exercise [] Caregiver training [] Kinesiotaping [] Other:    [] Nutrition [] Garment fitting/training [] Vasopneumatic Pump       2023 observations: presents similarly to evaluation    Circumferential Measurements   Measurements taken from nail base of D2 digit. Measurements (cm) cm Right Left   Dorsum    11 24.0 23.5   Inframalleolar  (Y girth)     16 33.5 33.0   Supramalleolar  (ankle)   20 37.5 25.5   Distal Calf    26 33.5 26.0   Mid Calf    35 42.0 37.0   Proximal Calf   41 47.5 44.5   Knee    54 46.5 41.5   Distal thigh   66 58.5 50.5   Mid thigh 76 69.0 70.0   Proximal Thigh         Initial Total 2023:  .0 351.5            Assessment:     Pt is educated on decongestive exercises to help drain fluid from the tissue.  Patient

## 2023-06-05 DIAGNOSIS — F41.9 ANXIETY: ICD-10-CM

## 2023-06-05 RX ORDER — BUSPIRONE HYDROCHLORIDE 10 MG/1
TABLET ORAL
Qty: 90 TABLET | Refills: 5 | Status: SHIPPED | OUTPATIENT
Start: 2023-06-05

## 2023-06-05 NOTE — TELEPHONE ENCOUNTER
Nadya Harry is calling to request a refill on the following medication(s):    Medication Request:  Requested Prescriptions     Pending Prescriptions Disp Refills    busPIRone (BUSPAR) 10 MG tablet [Pharmacy Med Name: busPIRone HCl 10 MG Oral Tablet] 90 tablet 0     Sig: TAKE 1 TABLET BY MOUTH THREE TIMES DAILY       Last Visit Date (If Applicable):  3/3/9320    Next Visit Date:    7/10/2023

## 2023-06-06 ENCOUNTER — HOSPITAL ENCOUNTER (OUTPATIENT)
Dept: OCCUPATIONAL THERAPY | Age: 63
Setting detail: THERAPIES SERIES
Discharge: HOME | End: 2023-06-06
Payer: MEDICAID

## 2023-06-06 PROCEDURE — 97140 MANUAL THERAPY 1/> REGIONS: CPT

## 2023-06-06 PROCEDURE — 97535 SELF CARE MNGMENT TRAINING: CPT

## 2023-06-06 NOTE — FLOWSHEET NOTE
garments/ devices to maintain decreased size upon discharge. progressing 6/6/23     Pt to compliant with CDT in order to reduce edema in the RLE by 25+ cm and LLE by 15+ cm.  progressing 6/6/23           Pt. Education:  [x] Yes  [] No  [x] Reviewed Prior HEP/Ed  Method of Education: [] Verbal  [] Demo  [] Written  Comprehension of Education:  [x] Verbalizes/demonstrates understanding  [x] Needs review  [] No understanding  Rehab potential:  [x] Good [] Fair [] Poor [] With assist from family/caregiver        Plan:   [x] Continue current frequency toward long and short term goals.           Treatment Charges   Minutes   Units   Re-evaluation (74462)               $66.82/$50.50                                                             Manual Therapy (63308):                                      $26.27 / $20.83 20 1   Therapeutic activities (78498):                             $35.63/ $25.68     Therapeutic Exercise (89446)                              $28.50/$ 22.29     Self care/home mgmt (08463)                              $31.61/ $23.84 25 2   Vasopneumatic Device (45643)                           $8.99     Total Treatment Time    45 3           Time In:  9839  Time Out: 8772      Electronically signed by Migue Palmer OT on 6/6/2023 at 9:17 AM

## 2023-06-08 ENCOUNTER — HOSPITAL ENCOUNTER (OUTPATIENT)
Dept: OCCUPATIONAL THERAPY | Age: 63
Setting detail: THERAPIES SERIES
Discharge: HOME | End: 2023-06-08
Payer: MEDICAID

## 2023-06-08 PROCEDURE — 97140 MANUAL THERAPY 1/> REGIONS: CPT

## 2023-06-08 PROCEDURE — 97535 SELF CARE MNGMENT TRAINING: CPT

## 2023-06-08 PROCEDURE — 97016 VASOPNEUMATIC DEVICE THERAPY: CPT

## 2023-06-08 NOTE — FLOWSHEET NOTE
TREATMENT LOCATION:   [] [address] [place], [place] [address]nn [facility]. [address] [place]: [phone]  F: [phone] [x] [address]: [phone]  F: [phone]        Lymphedema Services - Treatment Note of the Lower Extremity    Date:  2023  Patient: Yakov Galvez  : 1960             MRN: [id number]  Referring Provider: Kera Sloan       Phone: [phone]  Fax: [phone]  Insurance: [facility] Medicaid ([id number] ) - no patient liability, Tenisha Royalty after 30 visits (pt reports switching to [facility] as of )  Medical Diagnosis: I89.0  Rehab Codes: I89.0  Onset Date: 23  Visit# / total visits:  scheduled; Progress note due at visit 10 per POC. ( Certification Dates: 2023 - 23)       Contraindications/Precautions:   [x] Age 60+       Subjective: Pt states she has continued to do exercises. Pain: [] YES    [x] NO     Location: n/a      Pain Rating: ( 0-10 scale) : 0/10  Comments:     Plan for next session:  follow up garment order options (could purchase 1 leg for now to save money; ask insurance about in network DME providers that can be tried since [facility] will not bill for velcro wraps)    Objective:   [x] Measurement [x] Bandaging [] MLD [] Skin care   [x] Education [] Self-bandaging [] Self-MLD [] Wound Care   [] Exercise [] Caregiver training [] Kinesiotaping [] Other:    [] Nutrition [] Garment fitting/training [] Vasopneumatic Pump       2023 observations: arrives with bandages donned to RLE; RLE down in size below knee where bandages address    Circumferential Measurements   Measurements taken from nail base of D2 digit.   Measurements (cm) cm Right Left   Dorsum    11 23.5 23.0   Inframalleolar  (Y girth)     16 33.0 33.5   Supramalleolar  (ankle)   20 35.5 24.5   Distal Calf    26 31.5 27.5   Mid Calf    35 40.0 40.0   Proximal Calf   41 46.0 46.0   Knee    54 45.5 43.0   Distal thigh   66 57.5

## 2023-06-20 ENCOUNTER — APPOINTMENT (OUTPATIENT)
Dept: OCCUPATIONAL THERAPY | Age: 63
End: 2023-06-20
Payer: MEDICAID

## 2023-06-22 ENCOUNTER — APPOINTMENT (OUTPATIENT)
Dept: OCCUPATIONAL THERAPY | Age: 63
End: 2023-06-22
Payer: MEDICAID

## 2023-06-27 ENCOUNTER — HOSPITAL ENCOUNTER (OUTPATIENT)
Dept: OCCUPATIONAL THERAPY | Age: 63
Setting detail: THERAPIES SERIES
Discharge: HOME | End: 2023-06-27
Payer: MEDICAID

## 2023-06-27 ENCOUNTER — HOSPITAL ENCOUNTER (OUTPATIENT)
Age: 63
Discharge: HOME | End: 2023-06-27
Payer: MEDICAID

## 2023-06-27 DIAGNOSIS — M81.0 AGE-RELATED OSTEOPOROSIS WITHOUT CURRENT PATHOLOGICAL FRACTURE: ICD-10-CM

## 2023-06-27 LAB
ALBUMIN SERPL-MCNC: 3.8 G/DL (ref 3.5–5.2)
ALBUMIN/GLOB SERPL: 1.2 {RATIO} (ref 1–2.5)
ALP SERPL-CCNC: 103 U/L (ref 35–104)
ALT SERPL-CCNC: 15 U/L (ref 5–33)
ANION GAP SERPL CALCULATED.3IONS-SCNC: 13 MMOL/L (ref 9–17)
AST SERPL-CCNC: 17 U/L
BILIRUB SERPL-MCNC: 0.2 MG/DL (ref 0.3–1.2)
BUN SERPL-MCNC: 16 MG/DL (ref 8–23)
CALCIUM SERPL-MCNC: 8.8 MG/DL (ref 8.6–10.4)
CHLORIDE SERPL-SCNC: 107 MMOL/L (ref 98–107)
CO2 SERPL-SCNC: 19 MMOL/L (ref 20–31)
CREAT SERPL-MCNC: 0.86 MG/DL (ref 0.5–0.9)
GFR SERPL CREATININE-BSD FRML MDRD: >60 ML/MIN/1.73M2
GLUCOSE SERPL-MCNC: 72 MG/DL (ref 70–99)
POTASSIUM SERPL-SCNC: 4.4 MMOL/L (ref 3.7–5.3)
PROT SERPL-MCNC: 7 G/DL (ref 6.4–8.3)
SODIUM SERPL-SCNC: 139 MMOL/L (ref 135–144)

## 2023-06-27 PROCEDURE — 80053 COMPREHEN METABOLIC PANEL: CPT

## 2023-06-27 PROCEDURE — 36415 COLL VENOUS BLD VENIPUNCTURE: CPT

## 2023-06-27 PROCEDURE — 97535 SELF CARE MNGMENT TRAINING: CPT

## 2023-07-10 ENCOUNTER — OFFICE VISIT (OUTPATIENT)
Dept: FAMILY MEDICINE CLINIC | Age: 63
End: 2023-07-10
Payer: MEDICAID

## 2023-07-10 VITALS
BODY MASS INDEX: 41.95 KG/M2 | HEIGHT: 66 IN | WEIGHT: 261 LBS | SYSTOLIC BLOOD PRESSURE: 110 MMHG | HEART RATE: 74 BPM | DIASTOLIC BLOOD PRESSURE: 66 MMHG | TEMPERATURE: 97.7 F | OXYGEN SATURATION: 99 %

## 2023-07-10 DIAGNOSIS — E55.9 VITAMIN D DEFICIENCY: ICD-10-CM

## 2023-07-10 DIAGNOSIS — Z13.1 SCREENING FOR DIABETES MELLITUS: ICD-10-CM

## 2023-07-10 DIAGNOSIS — G43.711 INTRACTABLE CHRONIC MIGRAINE WITHOUT AURA AND WITH STATUS MIGRAINOSUS: ICD-10-CM

## 2023-07-10 DIAGNOSIS — T16.1XXA FOREIGN BODY OF RIGHT EAR, INITIAL ENCOUNTER: ICD-10-CM

## 2023-07-10 DIAGNOSIS — I10 ESSENTIAL HYPERTENSION: Primary | ICD-10-CM

## 2023-07-10 DIAGNOSIS — F43.22 ADJUSTMENT DISORDER WITH ANXIETY: ICD-10-CM

## 2023-07-10 DIAGNOSIS — E78.2 MIXED HYPERLIPIDEMIA: ICD-10-CM

## 2023-07-10 DIAGNOSIS — Z78.0 POSTMENOPAUSAL: ICD-10-CM

## 2023-07-10 PROCEDURE — G8427 DOCREV CUR MEDS BY ELIG CLIN: HCPCS | Performed by: STUDENT IN AN ORGANIZED HEALTH CARE EDUCATION/TRAINING PROGRAM

## 2023-07-10 PROCEDURE — 96372 THER/PROPH/DIAG INJ SC/IM: CPT | Performed by: STUDENT IN AN ORGANIZED HEALTH CARE EDUCATION/TRAINING PROGRAM

## 2023-07-10 PROCEDURE — 3078F DIAST BP <80 MM HG: CPT | Performed by: STUDENT IN AN ORGANIZED HEALTH CARE EDUCATION/TRAINING PROGRAM

## 2023-07-10 PROCEDURE — G8417 CALC BMI ABV UP PARAM F/U: HCPCS | Performed by: STUDENT IN AN ORGANIZED HEALTH CARE EDUCATION/TRAINING PROGRAM

## 2023-07-10 PROCEDURE — 99214 OFFICE O/P EST MOD 30 MIN: CPT | Performed by: STUDENT IN AN ORGANIZED HEALTH CARE EDUCATION/TRAINING PROGRAM

## 2023-07-10 PROCEDURE — 3074F SYST BP LT 130 MM HG: CPT | Performed by: STUDENT IN AN ORGANIZED HEALTH CARE EDUCATION/TRAINING PROGRAM

## 2023-07-10 PROCEDURE — 1036F TOBACCO NON-USER: CPT | Performed by: STUDENT IN AN ORGANIZED HEALTH CARE EDUCATION/TRAINING PROGRAM

## 2023-07-10 PROCEDURE — 3017F COLORECTAL CA SCREEN DOC REV: CPT | Performed by: STUDENT IN AN ORGANIZED HEALTH CARE EDUCATION/TRAINING PROGRAM

## 2023-07-10 RX ORDER — KETOROLAC TROMETHAMINE 30 MG/ML
60 INJECTION, SOLUTION INTRAMUSCULAR; INTRAVENOUS ONCE
Status: COMPLETED | OUTPATIENT
Start: 2023-07-10 | End: 2023-07-10

## 2023-07-10 RX ADMIN — KETOROLAC TROMETHAMINE 60 MG: 30 INJECTION, SOLUTION INTRAMUSCULAR; INTRAVENOUS at 12:24

## 2023-07-10 SDOH — ECONOMIC STABILITY: FOOD INSECURITY: WITHIN THE PAST 12 MONTHS, THE FOOD YOU BOUGHT JUST DIDN'T LAST AND YOU DIDN'T HAVE MONEY TO GET MORE.: NEVER TRUE

## 2023-07-10 SDOH — ECONOMIC STABILITY: HOUSING INSECURITY
IN THE LAST 12 MONTHS, WAS THERE A TIME WHEN YOU DID NOT HAVE A STEADY PLACE TO SLEEP OR SLEPT IN A SHELTER (INCLUDING NOW)?: NO

## 2023-07-10 SDOH — ECONOMIC STABILITY: INCOME INSECURITY: IN THE LAST 12 MONTHS, WAS THERE A TIME WHEN YOU WERE NOT ABLE TO PAY THE MORTGAGE OR RENT ON TIME?: NO

## 2023-07-10 SDOH — ECONOMIC STABILITY: FOOD INSECURITY: WITHIN THE PAST 12 MONTHS, YOU WORRIED THAT YOUR FOOD WOULD RUN OUT BEFORE YOU GOT MONEY TO BUY MORE.: NEVER TRUE

## 2023-07-10 ASSESSMENT — ENCOUNTER SYMPTOMS
NAUSEA: 0
SORE THROAT: 0
DIARRHEA: 0
CHEST TIGHTNESS: 0
ABDOMINAL PAIN: 0
CONSTIPATION: 0
VOMITING: 0
COUGH: 0
WHEEZING: 0
EYE DISCHARGE: 0
SHORTNESS OF BREATH: 0

## 2023-07-10 ASSESSMENT — PATIENT HEALTH QUESTIONNAIRE - PHQ9
SUM OF ALL RESPONSES TO PHQ9 QUESTIONS 1 & 2: 0
SUM OF ALL RESPONSES TO PHQ QUESTIONS 1-9: 0
1. LITTLE INTEREST OR PLEASURE IN DOING THINGS: 0
SUM OF ALL RESPONSES TO PHQ QUESTIONS 1-9: 0
2. FEELING DOWN, DEPRESSED OR HOPELESS: 0

## 2023-07-10 ASSESSMENT — SOCIAL DETERMINANTS OF HEALTH (SDOH): HOW HARD IS IT FOR YOU TO PAY FOR THE VERY BASICS LIKE FOOD, HOUSING, MEDICAL CARE, AND HEATING?: NOT HARD AT ALL

## 2023-07-10 NOTE — PROGRESS NOTES
[address] [place] [address] PRIMARY CARE  [address] [place] [address]  Dept: [phone]  Dept Fax: [phone]    Michelle Lozada is a 59 y.o. female who is a Established patient, who presents today for her medical conditions/complaints as noted below:  Chief Complaint   Patient presents with    Anxiety    Hypertension    Depression         HPI:     She is here today to follow-up on anxiety and depression. She states that she is doing well on the Zoloft and takes Xanax whenever she needs to leave home. She saw her cardiologist recently and her atorvastatin was increased to 20 mg daily. She has no significant family history of heart disease. She has chronic migraines and states that she has been having an episode since last 3 days and none of her abortive medications have worked so far. She follows with neurology for her migraines and is currently on Topamax and amitriptyline along with Imitrex oral and subcu for abortive therapy. She also complains of having a bug in her right ear, states that she felt some buzzing in her ear. States that she tried flushing it out with ear flush but the bug did not come out, states that the buzzing stopped after that.       Hemoglobin A1C (%)   Date Value   08/25/2022 5.6   01/15/2019 5.0   01/04/2018 5.1             ( goal A1Cis < 7)   No results found for: LABMICR  LDL Cholesterol (mg/dL)   Date Value   08/25/2022 116   07/12/2021 114   12/14/2020 131 (H)       (goal LDL is <100)   AST (U/L)   Date Value   06/27/2023 17     ALT (U/L)   Date Value   06/27/2023 15     BUN (mg/dL)   Date Value   06/27/2023 16     BP Readings from Last 3 Encounters:   07/10/23 110/66   06/26/23 118/76   05/16/23 112/76          (goal 120/80)    Past Medical History:   Diagnosis Date    Acromioclavicular joint arthritis 04/18/2016    Acute gastroenteritis 10/11/2017    Acute gastroenteritis 10/11/2017    Anemia 12/11/2018    Arthritis     Asthma 1980    On

## 2023-07-17 ENCOUNTER — HOSPITAL ENCOUNTER (OUTPATIENT)
Dept: WOMENS IMAGING | Age: 63
Discharge: HOME | End: 2023-07-19
Payer: MEDICAID

## 2023-07-17 DIAGNOSIS — Z78.0 POSTMENOPAUSAL: ICD-10-CM

## 2023-07-17 PROCEDURE — 77080 DXA BONE DENSITY AXIAL: CPT

## 2023-07-24 ENCOUNTER — OFFICE VISIT (OUTPATIENT)
Dept: PULMONOLOGY | Age: 63
End: 2023-07-24
Payer: MEDICAID

## 2023-07-24 VITALS
HEART RATE: 77 BPM | WEIGHT: 262.8 LBS | DIASTOLIC BLOOD PRESSURE: 78 MMHG | BODY MASS INDEX: 42.23 KG/M2 | HEIGHT: 66 IN | SYSTOLIC BLOOD PRESSURE: 111 MMHG | RESPIRATION RATE: 12 BRPM | OXYGEN SATURATION: 97 %

## 2023-07-24 DIAGNOSIS — G47.33 OSA ON CPAP: Primary | ICD-10-CM

## 2023-07-24 DIAGNOSIS — Z86.718 HISTORY OF DVT (DEEP VEIN THROMBOSIS): ICD-10-CM

## 2023-07-24 DIAGNOSIS — Z99.89 OSA ON CPAP: Primary | ICD-10-CM

## 2023-07-24 DIAGNOSIS — Z98.84 STATUS POST GASTRIC BYPASS FOR OBESITY: ICD-10-CM

## 2023-07-24 DIAGNOSIS — J45.20 MILD INTERMITTENT ASTHMA WITHOUT COMPLICATION: ICD-10-CM

## 2023-07-24 PROCEDURE — 1036F TOBACCO NON-USER: CPT | Performed by: INTERNAL MEDICINE

## 2023-07-24 PROCEDURE — 99214 OFFICE O/P EST MOD 30 MIN: CPT | Performed by: INTERNAL MEDICINE

## 2023-07-24 PROCEDURE — G8417 CALC BMI ABV UP PARAM F/U: HCPCS | Performed by: INTERNAL MEDICINE

## 2023-07-24 PROCEDURE — 3074F SYST BP LT 130 MM HG: CPT | Performed by: INTERNAL MEDICINE

## 2023-07-24 PROCEDURE — 3017F COLORECTAL CA SCREEN DOC REV: CPT | Performed by: INTERNAL MEDICINE

## 2023-07-24 PROCEDURE — 3078F DIAST BP <80 MM HG: CPT | Performed by: INTERNAL MEDICINE

## 2023-07-24 PROCEDURE — G8427 DOCREV CUR MEDS BY ELIG CLIN: HCPCS | Performed by: INTERNAL MEDICINE

## 2023-07-24 NOTE — PROGRESS NOTES
Vaccine, unspecified formulation 09/11/2015    Influenza Virus Vaccine 09/11/2015, 09/05/2018, 10/08/2020    Influenza, FLUAD, (age 72 y+), Adjuvanted, 0.5mL 08/09/2022    Influenza, FLUARIX, FLULAVAL, FLUZONE (age 10 mo+) AND AFLURIA, (age 1 y+), PF, 0.5mL 08/23/2016, 09/05/2017, 09/05/2018, 09/17/2019, 09/23/2021    Influenza, FLUBLOK, (age 25 y+), PF, 0.5mL 10/08/2020    Influenza, FLUCELVAX, (age 10 mo+), MDCK, MDV, 0.5mL 03/21/2018    Pneumococcal, PCV-13, PREVNAR 15, (age 6w+), IM, 0.5mL 09/18/2015    Pneumococcal, PPSV23, PNEUMOVAX 23, (age 2y+), SC/IM, 0.5mL 11/22/2016    TDaP, ADACEL (age 6y-58y), BOOSTRIX (age 10y+), IM, 0.5mL 09/14/2012, 07/31/2021    Zoster Live (Zostavax) 06/19/2015    Zoster Recombinant (Shingrix) 03/20/2018, 09/05/2018          PAST MEDICAL HISTORY:         Diagnosis Date    Acromioclavicular joint arthritis 04/18/2016    Acute gastroenteritis 10/11/2017    Acute gastroenteritis 10/11/2017    Anemia 12/11/2018    Arthritis     Asthma 1980    On Inhaler    Bursitis     left shoulder    Chronic right shoulder pain 07/24/2018    Closed fracture of left tibial plateau 74/26/4284    Closed fracture of left tibial plateau 20/68/0383    Closed fracture of proximal end of left tibia 11/22/2018    Closed fracture of proximal end of left tibia 11/22/2018    Closed fracture of shaft of left tibia 11/22/2018    Closed fracture of shaft of left tibia 11/22/2018    Depression     Dry eyes     Essential hypertension 08/21/2017    Former smoker     GERD (gastroesophageal reflux disease)     not since Shelton-en-Y and weight loss    Head ache     on Topiramate    Hearing loss     mild    Hemorrhoids     Hiatal hernia     History of bladder infections     History of DVT (deep vein thrombosis) 2010    started in left leg, went to lungs    History of gastric bypass 12/06/2018    History of gastritis     History of morbid obesity     had Shelton-en y    History of pulmonary embolism 2010    from foot  trauma,

## 2023-08-07 ENCOUNTER — HOSPITAL ENCOUNTER (OUTPATIENT)
Age: 63
Discharge: HOME | End: 2023-08-07
Payer: MEDICAID

## 2023-08-07 DIAGNOSIS — I10 ESSENTIAL HYPERTENSION: ICD-10-CM

## 2023-08-07 DIAGNOSIS — E78.2 MIXED HYPERLIPIDEMIA: ICD-10-CM

## 2023-08-07 DIAGNOSIS — E55.9 VITAMIN D DEFICIENCY: ICD-10-CM

## 2023-08-07 DIAGNOSIS — Z13.1 SCREENING FOR DIABETES MELLITUS: ICD-10-CM

## 2023-08-07 LAB
25(OH)D3 SERPL-MCNC: 56.9 NG/ML
ALBUMIN SERPL-MCNC: 3.9 G/DL (ref 3.5–5.2)
ALBUMIN/GLOB SERPL: 1.1 {RATIO} (ref 1–2.5)
ALP SERPL-CCNC: 95 U/L (ref 35–104)
ALT SERPL-CCNC: 14 U/L (ref 5–33)
ANION GAP SERPL CALCULATED.3IONS-SCNC: 15 MMOL/L (ref 9–17)
AST SERPL-CCNC: 15 U/L
BASOPHILS # BLD: 0.06 K/UL (ref 0–0.2)
BASOPHILS NFR BLD: 1 % (ref 0–2)
BILIRUB SERPL-MCNC: 0.2 MG/DL (ref 0.3–1.2)
BUN SERPL-MCNC: 15 MG/DL (ref 8–23)
CALCIUM SERPL-MCNC: 9 MG/DL (ref 8.6–10.4)
CHLORIDE SERPL-SCNC: 108 MMOL/L (ref 98–107)
CHOLEST SERPL-MCNC: 175 MG/DL
CHOLESTEROL/HDL RATIO: 3.9
CO2 SERPL-SCNC: 20 MMOL/L (ref 20–31)
CREAT SERPL-MCNC: 1 MG/DL (ref 0.5–0.9)
EOSINOPHIL # BLD: 0.2 K/UL (ref 0–0.44)
EOSINOPHILS RELATIVE PERCENT: 2 % (ref 1–4)
ERYTHROCYTE [DISTWIDTH] IN BLOOD BY AUTOMATED COUNT: 14.5 % (ref 11.8–14.4)
EST. AVERAGE GLUCOSE BLD GHB EST-MCNC: 126 MG/DL
GFR SERPL CREATININE-BSD FRML MDRD: >60 ML/MIN/1.73M2
GLUCOSE P FAST SERPL-MCNC: 91 MG/DL (ref 70–99)
HBA1C MFR BLD: 6 % (ref 4–6)
HCT VFR BLD AUTO: 40.2 % (ref 36.3–47.1)
HDLC SERPL-MCNC: 45 MG/DL
HGB BLD-MCNC: 12.4 G/DL (ref 11.9–15.1)
IMM GRANULOCYTES # BLD AUTO: 0.1 K/UL (ref 0–0.3)
IMM GRANULOCYTES NFR BLD: 1 %
LDLC SERPL CALC-MCNC: 104 MG/DL (ref 0–130)
LYMPHOCYTES NFR BLD: 2.75 K/UL (ref 1.1–3.7)
LYMPHOCYTES RELATIVE PERCENT: 33 % (ref 24–43)
MCH RBC QN AUTO: 28 PG (ref 25.2–33.5)
MCHC RBC AUTO-ENTMCNC: 30.8 G/DL (ref 28.4–34.8)
MCV RBC AUTO: 90.7 FL (ref 82.6–102.9)
MONOCYTES NFR BLD: 0.69 K/UL (ref 0.1–1.2)
MONOCYTES NFR BLD: 8 % (ref 3–12)
NEUTROPHILS NFR BLD: 55 % (ref 36–65)
NEUTS SEG NFR BLD: 4.62 K/UL (ref 1.5–8.1)
NRBC BLD-RTO: 0 PER 100 WBC
PLATELET # BLD AUTO: 337 K/UL (ref 138–453)
PMV BLD AUTO: 8.2 FL (ref 8.1–13.5)
POTASSIUM SERPL-SCNC: 4.3 MMOL/L (ref 3.7–5.3)
PROT SERPL-MCNC: 7.3 G/DL (ref 6.4–8.3)
RBC # BLD AUTO: 4.43 M/UL (ref 3.95–5.11)
RBC # BLD: ABNORMAL 10*6/UL
SODIUM SERPL-SCNC: 143 MMOL/L (ref 135–144)
TRIGL SERPL-MCNC: 132 MG/DL
TSH SERPL DL<=0.05 MIU/L-ACNC: 1.57 UIU/ML (ref 0.3–5)
WBC OTHER # BLD: 8.4 K/UL (ref 3.5–11.3)

## 2023-08-07 PROCEDURE — 84443 ASSAY THYROID STIM HORMONE: CPT

## 2023-08-07 PROCEDURE — 85025 COMPLETE CBC W/AUTO DIFF WBC: CPT

## 2023-08-07 PROCEDURE — 80053 COMPREHEN METABOLIC PANEL: CPT

## 2023-08-07 PROCEDURE — 82306 VITAMIN D 25 HYDROXY: CPT

## 2023-08-07 PROCEDURE — 36415 COLL VENOUS BLD VENIPUNCTURE: CPT

## 2023-08-07 PROCEDURE — 83036 HEMOGLOBIN GLYCOSYLATED A1C: CPT

## 2023-08-07 PROCEDURE — 80061 LIPID PANEL: CPT

## 2023-08-21 RX ORDER — ALBUTEROL SULFATE 90 UG/1
AEROSOL, METERED RESPIRATORY (INHALATION)
Qty: 3 EACH | Refills: 3 | Status: SHIPPED | OUTPATIENT
Start: 2023-08-21

## 2023-08-21 NOTE — TELEPHONE ENCOUNTER
LAST VISIT: 7/24/23  NEXT VISIT: 7/22/24    Per last dictation patient to continue albuterol. Please sign for refill if ok. Thank you.

## 2023-09-01 ENCOUNTER — TELEPHONE (OUTPATIENT)
Dept: FAMILY MEDICINE CLINIC | Age: 63
End: 2023-09-01

## 2023-09-01 NOTE — TELEPHONE ENCOUNTER
----- Message from Keturah Gallo sent at 9/1/2023 11:17 AM EDT -----  Subject: Appointment Request    Reason for Call: Established Patient Appointment needed: Routine ED Follow   Up Visit    QUESTIONS    Reason for appointment request? Available appointments did not meet   patient need     Additional Information for Provider?  Patient would like same time slots   with her   ---------------------------------------------------------------------------  --------------  Harry Marine Maggie  [phone]; OK to leave message on voicemail  ---------------------------------------------------------------------------  --------------  SCRIPT ANSWERS

## 2023-09-04 DIAGNOSIS — J45.20 MILD INTERMITTENT ASTHMA WITHOUT COMPLICATION: ICD-10-CM

## 2023-09-05 DIAGNOSIS — F43.22 ADJUSTMENT DISORDER WITH ANXIETY: ICD-10-CM

## 2023-09-05 RX ORDER — MONTELUKAST SODIUM 10 MG/1
10 TABLET ORAL NIGHTLY
Qty: 90 TABLET | Refills: 0 | Status: SHIPPED | OUTPATIENT
Start: 2023-09-05

## 2023-09-05 RX ORDER — SERTRALINE HYDROCHLORIDE 100 MG/1
TABLET, FILM COATED ORAL
Qty: 90 TABLET | Refills: 1 | Status: SHIPPED | OUTPATIENT
Start: 2023-09-05

## 2023-09-05 NOTE — TELEPHONE ENCOUNTER
Harriet Moreira is calling to request a refill on the following medication(s):    Medication Request:  Requested Prescriptions     Pending Prescriptions Disp Refills    sertraline (ZOLOFT) 100 MG tablet [Pharmacy Med Name: Sertraline HCl 100 MG Oral Tablet] 30 tablet 0     Sig: Take 1 tablet by mouth once daily       Last Visit Date (If Applicable):  3/76/7424    Next Visit Date:    1/10/2024

## 2023-09-13 DIAGNOSIS — J45.20 MILD INTERMITTENT ASTHMA WITHOUT COMPLICATION: ICD-10-CM

## 2023-09-13 NOTE — TELEPHONE ENCOUNTER
LAST VISIT: 7/24/23  NEXT VISIT: 7/22/24    Per last dictation patient to continue this medication. Please sign for refill if ok. Thank you.

## 2023-10-12 ENCOUNTER — TELEPHONE (OUTPATIENT)
Dept: PULMONOLOGY | Age: 63
End: 2023-10-12

## 2023-10-12 DIAGNOSIS — J45.20 MILD INTERMITTENT ASTHMA WITHOUT COMPLICATION: ICD-10-CM

## 2023-10-12 RX ORDER — MONTELUKAST SODIUM 10 MG/1
10 TABLET ORAL NIGHTLY
Qty: 90 TABLET | Refills: 3 | Status: SHIPPED | OUTPATIENT
Start: 2023-10-12

## 2023-10-12 NOTE — TELEPHONE ENCOUNTER
LAST VISIT: 7/24/23  NEXT VISIT: 7/22/24    Per last dictation patient is on this medication. Please sign for refill if ok. Thank you.

## 2023-10-12 NOTE — TELEPHONE ENCOUNTER
Patient needs a new machine but they need your 7/24/23 office note addended. In the current note field, it needs to say she is benefitting from it and using it, to co-incide with her recent compliance report. Would you be able to add the date for the recent visit to that area as well so that its  from the 'last visit' note?

## 2023-10-13 ENCOUNTER — TELEPHONE (OUTPATIENT)
Dept: ONCOLOGY | Age: 63
End: 2023-10-13

## 2023-10-13 ENCOUNTER — TELEPHONE (OUTPATIENT)
Dept: FAMILY MEDICINE CLINIC | Age: 63
End: 2023-10-13

## 2023-10-13 DIAGNOSIS — M81.0 AGE-RELATED OSTEOPOROSIS WITHOUT CURRENT PATHOLOGICAL FRACTURE: ICD-10-CM

## 2023-10-13 RX ORDER — DENOSUMAB 60 MG/ML
60 INJECTION SUBCUTANEOUS
Qty: 1 ML | Refills: 1 | Status: SHIPPED | OUTPATIENT
Start: 2023-10-13

## 2023-10-13 NOTE — TELEPHONE ENCOUNTER
Patient has an appt  Monday for her Prolia injection at the [facility]  they will need an order faxed over to them  today   [phone]

## 2023-10-16 ENCOUNTER — HOSPITAL ENCOUNTER (OUTPATIENT)
Dept: INFUSION THERAPY | Age: 63
Discharge: HOME | End: 2023-10-16
Payer: MEDICAID

## 2023-10-16 DIAGNOSIS — M81.0 AGE-RELATED OSTEOPOROSIS WITHOUT CURRENT PATHOLOGICAL FRACTURE: Primary | ICD-10-CM

## 2023-10-16 PROCEDURE — 96372 THER/PROPH/DIAG INJ SC/IM: CPT

## 2023-10-16 PROCEDURE — 6360000002 HC RX W HCPCS: Performed by: STUDENT IN AN ORGANIZED HEALTH CARE EDUCATION/TRAINING PROGRAM

## 2023-10-16 RX ADMIN — DENOSUMAB 60 MG: 60 INJECTION SUBCUTANEOUS at 11:50

## 2023-10-16 NOTE — PROGRESS NOTES
Pt arrives ambulatory for Prolia injection  Labs from 8/7/23 reviewed  Injection given without incident and pt discharged in stable condition  Next appt 4/15/24 Prolia

## 2023-11-02 DIAGNOSIS — F41.9 ANXIETY: ICD-10-CM

## 2023-11-02 RX ORDER — BUSPIRONE HYDROCHLORIDE 10 MG/1
TABLET ORAL
Qty: 90 TABLET | Refills: 5 | Status: SHIPPED | OUTPATIENT
Start: 2023-11-02

## 2023-11-02 NOTE — TELEPHONE ENCOUNTER
Shaista Ang is calling to request a refill on the following medication(s):    Medication Request:  Requested Prescriptions     Pending Prescriptions Disp Refills    busPIRone (BUSPAR) 10 MG tablet [Pharmacy Med Name: busPIRone HCl 10 MG Oral Tablet] 90 tablet 0     Sig: TAKE 1 TABLET BY MOUTH THREE TIMES DAILY       Last Visit Date (If Applicable):  7/10/2023    Next Visit Date:    1/10/2024            .

## 2024-01-10 ENCOUNTER — OFFICE VISIT (OUTPATIENT)
Dept: FAMILY MEDICINE CLINIC | Age: 64
End: 2024-01-10

## 2024-01-10 VITALS
DIASTOLIC BLOOD PRESSURE: 74 MMHG | HEIGHT: 66 IN | OXYGEN SATURATION: 100 % | BODY MASS INDEX: 41.3 KG/M2 | WEIGHT: 257 LBS | SYSTOLIC BLOOD PRESSURE: 112 MMHG | HEART RATE: 79 BPM

## 2024-01-10 DIAGNOSIS — F43.22 ADJUSTMENT DISORDER WITH ANXIETY: ICD-10-CM

## 2024-01-10 DIAGNOSIS — E55.9 VITAMIN D DEFICIENCY: ICD-10-CM

## 2024-01-10 DIAGNOSIS — J45.20 MILD INTERMITTENT ASTHMA WITHOUT COMPLICATION: ICD-10-CM

## 2024-01-10 DIAGNOSIS — Z12.31 ENCOUNTER FOR SCREENING MAMMOGRAM FOR MALIGNANT NEOPLASM OF BREAST: ICD-10-CM

## 2024-01-10 DIAGNOSIS — R73.03 PREDIABETES: ICD-10-CM

## 2024-01-10 DIAGNOSIS — I10 ESSENTIAL HYPERTENSION: Primary | ICD-10-CM

## 2024-01-10 RX ORDER — ALPRAZOLAM 0.25 MG/1
0.25 TABLET ORAL 3 TIMES DAILY PRN
Qty: 60 TABLET | Refills: 0 | Status: SHIPPED | OUTPATIENT
Start: 2024-01-10 | End: 2024-02-09

## 2024-01-10 RX ORDER — ALBUTEROL SULFATE 90 UG/1
AEROSOL, METERED RESPIRATORY (INHALATION)
Qty: 3 EACH | Refills: 3 | Status: SHIPPED | OUTPATIENT
Start: 2024-01-10

## 2024-01-10 RX ORDER — ALBUTEROL SULFATE 2.5 MG/3ML
2.5 SOLUTION RESPIRATORY (INHALATION) EVERY 6 HOURS PRN
Qty: 120 EACH | Refills: 11 | Status: SHIPPED | OUTPATIENT
Start: 2024-01-10

## 2024-01-10 RX ORDER — AMITRIPTYLINE HYDROCHLORIDE 50 MG/1
50 TABLET, FILM COATED ORAL NIGHTLY
Qty: 90 TABLET | Refills: 1 | Status: SHIPPED | OUTPATIENT
Start: 2024-01-10

## 2024-01-10 SDOH — ECONOMIC STABILITY: INCOME INSECURITY: HOW HARD IS IT FOR YOU TO PAY FOR THE VERY BASICS LIKE FOOD, HOUSING, MEDICAL CARE, AND HEATING?: NOT HARD AT ALL

## 2024-01-10 SDOH — ECONOMIC STABILITY: FOOD INSECURITY: WITHIN THE PAST 12 MONTHS, YOU WORRIED THAT YOUR FOOD WOULD RUN OUT BEFORE YOU GOT MONEY TO BUY MORE.: NEVER TRUE

## 2024-01-10 SDOH — ECONOMIC STABILITY: FOOD INSECURITY: WITHIN THE PAST 12 MONTHS, THE FOOD YOU BOUGHT JUST DIDN'T LAST AND YOU DIDN'T HAVE MONEY TO GET MORE.: NEVER TRUE

## 2024-01-10 ASSESSMENT — PATIENT HEALTH QUESTIONNAIRE - PHQ9
1. LITTLE INTEREST OR PLEASURE IN DOING THINGS: 0
SUM OF ALL RESPONSES TO PHQ QUESTIONS 1-9: 0
SUM OF ALL RESPONSES TO PHQ9 QUESTIONS 1 & 2: 0
SUM OF ALL RESPONSES TO PHQ QUESTIONS 1-9: 0
2. FEELING DOWN, DEPRESSED OR HOPELESS: 0

## 2024-01-10 NOTE — PROGRESS NOTES
[facility]  [facility] PRIMARY CARE  [address] [place] [address]  [address]  [facility] [address]  Dept: [phone]  Dept Fax: [phone]    Shaista Ang is a 64 y.o. female who is a Established patient, who presents today for her medical conditions/complaints as noted below:  Chief Complaint   Patient presents with    Hypertension         HPI:     She is here today to follow-up on hypertension and anxiety.  She states that lately her anxiety has been getting worse specially with everybody getting sick around her.  She states that she is not scared to leave home again.  She was previously taking Xanax which helped with her social anxiety but she has not been taking it for past several months now.  She also has trouble sleeping and is agreeable to trying higher dose of amitriptyline.  She was noticed some breathing issues lately specially with her grandkids being sick and would like to get refills on her nebulizer solution in case she has a flareup.  She is due for her mammogram and labs.        Hemoglobin A1C (%)   Date Value   08/07/2023 6.0   08/25/2022 5.6   01/15/2019 5.0             ( goal A1Cis < 7)   No components found for: \"LABMICR\"  LDL Cholesterol (mg/dL)   Date Value   08/07/2023 104   08/25/2022 116   07/12/2021 114       (goal LDL is <100)   AST (U/L)   Date Value   08/07/2023 15     ALT (U/L)   Date Value   08/07/2023 14     BUN (mg/dL)   Date Value   08/07/2023 15     BP Readings from Last 3 Encounters:   01/10/24 112/74   07/24/23 111/78   07/10/23 110/66          (goal 120/80)    Past Medical History:   Diagnosis Date    Acromioclavicular joint arthritis 04/18/2016    Acute gastroenteritis 10/11/2017    Acute gastroenteritis 10/11/2017    Anemia 12/11/2018    Arthritis     Asthma 1980    On Inhaler    Bursitis     left shoulder    Chronic right shoulder pain 07/24/2018    Closed fracture of left tibial plateau 07/16/2019    Closed fracture of left tibial plateau 07/16/2019    Closed

## 2024-01-11 ASSESSMENT — ENCOUNTER SYMPTOMS
SORE THROAT: 0
SHORTNESS OF BREATH: 0
CONSTIPATION: 0
WHEEZING: 0
EYE DISCHARGE: 0
CHEST TIGHTNESS: 1
NAUSEA: 0
ABDOMINAL PAIN: 0
DIARRHEA: 0
VOMITING: 0
COUGH: 0

## 2024-01-18 ENCOUNTER — TELEPHONE (OUTPATIENT)
Dept: PULMONOLOGY | Age: 64
End: 2024-01-18

## 2024-01-18 DIAGNOSIS — J45.20 MILD INTERMITTENT ASTHMA WITHOUT COMPLICATION: Primary | ICD-10-CM

## 2024-01-18 DIAGNOSIS — G47.33 OSA ON CPAP: ICD-10-CM

## 2024-01-18 NOTE — TELEPHONE ENCOUNTER
Patient needs to switch from [facility] to [facility] due to insurance. Sending info cpap and neb supplies

## 2024-02-14 ENCOUNTER — HOSPITAL ENCOUNTER (OUTPATIENT)
Dept: WOMENS IMAGING | Age: 64
Discharge: HOME | End: 2024-02-16
Payer: MEDICAID

## 2024-02-14 DIAGNOSIS — Z12.31 ENCOUNTER FOR SCREENING MAMMOGRAM FOR MALIGNANT NEOPLASM OF BREAST: ICD-10-CM

## 2024-02-14 PROCEDURE — 77063 BREAST TOMOSYNTHESIS BI: CPT

## 2024-02-22 DIAGNOSIS — F43.22 ADJUSTMENT DISORDER WITH ANXIETY: ICD-10-CM

## 2024-02-22 RX ORDER — SERTRALINE HYDROCHLORIDE 100 MG/1
TABLET, FILM COATED ORAL
Qty: 90 TABLET | Refills: 1 | Status: SHIPPED | OUTPATIENT
Start: 2024-02-22

## 2024-02-22 NOTE — TELEPHONE ENCOUNTER
Shaista Ang is calling to request a refill on the following medication(s):    Medication Request:  Requested Prescriptions     Pending Prescriptions Disp Refills    sertraline (ZOLOFT) 100 MG tablet [Pharmacy Med Name: Sertraline HCl 100 MG Oral Tablet] 90 tablet 0     Sig: Take 1 tablet by mouth once daily       Last Visit Date (If Applicable):  1/10/2024    Next Visit Date:    7/10/2024

## 2024-02-27 ENCOUNTER — OFFICE VISIT (OUTPATIENT)
Dept: PULMONOLOGY | Age: 64
End: 2024-02-27
Payer: MEDICAID

## 2024-02-27 VITALS
RESPIRATION RATE: 12 BRPM | HEIGHT: 66 IN | WEIGHT: 262 LBS | SYSTOLIC BLOOD PRESSURE: 130 MMHG | DIASTOLIC BLOOD PRESSURE: 86 MMHG | OXYGEN SATURATION: 96 % | TEMPERATURE: 98.1 F | BODY MASS INDEX: 42.11 KG/M2 | HEART RATE: 114 BPM

## 2024-02-27 DIAGNOSIS — J06.9 VIRAL URI WITH COUGH: ICD-10-CM

## 2024-02-27 DIAGNOSIS — G47.33 OSA ON CPAP: ICD-10-CM

## 2024-02-27 DIAGNOSIS — J45.20 MILD INTERMITTENT ASTHMA WITHOUT COMPLICATION: Primary | ICD-10-CM

## 2024-02-27 DIAGNOSIS — Z86.718 HISTORY OF DVT (DEEP VEIN THROMBOSIS): ICD-10-CM

## 2024-02-27 PROCEDURE — 3017F COLORECTAL CA SCREEN DOC REV: CPT | Performed by: NURSE PRACTITIONER

## 2024-02-27 PROCEDURE — 1036F TOBACCO NON-USER: CPT | Performed by: NURSE PRACTITIONER

## 2024-02-27 PROCEDURE — 99214 OFFICE O/P EST MOD 30 MIN: CPT | Performed by: NURSE PRACTITIONER

## 2024-02-27 PROCEDURE — G8482 FLU IMMUNIZE ORDER/ADMIN: HCPCS | Performed by: NURSE PRACTITIONER

## 2024-02-27 PROCEDURE — G8427 DOCREV CUR MEDS BY ELIG CLIN: HCPCS | Performed by: NURSE PRACTITIONER

## 2024-02-27 PROCEDURE — 3075F SYST BP GE 130 - 139MM HG: CPT | Performed by: NURSE PRACTITIONER

## 2024-02-27 PROCEDURE — 3079F DIAST BP 80-89 MM HG: CPT | Performed by: NURSE PRACTITIONER

## 2024-02-27 PROCEDURE — G8417 CALC BMI ABV UP PARAM F/U: HCPCS | Performed by: NURSE PRACTITIONER

## 2024-02-27 RX ORDER — DOXYCYCLINE HYCLATE 100 MG
100 TABLET ORAL 2 TIMES DAILY
Qty: 14 TABLET | Refills: 0 | Status: SHIPPED | OUTPATIENT
Start: 2024-02-27 | End: 2024-03-05

## 2024-02-27 ASSESSMENT — ENCOUNTER SYMPTOMS
EYES NEGATIVE: 1
GASTROINTESTINAL NEGATIVE: 1
ALLERGIC/IMMUNOLOGIC NEGATIVE: 1

## 2024-02-27 NOTE — PROGRESS NOTES
Minimal left basilar atelectasis.  No pneumothorax or pleural effusion.    CT chest 5/3/2015: Suboptimal enhancement of pulmonary arteries.  With mild respiratory motion artifacts.  Pulmonary embolus cannot be adequately excluded.  No pleural effusion.  Mild subsegmental atelectasis in the right lower lobe.    Sleep Study:      Laboratory Evaluation:      Immunizations:   Immunization History   Administered Date(s) Administered    COVID-19, MODERNA BLUE border, Primary or Immunocompromised, (age 12y+), IM, 100 mcg/0.5mL 01/23/2021, 01/23/2021, 02/27/2021, 10/28/2021    COVID-19, MODERNA Booster BLUE border, (age 18y+), IM, 50mcg/0.25mL 08/09/2022    COVID-19, [place] Vaccine, Vaccine Unspecified 12/11/2023    Influenza Vaccine, unspecified formulation 09/11/2015    Influenza Virus Vaccine 09/11/2015, 09/05/2018, 10/08/2020    Influenza, FLUAD, (age 65 y+), Adjuvanted, 0.5mL 08/09/2022    Influenza, FLUARIX, FLULAVAL, FLUZONE (age 6 mo+) AND AFLURIA, (age 3 y+), PF, 0.5mL 08/23/2016, 09/05/2017, 09/05/2018, 09/17/2019, 09/23/2021    Influenza, FLUBLOK, (age 18 y+), PF, 0.5mL 10/08/2020, 09/26/2023    Influenza, FLUCELVAX, (age 6 mo+), MDCK, MDV, 0.5mL 03/21/2018    Pneumococcal, PCV-13, PREVNAR 13, (age 6w+), IM, 0.5mL 09/18/2015    Pneumococcal, PCV20, PREVNAR 20, (age 6w+), IM, 0.5mL 04/27/2023    Pneumococcal, PPSV23, PNEUMOVAX 23, (age 2y+), SC/IM, 0.5mL 11/22/2016    TDaP, ADACEL (age 10y-64y), BOOSTRIX (age 10y+), IM, 0.5mL 09/14/2012, 07/31/2021    Zoster Live (Zostavax) 06/19/2015    Zoster Recombinant (Shingrix) 03/20/2018, 09/05/2018, 04/27/2023, 07/29/2023            10/12/2020    11:01 AM 2/28/2020     1:23 PM 8/26/2019    10:56 AM 8/22/2016     9:03 AM   Sleep Medicine   Sitting and reading 1 1 2 1   Watching TV 1 0 2 0   Sitting, inactive in a public place (e.g. a theatre or a meeting) 0 0 1 0   As a passenger in a car for an hour without a break 2 1 0 3   Lying down to rest in the afternoon when

## 2024-03-05 RX ORDER — PREDNISONE 20 MG/1
TABLET ORAL
Qty: 50 TABLET | Refills: 0 | Status: SHIPPED | OUTPATIENT
Start: 2024-03-05 | End: 2024-03-25

## 2024-04-15 ENCOUNTER — HOSPITAL ENCOUNTER (OUTPATIENT)
Dept: INFUSION THERAPY | Age: 64
Discharge: HOME | End: 2024-04-15
Payer: MEDICAID

## 2024-04-15 DIAGNOSIS — M81.0 AGE-RELATED OSTEOPOROSIS WITHOUT CURRENT PATHOLOGICAL FRACTURE: Primary | ICD-10-CM

## 2024-04-15 PROCEDURE — 6360000002 HC RX W HCPCS: Performed by: STUDENT IN AN ORGANIZED HEALTH CARE EDUCATION/TRAINING PROGRAM

## 2024-04-15 PROCEDURE — 96372 THER/PROPH/DIAG INJ SC/IM: CPT

## 2024-04-15 RX ORDER — DIPHENHYDRAMINE HYDROCHLORIDE 50 MG/ML
50 INJECTION INTRAMUSCULAR; INTRAVENOUS
OUTPATIENT
Start: 2024-06-24

## 2024-04-15 RX ORDER — DIPHENHYDRAMINE HYDROCHLORIDE 50 MG/ML
50 INJECTION INTRAMUSCULAR; INTRAVENOUS
Status: CANCELLED | OUTPATIENT
Start: 2024-04-15

## 2024-04-15 RX ORDER — ALBUTEROL SULFATE 90 UG/1
4 AEROSOL, METERED RESPIRATORY (INHALATION) PRN
Status: CANCELLED | OUTPATIENT
Start: 2024-04-15

## 2024-04-15 RX ORDER — ALBUTEROL SULFATE 90 UG/1
4 AEROSOL, METERED RESPIRATORY (INHALATION) PRN
OUTPATIENT
Start: 2024-06-24

## 2024-04-15 RX ORDER — ONDANSETRON 2 MG/ML
8 INJECTION INTRAMUSCULAR; INTRAVENOUS
OUTPATIENT
Start: 2024-06-24

## 2024-04-15 RX ORDER — SODIUM CHLORIDE 9 MG/ML
INJECTION, SOLUTION INTRAVENOUS CONTINUOUS
Status: CANCELLED | OUTPATIENT
Start: 2024-04-15

## 2024-04-15 RX ORDER — SODIUM CHLORIDE 9 MG/ML
INJECTION, SOLUTION INTRAVENOUS CONTINUOUS
OUTPATIENT
Start: 2024-06-24

## 2024-04-15 RX ORDER — EPINEPHRINE 1 MG/ML
0.3 INJECTION, SOLUTION, CONCENTRATE INTRAVENOUS PRN
OUTPATIENT
Start: 2024-06-24

## 2024-04-15 RX ORDER — FAMOTIDINE 10 MG/ML
20 INJECTION, SOLUTION INTRAVENOUS
Status: CANCELLED | OUTPATIENT
Start: 2024-04-15

## 2024-04-15 RX ORDER — EPINEPHRINE 1 MG/ML
0.3 INJECTION, SOLUTION, CONCENTRATE INTRAVENOUS PRN
Status: CANCELLED | OUTPATIENT
Start: 2024-04-15

## 2024-04-15 RX ORDER — FAMOTIDINE 10 MG/ML
20 INJECTION, SOLUTION INTRAVENOUS
OUTPATIENT
Start: 2024-06-24

## 2024-04-15 RX ORDER — ACETAMINOPHEN 325 MG/1
650 TABLET ORAL
OUTPATIENT
Start: 2024-06-24

## 2024-04-15 RX ORDER — ACETAMINOPHEN 325 MG/1
650 TABLET ORAL
Status: CANCELLED | OUTPATIENT
Start: 2024-04-15

## 2024-04-15 RX ORDER — ONDANSETRON 2 MG/ML
8 INJECTION INTRAMUSCULAR; INTRAVENOUS
Status: CANCELLED | OUTPATIENT
Start: 2024-04-15

## 2024-04-15 RX ADMIN — DENOSUMAB 60 MG: 60 INJECTION SUBCUTANEOUS at 13:24

## 2024-04-15 NOTE — PROGRESS NOTES
Patient arrived for prolia injection. Treatment completed w/o incident and left in stable condition.

## 2024-05-20 DIAGNOSIS — F43.22 ADJUSTMENT DISORDER WITH ANXIETY: ICD-10-CM

## 2024-05-20 RX ORDER — SERTRALINE HYDROCHLORIDE 100 MG/1
TABLET, FILM COATED ORAL
Qty: 90 TABLET | Refills: 1 | Status: SHIPPED | OUTPATIENT
Start: 2024-05-20

## 2024-05-20 NOTE — TELEPHONE ENCOUNTER
Shaista Ang is calling to request a refill on the following medication(s):    Medication Request:  Requested Prescriptions     Pending Prescriptions Disp Refills    sertraline (ZOLOFT) 100 MG tablet [Pharmacy Med Name: Sertraline HCl 100 MG Oral Tablet] 90 tablet 0     Sig: Take 1 tablet by mouth once daily       Last Visit Date (If Applicable):  1/10/2024    Next Visit Date:    7/10/2024              f

## 2024-05-28 DIAGNOSIS — F41.9 ANXIETY: ICD-10-CM

## 2024-05-28 RX ORDER — BUSPIRONE HYDROCHLORIDE 10 MG/1
TABLET ORAL
Qty: 90 TABLET | Refills: 1 | Status: SHIPPED | OUTPATIENT
Start: 2024-05-28

## 2024-05-28 NOTE — TELEPHONE ENCOUNTER
Shaista Ang is calling to request a refill on the following medication(s):    Medication Request:  Requested Prescriptions     Pending Prescriptions Disp Refills    busPIRone (BUSPAR) 10 MG tablet [Pharmacy Med Name: busPIRone HCl 10 MG Oral Tablet] 90 tablet 0     Sig: TAKE 1 TABLET BY MOUTH THREE TIMES DAILY       Last Visit Date (If Applicable):  1/10/2024    Next Visit Date:    7/10/2024

## 2024-06-18 RX ORDER — TOPIRAMATE 100 MG/1
TABLET, FILM COATED ORAL
Qty: 60 TABLET | Refills: 2 | Status: SHIPPED | OUTPATIENT
Start: 2024-06-18 | End: 2024-09-16

## 2024-06-18 NOTE — TELEPHONE ENCOUNTER
Pharmacy requesting refill of Topamax.      Medication active on med list yes      Date of last fill: 5/16/23  verified on 6/18/2024   verified by SF LPN      Date of last appointment 5/16/23    Next Visit Date:  7/16/2024

## 2024-06-29 DIAGNOSIS — F43.22 ADJUSTMENT DISORDER WITH ANXIETY: ICD-10-CM

## 2024-07-01 RX ORDER — AMITRIPTYLINE HYDROCHLORIDE 50 MG/1
50 TABLET, FILM COATED ORAL NIGHTLY
Qty: 90 TABLET | Refills: 1 | Status: SHIPPED | OUTPATIENT
Start: 2024-07-01

## 2024-07-01 NOTE — TELEPHONE ENCOUNTER
Shaista Ang is calling to request a refill on the following medication(s):    Medication Request:  Requested Prescriptions     Pending Prescriptions Disp Refills    amitriptyline (ELAVIL) 50 MG tablet [Pharmacy Med Name: Amitriptyline HCl 50 MG Oral Tablet] 90 tablet 0     Sig: Take 1 tablet by mouth nightly       Last Visit Date (If Applicable):  1/10/2024    Next Visit Date:    7/10/2024

## 2024-07-02 ENCOUNTER — HOSPITAL ENCOUNTER (OUTPATIENT)
Age: 64
Discharge: HOME | End: 2024-07-02
Payer: MEDICAID

## 2024-07-02 DIAGNOSIS — E55.9 VITAMIN D DEFICIENCY: ICD-10-CM

## 2024-07-02 DIAGNOSIS — I10 ESSENTIAL HYPERTENSION: ICD-10-CM

## 2024-07-02 DIAGNOSIS — R73.03 PREDIABETES: ICD-10-CM

## 2024-07-02 LAB
25(OH)D3 SERPL-MCNC: 59.3 NG/ML (ref 30–100)
ALBUMIN SERPL-MCNC: 4.2 G/DL (ref 3.5–5.2)
ALBUMIN/GLOB SERPL: 1 {RATIO} (ref 1–2.5)
ALP SERPL-CCNC: 97 U/L (ref 35–104)
ALT SERPL-CCNC: 20 U/L (ref 10–35)
ANION GAP SERPL CALCULATED.3IONS-SCNC: 10 MMOL/L (ref 9–16)
AST SERPL-CCNC: 23 U/L (ref 10–35)
BASOPHILS # BLD: 0.08 K/UL (ref 0–0.2)
BASOPHILS NFR BLD: 1 % (ref 0–2)
BILIRUB SERPL-MCNC: 0.2 MG/DL (ref 0–1.2)
BUN SERPL-MCNC: 15 MG/DL (ref 8–23)
CALCIUM SERPL-MCNC: 9 MG/DL (ref 8.6–10.4)
CHLORIDE SERPL-SCNC: 109 MMOL/L (ref 98–107)
CHOLEST SERPL-MCNC: 154 MG/DL (ref 0–199)
CHOLESTEROL/HDL RATIO: 3
CO2 SERPL-SCNC: 23 MMOL/L (ref 20–31)
CREAT SERPL-MCNC: 1 MG/DL (ref 0.5–0.9)
EOSINOPHIL # BLD: 0.28 K/UL (ref 0–0.44)
EOSINOPHILS RELATIVE PERCENT: 4 % (ref 1–4)
ERYTHROCYTE [DISTWIDTH] IN BLOOD BY AUTOMATED COUNT: 15.5 % (ref 11.8–14.4)
EST. AVERAGE GLUCOSE BLD GHB EST-MCNC: 128 MG/DL
GFR, ESTIMATED: 66 ML/MIN/1.73M2
GLUCOSE P FAST SERPL-MCNC: 85 MG/DL (ref 74–99)
HBA1C MFR BLD: 6.1 % (ref 4–6)
HCT VFR BLD AUTO: 40.6 % (ref 36.3–47.1)
HDLC SERPL-MCNC: 45 MG/DL
HGB BLD-MCNC: 12.3 G/DL (ref 11.9–15.1)
IMM GRANULOCYTES # BLD AUTO: 0.08 K/UL (ref 0–0.3)
IMM GRANULOCYTES NFR BLD: 1 %
LDLC SERPL CALC-MCNC: 75 MG/DL (ref 0–100)
LYMPHOCYTES NFR BLD: 2.81 K/UL (ref 1.1–3.7)
LYMPHOCYTES RELATIVE PERCENT: 36 % (ref 24–43)
MCH RBC QN AUTO: 27 PG (ref 25.2–33.5)
MCHC RBC AUTO-ENTMCNC: 30.3 G/DL (ref 28.4–34.8)
MCV RBC AUTO: 89.2 FL (ref 82.6–102.9)
MONOCYTES NFR BLD: 0.64 K/UL (ref 0.1–1.2)
MONOCYTES NFR BLD: 8 % (ref 3–12)
NEUTROPHILS NFR BLD: 50 % (ref 36–65)
NEUTS SEG NFR BLD: 4.01 K/UL (ref 1.5–8.1)
NRBC BLD-RTO: 0 PER 100 WBC
PLATELET # BLD AUTO: 318 K/UL (ref 138–453)
PMV BLD AUTO: 8.6 FL (ref 8.1–13.5)
POTASSIUM SERPL-SCNC: 4.2 MMOL/L (ref 3.7–5.3)
PROT SERPL-MCNC: 7.1 G/DL (ref 6.6–8.7)
RBC # BLD AUTO: 4.55 M/UL (ref 3.95–5.11)
RBC # BLD: ABNORMAL 10*6/UL
SODIUM SERPL-SCNC: 142 MMOL/L (ref 136–145)
TRIGL SERPL-MCNC: 170 MG/DL (ref 0–149)
TSH SERPL DL<=0.05 MIU/L-ACNC: 2.79 UIU/ML (ref 0.27–4.2)
VLDLC SERPL CALC-MCNC: 34 MG/DL
WBC OTHER # BLD: 7.9 K/UL (ref 3.5–11.3)

## 2024-07-02 PROCEDURE — 84443 ASSAY THYROID STIM HORMONE: CPT

## 2024-07-02 PROCEDURE — 80053 COMPREHEN METABOLIC PANEL: CPT

## 2024-07-02 PROCEDURE — 80061 LIPID PANEL: CPT

## 2024-07-02 PROCEDURE — 85025 COMPLETE CBC W/AUTO DIFF WBC: CPT

## 2024-07-02 PROCEDURE — 82306 VITAMIN D 25 HYDROXY: CPT

## 2024-07-02 PROCEDURE — 36415 COLL VENOUS BLD VENIPUNCTURE: CPT

## 2024-07-02 PROCEDURE — 83036 HEMOGLOBIN GLYCOSYLATED A1C: CPT

## 2024-07-10 ENCOUNTER — OFFICE VISIT (OUTPATIENT)
Dept: FAMILY MEDICINE CLINIC | Age: 64
End: 2024-07-10
Payer: MEDICAID

## 2024-07-10 VITALS
OXYGEN SATURATION: 99 % | SYSTOLIC BLOOD PRESSURE: 130 MMHG | DIASTOLIC BLOOD PRESSURE: 84 MMHG | HEIGHT: 66 IN | HEART RATE: 96 BPM | WEIGHT: 269 LBS | BODY MASS INDEX: 43.23 KG/M2

## 2024-07-10 DIAGNOSIS — I10 ESSENTIAL HYPERTENSION: Primary | ICD-10-CM

## 2024-07-10 DIAGNOSIS — F43.22 ADJUSTMENT DISORDER WITH ANXIETY: ICD-10-CM

## 2024-07-10 DIAGNOSIS — R73.03 PREDIABETES: ICD-10-CM

## 2024-07-10 PROCEDURE — 1036F TOBACCO NON-USER: CPT | Performed by: STUDENT IN AN ORGANIZED HEALTH CARE EDUCATION/TRAINING PROGRAM

## 2024-07-10 PROCEDURE — 3017F COLORECTAL CA SCREEN DOC REV: CPT | Performed by: STUDENT IN AN ORGANIZED HEALTH CARE EDUCATION/TRAINING PROGRAM

## 2024-07-10 PROCEDURE — 3079F DIAST BP 80-89 MM HG: CPT | Performed by: STUDENT IN AN ORGANIZED HEALTH CARE EDUCATION/TRAINING PROGRAM

## 2024-07-10 PROCEDURE — 99214 OFFICE O/P EST MOD 30 MIN: CPT | Performed by: STUDENT IN AN ORGANIZED HEALTH CARE EDUCATION/TRAINING PROGRAM

## 2024-07-10 PROCEDURE — 3075F SYST BP GE 130 - 139MM HG: CPT | Performed by: STUDENT IN AN ORGANIZED HEALTH CARE EDUCATION/TRAINING PROGRAM

## 2024-07-10 PROCEDURE — G8417 CALC BMI ABV UP PARAM F/U: HCPCS | Performed by: STUDENT IN AN ORGANIZED HEALTH CARE EDUCATION/TRAINING PROGRAM

## 2024-07-10 PROCEDURE — G8427 DOCREV CUR MEDS BY ELIG CLIN: HCPCS | Performed by: STUDENT IN AN ORGANIZED HEALTH CARE EDUCATION/TRAINING PROGRAM

## 2024-07-10 RX ORDER — CLONAZEPAM 0.5 MG/1
0.5 TABLET ORAL 2 TIMES DAILY
Qty: 60 TABLET | Refills: 0 | Status: SHIPPED | OUTPATIENT
Start: 2024-07-10 | End: 2024-08-09

## 2024-07-10 ASSESSMENT — ENCOUNTER SYMPTOMS
CONSTIPATION: 0
ABDOMINAL PAIN: 0
VOMITING: 0
DIARRHEA: 0
COUGH: 0
EYE DISCHARGE: 0
CHEST TIGHTNESS: 0
WHEEZING: 0
SORE THROAT: 0
NAUSEA: 0
SHORTNESS OF BREATH: 0

## 2024-07-10 NOTE — PROGRESS NOTES
[facility]  [facility] PRIMARY CARE  [address] [place] [address]  [address]  [facility] [address]  Dept: [phone]  Dept Fax: [phone]    Shaista Ang is a 64 y.o. female who is a Established patient, who presents today for her medical conditions/complaints as noted below:  Chief Complaint   Patient presents with    Hypertension         HPI:     She is here today to follow-up on hypertension and anxiety and to discuss her recent labs.  Her recent labs were normal except for elevated A1c of 6.1.  She states that she has been under more stress lately due to her 's health issues.  She states that the Xanax is not working as well anymore for her and she has been having hard time leaving home.  She is willing to try Klonopin to see if that works better.        Hemoglobin A1C (%)   Date Value   07/02/2024 6.1 (H)   08/07/2023 6.0   08/25/2022 5.6             ( goal A1Cis < 7)   No components found for: \"LABMICR\"  No components found for: \"LDLCHOLESTEROL\", \"LDLCALC\"    (goal LDL is <100)   AST (U/L)   Date Value   07/02/2024 23     ALT (U/L)   Date Value   07/02/2024 20     BUN (mg/dL)   Date Value   07/02/2024 15     BP Readings from Last 3 Encounters:   07/10/24 130/84   02/27/24 130/86   01/22/24 132/84          (goal 120/80)    Past Medical History:   Diagnosis Date    Acromioclavicular joint arthritis 04/18/2016    Acute gastroenteritis 10/11/2017    Acute gastroenteritis 10/11/2017    Anemia 12/11/2018    Arthritis     Asthma 1980    On Inhaler    Bursitis     left shoulder    Chronic right shoulder pain 07/24/2018    Closed fracture of left tibial plateau 07/16/2019    Closed fracture of left tibial plateau 07/16/2019    Closed fracture of proximal end of left tibia 11/22/2018    Closed fracture of proximal end of left tibia 11/22/2018    Closed fracture of shaft of left tibia 11/22/2018    Closed fracture of shaft of left tibia 11/22/2018    Depression     Dry eyes     Essential hypertension

## 2024-07-16 ENCOUNTER — OFFICE VISIT (OUTPATIENT)
Dept: NEUROLOGY | Age: 64
End: 2024-07-16
Payer: MEDICAID

## 2024-07-16 VITALS
HEIGHT: 66 IN | DIASTOLIC BLOOD PRESSURE: 82 MMHG | WEIGHT: 270 LBS | HEART RATE: 95 BPM | BODY MASS INDEX: 43.39 KG/M2 | SYSTOLIC BLOOD PRESSURE: 129 MMHG

## 2024-07-16 DIAGNOSIS — G25.0 ESSENTIAL TREMOR: ICD-10-CM

## 2024-07-16 DIAGNOSIS — G43.009 MIGRAINE WITHOUT AURA AND WITHOUT STATUS MIGRAINOSUS, NOT INTRACTABLE: Primary | ICD-10-CM

## 2024-07-16 PROCEDURE — 1036F TOBACCO NON-USER: CPT | Performed by: PSYCHIATRY & NEUROLOGY

## 2024-07-16 PROCEDURE — G8417 CALC BMI ABV UP PARAM F/U: HCPCS | Performed by: PSYCHIATRY & NEUROLOGY

## 2024-07-16 PROCEDURE — 99214 OFFICE O/P EST MOD 30 MIN: CPT | Performed by: PSYCHIATRY & NEUROLOGY

## 2024-07-16 PROCEDURE — 3017F COLORECTAL CA SCREEN DOC REV: CPT | Performed by: PSYCHIATRY & NEUROLOGY

## 2024-07-16 PROCEDURE — 3074F SYST BP LT 130 MM HG: CPT | Performed by: PSYCHIATRY & NEUROLOGY

## 2024-07-16 PROCEDURE — G8427 DOCREV CUR MEDS BY ELIG CLIN: HCPCS | Performed by: PSYCHIATRY & NEUROLOGY

## 2024-07-16 PROCEDURE — 3079F DIAST BP 80-89 MM HG: CPT | Performed by: PSYCHIATRY & NEUROLOGY

## 2024-07-16 RX ORDER — TOPIRAMATE 100 MG/1
TABLET, FILM COATED ORAL
Qty: 180 TABLET | Refills: 1 | Status: SHIPPED | OUTPATIENT
Start: 2024-07-16

## 2024-07-16 RX ORDER — PREDNISONE 20 MG/1
TABLET ORAL
Qty: 18 TABLET | Refills: 0 | Status: SHIPPED | OUTPATIENT
Start: 2024-07-16 | End: 2024-07-26

## 2024-07-16 RX ORDER — SUMATRIPTAN 6 MG/.5ML
INJECTION, SOLUTION SUBCUTANEOUS
Qty: 6 EACH | Refills: 11 | Status: SHIPPED | OUTPATIENT
Start: 2024-07-16

## 2024-07-16 RX ORDER — SUMATRIPTAN 100 MG/1
TABLET, FILM COATED ORAL
Qty: 18 TABLET | Refills: 11 | Status: SHIPPED | OUTPATIENT
Start: 2024-07-16

## 2024-07-16 RX ORDER — PRIMIDONE 50 MG/1
TABLET ORAL
Qty: 90 TABLET | Refills: 1 | Status: SHIPPED | OUTPATIENT
Start: 2024-07-16

## 2024-07-16 NOTE — PROGRESS NOTES
Subjective:      Patient ID: Shaista Ang is a 64 y.o. female.    Active problem common migraine headaches on elavil and topamax .The condition is she is having more stress with  having had kidney cancer having partial nephrectomy with headaches building up over the last 1 1/2 months  . These are twice per week over bilateral frontal temporal head of pressure quality of garde 8 over 10 attenuated with imitrex on occasion using imtrex SQ as back up. She remains on elavil 50 mg qhs and topamax 100 mg po bid tolerating well . She has social anxiety when she leaves the house placed on klonopin 0,5 mg po bid PRN . There is on going postural tremor of both hands moreon right  interfering with daily activity more so with stress. There is family history of tremor  There maybe blurred vision before headache . There will be no tingling , weakness or bulbar complaints . She can not use NSAID with prior bariatric surgery. Weather fronts may be trigger headaches.  Significant medications topamax 100 mg po bid , elavil 50 mg po qhs ,  imitrex 100 mg PRN, imitrex SQ PRN  . Testing Head CT normal , May 2020. MRI of Head normal      Past Medical History:   Diagnosis Date    Acromioclavicular joint arthritis 04/18/2016    Acute gastroenteritis 10/11/2017    Acute gastroenteritis 10/11/2017    Anemia 12/11/2018    Arthritis     Asthma 1980    On Inhaler    Bursitis     left shoulder    Chronic right shoulder pain 07/24/2018    Closed fracture of left tibial plateau 07/16/2019    Closed fracture of left tibial plateau 07/16/2019    Closed fracture of proximal end of left tibia 11/22/2018    Closed fracture of proximal end of left tibia 11/22/2018    Closed fracture of shaft of left tibia 11/22/2018    Closed fracture of shaft of left tibia 11/22/2018    Depression     Dry eyes     Essential hypertension 08/21/2017    Former smoker     GERD (gastroesophageal reflux disease)     not since Shelton-en-Y and weight loss

## 2024-07-20 DIAGNOSIS — F41.9 ANXIETY: ICD-10-CM

## 2024-07-22 ENCOUNTER — TELEPHONE (OUTPATIENT)
Dept: FAMILY MEDICINE CLINIC | Age: 64
End: 2024-07-22

## 2024-07-22 DIAGNOSIS — F41.9 ANXIETY: Primary | ICD-10-CM

## 2024-07-22 RX ORDER — BUSPIRONE HYDROCHLORIDE 10 MG/1
TABLET ORAL
Qty: 90 TABLET | Refills: 0 | Status: SHIPPED | OUTPATIENT
Start: 2024-07-22

## 2024-07-22 RX ORDER — ALPRAZOLAM 0.5 MG/1
0.5 TABLET ORAL 3 TIMES DAILY PRN
Qty: 30 TABLET | Refills: 0 | Status: SHIPPED | OUTPATIENT
Start: 2024-07-22 | End: 2024-08-21

## 2024-07-22 NOTE — TELEPHONE ENCOUNTER
Shaista Ang is calling to request a refill on the following medication(s):    Medication Request:  Requested Prescriptions     Pending Prescriptions Disp Refills    busPIRone (BUSPAR) 10 MG tablet [Pharmacy Med Name: busPIRone HCl 10 MG Oral Tablet] 90 tablet 0     Sig: TAKE 1 TABLET BY MOUTH THREE TIMES DAILY       Last Visit Date (If Applicable):  7/10/2024    Next Visit Date:    1/13/2025

## 2024-07-22 NOTE — TELEPHONE ENCOUNTER
[facility] pharmacy called to clarify if patient stopped taking the klonopin, since she was proscribed Xanax. Spoke with Dr. Mccain and she stated that patient is no longer on Klonopin due to the side effects

## 2024-08-08 ENCOUNTER — OFFICE VISIT (OUTPATIENT)
Dept: BEHAVIORAL/MENTAL HEALTH CLINIC | Age: 64
End: 2024-08-08

## 2024-08-08 DIAGNOSIS — F41.0 PANIC DISORDER: Primary | ICD-10-CM

## 2024-08-08 ASSESSMENT — PATIENT HEALTH QUESTIONNAIRE - PHQ9
2. FEELING DOWN, DEPRESSED OR HOPELESS: MORE THAN HALF THE DAYS
8. MOVING OR SPEAKING SO SLOWLY THAT OTHER PEOPLE COULD HAVE NOTICED. OR THE OPPOSITE, BEING SO FIGETY OR RESTLESS THAT YOU HAVE BEEN MOVING AROUND A LOT MORE THAN USUAL: NOT AT ALL
9. THOUGHTS THAT YOU WOULD BE BETTER OFF DEAD, OR OF HURTING YOURSELF: NOT AT ALL
SUM OF ALL RESPONSES TO PHQ QUESTIONS 1-9: 9
SUM OF ALL RESPONSES TO PHQ QUESTIONS 1-9: 9
3. TROUBLE FALLING OR STAYING ASLEEP: MORE THAN HALF THE DAYS
10. IF YOU CHECKED OFF ANY PROBLEMS, HOW DIFFICULT HAVE THESE PROBLEMS MADE IT FOR YOU TO DO YOUR WORK, TAKE CARE OF THINGS AT HOME, OR GET ALONG WITH OTHER PEOPLE: SOMEWHAT DIFFICULT
4. FEELING TIRED OR HAVING LITTLE ENERGY: MORE THAN HALF THE DAYS
SUM OF ALL RESPONSES TO PHQ QUESTIONS 1-9: 9
6. FEELING BAD ABOUT YOURSELF - OR THAT YOU ARE A FAILURE OR HAVE LET YOURSELF OR YOUR FAMILY DOWN: SEVERAL DAYS
7. TROUBLE CONCENTRATING ON THINGS, SUCH AS READING THE NEWSPAPER OR WATCHING TELEVISION: NOT AT ALL
SUM OF ALL RESPONSES TO PHQ QUESTIONS 1-9: 9
1. LITTLE INTEREST OR PLEASURE IN DOING THINGS: MORE THAN HALF THE DAYS
5. POOR APPETITE OR OVEREATING: NOT AT ALL
SUM OF ALL RESPONSES TO PHQ9 QUESTIONS 1 & 2: 4

## 2024-08-08 ASSESSMENT — ANXIETY QUESTIONNAIRES
6. BECOMING EASILY ANNOYED OR IRRITABLE: SEVERAL DAYS
5. BEING SO RESTLESS THAT IT IS HARD TO SIT STILL: NOT AT ALL
1. FEELING NERVOUS, ANXIOUS, OR ON EDGE: NEARLY EVERY DAY
3. WORRYING TOO MUCH ABOUT DIFFERENT THINGS: MORE THAN HALF THE DAYS
7. FEELING AFRAID AS IF SOMETHING AWFUL MIGHT HAPPEN: MORE THAN HALF THE DAYS
4. TROUBLE RELAXING: SEVERAL DAYS
GAD7 TOTAL SCORE: 11
2. NOT BEING ABLE TO STOP OR CONTROL WORRYING: MORE THAN HALF THE DAYS
IF YOU CHECKED OFF ANY PROBLEMS ON THIS QUESTIONNAIRE, HOW DIFFICULT HAVE THESE PROBLEMS MADE IT FOR YOU TO DO YOUR WORK, TAKE CARE OF THINGS AT HOME, OR GET ALONG WITH OTHER PEOPLE: VERY DIFFICULT

## 2024-08-08 NOTE — PROGRESS NOTES
ADULT BEHAVIORAL HEALTH ASSESSMENT  Mode Lorenzo, PhD  Postdoctoral Psychology Fellow     Supervisor: [place] Renee,  Ph.D.   Licensed Clinical Psychologist ([place] [id number], [place] [phone])      Visit Date: 8/8/2024   Time of appointment:  10:00 - 10:52   Time spent with Patient: 52 minutes.   This is patient's first appointment.    Reason for Consult:  Panic Disorder     Referring Provider/PCP:    Juana Mccain MD      Pt provided informed consent for the behavioral health program. Discussed with patient model of service to include the limits of confidentiality (i.e. abuse reporting, suicide intervention, etc.) and short-term intervention focused approach. Also discussed with patient that the service provider is a supervised clinician and in particular, is being supervised by Dr. Renee. Pt indicated understanding. Pt also signed the consent form agreeing to be seen by a supervised clinician.    PRESENTING PROBLEM AND HISTORY  Shaista is a 64 y.o. female who presents for new evaluation and treatment of  anxiety, and interoceptive symptoms such as shortness of breath, clammy hands, pounding heart, and tingling in the legs.  She has the following symptoms: depressed mood, anhedonia, isolating self, feelings of worthlessness, feeling nervous, anxious, or on edge, racing worry thoughts, excessive anxiety and worry about specific stressors, avoidance of situations that provoke fear and anxiety, unexpected panic attacks, persistent worry about additional panic attacks, and feeling afraid something awful might happen.  Onset of symptoms was approximately 4 years ago.  Symptoms have been gradually worsening since that time.  She denies current suicidal and homicidal ideation.  Family history significant for no psychiatric illness.  Risk factors: negative life event  diagnosed with cancer with poor prognosis .  Previous treatment includes BuSpar and Xanax.  She complains of the following medication side effects:

## 2024-08-14 DIAGNOSIS — J45.20 MILD INTERMITTENT ASTHMA WITHOUT COMPLICATION: ICD-10-CM

## 2024-08-22 ENCOUNTER — TELEMEDICINE (OUTPATIENT)
Dept: BEHAVIORAL/MENTAL HEALTH CLINIC | Age: 64
End: 2024-08-22

## 2024-08-22 DIAGNOSIS — F43.22 ADJUSTMENT DISORDER WITH ANXIETY: Primary | ICD-10-CM

## 2024-08-22 ASSESSMENT — PATIENT HEALTH QUESTIONNAIRE - PHQ9
SUM OF ALL RESPONSES TO PHQ QUESTIONS 1-9: 0
2. FEELING DOWN, DEPRESSED OR HOPELESS: NOT AT ALL
SUM OF ALL RESPONSES TO PHQ9 QUESTIONS 1 & 2: 0
SUM OF ALL RESPONSES TO PHQ QUESTIONS 1-9: 0
1. LITTLE INTEREST OR PLEASURE IN DOING THINGS: NOT AT ALL

## 2024-08-22 ASSESSMENT — ANXIETY QUESTIONNAIRES
IF YOU CHECKED OFF ANY PROBLEMS ON THIS QUESTIONNAIRE, HOW DIFFICULT HAVE THESE PROBLEMS MADE IT FOR YOU TO DO YOUR WORK, TAKE CARE OF THINGS AT HOME, OR GET ALONG WITH OTHER PEOPLE: VERY DIFFICULT
2. NOT BEING ABLE TO STOP OR CONTROL WORRYING: NEARLY EVERY DAY
6. BECOMING EASILY ANNOYED OR IRRITABLE: MORE THAN HALF THE DAYS
3. WORRYING TOO MUCH ABOUT DIFFERENT THINGS: NEARLY EVERY DAY
GAD7 TOTAL SCORE: 14
7. FEELING AFRAID AS IF SOMETHING AWFUL MIGHT HAPPEN: NEARLY EVERY DAY
1. FEELING NERVOUS, ANXIOUS, OR ON EDGE: NEARLY EVERY DAY
5. BEING SO RESTLESS THAT IT IS HARD TO SIT STILL: NOT AT ALL
4. TROUBLE RELAXING: NOT AT ALL

## 2024-08-22 NOTE — PROGRESS NOTES
ADULT BEHAVIORAL HEALTH FOLLOW UP  Mode Lorenzo , Ph.D  Postdoctoral Psychology Fellow    Supervisor: Florida Renee,  Ph.D.   Licensed Clinical Psychologist ([place] [address], [place] [phone])      Visit Date: 8/22/2024   Time of appointment:  9:00 - 9:50   Time spent with Patient: 50 minutes.   This is patient's second appointment.    Reason for Consult:  Anxiety     Referring Provider/PCP:    Juana Mccain MD Jeanette M Korzeniewski, was evaluated through a synchronous (real-time) audio-video encounter. The patient (or guardian if applicable) is aware that this is a billable service, which includes applicable co-pays. This Virtual Visit was conducted with patient's (and/or legal guardian's) consent. Patient identification was verified, and a caregiver was present when appropriate.   The patient was located at Home: [address] [place] [id number]  Provider was located at Home (Appt Dept State): [place]  Confirm you are appropriately licensed, registered, or certified to deliver care in the state where the patient is located as indicated above. If you are not or unsure, please re-schedule the visit: Yes, I confirm.        Total time spent for this encounter:  50 minutes    --Mode Lorenzo on 8/22/2024 at 12:58 PM    An electronic signature was used to authenticate this note.     Pt provided informed consent for the behavioral health program. Discussed with patient model of service to include the limits of confidentiality (i.e. abuse reporting, suicide intervention, etc.) and short-term intervention focused approach. Also discussed with patient that the service provider is a supervised clinician and in particular, is being supervised by Dr. Renee. Pt indicated understanding. Pt also signed the consent form agreeing to be seen by a supervised clinician. Pt indicated understanding.    FAUSTO Noonan is a 64 y.o. female who presents for follow up of anxiety and panic. Shaista stated that she had her first ever panic

## 2024-08-24 DIAGNOSIS — F41.9 ANXIETY: ICD-10-CM

## 2024-08-26 RX ORDER — BUSPIRONE HYDROCHLORIDE 10 MG/1
TABLET ORAL
Qty: 90 TABLET | Refills: 5 | Status: SHIPPED | OUTPATIENT
Start: 2024-08-26

## 2024-09-05 ENCOUNTER — OFFICE VISIT (OUTPATIENT)
Dept: PULMONOLOGY | Age: 64
End: 2024-09-05

## 2024-09-05 VITALS
BODY MASS INDEX: 45.16 KG/M2 | TEMPERATURE: 97 F | SYSTOLIC BLOOD PRESSURE: 116 MMHG | RESPIRATION RATE: 16 BRPM | HEART RATE: 87 BPM | WEIGHT: 281 LBS | HEIGHT: 66 IN | OXYGEN SATURATION: 95 % | DIASTOLIC BLOOD PRESSURE: 70 MMHG

## 2024-09-05 DIAGNOSIS — J45.20 MILD INTERMITTENT ASTHMA WITHOUT COMPLICATION: Primary | ICD-10-CM

## 2024-09-05 DIAGNOSIS — Z23 NEED FOR INFLUENZA VACCINATION: ICD-10-CM

## 2024-09-05 DIAGNOSIS — Z86.718 HISTORY OF DVT (DEEP VEIN THROMBOSIS): ICD-10-CM

## 2024-09-05 DIAGNOSIS — J45.20 MILD INTERMITTENT ASTHMA WITHOUT COMPLICATION: ICD-10-CM

## 2024-09-05 DIAGNOSIS — Z98.84 STATUS POST GASTRIC BYPASS FOR OBESITY: ICD-10-CM

## 2024-09-05 RX ORDER — ALBUTEROL SULFATE 90 UG/1
AEROSOL, METERED RESPIRATORY (INHALATION)
Qty: 3 EACH | Refills: 3 | Status: CANCELLED | OUTPATIENT
Start: 2024-09-05

## 2024-09-05 RX ORDER — MONTELUKAST SODIUM 10 MG/1
10 TABLET ORAL NIGHTLY
Qty: 90 TABLET | Refills: 3 | Status: CANCELLED | OUTPATIENT
Start: 2024-09-05

## 2024-09-05 RX ORDER — ALBUTEROL SULFATE 0.83 MG/ML
2.5 SOLUTION RESPIRATORY (INHALATION) EVERY 6 HOURS PRN
Qty: 120 EACH | Refills: 11 | Status: CANCELLED | OUTPATIENT
Start: 2024-09-05

## 2024-09-05 ASSESSMENT — SLEEP AND FATIGUE QUESTIONNAIRES
HOW LIKELY ARE YOU TO NOD OFF OR FALL ASLEEP WHEN YOU ARE A PASSENGER IN A CAR FOR AN HOUR WITHOUT A BREAK: WOULD NEVER DOZE
ESS TOTAL SCORE: 4
HOW LIKELY ARE YOU TO NOD OFF OR FALL ASLEEP WHILE SITTING INACTIVE IN A PUBLIC PLACE: WOULD NEVER DOZE
HOW LIKELY ARE YOU TO NOD OFF OR FALL ASLEEP WHILE SITTING QUIETLY AFTER LUNCH WITHOUT ALCOHOL: WOULD NEVER DOZE
HOW LIKELY ARE YOU TO NOD OFF OR FALL ASLEEP WHILE SITTING AND READING: MODERATE CHANCE OF DOZING
HOW LIKELY ARE YOU TO NOD OFF OR FALL ASLEEP IN A CAR, WHILE STOPPED FOR A FEW MINUTES IN TRAFFIC: WOULD NEVER DOZE
HOW LIKELY ARE YOU TO NOD OFF OR FALL ASLEEP WHILE LYING DOWN TO REST IN THE AFTERNOON WHEN CIRCUMSTANCES PERMIT: SLIGHT CHANCE OF DOZING
HOW LIKELY ARE YOU TO NOD OFF OR FALL ASLEEP WHILE SITTING AND TALKING TO SOMEONE: WOULD NEVER DOZE
HOW LIKELY ARE YOU TO NOD OFF OR FALL ASLEEP WHILE WATCHING TV: SLIGHT CHANCE OF DOZING

## 2024-09-05 NOTE — PROGRESS NOTES
organomegaly    Lower extremity-no edema    Upper extremity-no edema    Neurological-grossly normal cranial nerves.  No overt motor deficit    /70 (Site: Right Upper Arm)   Pulse 87   Temp 97 °F (36.1 °C)   Resp 16   Ht 1.676 m (5' 6\")   Wt 127.5 kg (281 lb)   LMP  (LMP Unknown)   SpO2 95% Comment: room air at rest  BMI 45.35 kg/m²       Wt Readings from Last 3 Encounters:   09/05/24 127.5 kg (281 lb)   07/22/24 123.8 kg (273 lb)   07/16/24 122.5 kg (270 lb)        Assessment   Diagnosis Orders   1. Mild intermittent asthma without complication  DME Order for Nebulizer as OP      2. Need for influenza vaccination  Influenza, FLUCELVAX Trivalent, (age 6 mo+) IM, Preservative Free, 0.5mL      3. History of DVT (deep vein thrombosis)        4. Status post gastric bypass for obesity              Plan:  Continue CPAP therapy.  This is an auto titrating machine.  Continue asthma treatment with Dulera and Singulair  Use albuterol as needed  With loss encouraged  She uses nebulized albuterol as needed and requires a new nebulizer  Follow-up in 6 months to a year  Requested Prescriptions      No prescriptions requested or ordered in this encounter       Medications Discontinued During This Encounter   Medication Reason    ipratropium 0.5 mg-albuterol 2.5 mg (DUONEB) 0.5-2.5 (3) MG/3ML SOLN nebulizer solution LIST CLEANUP    clonazePAM (KLONOPIN) 0.5 MG tablet LIST CLEANUP       Shaista received counseling on the following healthy behaviors: nutrition, exercise and medication adherence             Discussed use, benefit, and side effects of prescribed medications.  Barriers to medication compliance addressed.      All patient questions answered.  Pt voiced understanding.     Electronically signed by ATUL HARRIS MD on 9/5/2024 at 3:49 PM

## 2024-09-09 RX ORDER — TOPIRAMATE 100 MG/1
TABLET, FILM COATED ORAL
Qty: 60 TABLET | Refills: 0 | OUTPATIENT
Start: 2024-09-09

## 2024-09-17 RX ORDER — TOPIRAMATE 100 MG/1
100 TABLET, FILM COATED ORAL 2 TIMES DAILY
Qty: 180 TABLET | Refills: 3 | Status: SHIPPED | OUTPATIENT
Start: 2024-09-17 | End: 2025-09-17

## 2024-09-19 ENCOUNTER — TELEPHONE (OUTPATIENT)
Dept: NEUROLOGY | Age: 64
End: 2024-09-19

## 2024-09-19 RX ORDER — PREDNISONE 20 MG/1
TABLET ORAL
Qty: 18 TABLET | Refills: 0 | Status: SHIPPED | OUTPATIENT
Start: 2024-09-19 | End: 2024-09-29

## 2024-09-26 ENCOUNTER — TELEMEDICINE (OUTPATIENT)
Dept: BEHAVIORAL/MENTAL HEALTH CLINIC | Age: 64
End: 2024-09-26
Payer: MEDICAID

## 2024-09-26 DIAGNOSIS — F43.22 ADJUSTMENT DISORDER WITH ANXIETY: Primary | ICD-10-CM

## 2024-09-26 PROCEDURE — 90832 PSYTX W PT 30 MINUTES: CPT | Performed by: PSYCHOLOGIST

## 2024-09-26 PROCEDURE — 1036F TOBACCO NON-USER: CPT | Performed by: PSYCHOLOGIST

## 2024-09-26 ASSESSMENT — PATIENT HEALTH QUESTIONNAIRE - PHQ9
SUM OF ALL RESPONSES TO PHQ QUESTIONS 1-9: 0
SUM OF ALL RESPONSES TO PHQ9 QUESTIONS 1 & 2: 0
SUM OF ALL RESPONSES TO PHQ QUESTIONS 1-9: 0
1. LITTLE INTEREST OR PLEASURE IN DOING THINGS: NOT AT ALL
2. FEELING DOWN, DEPRESSED OR HOPELESS: NOT AT ALL

## 2024-09-26 ASSESSMENT — ANXIETY QUESTIONNAIRES
6. BECOMING EASILY ANNOYED OR IRRITABLE: NOT AT ALL
5. BEING SO RESTLESS THAT IT IS HARD TO SIT STILL: NOT AT ALL
4. TROUBLE RELAXING: NOT AT ALL
1. FEELING NERVOUS, ANXIOUS, OR ON EDGE: MORE THAN HALF THE DAYS
2. NOT BEING ABLE TO STOP OR CONTROL WORRYING: MORE THAN HALF THE DAYS
7. FEELING AFRAID AS IF SOMETHING AWFUL MIGHT HAPPEN: NEARLY EVERY DAY
IF YOU CHECKED OFF ANY PROBLEMS ON THIS QUESTIONNAIRE, HOW DIFFICULT HAVE THESE PROBLEMS MADE IT FOR YOU TO DO YOUR WORK, TAKE CARE OF THINGS AT HOME, OR GET ALONG WITH OTHER PEOPLE: VERY DIFFICULT
3. WORRYING TOO MUCH ABOUT DIFFERENT THINGS: NEARLY EVERY DAY
GAD7 TOTAL SCORE: 10

## 2024-09-26 NOTE — PROGRESS NOTES
present?  No  Reason:  N/A  Additional Supporting Information:  N/A     Electronically signed by Mode Lorenzo on 8/22/2024 at 12:42 PM

## 2024-10-03 ENCOUNTER — TELEPHONE (OUTPATIENT)
Dept: PULMONOLOGY | Age: 64
End: 2024-10-03

## 2024-10-03 NOTE — TELEPHONE ENCOUNTER
A call came in from [facility]. Faby received the refaxed order for nebulizer and office note but explained that per patient's insurance it must go through [facility]. [place]eline had sent it to [facility] a couple weeks ago.Writer  called patient to see if she has it but call went to voicemail. A message was left asking for a call back to inform us with writer's name and office number provided.

## 2024-10-07 ENCOUNTER — HOSPITAL ENCOUNTER (OUTPATIENT)
Age: 64
Discharge: HOME | End: 2024-10-07
Payer: MEDICAID

## 2024-10-07 DIAGNOSIS — M81.0 AGE-RELATED OSTEOPOROSIS WITHOUT CURRENT PATHOLOGICAL FRACTURE: ICD-10-CM

## 2024-10-07 LAB
ALBUMIN SERPL-MCNC: 4 G/DL (ref 3.5–5.2)
ALBUMIN/GLOB SERPL: 2 {RATIO} (ref 1–2.5)
ALP SERPL-CCNC: 84 U/L (ref 35–104)
ALT SERPL-CCNC: 15 U/L (ref 10–35)
ANION GAP SERPL CALCULATED.3IONS-SCNC: 8 MMOL/L (ref 9–16)
AST SERPL-CCNC: 16 U/L (ref 10–35)
BILIRUB SERPL-MCNC: <0.2 MG/DL (ref 0–1.2)
BUN SERPL-MCNC: 12 MG/DL (ref 8–23)
CALCIUM SERPL-MCNC: 8.8 MG/DL (ref 8.6–10.4)
CHLORIDE SERPL-SCNC: 111 MMOL/L (ref 98–107)
CO2 SERPL-SCNC: 22 MMOL/L (ref 20–31)
CREAT SERPL-MCNC: 1 MG/DL (ref 0.5–0.9)
GFR, ESTIMATED: 66 ML/MIN/1.73M2
GLUCOSE SERPL-MCNC: 79 MG/DL (ref 74–99)
POTASSIUM SERPL-SCNC: 4.1 MMOL/L (ref 3.7–5.3)
PROT SERPL-MCNC: 6.5 G/DL (ref 6.6–8.7)
SODIUM SERPL-SCNC: 141 MMOL/L (ref 136–145)

## 2024-10-07 PROCEDURE — 36415 COLL VENOUS BLD VENIPUNCTURE: CPT

## 2024-10-07 PROCEDURE — 80053 COMPREHEN METABOLIC PANEL: CPT

## 2024-10-14 ENCOUNTER — HOSPITAL ENCOUNTER (OUTPATIENT)
Dept: INFUSION THERAPY | Age: 64
Discharge: HOME | End: 2024-10-14
Payer: MEDICAID

## 2024-10-14 DIAGNOSIS — M81.0 AGE-RELATED OSTEOPOROSIS WITHOUT CURRENT PATHOLOGICAL FRACTURE: Primary | ICD-10-CM

## 2024-10-14 PROCEDURE — 96372 THER/PROPH/DIAG INJ SC/IM: CPT

## 2024-10-14 PROCEDURE — 6360000002 HC RX W HCPCS: Performed by: STUDENT IN AN ORGANIZED HEALTH CARE EDUCATION/TRAINING PROGRAM

## 2024-10-14 RX ORDER — SODIUM CHLORIDE 9 MG/ML
INJECTION, SOLUTION INTRAVENOUS CONTINUOUS
OUTPATIENT
Start: 2025-04-14

## 2024-10-14 RX ORDER — EPINEPHRINE 1 MG/ML
0.3 INJECTION, SOLUTION, CONCENTRATE INTRAVENOUS PRN
OUTPATIENT
Start: 2025-04-14

## 2024-10-14 RX ORDER — ONDANSETRON 2 MG/ML
8 INJECTION INTRAMUSCULAR; INTRAVENOUS
OUTPATIENT
Start: 2025-04-14

## 2024-10-14 RX ORDER — FAMOTIDINE 10 MG/ML
20 INJECTION, SOLUTION INTRAVENOUS
OUTPATIENT
Start: 2025-04-14

## 2024-10-14 RX ORDER — ALBUTEROL SULFATE 90 UG/1
4 INHALANT RESPIRATORY (INHALATION) PRN
OUTPATIENT
Start: 2025-04-14

## 2024-10-14 RX ORDER — ACETAMINOPHEN 325 MG/1
650 TABLET ORAL
OUTPATIENT
Start: 2025-04-14

## 2024-10-14 RX ORDER — DIPHENHYDRAMINE HYDROCHLORIDE 50 MG/ML
50 INJECTION INTRAMUSCULAR; INTRAVENOUS
OUTPATIENT
Start: 2025-04-14

## 2024-10-14 RX ADMIN — DENOSUMAB 60 MG: 60 INJECTION SUBCUTANEOUS at 10:41

## 2024-10-31 ENCOUNTER — TELEMEDICINE (OUTPATIENT)
Dept: BEHAVIORAL/MENTAL HEALTH CLINIC | Age: 64
End: 2024-10-31

## 2024-10-31 DIAGNOSIS — F43.22 ADJUSTMENT DISORDER WITH ANXIETY: Primary | ICD-10-CM

## 2024-10-31 ASSESSMENT — PATIENT HEALTH QUESTIONNAIRE - PHQ9
SUM OF ALL RESPONSES TO PHQ QUESTIONS 1-9: 0
1. LITTLE INTEREST OR PLEASURE IN DOING THINGS: NOT AT ALL
SUM OF ALL RESPONSES TO PHQ9 QUESTIONS 1 & 2: 0
2. FEELING DOWN, DEPRESSED OR HOPELESS: NOT AT ALL
SUM OF ALL RESPONSES TO PHQ QUESTIONS 1-9: 0

## 2024-10-31 ASSESSMENT — ANXIETY QUESTIONNAIRES
5. BEING SO RESTLESS THAT IT IS HARD TO SIT STILL: MORE THAN HALF THE DAYS
1. FEELING NERVOUS, ANXIOUS, OR ON EDGE: MORE THAN HALF THE DAYS
GAD7 TOTAL SCORE: 14
IF YOU CHECKED OFF ANY PROBLEMS ON THIS QUESTIONNAIRE, HOW DIFFICULT HAVE THESE PROBLEMS MADE IT FOR YOU TO DO YOUR WORK, TAKE CARE OF THINGS AT HOME, OR GET ALONG WITH OTHER PEOPLE: SOMEWHAT DIFFICULT
7. FEELING AFRAID AS IF SOMETHING AWFUL MIGHT HAPPEN: MORE THAN HALF THE DAYS
3. WORRYING TOO MUCH ABOUT DIFFERENT THINGS: MORE THAN HALF THE DAYS
4. TROUBLE RELAXING: MORE THAN HALF THE DAYS
6. BECOMING EASILY ANNOYED OR IRRITABLE: MORE THAN HALF THE DAYS
2. NOT BEING ABLE TO STOP OR CONTROL WORRYING: MORE THAN HALF THE DAYS

## 2024-10-31 NOTE — PROGRESS NOTES
PHQ Scores   PHQ2 Score 0 0 0 4 0 0 0   PHQ9 Score 0 0 0 9 0 0 0     Interpretation of Total Score Depression Severity: 1-4 = Minimal depression, 5-9 = Mild depression, 10-14 = Moderate depression, 15-19 = Moderately severe depression, 20-27 = Severe depression          10/31/2024     1:12 PM 9/26/2024    12:42 PM 8/22/2024    12:39 PM 8/8/2024    10:47 AM 5/27/2021    10:00 AM 2/4/2021     8:00 AM   SACHI 7 SCORE   SACHI-7 Total Score 14 10 14 11 20    SACHI-7 Total Score      2     Interpretation of SACHI-7 score: 5-9 = mild anxiety, 10-14 = moderate anxiety, 15+ = severe anxiety. Recommend referral to behavioral health for scores 10 or greater.       DIAGNOSIS  Shaista was seen today for anxiety.    Diagnoses and all orders for this visit:    Adjustment disorder with anxiety        INTERVENTION  Discussed various factors related to the development and maintenance of  anxiety (including biological, cognitive, behavioral, and environmental factors), Trained in strategies for increasing balanced thinking, Trained in relaxation strategies, Trained in improving communication skills, Provided education, Discussed and problem-solved barriers in adhering to behavioral change plan, Motivational Interviewing to increase patient confidence and compliance with adhering to behavioral change plan, Motivational Interviewing to determine importance and readiness for change, Discussed potential barriers to change, Established rapport, Conducted functional assessment, and Supportive techniques    PLAN  No future session is currently scheduled.    INTERACTIVE COMPLEXITY  Is interactive complexity present?  No  Reason:  N/A  Additional Supporting Information:  N/A     Electronically signed by Mode Lorenzo on 8/22/2024 at 12:42 PM

## 2024-11-19 DIAGNOSIS — J45.20 MILD INTERMITTENT ASTHMA WITHOUT COMPLICATION: ICD-10-CM

## 2024-11-19 RX ORDER — MONTELUKAST SODIUM 10 MG/1
10 TABLET ORAL NIGHTLY
Qty: 90 TABLET | Refills: 3 | Status: SHIPPED | OUTPATIENT
Start: 2024-11-19

## 2024-11-19 NOTE — TELEPHONE ENCOUNTER
LAST VISIT: 9/5/24  NEXT VISIT: 9/8/25    Per last dictation patient to continue this medication. Please sign for refill if ok. Thank you.    no

## 2024-12-20 DIAGNOSIS — B88.8 INFESTATION BY BED BUG: Primary | ICD-10-CM

## 2024-12-20 RX ORDER — TRIAMCINOLONE ACETONIDE 0.25 MG/G
CREAM TOPICAL
Qty: 15 G | Refills: 0 | Status: SHIPPED | OUTPATIENT
Start: 2024-12-20

## 2024-12-20 RX ORDER — HYDROXYZINE HYDROCHLORIDE 25 MG/1
25 TABLET, FILM COATED ORAL NIGHTLY
Qty: 30 TABLET | Refills: 0 | Status: SHIPPED | OUTPATIENT
Start: 2024-12-20 | End: 2025-01-19

## 2024-12-23 DIAGNOSIS — F43.22 ADJUSTMENT DISORDER WITH ANXIETY: ICD-10-CM

## 2024-12-23 RX ORDER — AMITRIPTYLINE HYDROCHLORIDE 50 MG/1
50 TABLET ORAL NIGHTLY
Qty: 90 TABLET | Refills: 0 | Status: SHIPPED | OUTPATIENT
Start: 2024-12-23

## 2024-12-23 NOTE — TELEPHONE ENCOUNTER
Shaista Ang is calling to request a refill on the following medication(s):    Medication Request:  Requested Prescriptions     Pending Prescriptions Disp Refills    amitriptyline (ELAVIL) 50 MG tablet [Pharmacy Med Name: Amitriptyline HCl 50 MG Oral Tablet] 90 tablet 0     Sig: Take 1 tablet by mouth nightly       Last Visit Date (If Applicable):  7/10/2024    Next Visit Date:    1/13/2025              f

## 2024-12-30 NOTE — TELEPHONE ENCOUNTER
Pharmacy requesting refill of primidone (MYSOLINE) 50 MG tablet      Medication active on med list yes      Date of last Rx: 7/16/2024 with 1 refills          verified by MAJO MCINTOSH      Date of last appointment 7/16/2024    Next Visit Date:  1/21/2025

## 2025-01-02 RX ORDER — PRIMIDONE 50 MG/1
TABLET ORAL
Qty: 90 TABLET | Refills: 1 | Status: SHIPPED | OUTPATIENT
Start: 2025-01-06

## 2025-01-13 ENCOUNTER — TELEMEDICINE (OUTPATIENT)
Dept: FAMILY MEDICINE CLINIC | Age: 65
End: 2025-01-13
Payer: MEDICAID

## 2025-01-13 DIAGNOSIS — F43.22 ADJUSTMENT DISORDER WITH ANXIETY: Primary | ICD-10-CM

## 2025-01-13 DIAGNOSIS — B88.8 INFESTATION BY BED BUG: ICD-10-CM

## 2025-01-13 DIAGNOSIS — J45.20 MILD INTERMITTENT ASTHMA WITHOUT COMPLICATION: ICD-10-CM

## 2025-01-13 PROCEDURE — 99214 OFFICE O/P EST MOD 30 MIN: CPT | Performed by: STUDENT IN AN ORGANIZED HEALTH CARE EDUCATION/TRAINING PROGRAM

## 2025-01-13 PROCEDURE — G8427 DOCREV CUR MEDS BY ELIG CLIN: HCPCS | Performed by: STUDENT IN AN ORGANIZED HEALTH CARE EDUCATION/TRAINING PROGRAM

## 2025-01-13 PROCEDURE — 3017F COLORECTAL CA SCREEN DOC REV: CPT | Performed by: STUDENT IN AN ORGANIZED HEALTH CARE EDUCATION/TRAINING PROGRAM

## 2025-01-13 PROCEDURE — 1036F TOBACCO NON-USER: CPT | Performed by: STUDENT IN AN ORGANIZED HEALTH CARE EDUCATION/TRAINING PROGRAM

## 2025-01-13 PROCEDURE — G8417 CALC BMI ABV UP PARAM F/U: HCPCS | Performed by: STUDENT IN AN ORGANIZED HEALTH CARE EDUCATION/TRAINING PROGRAM

## 2025-01-13 RX ORDER — ALPRAZOLAM 0.25 MG/1
0.25 TABLET ORAL 3 TIMES DAILY PRN
Qty: 30 TABLET | Refills: 0 | Status: SHIPPED | OUTPATIENT
Start: 2025-01-13 | End: 2025-02-12

## 2025-01-13 RX ORDER — HYDROXYZINE HYDROCHLORIDE 25 MG/1
25 TABLET, FILM COATED ORAL NIGHTLY
Qty: 30 TABLET | Refills: 3 | Status: SHIPPED | OUTPATIENT
Start: 2025-01-13 | End: 2025-02-12

## 2025-01-13 RX ORDER — HYDROXYZINE HYDROCHLORIDE 25 MG/1
25 TABLET, FILM COATED ORAL NIGHTLY
Qty: 30 TABLET | Refills: 0 | OUTPATIENT
Start: 2025-01-13

## 2025-01-13 SDOH — ECONOMIC STABILITY: FOOD INSECURITY: WITHIN THE PAST 12 MONTHS, YOU WORRIED THAT YOUR FOOD WOULD RUN OUT BEFORE YOU GOT MONEY TO BUY MORE.: SOMETIMES TRUE

## 2025-01-13 SDOH — ECONOMIC STABILITY: FOOD INSECURITY: WITHIN THE PAST 12 MONTHS, THE FOOD YOU BOUGHT JUST DIDN'T LAST AND YOU DIDN'T HAVE MONEY TO GET MORE.: NEVER TRUE

## 2025-01-13 SDOH — ECONOMIC STABILITY: INCOME INSECURITY: IN THE LAST 12 MONTHS, WAS THERE A TIME WHEN YOU WERE NOT ABLE TO PAY THE MORTGAGE OR RENT ON TIME?: NO

## 2025-01-13 ASSESSMENT — PATIENT HEALTH QUESTIONNAIRE - PHQ9
1. LITTLE INTEREST OR PLEASURE IN DOING THINGS: SEVERAL DAYS
SUM OF ALL RESPONSES TO PHQ9 QUESTIONS 1 & 2: 2
SUM OF ALL RESPONSES TO PHQ QUESTIONS 1-9: 2
2. FEELING DOWN, DEPRESSED OR HOPELESS: SEVERAL DAYS

## 2025-01-13 NOTE — PROGRESS NOTES
2025    TELEHEALTH EVALUATION -- Audio/Visual    HPI:    Shaista Ang (:  1960) has requested an audio/video evaluation for the following concern(s):    She is following up on anxiety and depression.  She states that her depression has worsened lately especially since they have been dealing with bedbugs and have not been able to find any solution.  She states that she did receive a call from  who tried to help them but also suggested that they threw out on their furniture.  She states that they do not have financial means to buy more furniture.  She got the bombs to kill bedbugs over-the-counter but states that she will have to leave her home when she uses them and they have nowhere to go, states that it would be hard to sit in the car for so long in this cold weather.  She is no longer following with psychotherapy saying that it was no longer being helpful to her.    Review of Systems   Constitutional:  Negative for appetite change, fatigue and fever.   HENT:  Negative for congestion, ear pain, hearing loss and sore throat.    Eyes:  Negative for discharge and visual disturbance.   Respiratory:  Negative for cough, chest tightness, shortness of breath and wheezing.    Cardiovascular:  Negative for chest pain, palpitations and leg swelling.   Gastrointestinal:  Negative for abdominal pain, constipation, diarrhea, nausea and vomiting.   Genitourinary:  Negative for flank pain, frequency, hematuria and urgency.   Musculoskeletal:  Negative for arthralgias, gait problem, joint swelling and myalgias.   Skin: Negative.    Neurological:  Negative for dizziness, weakness, numbness and headaches.   Psychiatric/Behavioral:  Positive for dysphoric mood. The patient is nervous/anxious.        Prior to Visit Medications    Medication Sig Taking? Authorizing Provider   sertraline (ZOLOFT) 50 MG tablet Take 1 tablet by mouth daily Yes Juana Mccain MD   ALPRAZolam (XANAX) 0.25 MG tablet Take

## 2025-01-13 NOTE — TELEPHONE ENCOUNTER
Shaista Ang is calling to request a refill on the following medication(s):    Medication Request:  Requested Prescriptions     Pending Prescriptions Disp Refills    hydrOXYzine HCl (ATARAX) 25 MG tablet [Pharmacy Med Name: hydrOXYzine HCl 25 MG Oral Tablet] 30 tablet 0     Sig: Take 1 tablet by mouth nightly       Last Visit Date (If Applicable):  7/10/2024    Next Visit Date:    1/13/2025

## 2025-01-14 ASSESSMENT — ENCOUNTER SYMPTOMS
CONSTIPATION: 0
NAUSEA: 0
DIARRHEA: 0
VOMITING: 0
EYE DISCHARGE: 0
SORE THROAT: 0
CHEST TIGHTNESS: 0
COUGH: 0
SHORTNESS OF BREATH: 0
WHEEZING: 0
ABDOMINAL PAIN: 0

## 2025-01-21 ENCOUNTER — OFFICE VISIT (OUTPATIENT)
Dept: NEUROLOGY | Age: 65
End: 2025-01-21
Payer: MEDICAID

## 2025-01-21 VITALS
DIASTOLIC BLOOD PRESSURE: 78 MMHG | WEIGHT: 288.2 LBS | HEART RATE: 92 BPM | HEIGHT: 66 IN | BODY MASS INDEX: 46.32 KG/M2 | SYSTOLIC BLOOD PRESSURE: 122 MMHG

## 2025-01-21 DIAGNOSIS — G25.0 ESSENTIAL TREMOR: ICD-10-CM

## 2025-01-21 DIAGNOSIS — G43.009 MIGRAINE WITHOUT AURA AND WITHOUT STATUS MIGRAINOSUS, NOT INTRACTABLE: Primary | ICD-10-CM

## 2025-01-21 PROCEDURE — G8417 CALC BMI ABV UP PARAM F/U: HCPCS | Performed by: PSYCHIATRY & NEUROLOGY

## 2025-01-21 PROCEDURE — G8427 DOCREV CUR MEDS BY ELIG CLIN: HCPCS | Performed by: PSYCHIATRY & NEUROLOGY

## 2025-01-21 PROCEDURE — 1036F TOBACCO NON-USER: CPT | Performed by: PSYCHIATRY & NEUROLOGY

## 2025-01-21 PROCEDURE — 99214 OFFICE O/P EST MOD 30 MIN: CPT | Performed by: PSYCHIATRY & NEUROLOGY

## 2025-01-21 PROCEDURE — 3017F COLORECTAL CA SCREEN DOC REV: CPT | Performed by: PSYCHIATRY & NEUROLOGY

## 2025-01-21 PROCEDURE — 3074F SYST BP LT 130 MM HG: CPT | Performed by: PSYCHIATRY & NEUROLOGY

## 2025-01-21 PROCEDURE — 3078F DIAST BP <80 MM HG: CPT | Performed by: PSYCHIATRY & NEUROLOGY

## 2025-01-21 RX ORDER — AMITRIPTYLINE HYDROCHLORIDE 50 MG/1
50 TABLET ORAL NIGHTLY
Qty: 90 TABLET | Refills: 0 | Status: SHIPPED | OUTPATIENT
Start: 2025-01-21

## 2025-01-21 RX ORDER — ERENUMAB-AOOE 140 MG/ML
140 INJECTION, SOLUTION SUBCUTANEOUS
Qty: 1 ML | Refills: 0 | COMMUNITY
Start: 2025-01-21

## 2025-01-21 RX ORDER — RIZATRIPTAN BENZOATE 10 MG/1
TABLET ORAL
Qty: 18 TABLET | Refills: 5 | Status: SHIPPED | OUTPATIENT
Start: 2025-01-21

## 2025-01-21 ASSESSMENT — ENCOUNTER SYMPTOMS
EYES NEGATIVE: 1
RESPIRATORY NEGATIVE: 1
GASTROINTESTINAL NEGATIVE: 1
ALLERGIC/IMMUNOLOGIC NEGATIVE: 1

## 2025-01-21 NOTE — PROGRESS NOTES
Subjective:      Patient ID: Shaista Ang is a 65 y.o. female.    Active problem common migraine headaches on elavil and topamax .Essential tremor to go on mysoline . The condition is she is having more breakthrough headaches having 2 headaches per week . These are bilateral frontal temporal head of pressure quality of grade 8 over 10 needing to go to sleep with headaches lasting up to 2 days reporting that imitrex tablet and SQ are not effective  .She remains on elavil 50 mg qhs and topamax 100 mg po bid . For intractable  headache she has used prednisone taper with good reponse . Mysoline 50 mg po qhs has attenuated tremor to larger degree tolerating westress with  having had kidney cancer having partial nephrectomy with headaches building up over the last 1 1/2 months  . She has social anxiety when she leaves the house placed on klonopin 0,5 mg po bid PRN . There has been also stress with  dealing with kidney cancer. There is family history of tremor  There maybe blurred vision before headache . There will be no tingling , weakness or bulbar complaints . She can not use NSAID with prior bariatric surgery. Weather fronts may be trigger headaches.  Significant medications topamax 100 mg po bid , elavil 50 mg po qhs ,  imitrex 100 mg PRN, imitrex SQ PRN  . Testing Head CT normal , May 2020. MRI of Head normal      Past Medical History:   Diagnosis Date    Acromioclavicular joint arthritis 04/18/2016    Acute gastroenteritis 10/11/2017    Acute gastroenteritis 10/11/2017    Anemia 12/11/2018    Anxiety     Arthritis     Asthma 1980    On Inhaler    Bursitis     left shoulder    Chronic right shoulder pain 07/24/2018    Closed fracture of left tibial plateau 07/16/2019    Closed fracture of left tibial plateau 07/16/2019    Closed fracture of proximal end of left tibia 11/22/2018    Closed fracture of proximal end of left tibia 11/22/2018    Closed fracture of shaft of left tibia 11/22/2018

## 2025-02-06 ENCOUNTER — HOSPITAL ENCOUNTER (OUTPATIENT)
Dept: NUCLEAR MEDICINE | Age: 65
Discharge: HOME | End: 2025-02-08
Attending: INTERNAL MEDICINE
Payer: MEDICAID

## 2025-02-06 ENCOUNTER — HOSPITAL ENCOUNTER (OUTPATIENT)
Age: 65
Discharge: HOME | End: 2025-02-08
Attending: INTERNAL MEDICINE
Payer: MEDICAID

## 2025-02-06 VITALS
WEIGHT: 291 LBS | BODY MASS INDEX: 46.77 KG/M2 | DIASTOLIC BLOOD PRESSURE: 88 MMHG | SYSTOLIC BLOOD PRESSURE: 150 MMHG | HEART RATE: 110 BPM | HEIGHT: 66 IN

## 2025-02-06 DIAGNOSIS — I20.89 ANGINAL EQUIVALENT (HCC): ICD-10-CM

## 2025-02-06 DIAGNOSIS — R06.02 SHORTNESS OF BREATH ON EXERTION: ICD-10-CM

## 2025-02-06 LAB
ECHO AO ROOT DIAM: 3.1 CM
ECHO AO ROOT INDEX: 1.32 CM/M2
ECHO AV AREA PEAK VELOCITY: 3.1 CM2
ECHO AV AREA VTI: 3 CM2
ECHO AV AREA/BSA PEAK VELOCITY: 1.3 CM2/M2
ECHO AV AREA/BSA VTI: 1.3 CM2/M2
ECHO AV MEAN GRADIENT: 3 MMHG
ECHO AV MEAN VELOCITY: 0.9 M/S
ECHO AV PEAK GRADIENT: 6 MMHG
ECHO AV PEAK VELOCITY: 1.2 M/S
ECHO AV VELOCITY RATIO: 0.83
ECHO AV VTI: 26.5 CM
ECHO BSA: 2.48 M2
ECHO BSA: 2.48 M2
ECHO LA AREA 2C: 14.2 CM2
ECHO LA AREA 4C: 10.2 CM2
ECHO LA DIAMETER INDEX: 1.53 CM/M2
ECHO LA DIAMETER: 3.6 CM
ECHO LA MAJOR AXIS: 4.1 CM
ECHO LA MINOR AXIS: 5.3 CM
ECHO LA TO AORTIC ROOT RATIO: 1.16
ECHO LA VOL MOD A2C: 32 ML (ref 22–52)
ECHO LA VOL MOD A4C: 21 ML (ref 22–52)
ECHO LA VOLUME INDEX MOD A2C: 14 ML/M2 (ref 16–34)
ECHO LA VOLUME INDEX MOD A4C: 9 ML/M2 (ref 16–34)
ECHO LV E' LATERAL VELOCITY: 10.9 CM/S
ECHO LV E' SEPTAL VELOCITY: 9.46 CM/S
ECHO LV EF PHYSICIAN: 60 %
ECHO LV FRACTIONAL SHORTENING: 32 % (ref 28–44)
ECHO LV INTERNAL DIMENSION DIASTOLE INDEX: 2 CM/M2
ECHO LV INTERNAL DIMENSION DIASTOLIC: 4.7 CM (ref 3.9–5.3)
ECHO LV INTERNAL DIMENSION SYSTOLIC INDEX: 1.36 CM/M2
ECHO LV INTERNAL DIMENSION SYSTOLIC: 3.2 CM
ECHO LV IVSD: 1.1 CM (ref 0.6–0.9)
ECHO LV MASS 2D: 199.6 G (ref 67–162)
ECHO LV MASS INDEX 2D: 84.9 G/M2 (ref 43–95)
ECHO LV POSTERIOR WALL DIASTOLIC: 1.2 CM (ref 0.6–0.9)
ECHO LV RELATIVE WALL THICKNESS RATIO: 0.51
ECHO LVOT AREA: 3.8 CM2
ECHO LVOT AV VTI INDEX: 0.79
ECHO LVOT DIAM: 2.2 CM
ECHO LVOT MEAN GRADIENT: 2 MMHG
ECHO LVOT PEAK GRADIENT: 4 MMHG
ECHO LVOT PEAK VELOCITY: 1 M/S
ECHO LVOT STROKE VOLUME INDEX: 34 ML/M2
ECHO LVOT SV: 79.8 ML
ECHO LVOT VTI: 21 CM
ECHO MV A VELOCITY: 0.67 M/S
ECHO MV E DECELERATION TIME (DT): 197 MS
ECHO MV E VELOCITY: 0.92 M/S
ECHO MV E/A RATIO: 1.37
ECHO MV E/E' LATERAL: 8.44
ECHO MV E/E' RATIO (AVERAGED): 9.08
ECHO MV E/E' SEPTAL: 9.73
ECHO RA AREA 4C: 9.7 CM2
ECHO RA END SYSTOLIC VOLUME APICAL 4 CHAMBER INDEX BSA: 8 ML/M2
ECHO RA VOLUME: 19 ML
ECHO RV TAPSE: 1.4 CM (ref 1.7–?)
STRESS BASELINE DIAS BP: 53 MMHG
STRESS BASELINE HR: 68 BPM
STRESS BASELINE SYS BP: 103 MMHG
STRESS ESTIMATED WORKLOAD: 1 METS
STRESS PEAK DIAS BP: 70 MMHG
STRESS PEAK SYS BP: 117 MMHG
STRESS PERCENT HR ACHIEVED: 62 %
STRESS POST PEAK HR: 96 BPM
STRESS RATE PRESSURE PRODUCT: NORMAL BPM*MMHG
STRESS STAGE RECOVERY 1 BP: NORMAL MMHG
STRESS STAGE RECOVERY 1 DURATION: 1 MIN:SEC
STRESS STAGE RECOVERY 1 HR: 95 BPM
STRESS STAGE RECOVERY 2 BP: NORMAL MMHG
STRESS STAGE RECOVERY 2 COMMENTS: NORMAL
STRESS STAGE RECOVERY 2 DURATION: 2 MIN:SEC
STRESS STAGE RECOVERY 2 HR: 95 BPM
STRESS STAGE RECOVERY 3 BP: NORMAL MMHG
STRESS STAGE RECOVERY 3 DURATION: 4 MIN:SEC
STRESS STAGE RECOVERY 3 HR: 88 BPM
STRESS TARGET HR: 156 BPM

## 2025-02-06 PROCEDURE — 6360000004 HC RX CONTRAST MEDICATION: Performed by: INTERNAL MEDICINE

## 2025-02-06 PROCEDURE — 78452 HT MUSCLE IMAGE SPECT MULT: CPT

## 2025-02-06 PROCEDURE — 93017 CV STRESS TEST TRACING ONLY: CPT

## 2025-02-06 PROCEDURE — 2500000003 HC RX 250 WO HCPCS: Performed by: STUDENT IN AN ORGANIZED HEALTH CARE EDUCATION/TRAINING PROGRAM

## 2025-02-06 PROCEDURE — C8929 TTE W OR WO FOL WCON,DOPPLER: HCPCS

## 2025-02-06 PROCEDURE — 3430000000 HC RX DIAGNOSTIC RADIOPHARMACEUTICAL: Performed by: STUDENT IN AN ORGANIZED HEALTH CARE EDUCATION/TRAINING PROGRAM

## 2025-02-06 PROCEDURE — 6360000002 HC RX W HCPCS: Performed by: STUDENT IN AN ORGANIZED HEALTH CARE EDUCATION/TRAINING PROGRAM

## 2025-02-06 PROCEDURE — A9500 TC99M SESTAMIBI: HCPCS | Performed by: STUDENT IN AN ORGANIZED HEALTH CARE EDUCATION/TRAINING PROGRAM

## 2025-02-06 RX ORDER — NITROGLYCERIN 0.4 MG/1
0.4 TABLET SUBLINGUAL EVERY 5 MIN PRN
Status: ACTIVE | OUTPATIENT
Start: 2025-02-06 | End: 2025-02-06

## 2025-02-06 RX ORDER — ATROPINE SULFATE 0.1 MG/ML
0.5 INJECTION INTRAVENOUS EVERY 5 MIN PRN
Status: ACTIVE | OUTPATIENT
Start: 2025-02-06 | End: 2025-02-06

## 2025-02-06 RX ORDER — TETRAKIS(2-METHOXYISOBUTYLISOCYANIDE)COPPER(I) TETRAFLUOROBORATE 1 MG/ML
10 INJECTION, POWDER, LYOPHILIZED, FOR SOLUTION INTRAVENOUS
Status: COMPLETED | OUTPATIENT
Start: 2025-02-06 | End: 2025-02-06

## 2025-02-06 RX ORDER — AMINOPHYLLINE 25 MG/ML
50 INJECTION, SOLUTION INTRAVENOUS PRN
Status: ACTIVE | OUTPATIENT
Start: 2025-02-06 | End: 2025-02-06

## 2025-02-06 RX ORDER — SODIUM CHLORIDE 0.9 % (FLUSH) 0.9 %
5-40 SYRINGE (ML) INJECTION PRN
Status: ACTIVE | OUTPATIENT
Start: 2025-02-06 | End: 2025-02-06

## 2025-02-06 RX ORDER — METOPROLOL TARTRATE 1 MG/ML
5 INJECTION, SOLUTION INTRAVENOUS EVERY 5 MIN PRN
Status: ACTIVE | OUTPATIENT
Start: 2025-02-06 | End: 2025-02-06

## 2025-02-06 RX ORDER — SODIUM CHLORIDE 0.9 % (FLUSH) 0.9 %
10 SYRINGE (ML) INJECTION PRN
Status: DISCONTINUED | OUTPATIENT
Start: 2025-02-06 | End: 2025-02-09 | Stop reason: HOSPADM

## 2025-02-06 RX ORDER — SODIUM CHLORIDE 9 MG/ML
500 INJECTION, SOLUTION INTRAVENOUS CONTINUOUS PRN
Status: ACTIVE | OUTPATIENT
Start: 2025-02-06 | End: 2025-02-06

## 2025-02-06 RX ORDER — TETRAKIS(2-METHOXYISOBUTYLISOCYANIDE)COPPER(I) TETRAFLUOROBORATE 1 MG/ML
30 INJECTION, POWDER, LYOPHILIZED, FOR SOLUTION INTRAVENOUS
Status: COMPLETED | OUTPATIENT
Start: 2025-02-06 | End: 2025-02-06

## 2025-02-06 RX ORDER — REGADENOSON 0.08 MG/ML
0.4 INJECTION, SOLUTION INTRAVENOUS
Status: COMPLETED | OUTPATIENT
Start: 2025-02-06 | End: 2025-02-06

## 2025-02-06 RX ORDER — ALBUTEROL SULFATE 90 UG/1
2 INHALANT RESPIRATORY (INHALATION) PRN
Status: ACTIVE | OUTPATIENT
Start: 2025-02-06 | End: 2025-02-06

## 2025-02-06 RX ADMIN — SULFUR HEXAFLUORIDE 4 ML: KIT at 07:43

## 2025-02-06 RX ADMIN — REGADENOSON 0.4 MG: 0.08 INJECTION, SOLUTION INTRAVENOUS at 08:37

## 2025-02-06 RX ADMIN — Medication 34 MILLICURIE: at 08:37

## 2025-02-06 RX ADMIN — SODIUM CHLORIDE, PRESERVATIVE FREE 10 ML: 5 INJECTION INTRAVENOUS at 07:10

## 2025-02-06 RX ADMIN — Medication 10.2 MILLICURIE: at 07:09

## 2025-02-17 ENCOUNTER — HOSPITAL ENCOUNTER (OUTPATIENT)
Dept: WOMENS IMAGING | Age: 65
Discharge: HOME | End: 2025-02-19
Payer: MEDICAID

## 2025-02-17 VITALS — WEIGHT: 291 LBS | BODY MASS INDEX: 46.77 KG/M2 | HEIGHT: 66 IN

## 2025-02-17 DIAGNOSIS — Z12.31 ENCOUNTER FOR SCREENING MAMMOGRAM FOR MALIGNANT NEOPLASM OF BREAST: ICD-10-CM

## 2025-02-17 DIAGNOSIS — F41.9 ANXIETY: ICD-10-CM

## 2025-02-17 PROCEDURE — 77063 BREAST TOMOSYNTHESIS BI: CPT

## 2025-02-17 RX ORDER — BUSPIRONE HYDROCHLORIDE 10 MG/1
TABLET ORAL
Qty: 90 TABLET | Refills: 0 | Status: SHIPPED | OUTPATIENT
Start: 2025-02-17

## 2025-02-17 NOTE — TELEPHONE ENCOUNTER
Requested Prescriptions     Pending Prescriptions Disp Refills    busPIRone (BUSPAR) 10 MG tablet [Pharmacy Med Name: busPIRone HCl 10 MG Oral Tablet] 90 tablet 0     Sig: TAKE 1 TABLET BY MOUTH THREE TIMES DAILY

## 2025-02-19 DIAGNOSIS — F43.22 ADJUSTMENT DISORDER WITH ANXIETY: ICD-10-CM

## 2025-02-19 NOTE — TELEPHONE ENCOUNTER
Shaista Ang is calling to request a refill on the following medication(s):    Medication Request:  Requested Prescriptions     Pending Prescriptions Disp Refills    sertraline (ZOLOFT) 100 MG tablet [Pharmacy Med Name: Sertraline HCl 100 MG Oral Tablet] 90 tablet 0     Sig: Take 1 tablet by mouth once daily       Last Visit Date (If Applicable):  1/13/2025    Next Visit Date:    Visit date not found

## 2025-02-21 RX ORDER — SERTRALINE HYDROCHLORIDE 100 MG/1
TABLET, FILM COATED ORAL
Qty: 90 TABLET | Refills: 1 | Status: SHIPPED | OUTPATIENT
Start: 2025-02-21

## 2025-03-17 DIAGNOSIS — F41.9 ANXIETY: ICD-10-CM

## 2025-03-17 RX ORDER — BUSPIRONE HYDROCHLORIDE 10 MG/1
10 TABLET ORAL 3 TIMES DAILY
Qty: 90 TABLET | Refills: 3 | Status: SHIPPED | OUTPATIENT
Start: 2025-03-17

## 2025-03-17 NOTE — TELEPHONE ENCOUNTER
Shaista Ang is calling to request a refill on the following medication(s):    Medication Request:  Requested Prescriptions     Pending Prescriptions Disp Refills    busPIRone (BUSPAR) 10 MG tablet [Pharmacy Med Name: busPIRone HCl 10 MG Oral Tablet] 90 tablet 0     Sig: TAKE 1 TABLET BY MOUTH THREE TIMES DAILY       Last Visit Date (If Applicable):  1/13/2025    Next Visit Date:    Visit date not found

## 2025-04-03 ENCOUNTER — OFFICE VISIT (OUTPATIENT)
Dept: FAMILY MEDICINE CLINIC | Age: 65
End: 2025-04-03
Payer: MEDICAID

## 2025-04-03 VITALS
DIASTOLIC BLOOD PRESSURE: 76 MMHG | BODY MASS INDEX: 46.93 KG/M2 | HEART RATE: 72 BPM | HEIGHT: 66 IN | WEIGHT: 292 LBS | OXYGEN SATURATION: 98 % | SYSTOLIC BLOOD PRESSURE: 113 MMHG

## 2025-04-03 DIAGNOSIS — R73.03 PREDIABETES: ICD-10-CM

## 2025-04-03 DIAGNOSIS — E55.9 VITAMIN D DEFICIENCY: ICD-10-CM

## 2025-04-03 DIAGNOSIS — R13.10 PAIN WITH SWALLOWING: ICD-10-CM

## 2025-04-03 DIAGNOSIS — I10 ESSENTIAL HYPERTENSION: Primary | ICD-10-CM

## 2025-04-03 DIAGNOSIS — F41.9 ANXIETY: ICD-10-CM

## 2025-04-03 PROCEDURE — 3078F DIAST BP <80 MM HG: CPT | Performed by: STUDENT IN AN ORGANIZED HEALTH CARE EDUCATION/TRAINING PROGRAM

## 2025-04-03 PROCEDURE — 1036F TOBACCO NON-USER: CPT | Performed by: STUDENT IN AN ORGANIZED HEALTH CARE EDUCATION/TRAINING PROGRAM

## 2025-04-03 PROCEDURE — 99214 OFFICE O/P EST MOD 30 MIN: CPT | Performed by: STUDENT IN AN ORGANIZED HEALTH CARE EDUCATION/TRAINING PROGRAM

## 2025-04-03 PROCEDURE — G8427 DOCREV CUR MEDS BY ELIG CLIN: HCPCS | Performed by: STUDENT IN AN ORGANIZED HEALTH CARE EDUCATION/TRAINING PROGRAM

## 2025-04-03 PROCEDURE — G8417 CALC BMI ABV UP PARAM F/U: HCPCS | Performed by: STUDENT IN AN ORGANIZED HEALTH CARE EDUCATION/TRAINING PROGRAM

## 2025-04-03 PROCEDURE — 3074F SYST BP LT 130 MM HG: CPT | Performed by: STUDENT IN AN ORGANIZED HEALTH CARE EDUCATION/TRAINING PROGRAM

## 2025-04-03 PROCEDURE — 3017F COLORECTAL CA SCREEN DOC REV: CPT | Performed by: STUDENT IN AN ORGANIZED HEALTH CARE EDUCATION/TRAINING PROGRAM

## 2025-04-03 RX ORDER — ALPRAZOLAM 0.5 MG
0.5 TABLET ORAL 2 TIMES DAILY PRN
Qty: 60 TABLET | Refills: 1 | Status: SHIPPED | OUTPATIENT
Start: 2025-04-03 | End: 2025-05-03

## 2025-04-03 ASSESSMENT — PATIENT HEALTH QUESTIONNAIRE - PHQ9
2. FEELING DOWN, DEPRESSED OR HOPELESS: SEVERAL DAYS
SUM OF ALL RESPONSES TO PHQ QUESTIONS 1-9: 2
1. LITTLE INTEREST OR PLEASURE IN DOING THINGS: SEVERAL DAYS
SUM OF ALL RESPONSES TO PHQ QUESTIONS 1-9: 2

## 2025-04-03 ASSESSMENT — ENCOUNTER SYMPTOMS
CONSTIPATION: 0
TROUBLE SWALLOWING: 1
CHEST TIGHTNESS: 0
ABDOMINAL PAIN: 0
COUGH: 0
WHEEZING: 0
SHORTNESS OF BREATH: 0
DIARRHEA: 0
SORE THROAT: 0
VOMITING: 0
EYE DISCHARGE: 0
NAUSEA: 0

## 2025-04-03 NOTE — PROGRESS NOTES
[facility]  [address]  [address]  [facility] [address]  Dept: [phone]  Dept Fax: [phone]    Shaista Ang is a 65 y.o. female who is a Established patient, who presents today for her medical conditions/complaints as noted below:  Chief Complaint   Patient presents with    Hypertension    Pharyngitis     Over a month          HPI:     She is here today to follow-up on hypertension and anxiety.  She states that she has been following with cardiology regularly and has been stable on her medications.  She also follows with neurology and states that some of her medications were changed and now she is doing better with her migraines.  She has been feeling more anxious and states that she is not taking Xanax 0.5 mg twice a day.  She complains of having pain with swallowing especially solids for the past month.  She states that she does okay with liquids but anything solid is difficult for her.  She is also due for her colonoscopy.        Hemoglobin A1C (%)   Date Value   07/02/2024 6.1 (H)   08/07/2023 6.0   08/25/2022 5.6             ( goal A1Cis < 7)   No components found for: \"LABMICR\"  No components found for: \"LDLCHOLESTEROL\", \"LDLCALC\"    (goal LDL is <100)   AST (U/L)   Date Value   10/07/2024 16     ALT (U/L)   Date Value   10/07/2024 15     BUN (mg/dL)   Date Value   10/07/2024 12     BP Readings from Last 3 Encounters:   04/03/25 113/76   02/06/25 (!) 150/88   01/28/25 (!) 150/88          (goal 120/80)    Past Medical History:   Diagnosis Date    Acromioclavicular joint arthritis 04/18/2016    Acute gastroenteritis 10/11/2017    Acute gastroenteritis 10/11/2017    Anemia 12/11/2018    Anxiety     Arthritis     Asthma 1980    On Inhaler    Bursitis     left shoulder    Chronic right shoulder pain 07/24/2018    Closed fracture of left tibial plateau 07/16/2019    Closed fracture of left tibial plateau 07/16/2019    Closed fracture of proximal end of left

## 2025-04-04 ENCOUNTER — TELEPHONE (OUTPATIENT)
Dept: GASTROENTEROLOGY | Age: 65
End: 2025-04-04

## 2025-04-04 NOTE — TELEPHONE ENCOUNTER
NP/Pain with swallowing/[employer]  1st attempt- Called pt and LVM to call the office.  2nd attempt- Sent NP letter.

## 2025-04-14 ENCOUNTER — HOSPITAL ENCOUNTER (OUTPATIENT)
Dept: INFUSION THERAPY | Age: 65
Discharge: HOME | End: 2025-04-14
Payer: MEDICAID

## 2025-04-14 DIAGNOSIS — M81.0 AGE-RELATED OSTEOPOROSIS WITHOUT CURRENT PATHOLOGICAL FRACTURE: Primary | ICD-10-CM

## 2025-04-14 PROCEDURE — 96372 THER/PROPH/DIAG INJ SC/IM: CPT

## 2025-04-14 PROCEDURE — 6360000002 HC RX W HCPCS: Performed by: STUDENT IN AN ORGANIZED HEALTH CARE EDUCATION/TRAINING PROGRAM

## 2025-04-14 RX ORDER — EPINEPHRINE 1 MG/ML
0.3 INJECTION, SOLUTION, CONCENTRATE INTRAVENOUS PRN
OUTPATIENT
Start: 2025-10-12

## 2025-04-14 RX ORDER — ALBUTEROL SULFATE 90 UG/1
4 INHALANT RESPIRATORY (INHALATION) PRN
OUTPATIENT
Start: 2025-10-12

## 2025-04-14 RX ORDER — HYDROCORTISONE SODIUM SUCCINATE 100 MG/2ML
100 INJECTION INTRAMUSCULAR; INTRAVENOUS
OUTPATIENT
Start: 2025-10-12

## 2025-04-14 RX ORDER — FAMOTIDINE 10 MG/ML
20 INJECTION, SOLUTION INTRAVENOUS
OUTPATIENT
Start: 2025-10-12

## 2025-04-14 RX ORDER — DENOSUMAB 60 MG/ML
60 INJECTION SUBCUTANEOUS
Qty: 1 ML | Refills: 1 | Status: SHIPPED | OUTPATIENT
Start: 2025-04-14

## 2025-04-14 RX ORDER — SODIUM CHLORIDE 9 MG/ML
INJECTION, SOLUTION INTRAVENOUS CONTINUOUS
OUTPATIENT
Start: 2025-10-12

## 2025-04-14 RX ORDER — ONDANSETRON 2 MG/ML
8 INJECTION INTRAMUSCULAR; INTRAVENOUS
OUTPATIENT
Start: 2025-10-12

## 2025-04-14 RX ORDER — ACETAMINOPHEN 325 MG/1
650 TABLET ORAL
OUTPATIENT
Start: 2025-10-12

## 2025-04-14 RX ORDER — DIPHENHYDRAMINE HYDROCHLORIDE 50 MG/ML
50 INJECTION, SOLUTION INTRAMUSCULAR; INTRAVENOUS
OUTPATIENT
Start: 2025-10-12

## 2025-04-14 RX ADMIN — DENOSUMAB 60 MG: 60 INJECTION SUBCUTANEOUS at 09:33

## 2025-04-19 DIAGNOSIS — J45.20 MILD INTERMITTENT ASTHMA WITHOUT COMPLICATION: ICD-10-CM

## 2025-04-21 ENCOUNTER — TELEPHONE (OUTPATIENT)
Dept: FAMILY MEDICINE CLINIC | Age: 65
End: 2025-04-21

## 2025-04-21 RX ORDER — MOMETASONE FUROATE AND FORMOTEROL FUMARATE DIHYDRATE 100; 5 UG/1; UG/1
2 AEROSOL RESPIRATORY (INHALATION) 2 TIMES DAILY
Qty: 3 EACH | Refills: 1 | Status: SHIPPED | OUTPATIENT
Start: 2025-04-21

## 2025-04-21 NOTE — TELEPHONE ENCOUNTER
Pt called and states she got a denial letter for denosumab but she is not sure what it is or why it got denied.

## 2025-04-21 NOTE — TELEPHONE ENCOUNTER
LAST VISIT: 9/5/24  NEXT VISIT: 9/8/25    Per last dictation patient is on this medication. Please sign for refill if ok. Thank you.

## 2025-04-21 NOTE — PROGRESS NOTES
Janes Still since patient on xanax , Zoloft     Electronically signed by ATUL HARRIS MD on 4/21/2025 at 10:10 AM

## 2025-04-21 NOTE — TELEPHONE ENCOUNTER
Patient states she got a letter that states there was a denial, she wants to know what she should do next because she was taking it

## 2025-04-22 ENCOUNTER — APPOINTMENT (OUTPATIENT)
Dept: CT IMAGING | Age: 65
End: 2025-04-22
Payer: MEDICAID

## 2025-04-22 ENCOUNTER — HOSPITAL ENCOUNTER (EMERGENCY)
Age: 65
Discharge: HOME | End: 2025-04-22
Attending: EMERGENCY MEDICINE
Payer: MEDICAID

## 2025-04-22 VITALS
DIASTOLIC BLOOD PRESSURE: 73 MMHG | HEART RATE: 68 BPM | TEMPERATURE: 98.2 F | RESPIRATION RATE: 16 BRPM | BODY MASS INDEX: 45.19 KG/M2 | OXYGEN SATURATION: 99 % | WEIGHT: 280 LBS | SYSTOLIC BLOOD PRESSURE: 126 MMHG

## 2025-04-22 DIAGNOSIS — S00.83XA CONTUSION OF FACE, INITIAL ENCOUNTER: ICD-10-CM

## 2025-04-22 DIAGNOSIS — S01.81XA FACIAL LACERATION, INITIAL ENCOUNTER: ICD-10-CM
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
## 2025-04-22 LAB
ANION GAP SERPL CALCULATED.3IONS-SCNC: 11 MMOL/L (ref 9–17)
BASOPHILS # BLD: 0.12 K/UL (ref 0–0.2)
BASOPHILS NFR BLD: 1 % (ref 0–2)
BUN SERPL-MCNC: 14 MG/DL (ref 8–23)
CALCIUM SERPL-MCNC: 8.5 MG/DL (ref 8.6–10.4)
CHLORIDE SERPL-SCNC: 106 MMOL/L (ref 98–107)
CO2 SERPL-SCNC: 22 MMOL/L (ref 20–31)
CREAT SERPL-MCNC: 0.8 MG/DL (ref 0.5–0.9)
EOSINOPHIL # BLD: 0.23 K/UL (ref 0–0.4)
EOSINOPHILS RELATIVE PERCENT: 2 % (ref 1–4)
ERYTHROCYTE [DISTWIDTH] IN BLOOD BY AUTOMATED COUNT: 19.7 % (ref 12.5–15.4)
GFR, ESTIMATED: 82 ML/MIN/1.73M2
GLUCOSE SERPL-MCNC: 92 MG/DL (ref 70–99)
HCT VFR BLD AUTO: 32.2 % (ref 36–46)
HGB BLD-MCNC: 9.7 G/DL (ref 12–16)
LYMPHOCYTES NFR BLD: 2.57 K/UL (ref 1–4.8)
LYMPHOCYTES RELATIVE PERCENT: 22 % (ref 24–44)
MCH RBC QN AUTO: 20.5 PG (ref 26–34)
MCHC RBC AUTO-ENTMCNC: 30.1 G/DL (ref 31–37)
MCV RBC AUTO: 68.1 FL (ref 80–100)
MONOCYTES NFR BLD: 0.94 K/UL (ref 0.1–1.2)
MONOCYTES NFR BLD: 8 % (ref 2–11)
MORPHOLOGY: ABNORMAL
MORPHOLOGY: ABNORMAL
NEUTROPHILS NFR BLD: 67 % (ref 36–66)
NEUTS SEG NFR BLD: 7.84 K/UL (ref 1.8–7.7)
PLATELET # BLD AUTO: 427 K/UL (ref 140–450)
PMV BLD AUTO: 5.8 FL (ref 6–12)
POTASSIUM SERPL-SCNC: 4.5 MMOL/L (ref 3.7–5.3)
RBC # BLD AUTO: 4.73 M/UL (ref 4–5.2)
SODIUM SERPL-SCNC: 139 MMOL/L (ref 135–144)
TROPONIN I SERPL HS-MCNC: <6 NG/L (ref 0–14)
WBC OTHER # BLD: 11.7 K/UL (ref 3.5–11)

## 2025-04-22 PROCEDURE — 6360000002 HC RX W HCPCS

## 2025-04-22 PROCEDURE — 90471 IMMUNIZATION ADMIN: CPT

## 2025-04-22 PROCEDURE — 80048 BASIC METABOLIC PNL TOTAL CA: CPT

## 2025-04-22 PROCEDURE — 70450 CT HEAD/BRAIN W/O DYE: CPT

## 2025-04-22 PROCEDURE — 36415 COLL VENOUS BLD VENIPUNCTURE: CPT

## 2025-04-22 PROCEDURE — 85025 COMPLETE CBC W/AUTO DIFF WBC: CPT

## 2025-04-22 PROCEDURE — 13132 CMPLX RPR F/C/C/M/N/AX/G/H/F: CPT

## 2025-04-22 PROCEDURE — 70486 CT MAXILLOFACIAL W/O DYE: CPT

## 2025-04-22 PROCEDURE — 90715 TDAP VACCINE 7 YRS/> IM: CPT

## 2025-04-22 PROCEDURE — 93005 ELECTROCARDIOGRAM TRACING: CPT | Performed by: EMERGENCY MEDICINE

## 2025-04-22 PROCEDURE — 99284 EMERGENCY DEPT VISIT MOD MDM: CPT

## 2025-04-22 PROCEDURE — 84484 ASSAY OF TROPONIN QUANT: CPT

## 2025-04-22 PROCEDURE — 72125 CT NECK SPINE W/O DYE: CPT

## 2025-04-22 RX ORDER — CEPHALEXIN 500 MG/1
500 CAPSULE ORAL 3 TIMES DAILY
Qty: 21 CAPSULE | Refills: 0 | Status: SHIPPED | OUTPATIENT
Start: 2025-04-22 | End: 2025-04-29

## 2025-04-22 RX ORDER — LIDOCAINE HYDROCHLORIDE 10 MG/ML
5 INJECTION, SOLUTION EPIDURAL; INFILTRATION; INTRACAUDAL; PERINEURAL ONCE
Status: COMPLETED | OUTPATIENT
Start: 2025-04-22 | End: 2025-04-22

## 2025-04-22 RX ADMIN — LIDOCAINE HYDROCHLORIDE 2 ML: 10 INJECTION, SOLUTION EPIDURAL; INFILTRATION; INTRACAUDAL; PERINEURAL at 12:31

## 2025-04-22 RX ADMIN — TETANUS TOXOID, REDUCED DIPHTHERIA TOXOID AND ACELLULAR PERTUSSIS VACCINE, ADSORBED 0.5 ML: 5; 2.5; 8; 8; 2.5 SUSPENSION INTRAMUSCULAR at 12:26

## 2025-04-22 ASSESSMENT — PAIN SCALES - GENERAL
PAINLEVEL_OUTOF10: 8
PAINLEVEL_OUTOF10: 5

## 2025-04-22 ASSESSMENT — PAIN DESCRIPTION - LOCATION: LOCATION: FACE

## 2025-04-22 NOTE — DISCHARGE INSTRUCTIONS
Keep wound clean and dry watch for signs of infection.  Sutures need to come out in 5 to 7 days.  You may follow-up with PCP with any concerns try to avoid another head injury over the next 2 to 3 weeks as you may have concussion.  Please understand that at this time there is no evidence for a more serious underlying process, but that early in the process of an illness or injury, an emergency department workup can be falsely reassuring.  You should contact your family doctor within the next 48 hours for a follow up appointment    THANK YOU!!!    From [facility] and [facility]    On behalf of the Emergency Department staff at [facility], I would like to thank you for giving us the opportunity to address your health care needs and concerns.    We hope that during your visit, our service was delivered in a professional and caring manner. Please keep [facility] in mind as we walk with you down the path to your own personal wellness.     Please expect an automated text message or email from us so we can ask a few questions about your health and progress. Based on your answers, a clinician may call you back to offer help and instructions.    Please understand that early in the process of an illness or injury, an emergency department workup can be falsely reassuring.  If you notice any worsening, changing or persistent symptoms please call your family doctor or return to the ER immediately.     Tell us how we did during your visit at [email or web address]   and let us know about your experience

## 2025-04-22 NOTE — ED PROVIDER NOTES
5.3 mmol/L    Chloride 106 98 - 107 mmol/L    CO2 22 20 - 31 mmol/L    Anion Gap 11 9 - 17 mmol/L    Glucose 92 70 - 99 mg/dL    BUN 14 8 - 23 mg/dL    Creatinine 0.8 0.5 - 0.9 mg/dL    Est, Glom Filt Rate 82 >60 mL/min/1.73m2    Calcium 8.5 (L) 8.6 - 10.4 mg/dL   Troponin   Result Value Ref Range    Troponin, High Sensitivity <6 0 - 14 ng/L   EKG 12 Lead   Result Value Ref Range    Ventricular Rate 65 BPM    Atrial Rate 65 BPM    P-R Interval 168 ms    QRS Duration 78 ms    Q-T Interval 402 ms    QTc Calculation (Bazett) 418 ms    P Axis 54 degrees    R Axis 49 degrees    T Axis 60 degrees       DEPARTMENT VITAL SIGNS:     Vitals:    04/22/25 1054 04/22/25 1400   BP: 117/71 126/73   Pulse: 63 68   Resp: 14 16   Temp: 98.2 °F (36.8 °C)    TempSrc: Oral    SpO2: 98% 99%   Weight: 127 kg (280 lb)      BP: 126/73, Temp: 98.2 °F (36.8 °C), Pulse: 68, Respirations: 16     EMERGENCY DEPARTMENT COURSE:        CONSULTS:   None    CRITICAL CARE:   [None]    PROCEDURES:   Lac Repair    Date/Time: 4/22/2025 12:54 PM    Performed by: Briseida Riggs APRN - CNP  Authorized by: New Desai DO    Consent:     Consent obtained:  Verbal    Consent given by:  Patient    Risks discussed:  Infection, pain, poor cosmetic result and poor wound healing    Alternatives discussed:  No treatment  Universal protocol:     Procedure explained and questions answered to patient or proxy's satisfaction: yes      Immediately prior to procedure, a time out was called: yes      Patient identity confirmed:  Verbally with patient and arm band  Anesthesia:     Anesthesia method:  Local infiltration    Local anesthetic:  Lidocaine 1% w/o epi  Laceration details:     Location:  Face    Face location:  Chin    Wound length (cm): 7.5.    Laceration depth: 15.  Pre-procedure details:     Preparation:  Patient was prepped and draped in usual sterile fashion  Exploration:     Hemostasis achieved with:  Direct pressure    Imaging outcome: foreign body 
states hit cheek,chin,nose on bedside table; ORDERING SYSTEM PROVIDED HISTORY: fall TECHNOLOGIST PROVIDED HISTORY: fall Decision Support Exception - unselect if not a suspected or confirmed emergency medical condition->Emergency Medical Condition (MA) Reason for Exam: fall from bed, denies LOC, states hit cheek,chin,nose on bedside table FINDINGS: CT HEAD: BRAIN/VENTRICLES: There is no acute intracranial hemorrhage, mass effect or midline shift. No abnormal extra-axial fluid collection.  The gray-white differentiation is maintained without evidence of an acute infarct.  There is no evidence of hydrocephalus. CT FACIAL BONES: FACIAL BONES: The maxilla, pterygoid plates and zygomatic arches are intact. The mandible is intact.  The mandibular condyles are normally situated.  The nasal bones and maxillary nasal processes are intact. ORBITS: The globes appear intact.  The extraocular muscles, optic nerve sheath complexes and lacrimal glands appear unremarkable.  No retrobulbar hematoma or mass is seen.  The orbital walls and rims are intact. SINUSES/MASTOIDS: The paranasal sinuses and mastoid air cells are well aerated.  No acute fracture is seen. SOFT TISSUES: No acute abnormality of the visualized skull laceration and soft tissue contusion overlying the left hemimandible manifested by skin irregularity and defect along with gas pockets in the subcutaneous tissues. CT CERVICAL SPINE: BONES/ALIGNMENT: There is no evidence of an acute cervical spine fracture. There is normal alignment of the cervical spine.  Some straightening of the cervical spine may be due to positioning and or muscle spasm. DEGENERATIVE CHANGES: No significant degenerative changes. SOFT TISSUES: There is no prevertebral soft tissue swelling.     No acute intracranial abnormality. No acute traumatic injury of the facial bones. No acute fracture or subluxation of cervical spine.  Some straightening of the cervical spine may be due to positioning and or

## 2025-04-23 LAB
EKG ATRIAL RATE: 65 BPM
EKG P AXIS: 54 DEGREES
EKG P-R INTERVAL: 168 MS
EKG Q-T INTERVAL: 402 MS
EKG QRS DURATION: 78 MS
EKG QTC CALCULATION (BAZETT): 418 MS
EKG R AXIS: 49 DEGREES
EKG T AXIS: 60 DEGREES
EKG VENTRICULAR RATE: 65 BPM

## 2025-04-23 PROCEDURE — 93010 ELECTROCARDIOGRAM REPORT: CPT | Performed by: INTERNAL MEDICINE

## 2025-04-26 DIAGNOSIS — F43.22 ADJUSTMENT DISORDER WITH ANXIETY: ICD-10-CM

## 2025-04-28 ENCOUNTER — PREP FOR PROCEDURE (OUTPATIENT)
Dept: GASTROENTEROLOGY | Age: 65
End: 2025-04-28

## 2025-04-28 ENCOUNTER — OFFICE VISIT (OUTPATIENT)
Dept: GASTROENTEROLOGY | Age: 65
End: 2025-04-28
Payer: MEDICAID

## 2025-04-28 VITALS
SYSTOLIC BLOOD PRESSURE: 141 MMHG | HEART RATE: 103 BPM | DIASTOLIC BLOOD PRESSURE: 83 MMHG | WEIGHT: 293 LBS | BODY MASS INDEX: 47.78 KG/M2

## 2025-04-28 DIAGNOSIS — Z12.11 COLON CANCER SCREENING: Primary | ICD-10-CM

## 2025-04-28 DIAGNOSIS — Z98.84 HISTORY OF GASTRIC BYPASS: ICD-10-CM

## 2025-04-28 DIAGNOSIS — R13.14 PHARYNGOESOPHAGEAL DYSPHAGIA: Primary | ICD-10-CM

## 2025-04-28 DIAGNOSIS — K44.9 HIATAL HERNIA: ICD-10-CM

## 2025-04-28 DIAGNOSIS — R13.10 ODYNOPHAGIA: ICD-10-CM

## 2025-04-28 DIAGNOSIS — Z98.84 HX OF GASTRIC BYPASS: ICD-10-CM

## 2025-04-28 DIAGNOSIS — E66.9 MODERATE OBESITY: ICD-10-CM

## 2025-04-28 PROCEDURE — 1036F TOBACCO NON-USER: CPT | Performed by: INTERNAL MEDICINE

## 2025-04-28 PROCEDURE — 3079F DIAST BP 80-89 MM HG: CPT | Performed by: INTERNAL MEDICINE

## 2025-04-28 PROCEDURE — 99204 OFFICE O/P NEW MOD 45 MIN: CPT | Performed by: INTERNAL MEDICINE

## 2025-04-28 PROCEDURE — G8427 DOCREV CUR MEDS BY ELIG CLIN: HCPCS | Performed by: INTERNAL MEDICINE

## 2025-04-28 PROCEDURE — 3017F COLORECTAL CA SCREEN DOC REV: CPT | Performed by: INTERNAL MEDICINE

## 2025-04-28 PROCEDURE — 3077F SYST BP >= 140 MM HG: CPT | Performed by: INTERNAL MEDICINE

## 2025-04-28 PROCEDURE — G8417 CALC BMI ABV UP PARAM F/U: HCPCS | Performed by: INTERNAL MEDICINE

## 2025-04-28 ASSESSMENT — ENCOUNTER SYMPTOMS
ANAL BLEEDING: 0
BLOOD IN STOOL: 0
SORE THROAT: 0
VOMITING: 0
CHOKING: 0
RECTAL PAIN: 0
DIARRHEA: 0
ABDOMINAL DISTENTION: 0
ABDOMINAL PAIN: 0
VOICE CHANGE: 0
NAUSEA: 0
TROUBLE SWALLOWING: 1
COUGH: 0
SHORTNESS OF BREATH: 0
CONSTIPATION: 0
WHEEZING: 0

## 2025-04-28 NOTE — TELEPHONE ENCOUNTER
Procedure scheduled/Dr MARYCHUY Fiore  Procedure: colon egd   Dx:   Pharyngoesophageal dysphagia     2. Moderate obesity    3. Odynophagia    4. Hiatal hernia    5. Hx of gastric bypass     Date: 10/7/25   Time: 10:30 a.m.  Hospital: [facility]   Bowel Prep instructions given: miralax dulco  In office/via phone: office   Clearance needed: yes  Dr Jordan - cardiology  Dr Jaquez - pulmonary  GLP - 1: N/A    The patient was informed that any cancellations/reschedules for procedures will need to be done no later than one week prior.  If less than one week prior an office visit will be needed in order to discuss being rescheduled.  All no shows to procedures will need an office visit prior as well.  Per CITLALI Fiore.

## 2025-04-28 NOTE — TELEPHONE ENCOUNTER
Shaista Ang is calling to request a refill on the following medication(s):    Medication Request:  Requested Prescriptions     Pending Prescriptions Disp Refills    sertraline (ZOLOFT) 50 MG tablet [Pharmacy Med Name: Sertraline HCl 50 MG Oral Tablet] 30 tablet 0     Sig: Take 1 tablet by mouth once daily       Last Visit Date (If Applicable):  4/3/2025    Next Visit Date:    10/3/2025

## 2025-04-28 NOTE — PROGRESS NOTES
encounter 2018    Tibial plateau fracture, left, closed, initial encounter 2018    Tremor     Vitamin D deficiency     Wears dentures     Wears glasses     Weight loss of 160 lbs since surgery  2017       Past Surgical History:   Procedure Laterality Date    ABDOMINOPLASTY N/A 2019    ABDOMINOPLASTY performed by Brayden Aguayo MD at [facility] OR    ARTHROSCOPY / ARTHROTOMY KNEE Left 2018    KNEE ARTHROSCOPY LEFT performed by James Pugh MD at [facility] OR     SECTION  1986,  1987     SECTION      CHOLECYSTECTOMY      CLOSED MANIPULATION SHOULDER Right 2018    SHOULDER MANIPULATION WITH INJECTION performed by James Pugh MD at [facility] OR    COLONOSCOPY      CYSTOSCOPY  16    EXCISION / BIOPSY SKIN LESION OF HEAD / NECK N/A 4/3/2017     EXCISION POSTERIOR NECK CYST performed by Efren Guerra IV, DO at [facility] OR    KNEE SURGERY Left 2018    left tibia plateau external fixator application    NECK SURGERY      cyst removed back of neck    ORIF PROXIMAL TIBIAL PLATEAU FRACTURE Left 2018    TIBIAL PLATEAU OPEN REDUCTION INTERNAL FIXATION LEFT -  SYNTHES     C-ARM performed by James Pugh MD at [facility] OR    OTHER SURGICAL HISTORY  10/14/2016    excision back lipoma with drain placement    CT OFFICE/OUTPT VISIT,PROCEDURE ONLY Right 10/8/2018    RIGHT SHOULDER  LOUIE PROCEDURE performed by James Pugh MD at [facility] OR    CT OFFICE/OUTPT VISIT,PROCEDURE ONLY Left 2018    LEFT TIBIA PLATEAU EXTERNAL FIXATOR APPLICATION performed by Nicolas Major MD at [facility] OR    SHELBY-EN-Y GASTRIC BYPASS  12/29/15    liver bx, EGD    SHOULDER SURGERY Left 2016    [place] procedure, bone spurs 2016    SHOULDER SURGERY Right 10/08/2018    [place]    TONSILLECTOMY  1979    UPPER GASTROINTESTINAL ENDOSCOPY  2015    found hiatal hernia       CURRENT MEDICATIONS:    Current Outpatient Medications:     sertraline (ZOLOFT) 50 MG tablet, Take 1 tablet by mouth

## 2025-04-29 ENCOUNTER — OFFICE VISIT (OUTPATIENT)
Dept: BURN CARE | Age: 65
End: 2025-04-29
Payer: MEDICAID

## 2025-04-29 ENCOUNTER — HOSPITAL ENCOUNTER (OUTPATIENT)
Age: 65
Discharge: HOME | End: 2025-04-29
Payer: MEDICAID

## 2025-04-29 VITALS — DIASTOLIC BLOOD PRESSURE: 74 MMHG | HEART RATE: 88 BPM | SYSTOLIC BLOOD PRESSURE: 125 MMHG

## 2025-04-29 DIAGNOSIS — R73.03 PREDIABETES: ICD-10-CM

## 2025-04-29 DIAGNOSIS — I10 ESSENTIAL HYPERTENSION: ICD-10-CM

## 2025-04-29 DIAGNOSIS — S01.81XA FACIAL LACERATION, INITIAL ENCOUNTER: Primary | ICD-10-CM

## 2025-04-29 DIAGNOSIS — Z48.02 ENCOUNTER FOR REMOVAL OF SUTURES: ICD-10-CM

## 2025-04-29 DIAGNOSIS — E55.9 VITAMIN D DEFICIENCY: ICD-10-CM

## 2025-04-29 LAB
25(OH)D3 SERPL-MCNC: 58.9 NG/ML (ref 30–100)
ALBUMIN SERPL-MCNC: 3.9 G/DL (ref 3.5–5.2)
ALBUMIN/GLOB SERPL: 1.2 {RATIO} (ref 1–2.5)
ALP SERPL-CCNC: 114 U/L (ref 35–104)
ALT SERPL-CCNC: 22 U/L (ref 10–35)
ANION GAP SERPL CALCULATED.3IONS-SCNC: 13 MMOL/L (ref 9–16)
AST SERPL-CCNC: 22 U/L (ref 10–35)
BASOPHILS # BLD: 0.12 K/UL (ref 0–0.2)
BASOPHILS NFR BLD: 1 % (ref 0–2)
BILIRUB SERPL-MCNC: 0.2 MG/DL (ref 0–1.2)
BUN SERPL-MCNC: 13 MG/DL (ref 8–23)
CALCIUM SERPL-MCNC: 8.9 MG/DL (ref 8.6–10.4)
CHLORIDE SERPL-SCNC: 107 MMOL/L (ref 98–107)
CHOLEST SERPL-MCNC: 160 MG/DL (ref 0–199)
CHOLESTEROL/HDL RATIO: 3.7
CO2 SERPL-SCNC: 19 MMOL/L (ref 20–31)
CREAT SERPL-MCNC: 0.9 MG/DL (ref 0.6–0.9)
EOSINOPHIL # BLD: 0.43 K/UL (ref 0–0.44)
EOSINOPHILS RELATIVE PERCENT: 4 % (ref 1–4)
ERYTHROCYTE [DISTWIDTH] IN BLOOD BY AUTOMATED COUNT: 19.9 % (ref 11.8–14.4)
EST. AVERAGE GLUCOSE BLD GHB EST-MCNC: 128 MG/DL
GFR, ESTIMATED: 71 ML/MIN/1.73M2
GLUCOSE SERPL-MCNC: 104 MG/DL (ref 74–99)
HBA1C MFR BLD: 6.1 % (ref 4–6)
HCT VFR BLD AUTO: 33.1 % (ref 36.3–47.1)
HDLC SERPL-MCNC: 43 MG/DL
HGB BLD-MCNC: 9.4 G/DL (ref 11.9–15.1)
IMM GRANULOCYTES # BLD AUTO: 0.32 K/UL (ref 0–0.3)
IMM GRANULOCYTES NFR BLD: 3 %
LDLC SERPL CALC-MCNC: 76 MG/DL (ref 0–100)
LYMPHOCYTES NFR BLD: 3.52 K/UL (ref 1.1–3.7)
LYMPHOCYTES RELATIVE PERCENT: 32 % (ref 24–43)
MCH RBC QN AUTO: 20.4 PG (ref 25.2–33.5)
MCHC RBC AUTO-ENTMCNC: 28.4 G/DL (ref 28.4–34.8)
MCV RBC AUTO: 72 FL (ref 82.6–102.9)
MONOCYTES NFR BLD: 0.97 K/UL (ref 0.1–1.2)
MONOCYTES NFR BLD: 9 % (ref 3–12)
NEUTROPHILS NFR BLD: 51 % (ref 36–65)
NEUTS SEG NFR BLD: 5.74 K/UL (ref 1.5–8.1)
NRBC BLD-RTO: 0 PER 100 WBC
PLATELET # BLD AUTO: 383 K/UL (ref 138–453)
PMV BLD AUTO: 7.8 FL (ref 8.1–13.5)
POTASSIUM SERPL-SCNC: 4.3 MMOL/L (ref 3.7–5.3)
PROT SERPL-MCNC: 7.1 G/DL (ref 6.6–8.7)
RBC # BLD AUTO: 4.6 M/UL (ref 3.95–5.11)
RBC # BLD: ABNORMAL 10*6/UL
SODIUM SERPL-SCNC: 139 MMOL/L (ref 136–145)
TRIGL SERPL-MCNC: 206 MG/DL
TSH SERPL DL<=0.05 MIU/L-ACNC: 2.7 UIU/ML (ref 0.27–4.2)
VLDLC SERPL CALC-MCNC: 41 MG/DL (ref 1–30)
WBC OTHER # BLD: 11.1 K/UL (ref 3.5–11.3)

## 2025-04-29 PROCEDURE — 99203 OFFICE O/P NEW LOW 30 MIN: CPT | Performed by: NURSE PRACTITIONER

## 2025-04-29 PROCEDURE — 36415 COLL VENOUS BLD VENIPUNCTURE: CPT

## 2025-04-29 PROCEDURE — 80061 LIPID PANEL: CPT

## 2025-04-29 PROCEDURE — 1036F TOBACCO NON-USER: CPT | Performed by: NURSE PRACTITIONER

## 2025-04-29 PROCEDURE — 3078F DIAST BP <80 MM HG: CPT | Performed by: NURSE PRACTITIONER

## 2025-04-29 PROCEDURE — 84443 ASSAY THYROID STIM HORMONE: CPT

## 2025-04-29 PROCEDURE — G8427 DOCREV CUR MEDS BY ELIG CLIN: HCPCS | Performed by: NURSE PRACTITIONER

## 2025-04-29 PROCEDURE — 85025 COMPLETE CBC W/AUTO DIFF WBC: CPT

## 2025-04-29 PROCEDURE — 83036 HEMOGLOBIN GLYCOSYLATED A1C: CPT

## 2025-04-29 PROCEDURE — 80053 COMPREHEN METABOLIC PANEL: CPT

## 2025-04-29 PROCEDURE — 82306 VITAMIN D 25 HYDROXY: CPT

## 2025-04-29 PROCEDURE — 3074F SYST BP LT 130 MM HG: CPT | Performed by: NURSE PRACTITIONER

## 2025-04-29 PROCEDURE — G8417 CALC BMI ABV UP PARAM F/U: HCPCS | Performed by: NURSE PRACTITIONER

## 2025-04-29 PROCEDURE — 3017F COLORECTAL CA SCREEN DOC REV: CPT | Performed by: NURSE PRACTITIONER

## 2025-04-29 RX ORDER — POLYETHYLENE GLYCOL 3350 17 G/17G
POWDER, FOR SOLUTION ORAL
Qty: 238 G | Refills: 0 | Status: SHIPPED | OUTPATIENT
Start: 2025-04-29

## 2025-04-29 RX ORDER — HYDROXYZINE HYDROCHLORIDE 25 MG/1
TABLET, FILM COATED ORAL
COMMUNITY
Start: 2025-04-24

## 2025-04-29 RX ORDER — BISACODYL 5 MG
TABLET, DELAYED RELEASE (ENTERIC COATED) ORAL
Qty: 4 TABLET | Refills: 0 | Status: SHIPPED | OUTPATIENT
Start: 2025-04-29

## 2025-04-30 ENCOUNTER — RESULTS FOLLOW-UP (OUTPATIENT)
Dept: FAMILY MEDICINE CLINIC | Age: 65
End: 2025-04-30

## 2025-05-05 DIAGNOSIS — M81.0 AGE-RELATED OSTEOPOROSIS WITHOUT CURRENT PATHOLOGICAL FRACTURE: Primary | ICD-10-CM

## 2025-05-05 RX ORDER — TERIPARATIDE 250 UG/ML
20 INJECTION, SOLUTION SUBCUTANEOUS DAILY
Qty: 7.44 ML | Refills: 1 | Status: SHIPPED | OUTPATIENT
Start: 2025-05-05

## 2025-05-06 ENCOUNTER — TELEPHONE (OUTPATIENT)
Dept: FAMILY MEDICINE CLINIC | Age: 65
End: 2025-05-06

## 2025-05-08 DIAGNOSIS — G43.009 MIGRAINE WITHOUT AURA AND WITHOUT STATUS MIGRAINOSUS, NOT INTRACTABLE: Primary | ICD-10-CM

## 2025-05-08 RX ORDER — METHYLPREDNISOLONE 4 MG/1
TABLET ORAL
Qty: 1 KIT | Refills: 0 | Status: SHIPPED | OUTPATIENT
Start: 2025-05-08 | End: 2025-05-14

## 2025-05-08 NOTE — TELEPHONE ENCOUNTER
ED records were reviewed- looks like she struck her face without loc; CTA head/ facial bones/neck showed no acute findings. If the pain is specifically new left temporal, she needs ESR/CRP to cover for vasculitis, hopefully unlikely given the history. No h/o DM- I'd recommend medrol dospak unless she has had issues with steroids in the past.

## 2025-05-08 NOTE — TELEPHONE ENCOUNTER
Patient notified; voiced understanding. She is agreeable with having these labs drawn--orders placed. Patient is also interested in trying the Medrol dospak. Prescription attached and set to pharmacy of patient's choice.

## 2025-05-08 NOTE — TELEPHONE ENCOUNTER
Patient called into the office about a fall and then a concussion she experienced in April. She states that since then she has been experiencing a sharp pain in her left side, and her migraine medication is not helping. Please advise as Dr. Amador is out of the office.

## 2025-05-09 ENCOUNTER — HOSPITAL ENCOUNTER (OUTPATIENT)
Age: 65
Discharge: HOME | End: 2025-05-09
Payer: MEDICAID

## 2025-05-09 DIAGNOSIS — G43.009 MIGRAINE WITHOUT AURA AND WITHOUT STATUS MIGRAINOSUS, NOT INTRACTABLE: ICD-10-CM

## 2025-05-09 LAB
CRP SERPL HS-MCNC: <3 MG/L (ref 0–5)
ERYTHROCYTE [SEDIMENTATION RATE] IN BLOOD BY PHOTOMETRIC METHOD: 80 MM/HR (ref 0–30)

## 2025-05-09 PROCEDURE — 36415 COLL VENOUS BLD VENIPUNCTURE: CPT

## 2025-05-09 PROCEDURE — 85652 RBC SED RATE AUTOMATED: CPT

## 2025-05-09 PROCEDURE — 86140 C-REACTIVE PROTEIN: CPT

## 2025-05-12 ENCOUNTER — RESULTS FOLLOW-UP (OUTPATIENT)
Dept: NEUROLOGY | Age: 65
End: 2025-05-12

## 2025-05-20 ENCOUNTER — OFFICE VISIT (OUTPATIENT)
Dept: NEUROLOGY | Age: 65
End: 2025-05-20
Payer: MEDICAID

## 2025-05-20 VITALS
WEIGHT: 293 LBS | HEART RATE: 94 BPM | SYSTOLIC BLOOD PRESSURE: 123 MMHG | BODY MASS INDEX: 45.99 KG/M2 | HEIGHT: 67 IN | DIASTOLIC BLOOD PRESSURE: 72 MMHG

## 2025-05-20 DIAGNOSIS — G25.0 ESSENTIAL TREMOR: ICD-10-CM

## 2025-05-20 DIAGNOSIS — G43.009 MIGRAINE WITHOUT AURA AND WITHOUT STATUS MIGRAINOSUS, NOT INTRACTABLE: Primary | ICD-10-CM

## 2025-05-20 PROCEDURE — G8417 CALC BMI ABV UP PARAM F/U: HCPCS | Performed by: PSYCHIATRY & NEUROLOGY

## 2025-05-20 PROCEDURE — 99214 OFFICE O/P EST MOD 30 MIN: CPT | Performed by: PSYCHIATRY & NEUROLOGY

## 2025-05-20 PROCEDURE — 3017F COLORECTAL CA SCREEN DOC REV: CPT | Performed by: PSYCHIATRY & NEUROLOGY

## 2025-05-20 PROCEDURE — 1036F TOBACCO NON-USER: CPT | Performed by: PSYCHIATRY & NEUROLOGY

## 2025-05-20 PROCEDURE — G8427 DOCREV CUR MEDS BY ELIG CLIN: HCPCS | Performed by: PSYCHIATRY & NEUROLOGY

## 2025-05-20 PROCEDURE — 3078F DIAST BP <80 MM HG: CPT | Performed by: PSYCHIATRY & NEUROLOGY

## 2025-05-20 PROCEDURE — 3074F SYST BP LT 130 MM HG: CPT | Performed by: PSYCHIATRY & NEUROLOGY

## 2025-05-20 RX ORDER — AMITRIPTYLINE HYDROCHLORIDE 50 MG/1
50 TABLET ORAL NIGHTLY
Qty: 90 TABLET | Refills: 1 | Status: SHIPPED | OUTPATIENT
Start: 2025-05-20

## 2025-05-20 ASSESSMENT — ENCOUNTER SYMPTOMS
GASTROINTESTINAL NEGATIVE: 1
RESPIRATORY NEGATIVE: 1
EYES NEGATIVE: 1
ALLERGIC/IMMUNOLOGIC NEGATIVE: 1

## 2025-05-20 NOTE — PROGRESS NOTES
Subjective:      Patient ID: Shaista Ang is a 65 y.o. female.    Active problem common migraine headaches on elavil and topamax adding aimovig to change abortive to maxalt . Essential tremor on mysoline . The condition is she reports that headaches are better having headaches every 2 weeks . These are bilateral frontal temporal head of pressure quality of grade 7 over 10 attenuated with maxalt . She is on  aimovig 70 mg SQ q month ,  topamax 100 mg po bid , elavil 50 mg po qhs . She had a fall in April falling out of bed while sleeping landing on her left side hitting head having a bruising on left jaw area developing intractable headache on left side on left parietal head area of grade 10 over 10 . She went to [facility] getting Head CT , CT cervical spine and CT of facial bones which were navin with headache going away after she was placed on prednisone taper two weeks ago having two headaches since then . Mysoline 50 mg po qhs has attenuated hand tremor to larger degree  . She has social anxiety when she leaves the house placed on klonopin 0,5 mg po bid PRN . There has been also stress with  dealing with kidney cancer. There is family history of tremor . She can not use NSAID with prior bariatric surgery. Weather fronts may be trigger headaches.  Significant medications aimovig 70 mg SQ q month ,  topamax 100 mg po bid , elavil 50 mg po qhs , maxalt 10 mg PRN, mysoline 50 mg po qhs  . Previously on  imitrex 100 mg PRN, imitrex SQ PRN  . Testing Head CT normal , May 2020. MRI of Head normal      Past Medical History:   Diagnosis Date    Acromioclavicular joint arthritis 04/18/2016    Acute gastroenteritis 10/11/2017    Acute gastroenteritis 10/11/2017    Anemia 12/11/2018    Anxiety     Arthritis     Asthma 1980    On Inhaler    Bursitis     left shoulder    Chronic right shoulder pain 07/24/2018    Closed fracture of left tibial plateau 07/16/2019    Closed fracture of left tibial plateau

## 2025-06-13 RX ORDER — PRIMIDONE 50 MG/1
50 TABLET ORAL NIGHTLY
Qty: 90 TABLET | Refills: 1 | Status: SHIPPED | OUTPATIENT
Start: 2025-06-13

## 2025-06-13 NOTE — TELEPHONE ENCOUNTER
Pharmacy requesting refill of Primidone 50mg QD.      Medication active on med list yes      Date of last fill: 1/6/25 #90 R-1  verified on 6/13/2025   verified by VS LPN      Date of last appointment 5/20/25    Next Visit Date:  9/2/2025

## 2025-06-28 DIAGNOSIS — F41.9 ANXIETY: ICD-10-CM

## 2025-06-30 DIAGNOSIS — G43.009 MIGRAINE WITHOUT AURA AND WITHOUT STATUS MIGRAINOSUS, NOT INTRACTABLE: ICD-10-CM

## 2025-06-30 RX ORDER — BUSPIRONE HYDROCHLORIDE 10 MG/1
10 TABLET ORAL 3 TIMES DAILY
Qty: 90 TABLET | Refills: 3 | Status: SHIPPED | OUTPATIENT
Start: 2025-06-30

## 2025-06-30 NOTE — TELEPHONE ENCOUNTER
Pharmacy requesting refill of Aimovig 70 mg.      Medication active on med list yes      Date of last Rx: 1/21/2025 with 5 refills          verified by BREANNA, RICOA      Date of last appointment 5/20/2025    Next Visit Date:  9/2/2025

## 2025-06-30 NOTE — TELEPHONE ENCOUNTER
Shaista Ang is calling to request a refill on the following medication(s):    Medication Request:  Requested Prescriptions     Pending Prescriptions Disp Refills    busPIRone (BUSPAR) 10 MG tablet [Pharmacy Med Name: busPIRone HCl 10 MG Oral Tablet] 90 tablet 0     Sig: TAKE 1 TABLET BY MOUTH THREE TIMES DAILY       Last Visit Date (If Applicable):  4/3/2025    Next Visit Date:    10/3/2025                
yes

## 2025-07-01 RX ORDER — ERENUMAB-AOOE 70 MG/ML
INJECTION SUBCUTANEOUS
Qty: 1 ML | Refills: 5 | Status: SHIPPED | OUTPATIENT
Start: 2025-07-01

## 2025-07-02 ENCOUNTER — PATIENT MESSAGE (OUTPATIENT)
Dept: FAMILY MEDICINE CLINIC | Age: 65
End: 2025-07-02

## 2025-07-03 DIAGNOSIS — M81.0 AGE-RELATED OSTEOPOROSIS WITHOUT CURRENT PATHOLOGICAL FRACTURE: ICD-10-CM

## 2025-07-03 RX ORDER — TERIPARATIDE 250 UG/ML
20 INJECTION, SOLUTION SUBCUTANEOUS DAILY
Qty: 7.44 ML | Refills: 1 | Status: SHIPPED | OUTPATIENT
Start: 2025-07-03

## 2025-07-07 DIAGNOSIS — F41.9 ANXIETY: ICD-10-CM

## 2025-07-07 RX ORDER — BUSPIRONE HYDROCHLORIDE 10 MG/1
10 TABLET ORAL 3 TIMES DAILY
Qty: 90 TABLET | Refills: 3 | Status: SHIPPED | OUTPATIENT
Start: 2025-07-07

## 2025-07-10 DIAGNOSIS — M81.0 AGE-RELATED OSTEOPOROSIS WITHOUT CURRENT PATHOLOGICAL FRACTURE: ICD-10-CM

## 2025-07-10 RX ORDER — TERIPARATIDE 250 UG/ML
20 INJECTION, SOLUTION SUBCUTANEOUS DAILY
Qty: 7.44 ML | Refills: 1 | Status: SHIPPED | OUTPATIENT
Start: 2025-07-10

## 2025-07-10 NOTE — TELEPHONE ENCOUNTER
Shaista Ang is calling to request a refill on the following medication(s):    Medication Request:  Requested Prescriptions     Pending Prescriptions Disp Refills    teriparatide (FORTEO) 620 MCG/2.48ML SOPN injection 7.44 mL 1     Sig: Inject 0.08 mLs into the skin daily       Last Visit Date (If Applicable):  4/3/2025    Next Visit Date:    10/3/2025

## 2025-07-12 ENCOUNTER — PATIENT MESSAGE (OUTPATIENT)
Dept: PULMONOLOGY | Age: 65
End: 2025-07-12

## 2025-07-12 DIAGNOSIS — J45.20 MILD INTERMITTENT ASTHMA WITHOUT COMPLICATION: ICD-10-CM

## 2025-07-14 ENCOUNTER — TELEPHONE (OUTPATIENT)
Dept: NEUROLOGY | Age: 65
End: 2025-07-14

## 2025-07-14 RX ORDER — MONTELUKAST SODIUM 10 MG/1
10 TABLET ORAL NIGHTLY
Qty: 90 TABLET | Refills: 0 | Status: SHIPPED | OUTPATIENT
Start: 2025-07-14

## 2025-07-14 NOTE — TELEPHONE ENCOUNTER
LAST VISIT: 9/5/24  NEXT VISIT: 9/8/25    Per last dictation patient to continue this medication. Please sign for refill if ok. Thank you.

## 2025-07-18 DIAGNOSIS — M81.0 AGE-RELATED OSTEOPOROSIS WITHOUT CURRENT PATHOLOGICAL FRACTURE: Primary | ICD-10-CM

## 2025-07-18 RX ORDER — TERIPARATIDE 250 UG/ML
20 INJECTION, SOLUTION SUBCUTANEOUS DAILY
Qty: 8 ML | Refills: 1 | Status: SHIPPED | OUTPATIENT
Start: 2025-07-18

## 2025-07-21 ENCOUNTER — TELEPHONE (OUTPATIENT)
Dept: FAMILY MEDICINE CLINIC | Age: 65
End: 2025-07-21

## 2025-07-21 RX ORDER — ALPRAZOLAM 0.5 MG
TABLET ORAL
COMMUNITY
Start: 2025-07-21

## 2025-07-21 NOTE — TELEPHONE ENCOUNTER
The patient is calling to follow up on her PA denial from the insurance . The patient stated that her Forteo was denied  due to not trying at least three of the preferred medications . She is following up to see what is next.

## 2025-07-30 DIAGNOSIS — M81.0 AGE-RELATED OSTEOPOROSIS WITHOUT CURRENT PATHOLOGICAL FRACTURE: Primary | ICD-10-CM

## 2025-07-30 RX ORDER — IBANDRONATE SODIUM 150 MG/1
150 TABLET, FILM COATED ORAL
Qty: 3 TABLET | Refills: 3 | Status: SHIPPED | OUTPATIENT
Start: 2025-07-30

## 2025-08-09 DIAGNOSIS — F43.22 ADJUSTMENT DISORDER WITH ANXIETY: ICD-10-CM

## 2025-08-11 RX ORDER — SERTRALINE HYDROCHLORIDE 100 MG/1
100 TABLET, FILM COATED ORAL DAILY
Qty: 90 TABLET | Refills: 1 | Status: SHIPPED | OUTPATIENT
Start: 2025-08-11

## 2025-08-25 DIAGNOSIS — I10 ESSENTIAL HYPERTENSION: Primary | ICD-10-CM

## 2025-08-26 ENCOUNTER — HOSPITAL ENCOUNTER (OUTPATIENT)
Dept: CT IMAGING | Age: 65
Discharge: HOME | End: 2025-08-28
Attending: INTERNAL MEDICINE
Payer: MEDICAID

## 2025-08-26 ENCOUNTER — HOSPITAL ENCOUNTER (OUTPATIENT)
Dept: LAB | Age: 65
Discharge: HOME | End: 2025-08-26
Attending: INTERNAL MEDICINE
Payer: MEDICAID

## 2025-08-26 VITALS — HEART RATE: 59 BPM | DIASTOLIC BLOOD PRESSURE: 49 MMHG | SYSTOLIC BLOOD PRESSURE: 120 MMHG

## 2025-08-26 DIAGNOSIS — I10 ESSENTIAL HYPERTENSION: ICD-10-CM

## 2025-08-26 DIAGNOSIS — R06.02 SHORTNESS OF BREATH ON EXERTION: ICD-10-CM

## 2025-08-26 DIAGNOSIS — I20.89 ANGINAL EQUIVALENT: ICD-10-CM

## 2025-08-26 LAB
ANION GAP SERPL CALCULATED.3IONS-SCNC: 14 MMOL/L (ref 9–16)
BUN SERPL-MCNC: 11 MG/DL (ref 8–23)
CALCIUM SERPL-MCNC: 9.3 MG/DL (ref 8.6–10.4)
CHLORIDE SERPL-SCNC: 104 MMOL/L (ref 98–107)
CO2 SERPL-SCNC: 23 MMOL/L (ref 20–31)
CREAT SERPL-MCNC: 1 MG/DL (ref 0.7–1.2)
GFR, ESTIMATED: 63 ML/MIN/1.73M2
GLUCOSE SERPL-MCNC: 97 MG/DL (ref 74–99)
POTASSIUM SERPL-SCNC: 4.2 MMOL/L (ref 3.7–5.3)
SODIUM SERPL-SCNC: 141 MMOL/L (ref 136–145)

## 2025-08-26 PROCEDURE — 80048 BASIC METABOLIC PNL TOTAL CA: CPT

## 2025-08-26 PROCEDURE — 2580000003 HC RX 258: Performed by: INTERNAL MEDICINE

## 2025-08-26 PROCEDURE — 2500000003 HC RX 250 WO HCPCS: Performed by: INTERNAL MEDICINE

## 2025-08-26 PROCEDURE — 6360000004 HC RX CONTRAST MEDICATION: Performed by: INTERNAL MEDICINE

## 2025-08-26 PROCEDURE — 6370000000 HC RX 637 (ALT 250 FOR IP): Performed by: INTERNAL MEDICINE

## 2025-08-26 PROCEDURE — 75574 CT ANGIO HRT W/3D IMAGE: CPT

## 2025-08-26 PROCEDURE — 36415 COLL VENOUS BLD VENIPUNCTURE: CPT

## 2025-08-26 RX ORDER — 0.9 % SODIUM CHLORIDE 0.9 %
100 INTRAVENOUS SOLUTION INTRAVENOUS ONCE
Status: COMPLETED | OUTPATIENT
Start: 2025-08-26 | End: 2025-08-26

## 2025-08-26 RX ORDER — IOPAMIDOL 755 MG/ML
100 INJECTION, SOLUTION INTRAVASCULAR
Status: COMPLETED | OUTPATIENT
Start: 2025-08-26 | End: 2025-08-26

## 2025-08-26 RX ORDER — METOPROLOL TARTRATE 50 MG
100 TABLET ORAL ONCE
Status: COMPLETED | OUTPATIENT
Start: 2025-08-26 | End: 2025-08-26

## 2025-08-26 RX ORDER — SODIUM CHLORIDE 0.9 % (FLUSH) 0.9 %
10 SYRINGE (ML) INJECTION PRN
Status: DISCONTINUED | OUTPATIENT
Start: 2025-08-26 | End: 2025-08-29 | Stop reason: HOSPADM

## 2025-08-26 RX ORDER — NITROGLYCERIN 0.4 MG/1
0.4 TABLET SUBLINGUAL ONCE
Status: DISCONTINUED | OUTPATIENT
Start: 2025-08-26 | End: 2025-08-29 | Stop reason: HOSPADM

## 2025-08-26 RX ADMIN — IOPAMIDOL 100 ML: 755 INJECTION, SOLUTION INTRAVENOUS at 08:39

## 2025-08-26 RX ADMIN — METOPROLOL 100 MG: 100 TABLET ORAL at 07:47

## 2025-08-26 RX ADMIN — SODIUM CHLORIDE, PRESERVATIVE FREE 10 ML: 5 INJECTION INTRAVENOUS at 08:39

## 2025-08-26 RX ADMIN — SODIUM CHLORIDE 100 ML: 9 INJECTION, SOLUTION INTRAVENOUS at 08:39

## 2025-09-02 ENCOUNTER — OFFICE VISIT (OUTPATIENT)
Dept: NEUROLOGY | Age: 65
End: 2025-09-02
Payer: MEDICAID

## 2025-09-02 ENCOUNTER — TELEPHONE (OUTPATIENT)
Dept: NEUROLOGY | Age: 65
End: 2025-09-02

## 2025-09-02 VITALS
HEART RATE: 99 BPM | DIASTOLIC BLOOD PRESSURE: 78 MMHG | HEIGHT: 66 IN | WEIGHT: 293 LBS | BODY MASS INDEX: 47.09 KG/M2 | SYSTOLIC BLOOD PRESSURE: 130 MMHG

## 2025-09-02 DIAGNOSIS — G43.009 MIGRAINE WITHOUT AURA AND WITHOUT STATUS MIGRAINOSUS, NOT INTRACTABLE: Primary | ICD-10-CM

## 2025-09-02 DIAGNOSIS — G47.52 REM SLEEP BEHAVIOR DISORDER: ICD-10-CM

## 2025-09-02 DIAGNOSIS — G25.0 ESSENTIAL TREMOR: ICD-10-CM

## 2025-09-02 DIAGNOSIS — G47.33 OBSTRUCTIVE SLEEP APNEA SYNDROME: ICD-10-CM

## 2025-09-02 PROCEDURE — 99214 OFFICE O/P EST MOD 30 MIN: CPT | Performed by: PSYCHIATRY & NEUROLOGY

## 2025-09-02 PROCEDURE — G8427 DOCREV CUR MEDS BY ELIG CLIN: HCPCS | Performed by: PSYCHIATRY & NEUROLOGY

## 2025-09-02 PROCEDURE — G8417 CALC BMI ABV UP PARAM F/U: HCPCS | Performed by: PSYCHIATRY & NEUROLOGY

## 2025-09-02 PROCEDURE — 3075F SYST BP GE 130 - 139MM HG: CPT | Performed by: PSYCHIATRY & NEUROLOGY

## 2025-09-02 PROCEDURE — 3078F DIAST BP <80 MM HG: CPT | Performed by: PSYCHIATRY & NEUROLOGY

## 2025-09-02 PROCEDURE — 3017F COLORECTAL CA SCREEN DOC REV: CPT | Performed by: PSYCHIATRY & NEUROLOGY

## 2025-09-02 PROCEDURE — 1036F TOBACCO NON-USER: CPT | Performed by: PSYCHIATRY & NEUROLOGY

## 2025-09-02 RX ORDER — RIZATRIPTAN BENZOATE 10 MG/1
TABLET ORAL
Qty: 18 TABLET | Refills: 5 | Status: SHIPPED | OUTPATIENT
Start: 2025-09-02

## 2025-09-02 RX ORDER — TOPIRAMATE 100 MG/1
100 TABLET, FILM COATED ORAL 2 TIMES DAILY
Qty: 180 TABLET | Refills: 1 | Status: SHIPPED | OUTPATIENT
Start: 2025-09-02 | End: 2026-09-02

## 2025-09-02 ASSESSMENT — ENCOUNTER SYMPTOMS
GASTROINTESTINAL NEGATIVE: 1
EYES NEGATIVE: 1
RESPIRATORY NEGATIVE: 1
ALLERGIC/IMMUNOLOGIC NEGATIVE: 1

## (undated) DEVICE — THIN OFFSET (9.0 X 0.38 X 25.0MM)

## (undated) DEVICE — ELECTROSURGICAL PENCIL BUTTON SWITCH E-Z CLEAN COATED BLADE ELECTRODE 10 FT (3 M) CORD HOLSTER: Brand: MEGADYNE

## (undated) DEVICE — GAUZE,SPONGE,FLUFF,6"X6.75",STRL,5/TRAY: Brand: MEDLINE

## (undated) DEVICE — SYRINGE IRRIG 60ML SFT PLIABLE BLB EZ TO GRP 1 HND USE W/

## (undated) DEVICE — DRESSING PETRO W3XL8IN OIL EMUL N ADH GZ KNIT IMPREG CELOS

## (undated) DEVICE — SUTURE STRATAFIX SPRL MCRYL + SZ 2 0 L27IN ABSRB UD W NDL SXMP1B419

## (undated) DEVICE — ZIMMER® STERILE DISPOSABLE TOURNIQUET CUFF WITH PROTECTIVE SLEEVE AND PLC, DUAL PORT, SINGLE BLADDER, 34 IN. (86 CM)

## (undated) DEVICE — GLOVE ORANGE PI 7   MSG9070

## (undated) DEVICE — COVER,TABLE,60X90,STERILE: Brand: MEDLINE

## (undated) DEVICE — Z DISCONTINUED BY MEDLINE USE 2711682 TRAY SKIN PREP DRY W/ PREM GLV

## (undated) DEVICE — GLOVE SURG SZ 75 L12IN FNGR THK87MIL WHT LTX FREE

## (undated) DEVICE — Device

## (undated) DEVICE — 1200CC GUARDIAN II: Brand: GUARDIAN

## (undated) DEVICE — 20 ML SYRINGE LUER-LOCK TIP: Brand: MONOJECT

## (undated) DEVICE — BIT DRL L250MM DIA2.5MM CALIB L95MM QUIK CPL W/ STP FOR

## (undated) DEVICE — TOWEL,OR,DSP,ST,BLUE,STD,4/PK,20PK/CS: Brand: MEDLINE

## (undated) DEVICE — MARKER,SKIN,WI/RULER AND LABELS: Brand: MEDLINE

## (undated) DEVICE — Device: Brand: LEVEL 1

## (undated) DEVICE — TUBING PMP L16FT MAIN DISP FOR AR-6400 AR-6475

## (undated) DEVICE — 3M™ WARMING BLANKET, LOWER BODY, 10 PER CASE, 42568: Brand: BAIR HUGGER™

## (undated) DEVICE — SUTURE MCRYL + SZ 4-0 L27IN ABSRB UD L19MM PS-2 3/8 CIR MCP426H

## (undated) DEVICE — SHEET, ORTHO, SPLIT, STERILE: Brand: MEDLINE

## (undated) DEVICE — YANKAUER,FLEXIBLE HANDLE,REGLR CAPACITY: Brand: MEDLINE INDUSTRIES, INC.

## (undated) DEVICE — Z DISCONTINUED PER MEDLINE DRESSING ALG W9XL10CM SIL CVR WTRPRF VIR BACT BARR ANTIMIC

## (undated) DEVICE — SOLUTION IV IRRIG WATER 1000ML POUR BRL 2F7114

## (undated) DEVICE — DRESSING,GAUZE,XEROFORM,CURAD,5"X9",ST: Brand: CURAD

## (undated) DEVICE — [AGGRESSIVE PLUS CUTTER, ARTHROSCOPIC SHAVER BLADE,  DO NOT RESTERILIZE,  DO NOT USE IF PACKAGE IS DAMAGED,  KEEP DRY,  KEEP AWAY FROM SUNLIGHT]: Brand: FORMULA

## (undated) DEVICE — SURGICAL PROCEDURE PACK GWN W/ BTC A

## (undated) DEVICE — SCREW BNE L48MM DIA3.5MM CORT S STL ST NONCANNULATED LOK
Type: IMPLANTABLE DEVICE | Site: LEG | Status: NON-FUNCTIONAL
Removed: 2018-12-05

## (undated) DEVICE — BANDAGE COBAN 4 IN COMPR W4INXL5YD FOAM COHESIVE QUIK STK SELF ADH SFT

## (undated) DEVICE — STERILE POLYISOPRENE POWDER-FREE SURGICAL GLOVES WITH EMOLLIENT COATING: Brand: PROTEXIS

## (undated) DEVICE — KIT DRN FLAT W/ 100CC EVAC 10MM FULL PERF

## (undated) DEVICE — GLOVE SURG SZ 65 L12IN FNGR THK87MIL WHT LTX FREE

## (undated) DEVICE — GOWN,AURORA,NONREINFORCED,LARGE: Brand: MEDLINE

## (undated) DEVICE — CATHETER IV 14GA L1.25IN TEF STR HUB INTROCAN SFTY

## (undated) DEVICE — GLOVE SURG SZ 7 L12IN FNGR THK87MIL WHT LTX FREE

## (undated) DEVICE — BANDAGE,GAUZE,BULKEE II,4.5"X4.1YD,STRL: Brand: MEDLINE

## (undated) DEVICE — BNDG,ELSTC,MATRIX,STRL,6"X5YD,LF,HOOK&LP: Brand: MEDLINE

## (undated) DEVICE — BNDG,ELSTC,MATRIX,STRL,4"X5YD,LF,HOOK&LP: Brand: MEDLINE

## (undated) DEVICE — CHLORAPREP 26ML ORANGE

## (undated) DEVICE — GLOVE SURG SZ 65 THK91MIL LTX FREE SYN POLYISOPRENE

## (undated) DEVICE — COUNTER NDL 10 COUNT HLD 20 FOAM BLK SGL MAG

## (undated) DEVICE — SPONGE LAP W18XL18IN WHT COT 4 PLY FLD STRUNG RADPQ DISP ST

## (undated) DEVICE — 3M™ STERI-DRAPE™ U-DRAPE 1015: Brand: STERI-DRAPE™

## (undated) DEVICE — BLADE ES L6IN ELASTOMERIC COAT EXT DURABLE BEND UPTO 90DEG

## (undated) DEVICE — C-ARMOR C-ARM EQUIPMENT COVERS CLEAR STERILE UNIVERSAL FIT 12 PER CASE: Brand: C-ARMOR

## (undated) DEVICE — GOWN,AURORA,NONRNF,XL,30/CS: Brand: MEDLINE

## (undated) DEVICE — PADDING UNDERCAST W6INXL4YD WYTEX 6 PER BG

## (undated) DEVICE — SUTURE VCRL SZ 3-0 L27IN ABSRB UD L26MM SH 1/2 CIR J416H

## (undated) DEVICE — INTENDED FOR TISSUE SEPARATION, AND OTHER PROCEDURES THAT REQUIRE A SHARP SURGICAL BLADE TO PUNCTURE OR CUT.: Brand: BARD-PARKER ® CARBON RIB-BACK BLADES

## (undated) DEVICE — 1010 S-DRAPE TOWEL DRAPE 10/BX: Brand: STERI-DRAPE™

## (undated) DEVICE — 3M™ IOBAN™ 2 ANTIMICROBIAL INCISE DRAPE 6650EZ: Brand: IOBAN™ 2

## (undated) DEVICE — SUTURE STRATAFIX SYMMETRIC PDS + SZ 0 L18IN ABSRB VLT CT SXPP1A406

## (undated) DEVICE — GARMENT,MEDLINE,DVT,INT,CALF,MED, GEN2: Brand: MEDLINE

## (undated) DEVICE — SUTURE PERMAHAND SZ 0 L30IN NONABSORBABLE BLK FSL L30MM 3/8 680H

## (undated) DEVICE — SOLUTION IV IRRIG LACTATED RINGERS 3000ML 2B7487

## (undated) DEVICE — CLAMP EXT FIX L MULTIPIN 4 POS

## (undated) DEVICE — MASTISOL ADHESIVE LIQ 2/3ML

## (undated) DEVICE — SUTURE MCRYL + SZ 3-0 L27IN ABSRB UD PS1 L24MM 3/8 CIR REV MCP936H

## (undated) DEVICE — YANKAUER,SMOOTH HANDLE,HIGH CAPACITY: Brand: MEDLINE INDUSTRIES, INC.

## (undated) DEVICE — STAPLER SKIN SQ 30 ABSRB STPL DISP INSORB

## (undated) DEVICE — TUBING, SUCTION, 1/4" X 12', STRAIGHT: Brand: MEDLINE

## (undated) DEVICE — STRIP,CLOSURE,WOUND,MEDI-STRIP,1/2X4: Brand: MEDLINE

## (undated) DEVICE — STOCKINETTE ORTH UNIV W4INXL25YD COT W DISPNS BX PROTOUCH

## (undated) DEVICE — SOLUTION IV IRRIG POUR BRL 0.9% SODIUM CHL 2F7124

## (undated) DEVICE — HYPODERMIC SAFETY NEEDLE: Brand: MAGELLAN

## (undated) DEVICE — ST CHARLES MAJOR ABDOMINAL PK: Brand: MEDLINE INDUSTRIES, INC.

## (undated) DEVICE — TUBE ET DIA7.5MM ORAL NSL CUF MURPHY EYE HI LO RADPQ LN

## (undated) DEVICE — SUTURE VCRL + SZ 0 L27IN ABSRB UD L36MM CT-1 1/2 CIR VCPP41D

## (undated) DEVICE — SVMMC HD AND NK PK

## (undated) DEVICE — DRAPE,U/ SHT,SPLIT,PLAS,STERIL: Brand: MEDLINE

## (undated) DEVICE — TUBE IRRIG HNDPC HI FLO TP INTRPULS W/SUCTION TUBE

## (undated) DEVICE — SURGICAL PROCEDURE KIT BASIN MAJ SET UP

## (undated) DEVICE — POUCH INSTR W6.75XL11.5IN FRST 2 PKT ADH FOR ORTH AND

## (undated) DEVICE — BINDER ABD UNISX 9IN 62IN L AND XL UNIV

## (undated) DEVICE — TOWEL,OR,DSP,ST,BLUE,STD,6/PK,12PK/CS: Brand: MEDLINE

## (undated) DEVICE — Z DUP USE 2650846 BRACE KNEE UNIV L46 65CM THGH 76CM LTWT EZ TO USE HNG EZ TO

## (undated) DEVICE — COVER LT HNDL BLU PLAS

## (undated) DEVICE — GLOVE SURG SZ 7 L12IN FNGR THK79MIL GRN LTX FREE

## (undated) DEVICE — DRAPE,REIN 53X77,STERILE: Brand: MEDLINE

## (undated) DEVICE — CULTURETTE AERO/ANEROBIC RED/BLUE BUNDLE

## (undated) DEVICE — TUBING, SUCTION, 3/16" X 10', STRAIGHT: Brand: MEDLINE

## (undated) DEVICE — GLOVE SURG SZ 8 L12IN FNGR THK87MIL WHT LTX FREE

## (undated) DEVICE — GLOVE ORANGE PI 7 1/2   MSG9075

## (undated) DEVICE — YANKAUER,BULB TIP,W/O VENT,RIGID,STERILE: Brand: MEDLINE

## (undated) DEVICE — DRAPE C ARM UNIV W41XL74IN CLR PLAS XR VELC CLSR POLY STRP

## (undated) DEVICE — GOWN,SIRUS,NON REINFRCD,LARGE,SET IN SL: Brand: MEDLINE

## (undated) DEVICE — DRESSING,GAUZE,XEROFORM,CURAD,1"X8",ST: Brand: CURAD

## (undated) DEVICE — GAUZE,SPONGE,4"X4",16PLY,XRAY,STRL,LF: Brand: MEDLINE

## (undated) DEVICE — ZIMMER® STERILE DISPOSABLE TOURNIQUET CUFF WITH PLC, DUAL PORT, SINGLE BLADDER, 34 IN. (86 CM)

## (undated) DEVICE — SUTURE PDS II SZ 0 L60IN ABSRB VLT L48MM CTX 1/2 CIR Z990G

## (undated) DEVICE — PAD,ABDOMINAL,8"X7.5",ST,LF,20/BX: Brand: MEDLINE INDUSTRIES, INC.

## (undated) DEVICE — NEEDLE SPNL 18GA L3.5IN W/ QNCKE SHARPER BVL DURA CLICK

## (undated) DEVICE — 3M™ STERI-STRIP™ COMPOUND BENZOIN TINCTURE 40 BAGS/CARTON 4 CARTONS/CASE C1544: Brand: 3M™ STERI-STRIP™

## (undated) DEVICE — 3M™ WARMING BLANKET, UPPER BODY, 10 PER CASE, 42268: Brand: BAIR HUGGER™

## (undated) DEVICE — GLOVE ORANGE PI 8   MSG9080

## (undated) DEVICE — GLOVE SURG SZ 8 L12IN FNGR THK79MIL GRN LTX FREE

## (undated) DEVICE — SUTURE PDS + SZ 2 0 L27IN ABSRB VLT L36MM CT 1 1 2 CIR PDP339H

## (undated) DEVICE — SUTURE STRATAFIX SYMMETRIC PDS + SZ 1 L18IN ABSRB VLT L48MM SXPP1A400

## (undated) DEVICE — GLOVE SURG SZ 75 STD WHT LTX SYN POLYMER BEAD REINF ANTI RL

## (undated) DEVICE — SKIN SCRUB TRAY: Brand: MEDLINE INDUSTRIES, INC.

## (undated) DEVICE — ORTHO EXT PK

## (undated) DEVICE — SUTURE MCRYL SZ 4-0 L18IN ABSRB UD L16MM PC-3 3/8 CIR PRIM Y845G

## (undated) DEVICE — ADHESIVE SKIN CLSR 0.7ML TOP DERMBND ADV

## (undated) DEVICE — STRAP,POSITIONING,KNEE/BODY,FOAM,4X60": Brand: MEDLINE

## (undated) DEVICE — COVER,MAYO STAND,STERILE: Brand: MEDLINE

## (undated) DEVICE — POSITIONER HD W8XH4XL8.5IN RASPBERRY FOAM SLT

## (undated) DEVICE — ZIMMER® STERILE DISPOSABLE TOURNIQUET CUFF WITH PROTECTIVE SLEEVE AND PLC, DUAL PORT, SINGLE BLADDER, 24 IN. (61 CM)

## (undated) DEVICE — STOCKINETTE,IMPERVIOUS,12X48,STERILE: Brand: MEDLINE